# Patient Record
Sex: MALE | Race: WHITE | NOT HISPANIC OR LATINO | Employment: OTHER | URBAN - METROPOLITAN AREA
[De-identification: names, ages, dates, MRNs, and addresses within clinical notes are randomized per-mention and may not be internally consistent; named-entity substitution may affect disease eponyms.]

---

## 2017-01-26 ENCOUNTER — HOSPITAL ENCOUNTER (OUTPATIENT)
Dept: SLEEP MEDICINE | Facility: HOSPITAL | Age: 62
Discharge: HOME | End: 2017-01-26
Attending: INTERNAL MEDICINE | Admitting: INTERNAL MEDICINE

## 2017-02-24 ENCOUNTER — HOSPITAL ENCOUNTER (OUTPATIENT)
Dept: OTHER | Facility: HOSPITAL | Age: 62
Discharge: HOME | End: 2017-02-24
Attending: FAMILY MEDICINE | Admitting: FAMILY MEDICINE

## 2017-02-24 LAB
ALBUMIN SERPL-MCNC: 4 G/DL (ref 3.5–4.8)
ALBUMIN/GLOB SERPL: 1.5 {RATIO} (ref 1–1.7)
ALP SERPL-CCNC: 71 IU/L (ref 32–91)
ALT SERPL-CCNC: 18 IU/L (ref 17–63)
ANION GAP SERPL CALC-SCNC: 11.8 MMOL/L (ref 10–20)
AST SERPL-CCNC: 20 IU/L (ref 15–41)
BASOPHILS # BLD AUTO: 0.1 10*3/UL (ref 0–0.2)
BASOPHILS NFR BLD AUTO: 1 % (ref 0–2)
BILIRUB SERPL-MCNC: 0.7 MG/DL (ref 0.3–1.2)
BUN SERPL-MCNC: 9 MG/DL (ref 8–20)
BUN/CREAT SERPL: 9 (ref 6.2–20.3)
CALCIUM SERPL-MCNC: 9.4 MG/DL (ref 8.9–10.3)
CHLORIDE SERPL-SCNC: 104 MMOL/L (ref 101–111)
CHOLEST SERPL-MCNC: 140 MG/DL
CHOLEST/HDLC SERPL: 3 {RATIO}
CONV CO2: 29 MMOL/L (ref 22–32)
CONV LDL CHOLESTEROL DIRECT: 72 MG/DL (ref 0–100)
CONV TOTAL PROTEIN: 6.7 G/DL (ref 6.1–7.9)
CREAT UR-MCNC: 1 MG/DL (ref 0.7–1.2)
DIFFERENTIAL METHOD BLD: (no result)
EOSINOPHIL # BLD AUTO: 0.3 10*3/UL (ref 0–0.3)
EOSINOPHIL # BLD AUTO: 3 % (ref 0–3)
ERYTHROCYTE [DISTWIDTH] IN BLOOD BY AUTOMATED COUNT: 13.2 % (ref 11.5–14.5)
GLOBULIN UR ELPH-MCNC: 2.7 G/DL (ref 2.5–3.8)
GLUCOSE SERPL-MCNC: 128 MG/DL (ref 65–99)
HCT VFR BLD AUTO: 42.4 % (ref 40–54)
HDLC SERPL-MCNC: 46 MG/DL
HGB BLD-MCNC: 14.3 G/DL (ref 14–18)
LDLC/HDLC SERPL: 1.6 {RATIO}
LIPID INTERPRETATION: ABNORMAL
LYMPHOCYTES # BLD AUTO: 2 10*3/UL (ref 0.8–4.8)
LYMPHOCYTES NFR BLD AUTO: 21 % (ref 18–42)
MCH RBC QN AUTO: 29.3 PG (ref 26–32)
MCHC RBC AUTO-ENTMCNC: 33.7 G/DL (ref 32–36)
MCV RBC AUTO: 87 FL (ref 80–94)
MONOCYTES # BLD AUTO: 0.8 10*3/UL (ref 0.1–1.3)
MONOCYTES NFR BLD AUTO: 9 % (ref 2–11)
NEUTROPHILS # BLD AUTO: 6.1 10*3/UL (ref 2.3–8.6)
NEUTROPHILS NFR BLD AUTO: 66 % (ref 50–75)
NRBC BLD AUTO-RTO: 0 /100{WBCS}
NRBC/RBC NFR BLD MANUAL: 0 10*3/UL
PLATELET # BLD AUTO: 314 10*3/UL (ref 150–450)
PMV BLD AUTO: 8.1 FL (ref 7.4–10.4)
POTASSIUM SERPL-SCNC: 3.8 MMOL/L (ref 3.6–5.1)
RBC # BLD AUTO: 4.88 10*6/UL (ref 4.6–6)
SODIUM SERPL-SCNC: 141 MMOL/L (ref 136–144)
TRIGL SERPL-MCNC: 129 MG/DL
VLDLC SERPL CALC-MCNC: 21.2 MG/DL
WBC # BLD AUTO: 9.2 10*3/UL (ref 4.5–11.5)

## 2017-03-03 ENCOUNTER — HOSPITAL ENCOUNTER (OUTPATIENT)
Dept: NUCLEAR MEDICINE | Facility: HOSPITAL | Age: 62
Discharge: HOME | End: 2017-03-03
Attending: FAMILY MEDICINE | Admitting: FAMILY MEDICINE

## 2017-05-04 ENCOUNTER — HOSPITAL ENCOUNTER (OUTPATIENT)
Dept: SLEEP MEDICINE | Facility: HOSPITAL | Age: 62
Discharge: HOME | End: 2017-05-04
Attending: INTERNAL MEDICINE | Admitting: INTERNAL MEDICINE

## 2017-12-05 ENCOUNTER — HOSPITAL ENCOUNTER (OUTPATIENT)
Dept: RESPIRATORY THERAPY | Facility: HOSPITAL | Age: 62
Discharge: HOME | End: 2017-12-05
Attending: INTERNAL MEDICINE | Admitting: INTERNAL MEDICINE

## 2017-12-29 ENCOUNTER — HOSPITAL ENCOUNTER (OUTPATIENT)
Dept: FAMILY MEDICINE CLINIC | Facility: CLINIC | Age: 62
Setting detail: SPECIMEN
Discharge: HOME | End: 2017-12-29
Attending: PHYSICIAN ASSISTANT | Admitting: PHYSICIAN ASSISTANT

## 2017-12-29 LAB
ALBUMIN SERPL-MCNC: 4 G/DL (ref 3.5–4.8)
ALBUMIN/GLOB SERPL: 1.3 {RATIO} (ref 1–1.7)
ALP SERPL-CCNC: 66 IU/L (ref 32–91)
ALT SERPL-CCNC: 22 IU/L (ref 17–63)
ANION GAP SERPL CALC-SCNC: 11.8 MMOL/L (ref 10–20)
AST SERPL-CCNC: 24 IU/L (ref 15–41)
BASOPHILS # BLD AUTO: 0 10*3/UL (ref 0–0.2)
BASOPHILS NFR BLD AUTO: 1 % (ref 0–2)
BILIRUB SERPL-MCNC: 0.5 MG/DL (ref 0.3–1.2)
BUN SERPL-MCNC: 11 MG/DL (ref 8–20)
BUN/CREAT SERPL: 12.2 (ref 6.2–20.3)
CALCIUM SERPL-MCNC: 9.7 MG/DL (ref 8.9–10.3)
CHLORIDE SERPL-SCNC: 102 MMOL/L (ref 101–111)
CHOLEST SERPL-MCNC: 159 MG/DL
CHOLEST/HDLC SERPL: 3.2 {RATIO}
CONV CO2: 27 MMOL/L (ref 22–32)
CONV LDL CHOLESTEROL DIRECT: 91 MG/DL (ref 0–100)
CONV TOTAL PROTEIN: 7.1 G/DL (ref 6.1–7.9)
CREAT UR-MCNC: 0.9 MG/DL (ref 0.7–1.2)
DIFFERENTIAL METHOD BLD: (no result)
EOSINOPHIL # BLD AUTO: 0.2 10*3/UL (ref 0–0.3)
EOSINOPHIL # BLD AUTO: 2 % (ref 0–3)
ERYTHROCYTE [DISTWIDTH] IN BLOOD BY AUTOMATED COUNT: 13.6 % (ref 11.5–14.5)
GLOBULIN UR ELPH-MCNC: 3.1 G/DL (ref 2.5–3.8)
GLUCOSE SERPL-MCNC: 77 MG/DL (ref 65–99)
HCT VFR BLD AUTO: 43.2 % (ref 40–54)
HDLC SERPL-MCNC: 50 MG/DL
HGB BLD-MCNC: 14.2 G/DL (ref 14–18)
LDLC/HDLC SERPL: 1.8 {RATIO}
LIPID INTERPRETATION: NORMAL
LYMPHOCYTES # BLD AUTO: 2.1 10*3/UL (ref 0.8–4.8)
LYMPHOCYTES NFR BLD AUTO: 26 % (ref 18–42)
MCH RBC QN AUTO: 29.3 PG (ref 26–32)
MCHC RBC AUTO-ENTMCNC: 32.9 G/DL (ref 32–36)
MCV RBC AUTO: 89.1 FL (ref 80–94)
MONOCYTES # BLD AUTO: 0.9 10*3/UL (ref 0.1–1.3)
MONOCYTES NFR BLD AUTO: 12 % (ref 2–11)
NEUTROPHILS # BLD AUTO: 4.7 10*3/UL (ref 2.3–8.6)
NEUTROPHILS NFR BLD AUTO: 59 % (ref 50–75)
NRBC BLD AUTO-RTO: 0 /100{WBCS}
NRBC/RBC NFR BLD MANUAL: 0 10*3/UL
PLATELET # BLD AUTO: 281 10*3/UL (ref 150–450)
PMV BLD AUTO: 7.6 FL (ref 7.4–10.4)
POTASSIUM SERPL-SCNC: 3.8 MMOL/L (ref 3.6–5.1)
RBC # BLD AUTO: 4.85 10*6/UL (ref 4.6–6)
SODIUM SERPL-SCNC: 137 MMOL/L (ref 136–144)
TRIGL SERPL-MCNC: 121 MG/DL
VLDLC SERPL CALC-MCNC: 18.2 MG/DL
WBC # BLD AUTO: 7.9 10*3/UL (ref 4.5–11.5)

## 2018-05-01 ENCOUNTER — HOSPITAL ENCOUNTER (OUTPATIENT)
Dept: FAMILY MEDICINE CLINIC | Facility: CLINIC | Age: 63
Setting detail: SPECIMEN
Discharge: HOME | End: 2018-05-01
Attending: PHYSICIAN ASSISTANT | Admitting: PHYSICIAN ASSISTANT

## 2018-05-01 LAB
ALBUMIN SERPL-MCNC: 3.9 G/DL (ref 3.5–4.8)
ALBUMIN/GLOB SERPL: 1.3 {RATIO} (ref 1–1.7)
ALP SERPL-CCNC: 77 IU/L (ref 32–91)
ALT SERPL-CCNC: 19 IU/L (ref 17–63)
ANION GAP SERPL CALC-SCNC: 13.6 MMOL/L (ref 10–20)
AST SERPL-CCNC: 17 IU/L (ref 15–41)
BASOPHILS # BLD AUTO: 0.1 10*3/UL (ref 0–0.2)
BASOPHILS NFR BLD AUTO: 0 % (ref 0–2)
BILIRUB SERPL-MCNC: 0.5 MG/DL (ref 0.3–1.2)
BUN SERPL-MCNC: 9 MG/DL (ref 8–20)
BUN/CREAT SERPL: 11.3 (ref 6.2–20.3)
CALCIUM SERPL-MCNC: 9.3 MG/DL (ref 8.9–10.3)
CHLORIDE SERPL-SCNC: 102 MMOL/L (ref 101–111)
CONV CO2: 26 MMOL/L (ref 22–32)
CONV TOTAL PROTEIN: 7 G/DL (ref 6.1–7.9)
CREAT UR-MCNC: 0.8 MG/DL (ref 0.7–1.2)
DIFFERENTIAL METHOD BLD: (no result)
EOSINOPHIL # BLD AUTO: 0.2 10*3/UL (ref 0–0.3)
EOSINOPHIL # BLD AUTO: 2 % (ref 0–3)
ERYTHROCYTE [DISTWIDTH] IN BLOOD BY AUTOMATED COUNT: 13.3 % (ref 11.5–14.5)
GLOBULIN UR ELPH-MCNC: 3.1 G/DL (ref 2.5–3.8)
GLUCOSE SERPL-MCNC: 103 MG/DL (ref 65–99)
HCT VFR BLD AUTO: 42.1 % (ref 40–54)
HGB BLD-MCNC: 13.9 G/DL (ref 14–18)
LYMPHOCYTES # BLD AUTO: 2 10*3/UL (ref 0.8–4.8)
LYMPHOCYTES NFR BLD AUTO: 15 % (ref 18–42)
MCH RBC QN AUTO: 29 PG (ref 26–32)
MCHC RBC AUTO-ENTMCNC: 33 G/DL (ref 32–36)
MCV RBC AUTO: 87.8 FL (ref 80–94)
MONOCYTES # BLD AUTO: 1.4 10*3/UL (ref 0.1–1.3)
MONOCYTES NFR BLD AUTO: 11 % (ref 2–11)
NEUTROPHILS # BLD AUTO: 9.8 10*3/UL (ref 2.3–8.6)
NEUTROPHILS NFR BLD AUTO: 72 % (ref 50–75)
NRBC BLD AUTO-RTO: 0 /100{WBCS}
NRBC/RBC NFR BLD MANUAL: 0 10*3/UL
PLATELET # BLD AUTO: 296 10*3/UL (ref 150–450)
PMV BLD AUTO: 7.9 FL (ref 7.4–10.4)
POTASSIUM SERPL-SCNC: 3.6 MMOL/L (ref 3.6–5.1)
RBC # BLD AUTO: 4.79 10*6/UL (ref 4.6–6)
SODIUM SERPL-SCNC: 138 MMOL/L (ref 136–144)
WBC # BLD AUTO: 13.5 10*3/UL (ref 4.5–11.5)

## 2018-05-02 LAB
EBV AB TO VIRAL CAPSID AG, IGM: NORMAL
HAV IGM SERPL QL IA: NONREACTIVE

## 2018-05-04 ENCOUNTER — HOSPITAL ENCOUNTER (OUTPATIENT)
Dept: FAMILY MEDICINE CLINIC | Facility: CLINIC | Age: 63
Setting detail: SPECIMEN
Discharge: HOME | End: 2018-05-04
Attending: PHYSICIAN ASSISTANT | Admitting: PHYSICIAN ASSISTANT

## 2018-05-04 LAB
BASOPHILS # BLD AUTO: 0.1 10*3/UL (ref 0–0.2)
BASOPHILS NFR BLD AUTO: 1 % (ref 0–2)
BILIRUB UR QL STRIP: NEGATIVE MG/DL
CASTS URNS QL MICRO: NORMAL /[LPF]
COLOR UR: YELLOW
CONV BACTERIA IN URINE MICRO: NEGATIVE
CONV CLARITY OF URINE: CLEAR
CONV HYALINE CASTS IN URINE MICRO: 0 /[LPF] (ref 0–5)
CONV PROTEIN IN URINE BY AUTOMATED TEST STRIP: NEGATIVE MG/DL
CONV SMALL ROUND CELLS: NORMAL /[HPF]
CONV UROBILINOGEN IN URINE BY AUTOMATED TEST STRIP: 0.2 MG/DL
CULTURE INDICATED?: NORMAL
DIFFERENTIAL METHOD BLD: (no result)
EOSINOPHIL # BLD AUTO: 0.2 10*3/UL (ref 0–0.3)
EOSINOPHIL # BLD AUTO: 2 % (ref 0–3)
ERYTHROCYTE [DISTWIDTH] IN BLOOD BY AUTOMATED COUNT: 13.3 % (ref 11.5–14.5)
GLUCOSE UR QL: NEGATIVE MG/DL
HCT VFR BLD AUTO: 38.8 % (ref 40–54)
HGB BLD-MCNC: 12.6 G/DL (ref 14–18)
HGB UR QL STRIP: NEGATIVE
KETONES UR QL STRIP: NEGATIVE MG/DL
LEUKOCYTE ESTERASE UR QL STRIP: NEGATIVE
LYMPHOCYTES # BLD AUTO: 2.4 10*3/UL (ref 0.8–4.8)
LYMPHOCYTES NFR BLD AUTO: 22 % (ref 18–42)
MCH RBC QN AUTO: 28.7 PG (ref 26–32)
MCHC RBC AUTO-ENTMCNC: 32.6 G/DL (ref 32–36)
MCV RBC AUTO: 88.2 FL (ref 80–94)
MONOCYTES # BLD AUTO: 1.1 10*3/UL (ref 0.1–1.3)
MONOCYTES NFR BLD AUTO: 10 % (ref 2–11)
NEUTROPHILS # BLD AUTO: 7.4 10*3/UL (ref 2.3–8.6)
NEUTROPHILS NFR BLD AUTO: 65 % (ref 50–75)
NITRITE UR QL STRIP: NEGATIVE
NRBC BLD AUTO-RTO: 0 /100{WBCS}
NRBC/RBC NFR BLD MANUAL: 0 10*3/UL
PH UR STRIP.AUTO: 5.5 [PH] (ref 4.5–8)
PLATELET # BLD AUTO: 272 10*3/UL (ref 150–450)
PMV BLD AUTO: 7.6 FL (ref 7.4–10.4)
RBC # BLD AUTO: 4.4 10*6/UL (ref 4.6–6)
RBC #/AREA URNS HPF: 0 /[HPF] (ref 0–3)
SP GR UR: 1.02 (ref 1–1.03)
SPERM URNS QL MICRO: NORMAL /[HPF]
SQUAMOUS SPT QL MICRO: 0 /[HPF] (ref 0–5)
UNIDENT CRYS URNS QL MICRO: NORMAL /[HPF]
WBC # BLD AUTO: 11.3 10*3/UL (ref 4.5–11.5)
WBC #/AREA URNS HPF: 0 /[HPF] (ref 0–5)
YEAST SPEC QL WET PREP: NORMAL /[HPF]

## 2018-05-11 ENCOUNTER — HOSPITAL ENCOUNTER (OUTPATIENT)
Dept: FAMILY MEDICINE CLINIC | Facility: CLINIC | Age: 63
Setting detail: SPECIMEN
Discharge: HOME | End: 2018-05-11
Attending: PHYSICIAN ASSISTANT | Admitting: PHYSICIAN ASSISTANT

## 2018-05-11 LAB
BASOPHILS # BLD AUTO: 0.1 10*3/UL (ref 0–0.2)
BASOPHILS NFR BLD AUTO: 1 % (ref 0–2)
DIFFERENTIAL METHOD BLD: (no result)
EOSINOPHIL # BLD AUTO: 0.3 10*3/UL (ref 0–0.3)
EOSINOPHIL # BLD AUTO: 3 % (ref 0–3)
ERYTHROCYTE [DISTWIDTH] IN BLOOD BY AUTOMATED COUNT: 13.1 % (ref 11.5–14.5)
HCT VFR BLD AUTO: 41.2 % (ref 40–54)
HGB BLD-MCNC: 13.7 G/DL (ref 14–18)
LYMPHOCYTES # BLD AUTO: 2.4 10*3/UL (ref 0.8–4.8)
LYMPHOCYTES NFR BLD AUTO: 25 % (ref 18–42)
MCH RBC QN AUTO: 29.2 PG (ref 26–32)
MCHC RBC AUTO-ENTMCNC: 33.1 G/DL (ref 32–36)
MCV RBC AUTO: 88.1 FL (ref 80–94)
MONOCYTES # BLD AUTO: 0.9 10*3/UL (ref 0.1–1.3)
MONOCYTES NFR BLD AUTO: 9 % (ref 2–11)
NEUTROPHILS # BLD AUTO: 6.2 10*3/UL (ref 2.3–8.6)
NEUTROPHILS NFR BLD AUTO: 62 % (ref 50–75)
NRBC BLD AUTO-RTO: 0 /100{WBCS}
NRBC/RBC NFR BLD MANUAL: 0 10*3/UL
PLATELET # BLD AUTO: 346 10*3/UL (ref 150–450)
PMV BLD AUTO: 7.9 FL (ref 7.4–10.4)
RBC # BLD AUTO: 4.68 10*6/UL (ref 4.6–6)
WBC # BLD AUTO: 9.9 10*3/UL (ref 4.5–11.5)

## 2018-06-26 ENCOUNTER — HOSPITAL ENCOUNTER (OUTPATIENT)
Dept: RESPIRATORY THERAPY | Facility: HOSPITAL | Age: 63
Discharge: HOME | End: 2018-06-26
Attending: INTERNAL MEDICINE | Admitting: INTERNAL MEDICINE

## 2018-06-29 ENCOUNTER — HOSPITAL ENCOUNTER (OUTPATIENT)
Dept: FAMILY MEDICINE CLINIC | Facility: CLINIC | Age: 63
Setting detail: SPECIMEN
Discharge: HOME | End: 2018-06-29
Attending: PHYSICIAN ASSISTANT | Admitting: PHYSICIAN ASSISTANT

## 2018-06-29 LAB
CHOLEST SERPL-MCNC: 152 MG/DL
CHOLEST/HDLC SERPL: 3.5 {RATIO}
CONV LDL CHOLESTEROL DIRECT: 87 MG/DL (ref 0–100)
HDLC SERPL-MCNC: 43 MG/DL
LDLC/HDLC SERPL: 2 {RATIO}
LIPID INTERPRETATION: ABNORMAL
TRIGL SERPL-MCNC: 144 MG/DL
VLDLC SERPL CALC-MCNC: 22.4 MG/DL

## 2018-08-08 ENCOUNTER — HOSPITAL ENCOUNTER (OUTPATIENT)
Dept: RESPIRATORY THERAPY | Facility: HOSPITAL | Age: 63
Discharge: HOME | End: 2018-08-08
Attending: INTERNAL MEDICINE | Admitting: INTERNAL MEDICINE

## 2018-08-14 ENCOUNTER — HOSPITAL ENCOUNTER (OUTPATIENT)
Dept: RESPIRATORY THERAPY | Facility: HOSPITAL | Age: 63
Discharge: HOME | End: 2018-08-14
Attending: INTERNAL MEDICINE | Admitting: INTERNAL MEDICINE

## 2018-08-14 ENCOUNTER — HOSPITAL ENCOUNTER (OUTPATIENT)
Dept: ONCOLOGY | Facility: HOSPITAL | Age: 63
Discharge: HOME | End: 2018-08-14
Attending: PHYSICIAN ASSISTANT | Admitting: PHYSICIAN ASSISTANT

## 2018-08-23 ENCOUNTER — HOSPITAL ENCOUNTER (OUTPATIENT)
Dept: SLEEP MEDICINE | Facility: HOSPITAL | Age: 63
Discharge: HOME | End: 2018-08-23
Attending: INTERNAL MEDICINE | Admitting: INTERNAL MEDICINE

## 2018-09-26 ENCOUNTER — HOSPITAL ENCOUNTER (OUTPATIENT)
Dept: RESPIRATORY THERAPY | Facility: HOSPITAL | Age: 63
Discharge: HOME | End: 2018-09-26
Attending: INTERNAL MEDICINE | Admitting: INTERNAL MEDICINE

## 2018-10-12 ENCOUNTER — HOSPITAL ENCOUNTER (OUTPATIENT)
Dept: FAMILY MEDICINE CLINIC | Facility: CLINIC | Age: 63
Setting detail: SPECIMEN
Discharge: HOME | End: 2018-10-12
Attending: PHYSICIAN ASSISTANT | Admitting: PHYSICIAN ASSISTANT

## 2018-10-12 LAB
ALBUMIN SERPL-MCNC: 4.1 G/DL (ref 3.5–4.8)
ALBUMIN/GLOB SERPL: 1.5 {RATIO} (ref 1–1.7)
ALP SERPL-CCNC: 66 IU/L (ref 32–91)
ALT SERPL-CCNC: 23 IU/L (ref 17–63)
ANION GAP SERPL CALC-SCNC: 16.9 MMOL/L (ref 10–20)
AST SERPL-CCNC: 19 IU/L (ref 15–41)
BILIRUB SERPL-MCNC: 0.6 MG/DL (ref 0.3–1.2)
BUN SERPL-MCNC: 10 MG/DL (ref 8–20)
BUN/CREAT SERPL: 11.1 (ref 6.2–20.3)
CALCIUM SERPL-MCNC: 9.5 MG/DL (ref 8.9–10.3)
CHLORIDE SERPL-SCNC: 103 MMOL/L (ref 101–111)
CHOLEST SERPL-MCNC: 148 MG/DL
CHOLEST/HDLC SERPL: 2.5 {RATIO}
CONV CO2: 25 MMOL/L (ref 22–32)
CONV LDL CHOLESTEROL DIRECT: 72 MG/DL (ref 0–100)
CONV TOTAL PROTEIN: 6.9 G/DL (ref 6.1–7.9)
CREAT UR-MCNC: 0.9 MG/DL (ref 0.7–1.2)
GLOBULIN UR ELPH-MCNC: 2.8 G/DL (ref 2.5–3.8)
GLUCOSE SERPL-MCNC: 104 MG/DL (ref 65–99)
HDLC SERPL-MCNC: 59 MG/DL
LDLC/HDLC SERPL: 1.2 {RATIO}
LIPID INTERPRETATION: NORMAL
POTASSIUM SERPL-SCNC: 3.9 MMOL/L (ref 3.6–5.1)
SODIUM SERPL-SCNC: 141 MMOL/L (ref 136–144)
TRIGL SERPL-MCNC: 135 MG/DL
VLDLC SERPL CALC-MCNC: 17.1 MG/DL

## 2018-10-24 ENCOUNTER — HOSPITAL ENCOUNTER (OUTPATIENT)
Dept: RESPIRATORY THERAPY | Facility: HOSPITAL | Age: 63
Discharge: HOME | End: 2018-10-24
Attending: INTERNAL MEDICINE | Admitting: INTERNAL MEDICINE

## 2018-11-02 ENCOUNTER — HOSPITAL ENCOUNTER (OUTPATIENT)
Dept: CT IMAGING | Facility: HOSPITAL | Age: 63
Discharge: HOME | End: 2018-11-02
Attending: INTERNAL MEDICINE | Admitting: INTERNAL MEDICINE

## 2018-11-14 ENCOUNTER — HOSPITAL ENCOUNTER (OUTPATIENT)
Dept: RESPIRATORY THERAPY | Facility: HOSPITAL | Age: 63
Discharge: HOME | End: 2018-11-14
Attending: INTERNAL MEDICINE | Admitting: INTERNAL MEDICINE

## 2018-11-21 ENCOUNTER — OFFICE (OUTPATIENT)
Dept: URBAN - METROPOLITAN AREA CLINIC 64 | Facility: CLINIC | Age: 63
End: 2018-11-21

## 2018-11-21 VITALS
HEART RATE: 77 BPM | DIASTOLIC BLOOD PRESSURE: 67 MMHG | HEIGHT: 68 IN | SYSTOLIC BLOOD PRESSURE: 120 MMHG | WEIGHT: 226 LBS

## 2018-11-21 DIAGNOSIS — R14.0 ABDOMINAL DISTENSION (GASEOUS): ICD-10-CM

## 2018-11-21 DIAGNOSIS — R10.13 EPIGASTRIC PAIN: ICD-10-CM

## 2018-11-21 DIAGNOSIS — Z79.02 LONG TERM (CURRENT) USE OF ANTITHROMBOTICS/ANTIPLATELETS: ICD-10-CM

## 2018-11-21 DIAGNOSIS — R10.11 RIGHT UPPER QUADRANT PAIN: ICD-10-CM

## 2018-11-21 DIAGNOSIS — R19.7 DIARRHEA, UNSPECIFIED: ICD-10-CM

## 2018-11-21 DIAGNOSIS — K80.20 CALCULUS OF GALLBLADDER WITHOUT CHOLECYSTITIS WITHOUT OBSTRU: ICD-10-CM

## 2018-11-21 DIAGNOSIS — R93.2 ABNORMAL FINDINGS ON DIAGNOSTIC IMAGING OF LIVER AND BILIARY: ICD-10-CM

## 2018-11-21 PROCEDURE — 99244 OFF/OP CNSLTJ NEW/EST MOD 40: CPT | Performed by: NURSE PRACTITIONER

## 2018-11-21 RX ORDER — OMEPRAZOLE 40 MG/1
40 CAPSULE, DELAYED RELEASE ORAL
Qty: 30 | Refills: 0 | Status: COMPLETED
Start: 2018-11-21 | End: 2024-05-29

## 2018-11-28 ENCOUNTER — OFFICE (OUTPATIENT)
Dept: URBAN - METROPOLITAN AREA CLINIC 64 | Facility: CLINIC | Age: 63
End: 2018-11-28

## 2018-11-28 VITALS
SYSTOLIC BLOOD PRESSURE: 126 MMHG | HEART RATE: 96 BPM | HEIGHT: 68 IN | WEIGHT: 228 LBS | DIASTOLIC BLOOD PRESSURE: 60 MMHG

## 2018-11-28 DIAGNOSIS — Z79.02 LONG TERM (CURRENT) USE OF ANTITHROMBOTICS/ANTIPLATELETS: ICD-10-CM

## 2018-11-28 DIAGNOSIS — K80.20 CALCULUS OF GALLBLADDER WITHOUT CHOLECYSTITIS WITHOUT OBSTRU: ICD-10-CM

## 2018-11-28 DIAGNOSIS — R19.7 DIARRHEA, UNSPECIFIED: ICD-10-CM

## 2018-11-28 DIAGNOSIS — R10.11 RIGHT UPPER QUADRANT PAIN: ICD-10-CM

## 2018-11-28 DIAGNOSIS — R14.0 ABDOMINAL DISTENSION (GASEOUS): ICD-10-CM

## 2018-11-28 DIAGNOSIS — K75.81 NONALCOHOLIC STEATOHEPATITIS (NASH): ICD-10-CM

## 2018-11-28 DIAGNOSIS — K74.69 OTHER CIRRHOSIS OF LIVER: ICD-10-CM

## 2018-11-28 PROCEDURE — 99213 OFFICE O/P EST LOW 20 MIN: CPT | Performed by: INTERNAL MEDICINE

## 2018-12-03 ENCOUNTER — HOSPITAL ENCOUNTER (OUTPATIENT)
Dept: NUCLEAR MEDICINE | Facility: HOSPITAL | Age: 63
Discharge: HOME | End: 2018-12-03
Attending: INTERNAL MEDICINE | Admitting: INTERNAL MEDICINE

## 2018-12-26 ENCOUNTER — OFFICE (OUTPATIENT)
Dept: URBAN - METROPOLITAN AREA CLINIC 64 | Facility: CLINIC | Age: 63
End: 2018-12-26
Payer: COMMERCIAL

## 2018-12-26 VITALS
DIASTOLIC BLOOD PRESSURE: 78 MMHG | SYSTOLIC BLOOD PRESSURE: 131 MMHG | WEIGHT: 225 LBS | HEART RATE: 92 BPM | HEIGHT: 68 IN

## 2018-12-26 DIAGNOSIS — K80.20 CALCULUS OF GALLBLADDER WITHOUT CHOLECYSTITIS WITHOUT OBSTRU: ICD-10-CM

## 2018-12-26 DIAGNOSIS — Z79.02 LONG TERM (CURRENT) USE OF ANTITHROMBOTICS/ANTIPLATELETS: ICD-10-CM

## 2018-12-26 DIAGNOSIS — R10.13 EPIGASTRIC PAIN: ICD-10-CM

## 2018-12-26 DIAGNOSIS — Z86.010 PERSONAL HISTORY OF COLONIC POLYPS: ICD-10-CM

## 2018-12-26 DIAGNOSIS — K75.81 NONALCOHOLIC STEATOHEPATITIS (NASH): ICD-10-CM

## 2018-12-26 DIAGNOSIS — R93.2 ABNORMAL FINDINGS ON DIAGNOSTIC IMAGING OF LIVER AND BILIARY: ICD-10-CM

## 2018-12-26 DIAGNOSIS — R10.11 RIGHT UPPER QUADRANT PAIN: ICD-10-CM

## 2018-12-26 PROCEDURE — 99213 OFFICE O/P EST LOW 20 MIN: CPT | Performed by: NURSE PRACTITIONER

## 2019-02-12 ENCOUNTER — HOSPITAL ENCOUNTER (OUTPATIENT)
Dept: RESPIRATORY THERAPY | Facility: HOSPITAL | Age: 64
Discharge: HOME | End: 2019-02-12
Attending: NURSE PRACTITIONER | Admitting: NURSE PRACTITIONER

## 2019-02-22 ENCOUNTER — OFFICE (OUTPATIENT)
Dept: URBAN - METROPOLITAN AREA CLINIC 64 | Facility: CLINIC | Age: 64
End: 2019-02-22

## 2019-02-22 VITALS
WEIGHT: 234 LBS | HEART RATE: 84 BPM | HEIGHT: 68 IN | SYSTOLIC BLOOD PRESSURE: 111 MMHG | DIASTOLIC BLOOD PRESSURE: 67 MMHG

## 2019-02-22 DIAGNOSIS — K74.60 UNSPECIFIED CIRRHOSIS OF LIVER: ICD-10-CM

## 2019-02-22 DIAGNOSIS — Z86.010 PERSONAL HISTORY OF COLONIC POLYPS: ICD-10-CM

## 2019-02-22 DIAGNOSIS — K76.9 LIVER DISEASE, UNSPECIFIED: ICD-10-CM

## 2019-02-22 DIAGNOSIS — Z80.0 FAMILY HISTORY OF MALIGNANT NEOPLASM OF DIGESTIVE ORGANS: ICD-10-CM

## 2019-02-22 DIAGNOSIS — Z79.02 LONG TERM (CURRENT) USE OF ANTITHROMBOTICS/ANTIPLATELETS: ICD-10-CM

## 2019-02-22 DIAGNOSIS — K75.81 NONALCOHOLIC STEATOHEPATITIS (NASH): ICD-10-CM

## 2019-02-22 PROCEDURE — 99214 OFFICE O/P EST MOD 30 MIN: CPT | Performed by: INTERNAL MEDICINE

## 2019-02-22 RX ORDER — OMEPRAZOLE 40 MG/1
40 CAPSULE, DELAYED RELEASE ORAL
Qty: 30 | Refills: 0 | Status: COMPLETED
Start: 2018-11-21 | End: 2024-05-29

## 2019-05-17 ENCOUNTER — OFFICE (OUTPATIENT)
Dept: URBAN - METROPOLITAN AREA CLINIC 64 | Facility: CLINIC | Age: 64
End: 2019-05-17

## 2019-05-17 VITALS
DIASTOLIC BLOOD PRESSURE: 77 MMHG | HEIGHT: 68 IN | SYSTOLIC BLOOD PRESSURE: 134 MMHG | WEIGHT: 242 LBS | HEART RATE: 83 BPM

## 2019-05-17 DIAGNOSIS — K74.69 OTHER CIRRHOSIS OF LIVER: ICD-10-CM

## 2019-05-17 DIAGNOSIS — R10.11 RIGHT UPPER QUADRANT PAIN: ICD-10-CM

## 2019-05-17 DIAGNOSIS — R10.13 EPIGASTRIC PAIN: ICD-10-CM

## 2019-05-17 DIAGNOSIS — K76.9 LIVER DISEASE, UNSPECIFIED: ICD-10-CM

## 2019-05-17 PROCEDURE — 99213 OFFICE O/P EST LOW 20 MIN: CPT | Performed by: INTERNAL MEDICINE

## 2019-05-20 ENCOUNTER — HOSPITAL ENCOUNTER (OUTPATIENT)
Dept: FAMILY MEDICINE CLINIC | Facility: CLINIC | Age: 64
Setting detail: SPECIMEN
Discharge: HOME | End: 2019-05-20
Attending: PHYSICIAN ASSISTANT | Admitting: PHYSICIAN ASSISTANT

## 2019-05-20 LAB
ALBUMIN SERPL-MCNC: 4 G/DL (ref 3.5–4.8)
ALBUMIN/GLOB SERPL: 1.5 {RATIO} (ref 1–1.7)
ALP SERPL-CCNC: 68 IU/L (ref 32–91)
ALT SERPL-CCNC: 20 IU/L (ref 17–63)
ANION GAP SERPL CALC-SCNC: 14.2 MMOL/L (ref 10–20)
AST SERPL-CCNC: 21 IU/L (ref 15–41)
BILIRUB SERPL-MCNC: 0.5 MG/DL (ref 0.3–1.2)
BUN SERPL-MCNC: 8 MG/DL (ref 8–20)
BUN/CREAT SERPL: 8.9 (ref 6.2–20.3)
CALCIUM SERPL-MCNC: 9.1 MG/DL (ref 8.9–10.3)
CHLORIDE SERPL-SCNC: 102 MMOL/L (ref 101–111)
CHOLEST SERPL-MCNC: 130 MG/DL
CHOLEST/HDLC SERPL: 3.7 {RATIO}
CONV CO2: 26 MMOL/L (ref 22–32)
CONV LDL CHOLESTEROL DIRECT: 77 MG/DL (ref 0–100)
CONV TOTAL PROTEIN: 6.6 G/DL (ref 6.1–7.9)
CREAT UR-MCNC: 0.9 MG/DL (ref 0.7–1.2)
GLOBULIN UR ELPH-MCNC: 2.6 G/DL (ref 2.5–3.8)
GLUCOSE SERPL-MCNC: 90 MG/DL (ref 65–99)
HDLC SERPL-MCNC: 35 MG/DL
LDLC/HDLC SERPL: 2.2 {RATIO}
LIPID INTERPRETATION: ABNORMAL
POTASSIUM SERPL-SCNC: 4.2 MMOL/L (ref 3.6–5.1)
PSA SERPL-MCNC: 0.61 NG/ML (ref 0–4)
SODIUM SERPL-SCNC: 138 MMOL/L (ref 136–144)
TRIGL SERPL-MCNC: 123 MG/DL
VLDLC SERPL CALC-MCNC: 18 MG/DL

## 2019-06-17 ENCOUNTER — TELEPHONE (OUTPATIENT)
Dept: FAMILY MEDICINE CLINIC | Facility: CLINIC | Age: 64
End: 2019-06-17

## 2019-06-17 RX ORDER — HYDROCODONE BITARTRATE AND ACETAMINOPHEN 10; 325 MG/1; MG/1
1 TABLET ORAL EVERY 6 HOURS PRN
Qty: 120 TABLET | Refills: 0 | Status: SHIPPED | OUTPATIENT
Start: 2019-06-17 | End: 2019-07-18 | Stop reason: SDUPTHER

## 2019-06-17 RX ORDER — HYDROCODONE BITARTRATE AND ACETAMINOPHEN 10; 325 MG/1; MG/1
TABLET ORAL
Refills: 0 | COMMUNITY
Start: 2019-04-26 | End: 2019-06-17 | Stop reason: SDUPTHER

## 2019-07-10 ENCOUNTER — APPOINTMENT (OUTPATIENT)
Dept: PULMONOLOGY | Facility: HOSPITAL | Age: 64
End: 2019-07-10

## 2019-07-15 RX ORDER — ASPIRIN 81 MG/1
81 TABLET ORAL DAILY
COMMUNITY
Start: 2018-11-20 | End: 2021-03-01

## 2019-07-15 RX ORDER — CLOPIDOGREL BISULFATE 75 MG/1
75 TABLET ORAL DAILY
COMMUNITY
Start: 2019-05-20 | End: 2019-10-31 | Stop reason: SDUPTHER

## 2019-07-15 RX ORDER — HYDROCODONE BITARTRATE AND ACETAMINOPHEN 10; 325 MG/1; MG/1
1 TABLET ORAL EVERY 6 HOURS PRN
Qty: 120 TABLET | Refills: 0 | Status: CANCELLED | OUTPATIENT
Start: 2019-07-15

## 2019-07-15 RX ORDER — DULOXETIN HYDROCHLORIDE 60 MG/1
CAPSULE, DELAYED RELEASE ORAL
COMMUNITY
Start: 2019-05-20 | End: 2019-08-15 | Stop reason: SDUPTHER

## 2019-07-15 RX ORDER — DILTIAZEM HYDROCHLORIDE 60 MG/1
TABLET, FILM COATED ORAL
COMMUNITY
Start: 2018-08-28 | End: 2019-09-26 | Stop reason: SDUPTHER

## 2019-07-15 RX ORDER — FUROSEMIDE 40 MG/1
TABLET ORAL EVERY 24 HOURS
COMMUNITY
Start: 2018-11-20 | End: 2019-08-19

## 2019-07-15 RX ORDER — DICYCLOMINE HYDROCHLORIDE 10 MG/1
CAPSULE ORAL
Refills: 2 | COMMUNITY
Start: 2019-04-16 | End: 2019-07-25 | Stop reason: SDUPTHER

## 2019-07-15 RX ORDER — ATORVASTATIN CALCIUM 40 MG/1
TABLET, FILM COATED ORAL
COMMUNITY
Start: 2019-05-20 | End: 2019-10-01 | Stop reason: SDUPTHER

## 2019-07-15 RX ORDER — AMLODIPINE BESYLATE 10 MG/1
TABLET ORAL EVERY 24 HOURS
COMMUNITY
Start: 2017-08-08 | End: 2019-09-14 | Stop reason: SDUPTHER

## 2019-07-15 RX ORDER — IPRATROPIUM BROMIDE AND ALBUTEROL SULFATE 2.5; .5 MG/3ML; MG/3ML
SOLUTION RESPIRATORY (INHALATION)
COMMUNITY
Start: 2011-10-18 | End: 2019-10-27

## 2019-07-17 NOTE — TELEPHONE ENCOUNTER
Informed pt that Dr. Mace wanted to see him prior to filling med.  Offered 8am appt on 7/18/19. Pt very upset.  I apologized and explained that with this physician never seeing him she has to take precautions when writing these types of meds.  Pt still not happy and asked when Kathryn would be back. Explained to pt that we were just informed she would not be returning here and going full time at another office.  Pt very upset and stated he was calling that office because he wants to follow her. I tried apologizing to pt and again offered the appt slot for tomorrow but he declined.

## 2019-07-18 ENCOUNTER — OFFICE VISIT (OUTPATIENT)
Dept: FAMILY MEDICINE CLINIC | Facility: CLINIC | Age: 64
End: 2019-07-18

## 2019-07-18 VITALS
HEIGHT: 68 IN | BODY MASS INDEX: 36.22 KG/M2 | WEIGHT: 239 LBS | HEART RATE: 84 BPM | RESPIRATION RATE: 16 BRPM | DIASTOLIC BLOOD PRESSURE: 89 MMHG | SYSTOLIC BLOOD PRESSURE: 138 MMHG | TEMPERATURE: 97.9 F | OXYGEN SATURATION: 96 %

## 2019-07-18 DIAGNOSIS — G89.29 CHRONIC BILATERAL LOW BACK PAIN WITH BILATERAL SCIATICA: Primary | ICD-10-CM

## 2019-07-18 DIAGNOSIS — M54.42 CHRONIC BILATERAL LOW BACK PAIN WITH BILATERAL SCIATICA: Primary | ICD-10-CM

## 2019-07-18 DIAGNOSIS — M54.41 CHRONIC BILATERAL LOW BACK PAIN WITH BILATERAL SCIATICA: Primary | ICD-10-CM

## 2019-07-18 PROBLEM — K74.60 LIVER CIRRHOSIS: Status: ACTIVE | Noted: 2018-11-08

## 2019-07-18 PROBLEM — D64.9 ANEMIA: Status: ACTIVE | Noted: 2018-05-07

## 2019-07-18 PROBLEM — Z95.5 STATUS POST CORONARY ARTERY STENT PLACEMENT: Status: ACTIVE | Noted: 2018-11-20

## 2019-07-18 PROBLEM — K21.9 GASTROESOPHAGEAL REFLUX DISEASE: Status: ACTIVE | Noted: 2019-01-10

## 2019-07-18 PROBLEM — K80.20 GALLSTONES: Status: ACTIVE | Noted: 2018-11-26

## 2019-07-18 PROBLEM — I25.10 CHRONIC CORONARY ARTERY DISEASE: Status: ACTIVE | Noted: 2018-11-02

## 2019-07-18 PROBLEM — M62.838 MUSCLE SPASM: Status: ACTIVE | Noted: 2019-01-10

## 2019-07-18 PROBLEM — Z12.5 PROSTATE CANCER SCREENING: Status: ACTIVE | Noted: 2017-12-29

## 2019-07-18 PROBLEM — F41.8 MIXED ANXIETY DEPRESSIVE DISORDER: Status: ACTIVE | Noted: 2018-10-12

## 2019-07-18 PROBLEM — R50.9 FEVER OF UNKNOWN ORIGIN: Status: ACTIVE | Noted: 2019-04-02

## 2019-07-18 PROCEDURE — 99214 OFFICE O/P EST MOD 30 MIN: CPT | Performed by: FAMILY MEDICINE

## 2019-07-18 PROCEDURE — 80305 DRUG TEST PRSMV DIR OPT OBS: CPT | Performed by: FAMILY MEDICINE

## 2019-07-18 RX ORDER — PREGABALIN 100 MG/1
100 CAPSULE ORAL 2 TIMES DAILY
Qty: 60 CAPSULE | Refills: 2 | Status: SHIPPED | OUTPATIENT
Start: 2019-07-18 | End: 2019-11-20 | Stop reason: SDUPTHER

## 2019-07-18 RX ORDER — HYDROCODONE BITARTRATE AND ACETAMINOPHEN 10; 325 MG/1; MG/1
1 TABLET ORAL EVERY 6 HOURS PRN
Qty: 120 TABLET | Refills: 0 | Status: SHIPPED | OUTPATIENT
Start: 2019-07-18 | End: 2019-08-12 | Stop reason: SDUPTHER

## 2019-07-25 RX ORDER — DICYCLOMINE HYDROCHLORIDE 10 MG/1
CAPSULE ORAL
Qty: 60 CAPSULE | Refills: 0 | Status: SHIPPED | OUTPATIENT
Start: 2019-07-25 | End: 2019-08-26 | Stop reason: SDUPTHER

## 2019-07-26 ENCOUNTER — HOSPITAL ENCOUNTER (OUTPATIENT)
Dept: CT IMAGING | Facility: HOSPITAL | Age: 64
Discharge: HOME | End: 2019-07-26
Admitting: INTERNAL MEDICINE

## 2019-07-26 VITALS
DIASTOLIC BLOOD PRESSURE: 79 MMHG | TEMPERATURE: 97.8 F | RESPIRATION RATE: 16 BRPM | HEIGHT: 68 IN | BODY MASS INDEX: 36.12 KG/M2 | SYSTOLIC BLOOD PRESSURE: 124 MMHG | WEIGHT: 238.32 LBS | OXYGEN SATURATION: 95 % | HEART RATE: 73 BPM

## 2019-07-26 DIAGNOSIS — K75.81 NASH (NONALCOHOLIC STEATOHEPATITIS): ICD-10-CM

## 2019-07-26 LAB
APTT PPP: 28.6 SECONDS (ref 24–31)
BASOPHILS # BLD AUTO: 0.1 10*3/MM3 (ref 0–0.2)
BASOPHILS NFR BLD AUTO: 1.1 % (ref 0–1.5)
DEPRECATED RDW RBC AUTO: 40.7 FL (ref 37–54)
EOSINOPHIL # BLD AUTO: 0.2 10*3/MM3 (ref 0–0.4)
EOSINOPHIL NFR BLD AUTO: 3.1 % (ref 0.3–6.2)
ERYTHROCYTE [DISTWIDTH] IN BLOOD BY AUTOMATED COUNT: 13.2 % (ref 12.3–15.4)
HCT VFR BLD AUTO: 40 % (ref 37.5–51)
HGB BLD-MCNC: 13.4 G/DL (ref 13–17.7)
INR PPP: 0.92 (ref 0.9–1.1)
LYMPHOCYTES # BLD AUTO: 1.3 10*3/MM3 (ref 0.7–3.1)
LYMPHOCYTES NFR BLD AUTO: 18.8 % (ref 19.6–45.3)
MCH RBC QN AUTO: 29.4 PG (ref 26.6–33)
MCHC RBC AUTO-ENTMCNC: 33.4 G/DL (ref 31.5–35.7)
MCV RBC AUTO: 87.9 FL (ref 79–97)
MONOCYTES # BLD AUTO: 0.8 10*3/MM3 (ref 0.1–0.9)
MONOCYTES NFR BLD AUTO: 11.3 % (ref 5–12)
NEUTROPHILS # BLD AUTO: 4.4 10*3/MM3 (ref 1.7–7)
NEUTROPHILS NFR BLD AUTO: 65.7 % (ref 42.7–76)
NRBC BLD AUTO-RTO: 0.1 /100 WBC (ref 0–0.2)
PLATELET # BLD AUTO: 270 10*3/MM3 (ref 140–450)
PMV BLD AUTO: 7.7 FL (ref 6–12)
PROTHROMBIN TIME: 9.6 SECONDS (ref 9.6–11.7)
RBC # BLD AUTO: 4.55 10*6/MM3 (ref 4.14–5.8)
WBC NRBC COR # BLD: 6.7 10*3/MM3 (ref 3.4–10.8)

## 2019-07-26 PROCEDURE — 85025 COMPLETE CBC W/AUTO DIFF WBC: CPT | Performed by: RADIOLOGY

## 2019-07-26 PROCEDURE — 85730 THROMBOPLASTIN TIME PARTIAL: CPT | Performed by: RADIOLOGY

## 2019-07-26 PROCEDURE — 77012 CT SCAN FOR NEEDLE BIOPSY: CPT

## 2019-07-26 PROCEDURE — 25010000002 FENTANYL CITRATE (PF) 100 MCG/2ML SOLUTION: Performed by: RADIOLOGY

## 2019-07-26 PROCEDURE — 25010000002 MIDAZOLAM PER 1 MG: Performed by: RADIOLOGY

## 2019-07-26 PROCEDURE — 85610 PROTHROMBIN TIME: CPT | Performed by: RADIOLOGY

## 2019-07-26 RX ORDER — HYDROCODONE BITARTRATE AND ACETAMINOPHEN 5; 325 MG/1; MG/1
2 TABLET ORAL EVERY 6 HOURS PRN
Status: DISCONTINUED | OUTPATIENT
Start: 2019-07-26 | End: 2019-07-27 | Stop reason: HOSPADM

## 2019-07-26 RX ORDER — SODIUM CHLORIDE 9 MG/ML
75 INJECTION, SOLUTION INTRAVENOUS CONTINUOUS
Status: DISCONTINUED | OUTPATIENT
Start: 2019-07-27 | End: 2019-07-27 | Stop reason: HOSPADM

## 2019-07-26 RX ORDER — SODIUM CHLORIDE 0.9 % (FLUSH) 0.9 %
3-10 SYRINGE (ML) INJECTION AS NEEDED
Status: DISCONTINUED | OUTPATIENT
Start: 2019-07-27 | End: 2019-07-26 | Stop reason: HOSPADM

## 2019-07-26 RX ORDER — FENTANYL CITRATE 50 UG/ML
INJECTION, SOLUTION INTRAMUSCULAR; INTRAVENOUS
Status: COMPLETED | OUTPATIENT
Start: 2019-07-26 | End: 2019-07-26

## 2019-07-26 RX ORDER — MIDAZOLAM HYDROCHLORIDE 1 MG/ML
INJECTION INTRAMUSCULAR; INTRAVENOUS
Status: COMPLETED | OUTPATIENT
Start: 2019-07-26 | End: 2019-07-26

## 2019-07-26 RX ADMIN — MIDAZOLAM 1 MG: 1 INJECTION INTRAMUSCULAR; INTRAVENOUS at 08:58

## 2019-07-26 RX ADMIN — FENTANYL CITRATE 100 MCG: 50 INJECTION, SOLUTION INTRAMUSCULAR; INTRAVENOUS at 08:58

## 2019-07-26 RX ADMIN — FENTANYL CITRATE 100 MCG: 50 INJECTION, SOLUTION INTRAMUSCULAR; INTRAVENOUS at 08:54

## 2019-07-26 RX ADMIN — HYDROCODONE BITARTRATE AND ACETAMINOPHEN 2 TABLET: 5; 325 TABLET ORAL at 09:33

## 2019-07-26 RX ADMIN — MIDAZOLAM 1 MG: 1 INJECTION INTRAMUSCULAR; INTRAVENOUS at 08:54

## 2019-07-26 NOTE — NURSING NOTE
Dr. Johnson is marking area on RUQ/right lateral abdomen for liver biopsy procedure, using CT flouro guidance.

## 2019-07-26 NOTE — NURSING NOTE
Dr. Johnson prepped pt's. RUQ, in sterile fashion, using chlorhexidine scrub and then draped area in sterile fashion.

## 2019-07-26 NOTE — NURSING NOTE
Pt moved self from CT table back onto stretcher, supine position, right side down. Pt being taken to post-op area for recovery but will continue to be monitored by IR RN for conscious sedation time.

## 2019-07-26 NOTE — POST-PROCEDURE NOTE
IR POST OP NOTE    Procedure:CT guided liver core biopsy ( TEIXEIRA study Patient)      Pre Op DX:TEIXEIRA      Post Op DX:same      Anesthesia: Local      Findings:Multiple 16 g cores sent per study protocol      Complications:No immediate.       Provider Signature: Dr. Lv Johnson

## 2019-07-26 NOTE — DISCHARGE INSTRUCTIONS
A responsible adult should stay with you and you should rest quietly for the rest of the day. Do not drink alcohol, drive or cook for 24 hours following your procedure.  Progress your diet as tolerated.  Resume your usually medications including aspirin.  When you remove your dressing in 48 hours, a small amount of blood is to be expected. Do not be alarmed.  If you feel it is bleeding excessively apply pressure and proceed to the Emergency room.  Do not shower, bath, or get your dressing wet at all for 48 hours.  You may shower after the dressing is removed. No lifting more that 10 pounds for 48 hours.  If severe pain, increased shortness of air or racing heartbeat occur, seek immediate medical attention.  Follow up with Dr. Hoffman for results.

## 2019-07-26 NOTE — NURSING NOTE
Container with samples handed off to Ethel study rep, Erna. 0907  Dr. Johnson injected gel-foam and is taking CT flouro image. 0908 Dr. Johnson injecting additional gel-foam and removed needle. Final CT flouro image taken by Dr. Johnson.

## 2019-07-26 NOTE — NURSING NOTE
Dr. Johnson has needle in place and is obtaining liver biopsy core samples and placing them in provided formulin container for TEIXEIRA Study representative, Erna.

## 2019-07-26 NOTE — NURSING NOTE
Pt moved self from stretcher to CT table in supine position with head first into the scanner. Pt. Remains on heart monitor and was placed on 2L oxygen via N/C for procedure of liver biopsy, TEIXEIRA Study.

## 2019-07-29 ENCOUNTER — OFFICE VISIT (OUTPATIENT)
Dept: PULMONOLOGY | Facility: HOSPITAL | Age: 64
End: 2019-07-29

## 2019-07-29 VITALS
SYSTOLIC BLOOD PRESSURE: 126 MMHG | OXYGEN SATURATION: 94 % | HEART RATE: 80 BPM | TEMPERATURE: 97.5 F | DIASTOLIC BLOOD PRESSURE: 76 MMHG | BODY MASS INDEX: 36.65 KG/M2 | WEIGHT: 241.8 LBS | HEIGHT: 68 IN

## 2019-07-29 DIAGNOSIS — Z99.89 OSA ON CPAP: ICD-10-CM

## 2019-07-29 DIAGNOSIS — G47.33 OSA ON CPAP: ICD-10-CM

## 2019-07-29 DIAGNOSIS — R06.09 DYSPNEA ON EXERTION: ICD-10-CM

## 2019-07-29 DIAGNOSIS — J44.9 CHRONIC OBSTRUCTIVE PULMONARY DISEASE, UNSPECIFIED COPD TYPE (HCC): Primary | ICD-10-CM

## 2019-07-29 PROCEDURE — G0463 HOSPITAL OUTPT CLINIC VISIT: HCPCS

## 2019-07-29 RX ORDER — MODAFINIL 200 MG/1
TABLET ORAL
Refills: 1 | COMMUNITY
Start: 2019-06-17 | End: 2019-08-06 | Stop reason: SDUPTHER

## 2019-07-29 RX ORDER — LISINOPRIL 20 MG/1
20 TABLET ORAL DAILY
Refills: 1 | COMMUNITY
Start: 2019-07-25 | End: 2019-09-01 | Stop reason: SDUPTHER

## 2019-07-29 RX ORDER — OMEPRAZOLE 20 MG/1
CAPSULE, DELAYED RELEASE ORAL
Refills: 2 | COMMUNITY
Start: 2019-07-15 | End: 2019-10-27

## 2019-07-29 NOTE — PROGRESS NOTES
7/29/2019     Neptali SERRA [place]  1955      Chief Complaint   Patient presents with   • Sleep Apnea   • Abnormal CT chest         Subjective   64 year old male presented to clinic today for follow up to his COPD and BRITTNEY.  He was last seen in clinic on 2/12/19 by Gia BUSTILLO.  The patient is using Trelegy inhaler to manage his COPD and feels like it helps.  He does remain with some shortness of air if walking up a hill or with exertion.  He is better at rest.  He has a rescue inhaler that he uses if needed.  He denies any recent hospitalizations, use of antibiotics, or steroids.  PAP data sheet reviewed and showed 30 days with and 0 days without.  100% compliant with PAP.  Average usage time is 6 hours and 31 minutes.  Greater than 4 hours use is 90%.  Average AHI is 3.2.  No issues with his machine or mask.  He is using nasal pillows.      Review of System  General: Denies fevers or chills.  Denies any weakness or fatigue.  Denies any weight loss or weight gain.  HEENT: Denies sore throat, rhinorrhea, ear pain.  Denies any change in vision.   LUNGS: + shortness of breath No wheezing.  Denies any cough.  Denies any hemoptysis.  CARDIAC: Denies any chest pain, palpitations. Denies edema  ABD: Denies any abdominal pain.  Denies any N/V/D  PSYCH: Negative for suicidal ideas. Denies anxiety or depression  All other systems reviewed and negative unless stated in HPI       Objective    Vitals:    07/29/19 1329   BP: 126/76   Pulse: 80   Temp: 97.5 °F (36.4 °C)   SpO2: 94%       General: NAD, alert and oriented x 4  Cardiac: S1, S2, RRR, no murmur, no edema  Pulmonary: CTA bilaterally, no wheezing   Abdomen: soft, non-tender, non-distended  : Voids independently, no CVA tenderness   Musculoskeletal: Moves all extremities, equal strength bilaterally  Neuro: alert and oriented x 3, CN 2 - 12 grossly intact  Skin: warm, dry, and intact          Current Outpatient Medications:   •  amLODIPine (NORVASC) 10 MG tablet,  Daily., Disp: , Rfl:   •  aspirin (ASPIR-LOW) 81 MG EC tablet, Daily., Disp: , Rfl:   •  atorvastatin (LIPITOR) 40 MG tablet, , Disp: , Rfl:   •  budesonide-formoterol (SYMBICORT) 80-4.5 MCG/ACT inhaler, SYMBICORT 80-4.5 MCG/ACT AERO, Disp: , Rfl:   •  clopidogrel (PLAVIX) 75 MG tablet, , Disp: , Rfl:   •  dicyclomine (BENTYL) 10 MG capsule, TAKE 1 CAPSULE BY MOUTH THREE TIMES DAILY AS NEEDED FOR STOMACH PAIN/CRAMPS, Disp: 60 capsule, Rfl: 0  •  DULoxetine (CYMBALTA) 60 MG capsule, , Disp: , Rfl:   •  HYDROcodone-acetaminophen (NORCO)  MG per tablet, Take 1 tablet by mouth Every 6 (Six) Hours As Needed for Severe Pain ., Disp: 120 tablet, Rfl: 0  •  ipratropium-albuterol (COMBIVENT RESPIMAT)  MCG/ACT inhaler, COMBIVENT RESPIMAT  MCG/ACT AERS, Disp: , Rfl:   •  ipratropium-albuterol (DUO-NEB) 0.5-2.5 mg/3 ml nebulizer, IPRATROPIUM-ALBUTEROL 0.5-2.5 (3) MG/3ML SOLN, Disp: , Rfl:   •  lisinopril (PRINIVIL,ZESTRIL) 20 MG tablet, Take 20 mg by mouth Daily., Disp: , Rfl: 1  •  modafinil (PROVIGIL) 200 MG tablet, TK 1 T PO QAM, Disp: , Rfl: 1  •  omeprazole (priLOSEC) 20 MG capsule, TK 1 C PO QAM, Disp: , Rfl: 2  •  pregabalin (LYRICA) 100 MG capsule, Take 1 capsule by mouth 2 (Two) Times a Day., Disp: 60 capsule, Rfl: 2  •  TRELEGY ELLIPTA 100-62.5-25 MCG/INH aerosol powder , Inhale Daily., Disp: , Rfl: 5  •  furosemide (LASIX) 40 MG tablet, Daily., Disp: , Rfl:     Social History     Socioeconomic History   • Marital status:      Spouse name: Not on file   • Number of children: Not on file   • Years of education: Not on file   • Highest education level: Not on file   Tobacco Use   • Smoking status: Former Smoker     Types: Cigarettes     Last attempt to quit: 2009     Years since quitting: 10.5   • Smokeless tobacco: Never Used   Substance and Sexual Activity   • Alcohol use: No     Frequency: Never   • Drug use: No   • Sexual activity: Defer       Past Medical History:   Diagnosis Date   • Acute  diverticulitis     with perforation   • Allergic    • Anemia    • Anxiety    • Back pain    • CAD S/P percutaneous coronary angioplasty    • COPD (chronic obstructive pulmonary disease) (CMS/HCC)    • Depression    • Diverticulitis    • GERD (gastroesophageal reflux disease)    • Hyperlipidemia    • Hypertension    • Low back pain    • Myocardial infarction (CMS/HCC)    • TEIXEIRA (nonalcoholic steatohepatitis)    • Obesity    • BRITTNEY on CPAP    • Sleep apnea    • Sleep disorder, shift work    • Spinal stenosis of lumbar region at multiple levels                Diagnoses and all orders for this visit:    1. Chronic obstructive pulmonary disease, unspecified COPD type (CMS/HCC) (Primary)    2. BRITTNEY on CPAP  -     Miscellaneous DME    3. Dyspnea on exertion    -PAP data sheet reviewed and showed 30 days with and 0 days without.  100% compliant with PAP.  Average usage time is 6 hours and 31 minutes.  Greater than 4 hours use is 90%.  Average AHI is 3.2.  No issues with his machine or mask.  He is using nasal pillows.  Patient is compliant with use and benefiting from use  -cont Use of Trelegy inhaler  -rescue inhaler if needed       Vilma Velasco, APRN

## 2019-08-06 ENCOUNTER — OFFICE VISIT (OUTPATIENT)
Dept: FAMILY MEDICINE CLINIC | Facility: CLINIC | Age: 64
End: 2019-08-06

## 2019-08-06 VITALS
SYSTOLIC BLOOD PRESSURE: 112 MMHG | HEART RATE: 83 BPM | DIASTOLIC BLOOD PRESSURE: 76 MMHG | RESPIRATION RATE: 17 BRPM | WEIGHT: 235 LBS | TEMPERATURE: 97.6 F | OXYGEN SATURATION: 95 % | HEIGHT: 68 IN | BODY MASS INDEX: 35.61 KG/M2

## 2019-08-06 DIAGNOSIS — I10 ESSENTIAL HYPERTENSION: ICD-10-CM

## 2019-08-06 DIAGNOSIS — R40.4 ALTERED CONSCIOUSNESS: Primary | ICD-10-CM

## 2019-08-06 DIAGNOSIS — E66.01 CLASS 2 SEVERE OBESITY WITH SERIOUS COMORBIDITY AND BODY MASS INDEX (BMI) OF 35.0 TO 35.9 IN ADULT, UNSPECIFIED OBESITY TYPE (HCC): ICD-10-CM

## 2019-08-06 DIAGNOSIS — G47.26 SLEEP DISORDER, SHIFT WORK: ICD-10-CM

## 2019-08-06 DIAGNOSIS — I25.10 CHRONIC CORONARY ARTERY DISEASE: ICD-10-CM

## 2019-08-06 DIAGNOSIS — G47.33 OSA (OBSTRUCTIVE SLEEP APNEA): ICD-10-CM

## 2019-08-06 PROCEDURE — 99214 OFFICE O/P EST MOD 30 MIN: CPT | Performed by: FAMILY MEDICINE

## 2019-08-06 RX ORDER — MODAFINIL 200 MG/1
200 TABLET ORAL DAILY
Qty: 30 TABLET | Refills: 5 | Status: CANCELLED | OUTPATIENT
Start: 2019-08-06

## 2019-08-06 RX ORDER — MODAFINIL 200 MG/1
TABLET ORAL
Qty: 30 TABLET | Refills: 0 | Status: SHIPPED | OUTPATIENT
Start: 2019-08-06 | End: 2019-09-03 | Stop reason: SDUPTHER

## 2019-08-06 NOTE — TELEPHONE ENCOUNTER
Patient called to request refill for Modafanil. Pharmacy also faxed refill request, awaiting Dr. Qiu's approval.      Dr. Mace called [facility].  She will refill pt's medication since Dr. Qiu is on vacation.

## 2019-08-09 ENCOUNTER — TELEPHONE (OUTPATIENT)
Dept: FAMILY MEDICINE CLINIC | Facility: CLINIC | Age: 64
End: 2019-08-09

## 2019-08-12 NOTE — TELEPHONE ENCOUNTER
Pt LM stating he needs refill of Norco sent.     Also, wanted to speak about [place] papers. I attempted to call pt to get more info, but the # was not accepting calls at this time.

## 2019-08-13 ENCOUNTER — HOSPITAL ENCOUNTER (OUTPATIENT)
Dept: CT IMAGING | Facility: HOSPITAL | Age: 64
Discharge: HOME | End: 2019-08-13

## 2019-08-13 ENCOUNTER — HOSPITAL ENCOUNTER (OUTPATIENT)
Dept: NEUROLOGY | Facility: HOSPITAL | Age: 64
Discharge: HOME | End: 2019-08-13
Admitting: FAMILY MEDICINE

## 2019-08-13 ENCOUNTER — HOSPITAL ENCOUNTER (OUTPATIENT)
Dept: RESPIRATORY THERAPY | Facility: HOSPITAL | Age: 64
Discharge: HOME | End: 2019-08-13

## 2019-08-13 DIAGNOSIS — R40.4 ALTERED CONSCIOUSNESS: ICD-10-CM

## 2019-08-13 PROCEDURE — 70450 CT HEAD/BRAIN W/O DYE: CPT

## 2019-08-13 PROCEDURE — 93225 XTRNL ECG REC<48 HRS REC: CPT

## 2019-08-13 PROCEDURE — 95816 EEG AWAKE AND DROWSY: CPT

## 2019-08-13 NOTE — TELEPHONE ENCOUNTER
Inspect given to  to scan.  Pt has appt already sched next wk, will bring [place] papers.  Also, he states he just did a UDS last time he was here 3 wks ago... Does he need to do another one?  Needs [facility] refill - [facility]

## 2019-08-14 RX ORDER — HYDROCODONE BITARTRATE AND ACETAMINOPHEN 10; 325 MG/1; MG/1
1 TABLET ORAL EVERY 6 HOURS PRN
Qty: 120 TABLET | Refills: 0 | Status: SHIPPED | OUTPATIENT
Start: 2019-08-14 | End: 2019-09-09 | Stop reason: SDUPTHER

## 2019-08-16 LAB
BUPRENORPHINE SERPL-MCNC: NEGATIVE NG/ML
MDMA UR QL SCN: NEGATIVE
POC AMPHETAMINES: NEGATIVE
POC BARBITURATES: NEGATIVE
POC BENZODIAZEPHINES: NEGATIVE
POC COCAINE: NEGATIVE
POC METHADONE: NEGATIVE
POC METHAMPHETAMINE SCREEN URINE: NEGATIVE
POC OPIATES: NEGATIVE
POC OXYCODONE: NEGATIVE
POC THC: NEGATIVE
POC TRICYCLIC ANTIDEPRESSANTS: NEGATIVE

## 2019-08-16 RX ORDER — DULOXETIN HYDROCHLORIDE 60 MG/1
CAPSULE, DELAYED RELEASE ORAL
Qty: 90 CAPSULE | Refills: 0 | Status: SHIPPED | OUTPATIENT
Start: 2019-08-16 | End: 2019-10-27

## 2019-08-19 ENCOUNTER — OFFICE VISIT (OUTPATIENT)
Dept: FAMILY MEDICINE CLINIC | Facility: CLINIC | Age: 64
End: 2019-08-19

## 2019-08-19 VITALS
SYSTOLIC BLOOD PRESSURE: 164 MMHG | OXYGEN SATURATION: 93 % | RESPIRATION RATE: 14 BRPM | DIASTOLIC BLOOD PRESSURE: 77 MMHG | HEART RATE: 82 BPM | BODY MASS INDEX: 35.92 KG/M2 | WEIGHT: 237 LBS | HEIGHT: 68 IN | TEMPERATURE: 97.6 F

## 2019-08-19 DIAGNOSIS — R40.4 EPISODE OF ALTERED CONSCIOUSNESS: ICD-10-CM

## 2019-08-19 DIAGNOSIS — G47.33 OSA ON CPAP: ICD-10-CM

## 2019-08-19 DIAGNOSIS — Z99.89 OSA ON CPAP: ICD-10-CM

## 2019-08-19 DIAGNOSIS — R55 SYNCOPE, UNSPECIFIED SYNCOPE TYPE: Primary | ICD-10-CM

## 2019-08-19 PROCEDURE — 99214 OFFICE O/P EST MOD 30 MIN: CPT | Performed by: FAMILY MEDICINE

## 2019-08-21 DIAGNOSIS — G47.39 OTHER SLEEP APNEA: Primary | ICD-10-CM

## 2019-08-21 DIAGNOSIS — R40.4 ALTERED CONSCIOUSNESS: ICD-10-CM

## 2019-08-26 RX ORDER — DICYCLOMINE HYDROCHLORIDE 10 MG/1
10 CAPSULE ORAL
Qty: 60 CAPSULE | Refills: 5 | Status: SHIPPED | OUTPATIENT
Start: 2019-08-26 | End: 2020-01-08

## 2019-08-26 RX ORDER — DICYCLOMINE HYDROCHLORIDE 10 MG/1
CAPSULE ORAL
Qty: 60 CAPSULE | Refills: 0 | OUTPATIENT
Start: 2019-08-26

## 2019-08-30 ENCOUNTER — TELEPHONE (OUTPATIENT)
Dept: FAMILY MEDICINE CLINIC | Facility: CLINIC | Age: 64
End: 2019-08-30

## 2019-09-03 RX ORDER — LISINOPRIL 20 MG/1
TABLET ORAL
Qty: 90 TABLET | Refills: 0 | Status: SHIPPED | OUTPATIENT
Start: 2019-09-03 | End: 2019-10-24 | Stop reason: DRUGHIGH

## 2019-09-03 RX ORDER — PREDNISONE 10 MG/1
TABLET ORAL
Qty: 20 TABLET | Refills: 0 | OUTPATIENT
Start: 2019-09-03

## 2019-09-03 NOTE — TELEPHONE ENCOUNTER
Last visit:  8/19/19  Next visit:9/19/19  Last labs: 7/26/19    Rx requested: modafinil   Pharmacy: [facility] in

## 2019-09-05 RX ORDER — MODAFINIL 200 MG/1
TABLET ORAL
Qty: 30 TABLET | Refills: 0 | Status: SHIPPED | OUTPATIENT
Start: 2019-09-05 | End: 2019-10-03 | Stop reason: SDUPTHER

## 2019-09-09 ENCOUNTER — OFFICE VISIT (OUTPATIENT)
Dept: SLEEP MEDICINE | Facility: HOSPITAL | Age: 64
End: 2019-09-09

## 2019-09-09 VITALS
SYSTOLIC BLOOD PRESSURE: 127 MMHG | HEART RATE: 92 BPM | DIASTOLIC BLOOD PRESSURE: 78 MMHG | OXYGEN SATURATION: 96 % | WEIGHT: 234.6 LBS | BODY MASS INDEX: 35.55 KG/M2 | HEIGHT: 68 IN

## 2019-09-09 DIAGNOSIS — G47.33 OBSTRUCTIVE SLEEP APNEA, ADULT: Primary | ICD-10-CM

## 2019-09-09 PROCEDURE — G0463 HOSPITAL OUTPT CLINIC VISIT: HCPCS

## 2019-09-09 RX ORDER — HYDROCODONE BITARTRATE AND ACETAMINOPHEN 10; 325 MG/1; MG/1
1 TABLET ORAL EVERY 6 HOURS PRN
Qty: 120 TABLET | Refills: 0 | Status: SHIPPED | OUTPATIENT
Start: 2019-09-09 | End: 2019-10-03 | Stop reason: SDUPTHER
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
## 2019-09-17 RX ORDER — AMLODIPINE BESYLATE 10 MG/1
TABLET ORAL
Qty: 90 TABLET | Refills: 1 | Status: SHIPPED | OUTPATIENT
Start: 2019-09-17 | End: 2019-10-21 | Stop reason: SDUPTHER

## 2019-09-26 ENCOUNTER — OFFICE VISIT (OUTPATIENT)
Dept: FAMILY MEDICINE CLINIC | Facility: CLINIC | Age: 64
End: 2019-09-26

## 2019-09-26 VITALS
RESPIRATION RATE: 18 BRPM | OXYGEN SATURATION: 96 % | DIASTOLIC BLOOD PRESSURE: 89 MMHG | SYSTOLIC BLOOD PRESSURE: 147 MMHG | BODY MASS INDEX: 36.37 KG/M2 | HEIGHT: 68 IN | HEART RATE: 93 BPM | WEIGHT: 240 LBS | TEMPERATURE: 98.3 F

## 2019-09-26 DIAGNOSIS — F41.9 ANXIETY AND DEPRESSION: ICD-10-CM

## 2019-09-26 DIAGNOSIS — I10 BENIGN ESSENTIAL HYPERTENSION: ICD-10-CM

## 2019-09-26 DIAGNOSIS — F32.A ANXIETY AND DEPRESSION: ICD-10-CM

## 2019-09-26 DIAGNOSIS — M51.36 DDD (DEGENERATIVE DISC DISEASE), LUMBAR: ICD-10-CM

## 2019-09-26 DIAGNOSIS — R55 SYNCOPE, UNSPECIFIED SYNCOPE TYPE: Primary | ICD-10-CM

## 2019-09-26 DIAGNOSIS — G89.4 CHRONIC PAIN DISORDER: ICD-10-CM

## 2019-09-26 PROCEDURE — 99214 OFFICE O/P EST MOD 30 MIN: CPT | Performed by: FAMILY MEDICINE

## 2019-10-01 RX ORDER — ATORVASTATIN CALCIUM 40 MG/1
TABLET, FILM COATED ORAL
Qty: 90 TABLET | Refills: 0 | Status: SHIPPED | OUTPATIENT
Start: 2019-10-01 | End: 2019-10-27

## 2019-10-03 RX ORDER — MODAFINIL 200 MG/1
TABLET ORAL
Qty: 30 TABLET | Refills: 0 | Status: CANCELLED | OUTPATIENT
Start: 2019-10-03

## 2019-10-07 ENCOUNTER — TELEPHONE (OUTPATIENT)
Dept: FAMILY MEDICINE CLINIC | Facility: CLINIC | Age: 64
End: 2019-10-07

## 2019-10-07 RX ORDER — HYDROCODONE BITARTRATE AND ACETAMINOPHEN 10; 325 MG/1; MG/1
1 TABLET ORAL EVERY 6 HOURS PRN
Qty: 120 TABLET | Refills: 0 | OUTPATIENT
Start: 2019-10-07

## 2019-10-07 RX ORDER — MODAFINIL 200 MG/1
TABLET ORAL
Qty: 30 TABLET | Refills: 2 | Status: SHIPPED | OUTPATIENT
Start: 2019-10-07 | End: 2019-10-27

## 2019-10-07 RX ORDER — HYDROCODONE BITARTRATE AND ACETAMINOPHEN 10; 325 MG/1; MG/1
1 TABLET ORAL EVERY 6 HOURS PRN
Qty: 120 TABLET | Refills: 0 | Status: SHIPPED | OUTPATIENT
Start: 2019-10-07 | End: 2019-10-31 | Stop reason: SDUPTHER

## 2019-10-07 NOTE — TELEPHONE ENCOUNTER
Last visit:  9/26/19  Next visit: 10/24/19  Last labs: 7/26/19    Rx requested: Modafinil & Norvasc   Pharmacy: [facility] in [place]    Patient called for these refills

## 2019-10-18 ENCOUNTER — TELEPHONE (OUTPATIENT)
Dept: FAMILY MEDICINE CLINIC | Facility: CLINIC | Age: 64
End: 2019-10-18

## 2019-10-18 NOTE — TELEPHONE ENCOUNTER
Last visit:  9/26/19  Next visit: 10/24/19  Last labs: 7/26/19     Rx requested: Norvasc   Pharmacy: [facility] in [place]

## 2019-10-21 RX ORDER — AMLODIPINE BESYLATE 10 MG/1
10 TABLET ORAL DAILY
Qty: 90 TABLET | Refills: 1 | Status: SHIPPED | OUTPATIENT
Start: 2019-10-21 | End: 2020-03-02

## 2019-10-24 ENCOUNTER — OFFICE VISIT (OUTPATIENT)
Dept: FAMILY MEDICINE CLINIC | Facility: CLINIC | Age: 64
End: 2019-10-24

## 2019-10-24 VITALS
WEIGHT: 238 LBS | TEMPERATURE: 98.1 F | BODY MASS INDEX: 36.07 KG/M2 | OXYGEN SATURATION: 97 % | SYSTOLIC BLOOD PRESSURE: 146 MMHG | RESPIRATION RATE: 18 BRPM | HEIGHT: 68 IN | DIASTOLIC BLOOD PRESSURE: 85 MMHG | HEART RATE: 93 BPM

## 2019-10-24 DIAGNOSIS — Z99.89 OSA ON CPAP: ICD-10-CM

## 2019-10-24 DIAGNOSIS — Z79.899 MEDICATION MANAGEMENT: ICD-10-CM

## 2019-10-24 DIAGNOSIS — G47.26 SLEEP DISORDER, SHIFT WORK: ICD-10-CM

## 2019-10-24 DIAGNOSIS — I10 ESSENTIAL HYPERTENSION: ICD-10-CM

## 2019-10-24 DIAGNOSIS — R40.20 LOSS OF CONSCIOUSNESS (HCC): ICD-10-CM

## 2019-10-24 DIAGNOSIS — G89.29 CHRONIC BILATERAL LOW BACK PAIN WITH BILATERAL SCIATICA: ICD-10-CM

## 2019-10-24 DIAGNOSIS — E66.01 CLASS 3 SEVERE OBESITY DUE TO EXCESS CALORIES WITH SERIOUS COMORBIDITY IN ADULT, UNSPECIFIED BMI (HCC): ICD-10-CM

## 2019-10-24 DIAGNOSIS — T78.40XD ALLERGIC STATE, SUBSEQUENT ENCOUNTER: Primary | ICD-10-CM

## 2019-10-24 DIAGNOSIS — M54.41 CHRONIC BILATERAL LOW BACK PAIN WITH BILATERAL SCIATICA: ICD-10-CM

## 2019-10-24 DIAGNOSIS — G47.33 OSA ON CPAP: ICD-10-CM

## 2019-10-24 DIAGNOSIS — M51.36 DDD (DEGENERATIVE DISC DISEASE), LUMBAR: ICD-10-CM

## 2019-10-24 DIAGNOSIS — J00 ACUTE RHINITIS: ICD-10-CM

## 2019-10-24 DIAGNOSIS — M54.42 CHRONIC BILATERAL LOW BACK PAIN WITH BILATERAL SCIATICA: ICD-10-CM

## 2019-10-24 LAB
BUPRENORPHINE SERPL-MCNC: NEGATIVE NG/ML
MDMA-ECSTASY: NEGATIVE
POC AMPHETAMINES: NEGATIVE
POC BARBITURATES: NEGATIVE
POC BENZODIAZEPHINES: NEGATIVE
POC COCAINE: NEGATIVE
POC METHADONE: NEGATIVE
POC METHAMPHETAMINE SCREEN URINE: NEGATIVE
POC OPIATES: POSITIVE
POC OXYCODONE: NEGATIVE
POC THC: NEGATIVE
POC TRICYCLIC ANTIDEPRESSANTS: NEGATIVE

## 2019-10-24 PROCEDURE — 80305 DRUG TEST PRSMV DIR OPT OBS: CPT | Performed by: FAMILY MEDICINE

## 2019-10-24 PROCEDURE — 99214 OFFICE O/P EST MOD 30 MIN: CPT | Performed by: FAMILY MEDICINE

## 2019-10-24 RX ORDER — LORATADINE 10 MG/1
10 TABLET ORAL DAILY
Qty: 30 TABLET | Refills: 2 | Status: SHIPPED | OUTPATIENT
Start: 2019-10-24 | End: 2019-10-31 | Stop reason: SDUPTHER

## 2019-10-24 RX ORDER — FLUTICASONE PROPIONATE 50 MCG
2 SPRAY, SUSPENSION (ML) NASAL DAILY
Qty: 1 BOTTLE | Refills: 2 | Status: SHIPPED | OUTPATIENT
Start: 2019-10-24 | End: 2019-10-27

## 2019-10-24 RX ORDER — LISINOPRIL 30 MG/1
30 TABLET ORAL DAILY
Qty: 90 TABLET | Refills: 0 | Status: SHIPPED | OUTPATIENT
Start: 2019-10-24 | End: 2020-01-13

## 2019-10-24 RX ORDER — LORATADINE 10 MG/1
10 TABLET ORAL DAILY
Status: DISCONTINUED | OUTPATIENT
Start: 2019-10-24 | End: 2019-10-28 | Stop reason: HOSPADM

## 2019-10-24 NOTE — PATIENT INSTRUCTIONS
Form completed to continue disability  Will refill meds for chronic pain when due  Refill otgher medes as [id number]   neurology referral

## 2019-10-24 NOTE — PROGRESS NOTES
Teresa Osuna is a 64 y.o. male.     Chief Complaint   Patient presents with   • Follow-up     syncope/ disability papers   • URI   • Loss of Consciousness       History of Present Illness   Pt had initial episode of altered/loss of conscoiusness on August 3, 2019.  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  He has not been able to drive and has been off work on disability since  He has had 3 moreepisodes since passing out 9/30/19, 10/8/19. 10/16/19  His appointment with neurology is not until March.  We will contact Dr. Seipel about a soone appt or see he can be referred to another neurologist  Normal EEG noted 8/13/19He has seen Dr. Gonzalez, for fu BRITTNEY,has a CPAP., has fu with Dr. Qiu  Hx shift work disorderper Dr. Maria.  He has continued modafinal daily to stay awake during the day  Which has been prescribed here  He was seen by sleep specialist dr. Gonzalez recently and noted abnormalitis in sleep card.  He will fu with Dr. QIU  He has appt with psych Dr. Carl Aburto at [facility] in [place]  Hx chronic pain, DDD, takes hydrocodone-apap.  Narcotic agreement reviewed, UDS done and fopund appropriate.  Will refill meds when due  The following portions of the patient's history were reviewed and updated as appropriate: allergies, current medications, past family history, past medical history, past social history, past surgical history and problem list.    Past Medical History:   Diagnosis Date   • Acute diverticulitis     with perforation   • Allergic    • Anemia    • Anxiety    • Back pain    • CAD S/P percutaneous coronary angioplasty    • COPD (chronic obstructive pulmonary disease) (CMS/HCC)    • Depression    • Diverticulitis    • GERD (gastroesophageal reflux disease)    • Hyperlipidemia    • Hypertension    • Low back pain    • Myocardial infarction (CMS/[facility])    • TEIXEIRA (nonalcoholic steatohepatitis)    • Obesity    • BRITTNEY on CPAP    • Sleep apnea    • Sleep disorder, shift work     • Spinal stenosis of lumbar region at multiple levels        Past Surgical History:   Procedure Laterality Date   • CARDIAC CATHETERIZATION      Dr. Sher   • COLONOSCOPY  2016   • CORONARY STENT PLACEMENT     • LIVER BIOPSY     • TONSILLECTOMY AND ADENOIDECTOMY         Family History   Problem Relation Age of Onset   • Colon cancer Mother    • Colon cancer Father    • Heart disease Father    • Diabetes Brother    • Diabetes Maternal Grandmother    • Diabetes Maternal Grandfather        Review of Systems   Constitutional: Negative for fever.   HENT: Positive for congestion, postnasal drip and sore throat.    Respiratory: Negative for cough.    Musculoskeletal: Positive for back pain.   Neurological: Positive for syncope, light-headedness and memory problem. Negative for seizures.   Psychiatric/Behavioral: Positive for sleep disturbance.       Objective   Physical Exam   Constitutional: He is oriented to person, place, and time. He appears well-developed and well-nourished. No distress.   HENT:   Head: Normocephalic and atraumatic.   Right Ear: External ear normal.   Left Ear: External ear normal.   Nose: Nose normal.   Mouth/Throat: Oropharynx is clear and moist.   =op cobblestoning   Eyes: EOM are normal. Pupils are equal, round, and reactive to light.   Neck: Normal range of motion. Neck supple. No thyromegaly present.   Cardiovascular: Normal rate, regular rhythm and normal heart sounds. Exam reveals no gallop and no friction rub.   No murmur heard.  Pulmonary/Chest: Effort normal. He has no wheezes.   Abdominal: Soft. There is no tenderness.   Musculoskeletal: Normal range of motion. He exhibits no edema, tenderness or deformity.   Lymphadenopathy:     He has no cervical adenopathy.   Neurological: He is alert and oriented to person, place, and time. No cranial nerve deficit.   Skin: Skin is warm and dry. No rash noted. He is not diaphoretic.   Psychiatric: He has a normal mood and affect. His behavior is  normal. Judgment and thought content normal.   Nursing note and vitals reviewed.        Assessment/Plan   Neptali was seen today for follow-up, uri and loss of consciousness.    Diagnoses and all orders for this visit:    Allergic state, subsequent encounter  -     fluticasone (FLONASE) 50 MCG/ACT nasal spray; 2 sprays into the nostril(s) as directed by provider Daily.  -     loratadine (CLARITIN) tablet 10 mg    Acute rhinitis  -     fluticasone (FLONASE) 50 MCG/ACT nasal spray; 2 sprays into the nostril(s) as directed by provider Daily.  -     loratadine (CLARITIN) tablet 10 mg    Loss of consciousness (CMS/HCC)    BRITTNEY on CPAP    Sleep disorder, shift work    Class 3 severe obesity due to excess calories with serious comorbidity in adult, unspecified BMI (CMS/HCC)    Essential hypertension    DDD (degenerative disc disease), lumbar    Chronic bilateral low back pain with bilateral sciatica    Medication management  -     POC Urine Drug Screen, Triage    Other orders  -     lisinopril (PRINIVIL,ZESTRIL) 30 MG tablet; Take 1 tablet by mouth Daily.  -     loratadine (CLARITIN) 10 MG tablet; Take 1 tablet by mouth Daily.  -     Cancel: Urine Drug Screen - Urine, Clean Catch; Future        Refill meds  Refill pain meds when due  Form completed to continue disability  Pt cannot drive due to episodes of loss of consiciousness  Will get records for Dr. Gonzalez who saw pt for sleep disorder  Fu referral to neurology , may need referral to another provider         Vitals:    10/24/19 0952   BP: 146/85   Pulse: 93   Resp: 18   Temp: 98.1 °F (36.7 °C)   SpO2: 97%     Body mass index is 36.19 kg/m².    LDL Cholesterol    Date Value Ref Range Status   05/20/2019 77 0 - 100 mg/dL Final   11/06/2018 70 0 - 100 mg/dL Final   10/12/2018 72 0 - 100 mg/dL Final

## 2019-10-27 ENCOUNTER — APPOINTMENT (OUTPATIENT)
Dept: CT IMAGING | Facility: HOSPITAL | Age: 64
End: 2019-10-27

## 2019-10-27 ENCOUNTER — HOSPITAL ENCOUNTER (OUTPATIENT)
Facility: HOSPITAL | Age: 64
Setting detail: OBSERVATION
Discharge: HOME | End: 2019-10-28
Attending: INTERNAL MEDICINE | Admitting: INTERNAL MEDICINE

## 2019-10-27 ENCOUNTER — APPOINTMENT (OUTPATIENT)
Dept: GENERAL RADIOLOGY | Facility: HOSPITAL | Age: 64
End: 2019-10-27

## 2019-10-27 DIAGNOSIS — T78.40XD ALLERGIC STATE, SUBSEQUENT ENCOUNTER: ICD-10-CM

## 2019-10-27 DIAGNOSIS — J00 ACUTE RHINITIS: ICD-10-CM

## 2019-10-27 DIAGNOSIS — A41.9 SEPSIS, DUE TO UNSPECIFIED ORGANISM, UNSPECIFIED WHETHER ACUTE ORGAN DYSFUNCTION PRESENT (HCC): ICD-10-CM

## 2019-10-27 DIAGNOSIS — J06.9 UPPER RESPIRATORY TRACT INFECTION, UNSPECIFIED TYPE: ICD-10-CM

## 2019-10-27 DIAGNOSIS — J01.90 ACUTE NON-RECURRENT SINUSITIS, UNSPECIFIED LOCATION: Primary | ICD-10-CM

## 2019-10-27 LAB
ANION GAP SERPL CALCULATED.3IONS-SCNC: 12 MMOL/L (ref 5–15)
BASOPHILS # BLD AUTO: 0.1 10*3/MM3 (ref 0–0.2)
BASOPHILS NFR BLD AUTO: 0.8 % (ref 0–1.5)
BUN BLD-MCNC: 9 MG/DL (ref 8–23)
BUN/CREAT SERPL: 11.7 (ref 7–25)
CALCIUM SPEC-SCNC: 9.4 MG/DL (ref 8.6–10.5)
CHLORIDE SERPL-SCNC: 102 MMOL/L (ref 98–107)
CO2 SERPL-SCNC: 27 MMOL/L (ref 22–29)
CREAT BLD-MCNC: 0.77 MG/DL (ref 0.76–1.27)
D-LACTATE SERPL-SCNC: 2.3 MMOL/L (ref 0.5–2)
D-LACTATE SERPL-SCNC: 5.3 MMOL/L (ref 0.5–2)
DEPRECATED RDW RBC AUTO: 41.1 FL (ref 37–54)
EOSINOPHIL # BLD AUTO: 0.2 10*3/MM3 (ref 0–0.4)
EOSINOPHIL NFR BLD AUTO: 1.6 % (ref 0.3–6.2)
ERYTHROCYTE [DISTWIDTH] IN BLOOD BY AUTOMATED COUNT: 13.6 % (ref 12.3–15.4)
FLUAV SUBTYP SPEC NAA+PROBE: NOT DETECTED
FLUBV RNA ISLT QL NAA+PROBE: NOT DETECTED
GFR SERPL CREATININE-BSD FRML MDRD: 102 ML/MIN/1.73
GLUCOSE BLD-MCNC: 147 MG/DL (ref 65–99)
HCT VFR BLD AUTO: 42.1 % (ref 37.5–51)
HGB BLD-MCNC: 14.1 G/DL (ref 13–17.7)
HOLD SPECIMEN: NORMAL
INR PPP: 1.06 (ref 0.9–1.1)
LYMPHOCYTES # BLD AUTO: 1.3 10*3/MM3 (ref 0.7–3.1)
LYMPHOCYTES NFR BLD AUTO: 9 % (ref 19.6–45.3)
MCH RBC QN AUTO: 29.1 PG (ref 26.6–33)
MCHC RBC AUTO-ENTMCNC: 33.5 G/DL (ref 31.5–35.7)
MCV RBC AUTO: 86.9 FL (ref 79–97)
MONOCYTES # BLD AUTO: 0.9 10*3/MM3 (ref 0.1–0.9)
MONOCYTES NFR BLD AUTO: 6 % (ref 5–12)
NEUTROPHILS # BLD AUTO: 11.8 10*3/MM3 (ref 1.7–7)
NEUTROPHILS NFR BLD AUTO: 82.6 % (ref 42.7–76)
NRBC BLD AUTO-RTO: 0.1 /100 WBC (ref 0–0.2)
PLATELET # BLD AUTO: 407 10*3/MM3 (ref 140–450)
PMV BLD AUTO: 7.3 FL (ref 6–12)
POTASSIUM BLD-SCNC: 3.9 MMOL/L (ref 3.5–5.2)
PROTHROMBIN TIME: 11 SECONDS (ref 9.6–11.7)
RBC # BLD AUTO: 4.84 10*6/MM3 (ref 4.14–5.8)
SODIUM BLD-SCNC: 141 MMOL/L (ref 136–145)
TROPONIN T SERPL-MCNC: <0.01 NG/ML (ref 0–0.03)
WBC NRBC COR # BLD: 14.3 10*3/MM3 (ref 3.4–10.8)

## 2019-10-27 PROCEDURE — 25010000002 CEFTRIAXONE PER 250 MG: Performed by: NURSE PRACTITIONER

## 2019-10-27 PROCEDURE — 96366 THER/PROPH/DIAG IV INF ADDON: CPT

## 2019-10-27 PROCEDURE — 25010000002 METHYLPREDNISOLONE PER 125 MG: Performed by: NURSE PRACTITIONER

## 2019-10-27 PROCEDURE — 71045 X-RAY EXAM CHEST 1 VIEW: CPT

## 2019-10-27 PROCEDURE — 96361 HYDRATE IV INFUSION ADD-ON: CPT

## 2019-10-27 PROCEDURE — 84484 ASSAY OF TROPONIN QUANT: CPT | Performed by: NURSE PRACTITIONER

## 2019-10-27 PROCEDURE — G0378 HOSPITAL OBSERVATION PER HR: HCPCS

## 2019-10-27 PROCEDURE — 99220 PR INITIAL OBSERVATION CARE/DAY 70 MINUTES: CPT | Performed by: HOSPITALIST

## 2019-10-27 PROCEDURE — 94640 AIRWAY INHALATION TREATMENT: CPT

## 2019-10-27 PROCEDURE — 93005 ELECTROCARDIOGRAM TRACING: CPT | Performed by: NURSE PRACTITIONER

## 2019-10-27 PROCEDURE — 70450 CT HEAD/BRAIN W/O DYE: CPT

## 2019-10-27 PROCEDURE — 96365 THER/PROPH/DIAG IV INF INIT: CPT

## 2019-10-27 PROCEDURE — 94799 UNLISTED PULMONARY SVC/PX: CPT

## 2019-10-27 PROCEDURE — 25010000002 CEFEPIME PER 500 MG: Performed by: HOSPITALIST

## 2019-10-27 PROCEDURE — 87040 BLOOD CULTURE FOR BACTERIA: CPT | Performed by: NURSE PRACTITIONER

## 2019-10-27 PROCEDURE — 96367 TX/PROPH/DG ADDL SEQ IV INF: CPT

## 2019-10-27 PROCEDURE — 87502 INFLUENZA DNA AMP PROBE: CPT | Performed by: NURSE PRACTITIONER

## 2019-10-27 PROCEDURE — 36415 COLL VENOUS BLD VENIPUNCTURE: CPT

## 2019-10-27 PROCEDURE — 80048 BASIC METABOLIC PNL TOTAL CA: CPT | Performed by: NURSE PRACTITIONER

## 2019-10-27 PROCEDURE — 96375 TX/PRO/DX INJ NEW DRUG ADDON: CPT

## 2019-10-27 PROCEDURE — 99285 EMERGENCY DEPT VISIT HI MDM: CPT

## 2019-10-27 PROCEDURE — 85610 PROTHROMBIN TIME: CPT | Performed by: NURSE PRACTITIONER

## 2019-10-27 PROCEDURE — 85025 COMPLETE CBC W/AUTO DIFF WBC: CPT | Performed by: NURSE PRACTITIONER

## 2019-10-27 PROCEDURE — 83605 ASSAY OF LACTIC ACID: CPT

## 2019-10-27 RX ORDER — CETIRIZINE HYDROCHLORIDE 10 MG/1
10 TABLET ORAL DAILY
Status: DISCONTINUED | OUTPATIENT
Start: 2019-10-28 | End: 2019-10-28 | Stop reason: HOSPADM

## 2019-10-27 RX ORDER — ACETAMINOPHEN 500 MG
1000 TABLET ORAL ONCE
Status: COMPLETED | OUTPATIENT
Start: 2019-10-27 | End: 2019-10-27

## 2019-10-27 RX ORDER — SODIUM CHLORIDE 9 MG/ML
125 INJECTION, SOLUTION INTRAVENOUS CONTINUOUS
Status: DISCONTINUED | OUTPATIENT
Start: 2019-10-27 | End: 2019-10-27

## 2019-10-27 RX ORDER — PREGABALIN 100 MG/1
100 CAPSULE ORAL 2 TIMES DAILY
Status: DISCONTINUED | OUTPATIENT
Start: 2019-10-27 | End: 2019-10-28 | Stop reason: HOSPADM

## 2019-10-27 RX ORDER — ATORVASTATIN CALCIUM 40 MG/1
40 TABLET, FILM COATED ORAL DAILY
Status: DISCONTINUED | OUTPATIENT
Start: 2019-10-28 | End: 2019-10-28 | Stop reason: HOSPADM

## 2019-10-27 RX ORDER — SODIUM CHLORIDE 9 MG/ML
75 INJECTION, SOLUTION INTRAVENOUS CONTINUOUS
Status: DISCONTINUED | OUTPATIENT
Start: 2019-10-27 | End: 2019-10-28 | Stop reason: HOSPADM

## 2019-10-27 RX ORDER — CETIRIZINE HYDROCHLORIDE 10 MG/1
10 TABLET ORAL DAILY
Status: DISCONTINUED | OUTPATIENT
Start: 2019-10-28 | End: 2019-10-27

## 2019-10-27 RX ORDER — DULOXETIN HYDROCHLORIDE 30 MG/1
60 CAPSULE, DELAYED RELEASE ORAL DAILY
Status: DISCONTINUED | OUTPATIENT
Start: 2019-10-28 | End: 2019-10-28 | Stop reason: HOSPADM

## 2019-10-27 RX ORDER — ASPIRIN 81 MG/1
81 TABLET ORAL DAILY
Status: DISCONTINUED | OUTPATIENT
Start: 2019-10-28 | End: 2019-10-28 | Stop reason: HOSPADM

## 2019-10-27 RX ORDER — ACETAMINOPHEN 500 MG
TABLET ORAL
Status: COMPLETED
Start: 2019-10-27 | End: 2019-10-27

## 2019-10-27 RX ORDER — PANTOPRAZOLE SODIUM 40 MG/1
40 TABLET, DELAYED RELEASE ORAL EVERY MORNING
Status: DISCONTINUED | OUTPATIENT
Start: 2019-10-28 | End: 2019-10-28 | Stop reason: HOSPADM

## 2019-10-27 RX ORDER — AMLODIPINE BESYLATE 5 MG/1
10 TABLET ORAL DAILY
Status: DISCONTINUED | OUTPATIENT
Start: 2019-10-28 | End: 2019-10-28 | Stop reason: HOSPADM

## 2019-10-27 RX ORDER — ALBUTEROL SULFATE 2.5 MG/3ML
2.5 SOLUTION RESPIRATORY (INHALATION)
Status: COMPLETED | OUTPATIENT
Start: 2019-10-27 | End: 2019-10-27

## 2019-10-27 RX ORDER — ACETAMINOPHEN 650 MG/1
650 SUPPOSITORY RECTAL EVERY 4 HOURS PRN
Status: DISCONTINUED | OUTPATIENT
Start: 2019-10-27 | End: 2019-10-28 | Stop reason: HOSPADM

## 2019-10-27 RX ORDER — ACETAMINOPHEN 160 MG/5ML
650 SOLUTION ORAL EVERY 4 HOURS PRN
Status: DISCONTINUED | OUTPATIENT
Start: 2019-10-27 | End: 2019-10-28 | Stop reason: HOSPADM

## 2019-10-27 RX ORDER — SODIUM CHLORIDE 0.9 % (FLUSH) 0.9 %
10 SYRINGE (ML) INJECTION AS NEEDED
Status: DISCONTINUED | OUTPATIENT
Start: 2019-10-27 | End: 2019-10-28 | Stop reason: HOSPADM

## 2019-10-27 RX ORDER — IPRATROPIUM BROMIDE AND ALBUTEROL SULFATE 2.5; .5 MG/3ML; MG/3ML
3 SOLUTION RESPIRATORY (INHALATION) EVERY 4 HOURS PRN
Status: DISCONTINUED | OUTPATIENT
Start: 2019-10-27 | End: 2019-10-28 | Stop reason: HOSPADM

## 2019-10-27 RX ORDER — DULOXETIN HYDROCHLORIDE 60 MG/1
60 CAPSULE, DELAYED RELEASE ORAL DAILY
COMMUNITY
End: 2020-01-29

## 2019-10-27 RX ORDER — DICYCLOMINE HYDROCHLORIDE 10 MG/1
10 CAPSULE ORAL
Status: DISCONTINUED | OUTPATIENT
Start: 2019-10-27 | End: 2019-10-28 | Stop reason: HOSPADM

## 2019-10-27 RX ORDER — BISACODYL 5 MG/1
5 TABLET, DELAYED RELEASE ORAL DAILY PRN
Status: DISCONTINUED | OUTPATIENT
Start: 2019-10-27 | End: 2019-10-28 | Stop reason: HOSPADM

## 2019-10-27 RX ORDER — MODAFINIL 200 MG/1
200 TABLET ORAL DAILY
COMMUNITY
End: 2019-11-07

## 2019-10-27 RX ORDER — VANCOMYCIN 1.75 GRAM/500 ML IN 0.9 % SODIUM CHLORIDE INTRAVENOUS
20 ONCE
Status: COMPLETED | OUTPATIENT
Start: 2019-10-27 | End: 2019-10-28

## 2019-10-27 RX ORDER — CLOPIDOGREL BISULFATE 75 MG/1
75 TABLET ORAL DAILY
Status: DISCONTINUED | OUTPATIENT
Start: 2019-10-28 | End: 2019-10-28 | Stop reason: HOSPADM

## 2019-10-27 RX ORDER — ACETAMINOPHEN 325 MG/1
650 TABLET ORAL EVERY 4 HOURS PRN
Status: DISCONTINUED | OUTPATIENT
Start: 2019-10-27 | End: 2019-10-28 | Stop reason: HOSPADM

## 2019-10-27 RX ORDER — SODIUM CHLORIDE 0.9 % (FLUSH) 0.9 %
10 SYRINGE (ML) INJECTION EVERY 12 HOURS SCHEDULED
Status: DISCONTINUED | OUTPATIENT
Start: 2019-10-27 | End: 2019-10-28 | Stop reason: HOSPADM

## 2019-10-27 RX ORDER — IPRATROPIUM BROMIDE AND ALBUTEROL SULFATE 2.5; .5 MG/3ML; MG/3ML
3 SOLUTION RESPIRATORY (INHALATION) ONCE
Status: COMPLETED | OUTPATIENT
Start: 2019-10-27 | End: 2019-10-27

## 2019-10-27 RX ORDER — ATORVASTATIN CALCIUM 40 MG/1
40 TABLET, FILM COATED ORAL NIGHTLY
COMMUNITY
End: 2021-05-13 | Stop reason: SDUPTHER

## 2019-10-27 RX ORDER — METHYLPREDNISOLONE SODIUM SUCCINATE 125 MG/2ML
125 INJECTION, POWDER, LYOPHILIZED, FOR SOLUTION INTRAMUSCULAR; INTRAVENOUS ONCE
Status: COMPLETED | OUTPATIENT
Start: 2019-10-27 | End: 2019-10-27

## 2019-10-27 RX ORDER — OMEPRAZOLE 20 MG/1
20 CAPSULE, DELAYED RELEASE ORAL DAILY
COMMUNITY
End: 2019-11-07

## 2019-10-27 RX ORDER — ONDANSETRON 2 MG/ML
4 INJECTION INTRAMUSCULAR; INTRAVENOUS EVERY 4 HOURS PRN
Status: DISCONTINUED | OUTPATIENT
Start: 2019-10-27 | End: 2019-10-28 | Stop reason: HOSPADM

## 2019-10-27 RX ORDER — HYDROCODONE BITARTRATE AND ACETAMINOPHEN 10; 325 MG/1; MG/1
1 TABLET ORAL EVERY 6 HOURS PRN
Status: DISCONTINUED | OUTPATIENT
Start: 2019-10-27 | End: 2019-10-28 | Stop reason: HOSPADM

## 2019-10-27 RX ORDER — MODAFINIL 100 MG/1
200 TABLET ORAL DAILY
Status: DISCONTINUED | OUTPATIENT
Start: 2019-10-28 | End: 2019-10-28 | Stop reason: HOSPADM

## 2019-10-27 RX ORDER — BISACODYL 10 MG
10 SUPPOSITORY, RECTAL RECTAL DAILY PRN
Status: DISCONTINUED | OUTPATIENT
Start: 2019-10-27 | End: 2019-10-28 | Stop reason: HOSPADM

## 2019-10-27 RX ORDER — IPRATROPIUM BROMIDE AND ALBUTEROL SULFATE 2.5; .5 MG/3ML; MG/3ML
3 SOLUTION RESPIRATORY (INHALATION) EVERY 4 HOURS PRN
COMMUNITY
End: 2020-04-02 | Stop reason: SDUPTHER

## 2019-10-27 RX ADMIN — SODIUM CHLORIDE 500 ML: 900 INJECTION, SOLUTION INTRAVENOUS at 17:31

## 2019-10-27 RX ADMIN — ALBUTEROL SULFATE 2.5 MG: 2.5 SOLUTION RESPIRATORY (INHALATION) at 12:30

## 2019-10-27 RX ADMIN — ALBUTEROL SULFATE 2.5 MG: 2.5 SOLUTION RESPIRATORY (INHALATION) at 12:51

## 2019-10-27 RX ADMIN — CEFTRIAXONE SODIUM 1 G: 10 INJECTION, POWDER, FOR SOLUTION INTRAVENOUS at 14:53

## 2019-10-27 RX ADMIN — Medication 1000 MG: at 13:38

## 2019-10-27 RX ADMIN — HYDROCODONE BITARTRATE AND ACETAMINOPHEN 1 TABLET: 10; 325 TABLET ORAL at 17:31

## 2019-10-27 RX ADMIN — PREGABALIN 100 MG: 100 CAPSULE ORAL at 21:28

## 2019-10-27 RX ADMIN — SODIUM CHLORIDE 75 ML/HR: 900 INJECTION, SOLUTION INTRAVENOUS at 21:30

## 2019-10-27 RX ADMIN — CEFEPIME HYDROCHLORIDE 1 G: 1 INJECTION, POWDER, FOR SOLUTION INTRAMUSCULAR; INTRAVENOUS at 21:25

## 2019-10-27 RX ADMIN — ACETAMINOPHEN 1000 MG: 500 TABLET, FILM COATED ORAL at 13:38

## 2019-10-27 RX ADMIN — IPRATROPIUM BROMIDE AND ALBUTEROL SULFATE 3 ML: .5; 3 SOLUTION RESPIRATORY (INHALATION) at 14:45

## 2019-10-27 RX ADMIN — ALBUTEROL SULFATE 2.5 MG: 2.5 SOLUTION RESPIRATORY (INHALATION) at 12:15

## 2019-10-27 RX ADMIN — DICYCLOMINE HYDROCHLORIDE 10 MG: 10 CAPSULE ORAL at 21:28

## 2019-10-27 RX ADMIN — METHYLPREDNISOLONE SODIUM SUCCINATE 125 MG: 125 INJECTION, POWDER, FOR SOLUTION INTRAMUSCULAR; INTRAVENOUS at 14:24

## 2019-10-28 VITALS
HEART RATE: 87 BPM | SYSTOLIC BLOOD PRESSURE: 148 MMHG | HEIGHT: 68 IN | TEMPERATURE: 97.9 F | RESPIRATION RATE: 18 BRPM | OXYGEN SATURATION: 95 % | BODY MASS INDEX: 34.98 KG/M2 | WEIGHT: 230.82 LBS | DIASTOLIC BLOOD PRESSURE: 78 MMHG

## 2019-10-28 PROBLEM — K75.81 NASH (NONALCOHOLIC STEATOHEPATITIS): Chronic | Status: ACTIVE | Noted: 2019-10-28

## 2019-10-28 LAB
ALBUMIN SERPL-MCNC: 3.8 G/DL (ref 3.5–5.2)
ALBUMIN/GLOB SERPL: 1.3 G/DL
ALP SERPL-CCNC: 83 U/L (ref 39–117)
ALT SERPL W P-5'-P-CCNC: 6 U/L (ref 1–41)
ANION GAP SERPL CALCULATED.3IONS-SCNC: 10 MMOL/L (ref 5–15)
AST SERPL-CCNC: 11 U/L (ref 1–40)
BASOPHILS # BLD AUTO: 0 10*3/MM3 (ref 0–0.2)
BASOPHILS NFR BLD AUTO: 0.3 % (ref 0–1.5)
BILIRUB SERPL-MCNC: <0.2 MG/DL (ref 0.2–1.2)
BUN BLD-MCNC: 13 MG/DL (ref 8–23)
BUN/CREAT SERPL: 17.1 (ref 7–25)
CALCIUM SPEC-SCNC: 9.1 MG/DL (ref 8.6–10.5)
CHLORIDE SERPL-SCNC: 104 MMOL/L (ref 98–107)
CO2 SERPL-SCNC: 24 MMOL/L (ref 22–29)
CREAT BLD-MCNC: 0.76 MG/DL (ref 0.76–1.27)
D-LACTATE SERPL-SCNC: 1.9 MMOL/L (ref 0.5–2)
DEPRECATED RDW RBC AUTO: 42.4 FL (ref 37–54)
EOSINOPHIL # BLD AUTO: 0 10*3/MM3 (ref 0–0.4)
EOSINOPHIL NFR BLD AUTO: 0 % (ref 0.3–6.2)
ERYTHROCYTE [DISTWIDTH] IN BLOOD BY AUTOMATED COUNT: 13.9 % (ref 12.3–15.4)
GFR SERPL CREATININE-BSD FRML MDRD: 104 ML/MIN/1.73
GLOBULIN UR ELPH-MCNC: 2.9 GM/DL
GLUCOSE BLD-MCNC: 153 MG/DL (ref 65–99)
HCT VFR BLD AUTO: 37.2 % (ref 37.5–51)
HGB BLD-MCNC: 12.6 G/DL (ref 13–17.7)
LYMPHOCYTES # BLD AUTO: 0.9 10*3/MM3 (ref 0.7–3.1)
LYMPHOCYTES NFR BLD AUTO: 6.7 % (ref 19.6–45.3)
MAGNESIUM SERPL-MCNC: 2 MG/DL (ref 1.6–2.4)
MCH RBC QN AUTO: 29.4 PG (ref 26.6–33)
MCHC RBC AUTO-ENTMCNC: 33.9 G/DL (ref 31.5–35.7)
MCV RBC AUTO: 86.7 FL (ref 79–97)
MONOCYTES # BLD AUTO: 0.6 10*3/MM3 (ref 0.1–0.9)
MONOCYTES NFR BLD AUTO: 4.5 % (ref 5–12)
NEUTROPHILS # BLD AUTO: 12.6 10*3/MM3 (ref 1.7–7)
NEUTROPHILS NFR BLD AUTO: 88.5 % (ref 42.7–76)
NRBC BLD AUTO-RTO: 0.1 /100 WBC (ref 0–0.2)
PHOSPHATE SERPL-MCNC: 3.3 MG/DL (ref 2.5–4.5)
PLATELET # BLD AUTO: 395 10*3/MM3 (ref 140–450)
PMV BLD AUTO: 7.5 FL (ref 6–12)
POTASSIUM BLD-SCNC: 4.4 MMOL/L (ref 3.5–5.2)
PROCALCITONIN SERPL-MCNC: 0.07 NG/ML (ref 0.1–0.25)
PROT SERPL-MCNC: 6.7 G/DL (ref 6–8.5)
RBC # BLD AUTO: 4.29 10*6/MM3 (ref 4.14–5.8)
SODIUM BLD-SCNC: 138 MMOL/L (ref 136–145)
T4 FREE SERPL-MCNC: 1.05 NG/DL (ref 0.93–1.7)
WBC NRBC COR # BLD: 14.2 10*3/MM3 (ref 3.4–10.8)

## 2019-10-28 PROCEDURE — 99217 PR OBSERVATION CARE DISCHARGE MANAGEMENT: CPT | Performed by: INTERNAL MEDICINE

## 2019-10-28 PROCEDURE — 84145 PROCALCITONIN (PCT): CPT | Performed by: HOSPITALIST

## 2019-10-28 PROCEDURE — 84100 ASSAY OF PHOSPHORUS: CPT | Performed by: HOSPITALIST

## 2019-10-28 PROCEDURE — 25010000002 VANCOMYCIN 10 G RECONSTITUTED SOLUTION: Performed by: HOSPITALIST

## 2019-10-28 PROCEDURE — 84439 ASSAY OF FREE THYROXINE: CPT | Performed by: HOSPITALIST

## 2019-10-28 PROCEDURE — 83605 ASSAY OF LACTIC ACID: CPT | Performed by: HOSPITALIST

## 2019-10-28 PROCEDURE — 25010000002 CEFEPIME PER 500 MG: Performed by: HOSPITALIST

## 2019-10-28 PROCEDURE — 96361 HYDRATE IV INFUSION ADD-ON: CPT

## 2019-10-28 PROCEDURE — 83735 ASSAY OF MAGNESIUM: CPT | Performed by: HOSPITALIST

## 2019-10-28 PROCEDURE — G0378 HOSPITAL OBSERVATION PER HR: HCPCS

## 2019-10-28 PROCEDURE — 85025 COMPLETE CBC W/AUTO DIFF WBC: CPT | Performed by: HOSPITALIST

## 2019-10-28 PROCEDURE — 80053 COMPREHEN METABOLIC PANEL: CPT | Performed by: HOSPITALIST

## 2019-10-28 PROCEDURE — 96366 THER/PROPH/DIAG IV INF ADDON: CPT

## 2019-10-28 RX ORDER — PREDNISONE 20 MG/1
40 TABLET ORAL DAILY
Qty: 5 TABLET | Refills: 0 | Status: SHIPPED | OUTPATIENT
Start: 2019-10-28 | End: 2019-11-02

## 2019-10-28 RX ORDER — CEPHALEXIN 500 MG/1
500 CAPSULE ORAL 3 TIMES DAILY
Qty: 12 CAPSULE | Refills: 0 | Status: SHIPPED | OUTPATIENT
Start: 2019-10-28 | End: 2019-11-01

## 2019-10-28 RX ADMIN — DULOXETINE HYDROCHLORIDE 60 MG: 30 CAPSULE, DELAYED RELEASE ORAL at 08:30

## 2019-10-28 RX ADMIN — HYDROCODONE BITARTRATE AND ACETAMINOPHEN 1 TABLET: 10; 325 TABLET ORAL at 06:25

## 2019-10-28 RX ADMIN — CLOPIDOGREL BISULFATE 75 MG: 75 TABLET ORAL at 08:30

## 2019-10-28 RX ADMIN — PREGABALIN 100 MG: 100 CAPSULE ORAL at 08:29

## 2019-10-28 RX ADMIN — HYDROCODONE BITARTRATE AND ACETAMINOPHEN 1 TABLET: 10; 325 TABLET ORAL at 00:43

## 2019-10-28 RX ADMIN — MODAFINIL 200 MG: 100 TABLET ORAL at 08:30

## 2019-10-28 RX ADMIN — Medication 10 ML: at 08:33

## 2019-10-28 RX ADMIN — CETIRIZINE HYDROCHLORIDE 10 MG: 10 TABLET, FILM COATED ORAL at 08:30

## 2019-10-28 RX ADMIN — DICYCLOMINE HYDROCHLORIDE 10 MG: 10 CAPSULE ORAL at 08:31

## 2019-10-28 RX ADMIN — CEFEPIME HYDROCHLORIDE 1 G: 1 INJECTION, POWDER, FOR SOLUTION INTRAMUSCULAR; INTRAVENOUS at 05:33

## 2019-10-28 RX ADMIN — VANCOMYCIN HYDROCHLORIDE 1750 MG: 10 INJECTION, POWDER, LYOPHILIZED, FOR SOLUTION INTRAVENOUS at 00:45

## 2019-10-28 RX ADMIN — PANTOPRAZOLE SODIUM 40 MG: 40 TABLET, DELAYED RELEASE ORAL at 08:31

## 2019-10-28 RX ADMIN — ATORVASTATIN CALCIUM 40 MG: 40 TABLET, FILM COATED ORAL at 08:30

## 2019-10-28 RX ADMIN — AMLODIPINE BESYLATE 10 MG: 5 TABLET ORAL at 08:30

## 2019-10-28 RX ADMIN — LISINOPRIL 30 MG: 5 TABLET ORAL at 08:29

## 2019-10-28 RX ADMIN — ASPIRIN 81 MG: 81 TABLET, COATED ORAL at 08:29

## 2019-10-29 ENCOUNTER — READMISSION MANAGEMENT (OUTPATIENT)
Dept: CALL CENTER | Facility: HOSPITAL | Age: 64
End: 2019-10-29

## 2019-10-29 NOTE — OUTREACH NOTE
Prep Survey      Responses   Facility patient discharged from?  [facility]   Is patient eligible?  Yes   Discharge diagnosis  Acute exacerbation of chronic obstructive pulmonary disease    Does the patient have one of the following disease processes/diagnoses(primary or secondary)?  COPD/Pneumonia   Does the patient have Home health ordered?  No   Is there a DME ordered?  No   Prep survey completed?  Yes          Vilma Barnhart RN

## 2019-10-30 ENCOUNTER — READMISSION MANAGEMENT (OUTPATIENT)
Dept: CALL CENTER | Facility: HOSPITAL | Age: 64
End: 2019-10-30

## 2019-10-30 NOTE — OUTREACH NOTE
COPD/PN Week 1 Survey      Responses   Facility patient discharged from?  [facility]   Does the patient have one of the following disease processes/diagnoses(primary or secondary)?  COPD/Pneumonia   Is there a successful TCM telephone encounter documented?  No   Was the primary reason for admission:  COPD exacerbation   Week 1 attempt successful?  No   Unsuccessful attempts  Attempt 1 [NO ANSWER, LEFT VM]          Marley Morejon LPN

## 2019-10-31 ENCOUNTER — READMISSION MANAGEMENT (OUTPATIENT)
Dept: CALL CENTER | Facility: HOSPITAL | Age: 64
End: 2019-10-31

## 2019-10-31 RX ORDER — HYDROCODONE BITARTRATE AND ACETAMINOPHEN 10; 325 MG/1; MG/1
1 TABLET ORAL EVERY 6 HOURS PRN
Qty: 120 TABLET | Refills: 0 | Status: SHIPPED | OUTPATIENT
Start: 2019-10-31 | End: 2019-11-20 | Stop reason: SDUPTHER

## 2019-10-31 RX ORDER — DULOXETIN HYDROCHLORIDE 60 MG/1
CAPSULE, DELAYED RELEASE ORAL
Qty: 90 CAPSULE | Refills: 0 | Status: SHIPPED | OUTPATIENT
Start: 2019-10-31 | End: 2019-11-07

## 2019-10-31 RX ORDER — CLOPIDOGREL BISULFATE 75 MG/1
TABLET ORAL
Qty: 90 TABLET | Refills: 1 | Status: SHIPPED | OUTPATIENT
Start: 2019-10-31 | End: 2020-05-15

## 2019-10-31 RX ORDER — LORATADINE 10 MG/1
10 TABLET ORAL DAILY
Qty: 30 TABLET | Refills: 2 | Status: SHIPPED | OUTPATIENT
Start: 2019-10-31 | End: 2020-02-20

## 2019-10-31 NOTE — OUTREACH NOTE
COPD/PN Week 1 Survey      Responses   Facility patient discharged from?  [facility]   Does the patient have one of the following disease processes/diagnoses(primary or secondary)?  COPD/Pneumonia   Is there a successful TCM telephone encounter documented?  No   Was the primary reason for admission:  COPD exacerbation   Week 1 attempt successful?  No   Unsuccessful attempts  Attempt 2 [NO ANSWER, LEFT VM]          Marley Morejon LPN

## 2019-11-01 LAB
BACTERIA SPEC AEROBE CULT: ABNORMAL
BACTERIA SPEC AEROBE CULT: NORMAL
GRAM STN SPEC: ABNORMAL
ISOLATED FROM: ABNORMAL

## 2019-11-05 ENCOUNTER — READMISSION MANAGEMENT (OUTPATIENT)
Dept: CALL CENTER | Facility: HOSPITAL | Age: 64
End: 2019-11-05

## 2019-11-05 NOTE — OUTREACH NOTE
COPD/PN Week 2 Survey      Responses   Facility patient discharged from?  [facility]   Does the patient have one of the following disease processes/diagnoses(primary or secondary)?  COPD/Pneumonia   Was the primary reason for admission:  COPD exacerbation   Week 2 attempt successful?  Yes   Call start time  0910   Call end time  0914   Meds reviewed with patient/caregiver?  Yes   Is the patient having any side effects they believe may be caused by any medication additions or changes?  No   Does the patient have all medications ordered at discharge?  Yes   Is the patient taking all medications as directed (includes completed medication regime)?  Yes   Does the patient have a primary care provider?   Yes   Does the patient have an appointment with their PCP or pulmonologist within 7 days of discharge?  Greater than 7 days   Comments regarding PCP  PATIENT HAS APPT WITH PCP ON 11/7   What is preventing the patient from scheduling follow up appointments within 7 days of discharge?  Earlier appointment not available   Nursing Interventions  Verified appointment date/time/provider   Has the patient kept scheduled appointments due by today?  N/A   Has home health visited the patient within 72 hours of discharge?  N/A   Did the patient receive a copy of their discharge instructions?  Yes   Nursing interventions  Reviewed instructions with patient   What is the patient's perception of their health status since discharge?  Improving   Is the patient/caregiver able to teach back the hierarchy of who to call/visit for symptoms/problems? PCP, Specialist, Home health nurse, Urgent Care, ED, 911  Yes   Is the patient able to teach back COPD zones?  Yes   Nursing interventions  Education provided on various zones   Patient reports what zone on this call?  Green Zone   Green Zone  Reports doing well, Breathing without shortness of breath, Usual activity and exercise level, Usual amount of phlegm/mucus without difficulty coughing up,  Appetite is good, Sleeping well   Green Zone interventions:  Take daily medications, Use oxygen as prescribed, Avoid indoor/outdoor triggers   Week 2 call completed?  Yes          Marley Morejon LPN

## 2019-11-06 ENCOUNTER — OFFICE (OUTPATIENT)
Dept: URBAN - METROPOLITAN AREA CLINIC 64 | Facility: CLINIC | Age: 64
End: 2019-11-06

## 2019-11-06 VITALS
DIASTOLIC BLOOD PRESSURE: 61 MMHG | HEIGHT: 68 IN | HEART RATE: 99 BPM | WEIGHT: 241 LBS | SYSTOLIC BLOOD PRESSURE: 105 MMHG

## 2019-11-06 DIAGNOSIS — K74.69 OTHER CIRRHOSIS OF LIVER: ICD-10-CM

## 2019-11-06 DIAGNOSIS — R10.11 RIGHT UPPER QUADRANT PAIN: ICD-10-CM

## 2019-11-06 DIAGNOSIS — I10 ESSENTIAL (PRIMARY) HYPERTENSION: ICD-10-CM

## 2019-11-06 DIAGNOSIS — E78.2 MIXED HYPERLIPIDEMIA: ICD-10-CM

## 2019-11-06 DIAGNOSIS — K76.9 LIVER DISEASE, UNSPECIFIED: ICD-10-CM

## 2019-11-06 DIAGNOSIS — Z86.010 PERSONAL HISTORY OF COLONIC POLYPS: ICD-10-CM

## 2019-11-06 PROCEDURE — 99213 OFFICE O/P EST LOW 20 MIN: CPT | Performed by: INTERNAL MEDICINE

## 2019-11-06 RX ORDER — ATORVASTATIN CALCIUM 40 MG/1
TABLET, FILM COATED ORAL
Qty: 90 | Refills: 1 | Status: ACTIVE

## 2019-11-06 RX ORDER — OMEPRAZOLE 40 MG/1
40 CAPSULE, DELAYED RELEASE ORAL
Qty: 30 | Refills: 0 | Status: COMPLETED
Start: 2018-11-21 | End: 2024-05-29

## 2019-11-06 RX ORDER — URSODIOL 500 MG/1
TABLET, FILM COATED ORAL
Qty: 180 | Refills: 10 | Status: COMPLETED
Start: 2019-11-06 | End: 2024-05-29

## 2019-11-07 ENCOUNTER — OFFICE VISIT (OUTPATIENT)
Dept: FAMILY MEDICINE CLINIC | Facility: CLINIC | Age: 64
End: 2019-11-07

## 2019-11-07 VITALS
DIASTOLIC BLOOD PRESSURE: 72 MMHG | WEIGHT: 239 LBS | SYSTOLIC BLOOD PRESSURE: 146 MMHG | HEART RATE: 108 BPM | OXYGEN SATURATION: 94 % | BODY MASS INDEX: 36.35 KG/M2 | TEMPERATURE: 98.2 F

## 2019-11-07 DIAGNOSIS — R41.0 CONFUSION: ICD-10-CM

## 2019-11-07 DIAGNOSIS — R00.0 TACHYCARDIA: ICD-10-CM

## 2019-11-07 DIAGNOSIS — D72.829 LEUKOCYTOSIS, UNSPECIFIED TYPE: ICD-10-CM

## 2019-11-07 DIAGNOSIS — R55 BLACK-OUT (NOT AMNESIA): Primary | ICD-10-CM

## 2019-11-07 PROBLEM — R50.9 FEVER OF UNKNOWN ORIGIN: Status: RESOLVED | Noted: 2019-04-02 | Resolved: 2019-11-07

## 2019-11-07 LAB
BILIRUB BLD-MCNC: NEGATIVE MG/DL
CLARITY, POC: CLEAR
COLOR UR: YELLOW
GLUCOSE UR STRIP-MCNC: NEGATIVE MG/DL
KETONES UR QL: NEGATIVE
LEUKOCYTE EST, POC: NEGATIVE
NITRITE UR-MCNC: NEGATIVE MG/ML
PH UR: 7 [PH] (ref 5–8)
PROT UR STRIP-MCNC: NEGATIVE MG/DL
RBC # UR STRIP: NEGATIVE /UL
SP GR UR: 1.01 (ref 1–1.03)
UROBILINOGEN UR QL: NORMAL

## 2019-11-07 PROCEDURE — 81003 URINALYSIS AUTO W/O SCOPE: CPT | Performed by: PHYSICIAN ASSISTANT

## 2019-11-07 PROCEDURE — 99214 OFFICE O/P EST MOD 30 MIN: CPT | Performed by: PHYSICIAN ASSISTANT

## 2019-11-07 RX ORDER — LISINOPRIL 20 MG/1
20 TABLET ORAL DAILY
Refills: 0 | COMMUNITY
Start: 2019-11-04 | End: 2019-11-07

## 2019-11-07 RX ORDER — OMEPRAZOLE 40 MG/1
40 CAPSULE, DELAYED RELEASE ORAL DAILY
COMMUNITY
Start: 2019-11-06 | End: 2021-07-09

## 2019-11-07 RX ORDER — URSODIOL 500 MG/1
500 TABLET, FILM COATED ORAL DAILY
COMMUNITY
Start: 2019-11-06 | End: 2019-12-30
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
## 2019-11-08 ENCOUNTER — HOSPITAL ENCOUNTER (OUTPATIENT)
Dept: CARDIOLOGY | Facility: HOSPITAL | Age: 64
Discharge: HOME | End: 2019-11-08
Admitting: PHYSICIAN ASSISTANT

## 2019-11-08 ENCOUNTER — TELEPHONE (OUTPATIENT)
Dept: FAMILY MEDICINE CLINIC | Facility: CLINIC | Age: 64
End: 2019-11-08

## 2019-11-08 DIAGNOSIS — R55 BLACK-OUT (NOT AMNESIA): ICD-10-CM

## 2019-11-08 DIAGNOSIS — R41.0 CONFUSION: ICD-10-CM

## 2019-11-08 LAB
BH CV XLRA MEAS LEFT CCA RATIO VEL: 114 CM/SEC
BH CV XLRA MEAS LEFT DIST CCA EDV: 24.9 CM/SEC
BH CV XLRA MEAS LEFT DIST CCA PSV: 114 CM/SEC
BH CV XLRA MEAS LEFT DIST ICA EDV: -26.2 CM/SEC
BH CV XLRA MEAS LEFT DIST ICA PSV: -71.7 CM/SEC
BH CV XLRA MEAS LEFT ICA RATIO VEL: -86.4 CM/SEC
BH CV XLRA MEAS LEFT ICA/CCA RATIO: -0.76
BH CV XLRA MEAS LEFT PROX CCA EDV: 21.1 CM/SEC
BH CV XLRA MEAS LEFT PROX CCA PSV: 111 CM/SEC
BH CV XLRA MEAS LEFT PROX ECA PSV: -80.8 CM/SEC
BH CV XLRA MEAS LEFT PROX ICA EDV: -28.3 CM/SEC
BH CV XLRA MEAS LEFT PROX ICA PSV: -86.4 CM/SEC
BH CV XLRA MEAS LEFT PROX SCLA PSV: 250 CM/SEC
BH CV XLRA MEAS LEFT VERTEBRAL A PSV: 47.2 CM/SEC
BH CV XLRA MEAS RIGHT CCA RATIO VEL: 103 CM/SEC
BH CV XLRA MEAS RIGHT DIST CCA EDV: 15.5 CM/SEC
BH CV XLRA MEAS RIGHT DIST CCA PSV: 85.1 CM/SEC
BH CV XLRA MEAS RIGHT DIST ICA EDV: -27.3 CM/SEC
BH CV XLRA MEAS RIGHT DIST ICA PSV: -88.8 CM/SEC
BH CV XLRA MEAS RIGHT ICA RATIO VEL: -88.8 CM/SEC
BH CV XLRA MEAS RIGHT ICA/CCA RATIO: -0.86
BH CV XLRA MEAS RIGHT PROX CCA EDV: 14.9 CM/SEC
BH CV XLRA MEAS RIGHT PROX CCA PSV: 103 CM/SEC
BH CV XLRA MEAS RIGHT PROX ECA PSV: -77 CM/SEC
BH CV XLRA MEAS RIGHT PROX ICA EDV: -19.9 CM/SEC
BH CV XLRA MEAS RIGHT PROX ICA PSV: -78.3 CM/SEC
BH CV XLRA MEAS RIGHT PROX SCLA PSV: 139 CM/SEC
BH CV XLRA MEAS RIGHT VERTEBRAL A PSV: -44.1 CM/SEC

## 2019-11-08 PROCEDURE — 93880 EXTRACRANIAL BILAT STUDY: CPT

## 2019-11-08 NOTE — TELEPHONE ENCOUNTER
Per Kathryn:  Patient needs to have all medical records pertaining to his dizziness and blacks that he was seen for on 11/7/19 by Kathryn sent to [facility], fax # [phone].  Include all testing done for the same thing by other providers, all labs, imaging that you can see in the chart.  Please fax this Monday morning ASAP as his appt is on Tuesday and they will not see him if his records aren't there.

## 2019-11-09 ENCOUNTER — APPOINTMENT (OUTPATIENT)
Dept: LAB | Facility: HOSPITAL | Age: 64
End: 2019-11-09

## 2019-11-09 LAB
ALBUMIN SERPL-MCNC: 4 G/DL (ref 3.5–5.2)
ALBUMIN/GLOB SERPL: 1.3 G/DL
ALP SERPL-CCNC: 122 U/L (ref 39–117)
ALT SERPL W P-5'-P-CCNC: 18 U/L (ref 1–41)
ANION GAP SERPL CALCULATED.3IONS-SCNC: 13.2 MMOL/L (ref 5–15)
AST SERPL-CCNC: 9 U/L (ref 1–40)
BASOPHILS # BLD AUTO: 0.05 10*3/MM3 (ref 0–0.2)
BASOPHILS NFR BLD AUTO: 0.4 % (ref 0–1.5)
BILIRUB SERPL-MCNC: 0.3 MG/DL (ref 0.2–1.2)
BUN BLD-MCNC: 12 MG/DL (ref 8–23)
BUN/CREAT SERPL: 13.6 (ref 7–25)
CALCIUM SPEC-SCNC: 9.2 MG/DL (ref 8.6–10.5)
CHLORIDE SERPL-SCNC: 98 MMOL/L (ref 98–107)
CO2 SERPL-SCNC: 26.8 MMOL/L (ref 22–29)
CREAT BLD-MCNC: 0.88 MG/DL (ref 0.76–1.27)
DEPRECATED RDW RBC AUTO: 40.2 FL (ref 37–54)
EOSINOPHIL # BLD AUTO: 0.35 10*3/MM3 (ref 0–0.4)
EOSINOPHIL NFR BLD AUTO: 2.8 % (ref 0.3–6.2)
ERYTHROCYTE [DISTWIDTH] IN BLOOD BY AUTOMATED COUNT: 12.6 % (ref 12.3–15.4)
GFR SERPL CREATININE-BSD FRML MDRD: 87 ML/MIN/1.73
GLOBULIN UR ELPH-MCNC: 3 GM/DL
GLUCOSE BLD-MCNC: 110 MG/DL (ref 65–99)
HBA1C MFR BLD: 5.9 % (ref 3.5–5.6)
HCT VFR BLD AUTO: 38.6 % (ref 37.5–51)
HGB BLD-MCNC: 12.7 G/DL (ref 13–17.7)
IMM GRANULOCYTES # BLD AUTO: 0.06 10*3/MM3 (ref 0–0.05)
IMM GRANULOCYTES NFR BLD AUTO: 0.5 % (ref 0–0.5)
LYMPHOCYTES # BLD AUTO: 2.34 10*3/MM3 (ref 0.7–3.1)
LYMPHOCYTES NFR BLD AUTO: 19 % (ref 19.6–45.3)
MCH RBC QN AUTO: 28.9 PG (ref 26.6–33)
MCHC RBC AUTO-ENTMCNC: 32.9 G/DL (ref 31.5–35.7)
MCV RBC AUTO: 87.7 FL (ref 79–97)
MONOCYTES # BLD AUTO: 1.17 10*3/MM3 (ref 0.1–0.9)
MONOCYTES NFR BLD AUTO: 9.5 % (ref 5–12)
NEUTROPHILS # BLD AUTO: 8.37 10*3/MM3 (ref 1.7–7)
NEUTROPHILS NFR BLD AUTO: 67.8 % (ref 42.7–76)
NRBC BLD AUTO-RTO: 0 /100 WBC (ref 0–0.2)
PLATELET # BLD AUTO: 371 10*3/MM3 (ref 140–450)
PMV BLD AUTO: 10.3 FL (ref 6–12)
POTASSIUM BLD-SCNC: 4.6 MMOL/L (ref 3.5–5.2)
PROCALCITONIN SERPL-MCNC: 0.22 NG/ML (ref 0.1–0.25)
PROT SERPL-MCNC: 7 G/DL (ref 6–8.5)
PTH-INTACT SERPL-MCNC: 44.9 PG/ML (ref 15–65)
RBC # BLD AUTO: 4.4 10*6/MM3 (ref 4.14–5.8)
SODIUM BLD-SCNC: 138 MMOL/L (ref 136–145)
TSH SERPL DL<=0.05 MIU/L-ACNC: 1.98 UIU/ML (ref 0.27–4.2)
VIT B12 BLD-MCNC: 399 PG/ML (ref 211–946)
WBC NRBC COR # BLD: 12.34 10*3/MM3 (ref 3.4–10.8)

## 2019-11-09 PROCEDURE — 86757 RICKETTSIA ANTIBODY: CPT | Performed by: PHYSICIAN ASSISTANT

## 2019-11-09 PROCEDURE — 36415 COLL VENOUS BLD VENIPUNCTURE: CPT | Performed by: PHYSICIAN ASSISTANT

## 2019-11-09 PROCEDURE — 82607 VITAMIN B-12: CPT | Performed by: PHYSICIAN ASSISTANT

## 2019-11-09 PROCEDURE — 85025 COMPLETE CBC W/AUTO DIFF WBC: CPT | Performed by: PHYSICIAN ASSISTANT

## 2019-11-09 PROCEDURE — 86666 EHRLICHIA ANTIBODY: CPT | Performed by: PHYSICIAN ASSISTANT

## 2019-11-09 PROCEDURE — 84443 ASSAY THYROID STIM HORMONE: CPT | Performed by: PHYSICIAN ASSISTANT

## 2019-11-09 PROCEDURE — 86622 BRUCELLA ANTIBODY: CPT | Performed by: PHYSICIAN ASSISTANT

## 2019-11-09 PROCEDURE — 86618 LYME DISEASE ANTIBODY: CPT | Performed by: PHYSICIAN ASSISTANT

## 2019-11-09 PROCEDURE — 80053 COMPREHEN METABOLIC PANEL: CPT | Performed by: PHYSICIAN ASSISTANT

## 2019-11-09 PROCEDURE — 83036 HEMOGLOBIN GLYCOSYLATED A1C: CPT | Performed by: PHYSICIAN ASSISTANT

## 2019-11-09 PROCEDURE — 84145 PROCALCITONIN (PCT): CPT | Performed by: PHYSICIAN ASSISTANT

## 2019-11-09 PROCEDURE — 83970 ASSAY OF PARATHORMONE: CPT | Performed by: PHYSICIAN ASSISTANT

## 2019-11-09 PROCEDURE — 84425 ASSAY OF VITAMIN B-1: CPT | Performed by: PHYSICIAN ASSISTANT

## 2019-11-11 ENCOUNTER — OFFICE VISIT (OUTPATIENT)
Dept: CARDIOLOGY | Facility: CLINIC | Age: 64
End: 2019-11-11

## 2019-11-11 ENCOUNTER — TELEPHONE (OUTPATIENT)
Dept: FAMILY MEDICINE CLINIC | Facility: CLINIC | Age: 64
End: 2019-11-11

## 2019-11-11 VITALS
BODY MASS INDEX: 35.92 KG/M2 | WEIGHT: 237 LBS | OXYGEN SATURATION: 97 % | HEIGHT: 68 IN | HEART RATE: 85 BPM | SYSTOLIC BLOOD PRESSURE: 113 MMHG | DIASTOLIC BLOOD PRESSURE: 76 MMHG

## 2019-11-11 DIAGNOSIS — R55 SYNCOPE AND COLLAPSE: Primary | ICD-10-CM

## 2019-11-11 DIAGNOSIS — I10 ESSENTIAL HYPERTENSION: ICD-10-CM

## 2019-11-11 DIAGNOSIS — Z95.820 STATUS POST ANGIOPLASTY WITH STENT: ICD-10-CM

## 2019-11-11 DIAGNOSIS — E78.5 DYSLIPIDEMIA: ICD-10-CM

## 2019-11-11 LAB — B BURGDOR IGG SER QL: NEGATIVE

## 2019-11-11 PROCEDURE — 99215 OFFICE O/P EST HI 40 MIN: CPT | Performed by: INTERNAL MEDICINE

## 2019-11-11 NOTE — PROGRESS NOTES
Encounter Date:11/11/2019  Last seen 5/13/2019      Patient ID: Neptali Osuna is a 64 y.o. male.    Chief Complaint:  Status post stent  Recent syncopal episodes-new problem  Hypertension  Dyslipidemia  COPD      History of Present Illness  Patient recently has been having syncopal episodes preceded by dizziness.  The symptoms would last for a few seconds.  His wife noticed him to fall one time the syncope is associated with falling down.  Has been having perhaps once a week recently  Patient recently had carotid duplex scan which was negative.  No other associated aggravating or elevating factors.    Since I have last seen, the patient has been without any chest discomfort ,shortness of breath, palpitations, dizziness..  Denies having any headache ,abdominal pain ,nausea, vomiting , diarrhea constipation, loss of weight or loss of appetite.  Denies having any excessive bruising ,hematuria or blood in the stool.    Review of all systems negative except as indicated  Assessment and Plan     ]]]]]]]]]]]]]]]]]]]   impression  =============  - Recent recurrent syncopal episodes concerning for bradyarrhythmias.     -Status post stent to proximal LAD 11/05/2018     Cardiac catheterization 11/05/2018 revealed  Left ventricular size and contractility is normal with ejection fraction of 65%.  Left main coronary artery is normal.  Left anterior descending artery has proximal calcific plaquing with 95-99% proximal LAD disease.  Circumflex coronary artery is normal.  Right coronary artery is a large and dominant vessel that has mid segment 50% disease.      -Chest pain suggestive of unstable angina pectoris. prior to stent placement   CT scan of the chest showed significant coronary artery calcifications(prior to stent placement).  EKG is normal  Troponin levels are negative.      -COPD dyslipidemia hypertension      -Former smoker      -Strong family history of coronary artery disease      -No known  allergies  =========  Plan  ===========   patient is not having any angina pectoris or congestive heart failure  Patient is recently having recurrent episodes of syncope.  The symptoms are preceded by dizziness.  Concerning for bradycardia arrhythmias.  Patient has an appointment to see neurologist.  Recent Holter monitor (48-hour Holter monitor) did not reveal any arrhythmias although patient did not have any symptoms while he was wearing Holter monitor.  Medications were reviewed and updated.  Continue Plavix and other medications.  Options were discussed with patient.  Patient would benefit from loop recorder placement.  Risks and benefits pros and cons of the procedure were discussed with patient.  Further plan will depend on patient's progress  ]]]]]]]]]]]]]]]              Diagnosis Plan   1. Syncope and collapse  Loop Recorder Implant - Treatment Room   2. Dyslipidemia     3. Essential hypertension     4. Status post angioplasty with stent     LAB RESULTS (LAST 7 DAYS)    CBC  Results from last 7 days   Lab Units 11/09/19  0730   WBC 10*3/mm3 12.34*   RBC 10*6/mm3 4.40   HEMOGLOBIN g/dL 12.7*   HEMATOCRIT % 38.6   MCV fL 87.7   PLATELETS 10*3/mm3 371       BMP  Results from last 7 days   Lab Units 11/09/19  0730   SODIUM mmol/L 138   POTASSIUM mmol/L 4.6   CHLORIDE mmol/L 98   CO2 mmol/L 26.8   BUN mg/dL 12   CREATININE mg/dL 0.88   GLUCOSE mg/dL 110*       CMP   Results from last 7 days   Lab Units 11/09/19  0730   SODIUM mmol/L 138   POTASSIUM mmol/L 4.6   CHLORIDE mmol/L 98   CO2 mmol/L 26.8   BUN mg/dL 12   CREATININE mg/dL 0.88   GLUCOSE mg/dL 110*   ALBUMIN g/dL 4.00   BILIRUBIN mg/dL 0.3   ALK PHOS U/L 122*   AST (SGOT) U/L 9   ALT (SGPT) U/L 18         BNP        TROPONIN        CoAg        Creatinine Clearance  Estimated Creatinine Clearance: 102.3 mL/min (by C-G formula based on SCr of 0.88 mg/dL).    ABG        Radiology  No radiology results for the last day                The following portions of  the patient's history were reviewed and updated as appropriate: allergies, current medications, past family history, past medical history, past social history, past surgical history and problem list.    Review of Systems   Constitution: Negative for malaise/fatigue.   Cardiovascular: Negative for chest pain, leg swelling, palpitations and syncope.   Respiratory: Negative for shortness of breath.    Skin: Negative for rash.   Gastrointestinal: Negative for nausea and vomiting.   Neurological: Negative for dizziness, light-headedness and numbness.         Current Outpatient Medications:   •  amLODIPine (NORVASC) 10 MG tablet, Take 1 tablet by mouth Daily., Disp: 90 tablet, Rfl: 1  •  aspirin (ASPIR-LOW) 81 MG EC tablet, Take 81 mg by mouth Daily., Disp: , Rfl:   •  atorvastatin (LIPITOR) 40 MG tablet, Take 40 mg by mouth Daily., Disp: , Rfl:   •  clopidogrel (PLAVIX) 75 MG tablet, TAKE 1 TABLET BY MOUTH DAILY, Disp: 90 tablet, Rfl: 1  •  dicyclomine (BENTYL) 10 MG capsule, Take 1 capsule by mouth 4 (Four) Times a Day Before Meals & at Bedtime., Disp: 60 capsule, Rfl: 5  •  DULoxetine (CYMBALTA) 60 MG capsule, Take 60 mg by mouth Daily., Disp: , Rfl:   •  HYDROcodone-acetaminophen (NORCO)  MG per tablet, Take 1 tablet by mouth Every 6 (Six) Hours As Needed for Severe Pain ., Disp: 120 tablet, Rfl: 0  •  ipratropium-albuterol (DUO-NEB) 0.5-2.5 mg/3 ml nebulizer, Take 3 mL by nebulization Every 4 (Four) Hours As Needed for Wheezing., Disp: , Rfl:   •  lisinopril (PRINIVIL,ZESTRIL) 30 MG tablet, Take 1 tablet by mouth Daily., Disp: 90 tablet, Rfl: 0  •  loratadine (CLARITIN) 10 MG tablet, Take 1 tablet by mouth Daily., Disp: 30 tablet, Rfl: 2  •  omeprazole (priLOSEC) 40 MG capsule, Take 40 mg by mouth Daily., Disp: , Rfl:   •  pregabalin (LYRICA) 100 MG capsule, Take 1 capsule by mouth 2 (Two) Times a Day., Disp: 60 capsule, Rfl: 2  •  TRELEGY ELLIPTA 100-62.5-25 MCG/INH aerosol powder , INHALE 1 PUFF ONCE DAILY,  Disp: 1 each, Rfl: 5  •  ursodiol (ACTIGALL) 500 MG tablet, Take 500 mg by mouth Daily., Disp: , Rfl:   •  Vortioxetine HBr (TRINTELLIX) 10 MG tablet, Take 10 mg by mouth Daily., Disp: 30 tablet, Rfl:     No Known Allergies    Family History   Problem Relation Age of Onset   • Colon cancer Mother    • Colon cancer Father    • Heart disease Father    • Diabetes Brother    • Diabetes Maternal Grandmother    • Diabetes Maternal Grandfather        Past Surgical History:   Procedure Laterality Date   • CARDIAC CATHETERIZATION  11/05/2018    Dr. Sher   • COLONOSCOPY  2016   • CORONARY STENT PLACEMENT     • LIVER BIOPSY     • TONSILLECTOMY AND ADENOIDECTOMY         Past Medical History:   Diagnosis Date   • Acute diverticulitis     with perforation   • Allergic    • Anemia    • Anxiety    • Back pain    • CAD S/P percutaneous coronary angioplasty    • COPD (chronic obstructive pulmonary disease) (CMS/HCC)    • Depression    • Diverticulitis    • GERD (gastroesophageal reflux disease)    • Hyperlipidemia    • Hypertension    • Low back pain    • Myocardial infarction (CMS/HCC)    • TEIXEIRA (nonalcoholic steatohepatitis)    • Obesity    • BRITTNEY on CPAP    • Sleep apnea    • Sleep disorder, shift work    • Spinal stenosis of lumbar region at multiple levels        Family History   Problem Relation Age of Onset   • Colon cancer Mother    • Colon cancer Father    • Heart disease Father    • Diabetes Brother    • Diabetes Maternal Grandmother    • Diabetes Maternal Grandfather        Social History     Socioeconomic History   • Marital status:      Spouse name: Not on file   • Number of children: Not on file   • Years of education: Not on file   • Highest education level: Not on file   Tobacco Use   • Smoking status: Former Smoker     Types: Cigarettes     Last attempt to quit: 2009     Years since quitting: 10.8   • Smokeless tobacco: Never Used   Substance and Sexual Activity   • Alcohol use: No     Frequency: Never   •  "Drug use: No   • Sexual activity: Defer         Procedures      Objective:       Physical Exam    /76 (BP Location: Left arm, Patient Position: Sitting, Cuff Size: Large Adult)   Pulse 85   Ht 172.7 cm (68\")   Wt 108 kg (237 lb)   SpO2 97%   BMI 36.04 kg/m²   The patient is alert, oriented and in no distress.    Vital signs as noted above.    Head and neck revealed no carotid bruits or jugular venous distension.  No thyromegaly or lymphadenopathy is present.    Lungs clear.  No wheezing.  Breath sounds are normal bilaterally.    Heart normal first and second heart sounds.  No murmur..  No pericardial rub is present.  No gallop is present.    Abdomen soft and nontender.  No organomegaly is present.    Extremities revealed good peripheral pulses without any pedal edema.    Skin warm and dry.    Musculoskeletal system is grossly normal.    CNS grossly normal.        "

## 2019-11-11 NOTE — TELEPHONE ENCOUNTER
I got all of the patient's records surrounding his blackouts organized and have given them to my medical assistant to fax today.  I also made a second copy that I put up front for the patient to get today and take to the appointment with him or take today before his appointment tomorrow.  I called the patient and informed him, he states he will come pick them up today.

## 2019-11-11 NOTE — H&P (VIEW-ONLY)
Encounter Date:11/11/2019  Last seen 5/13/2019      Patient ID: Neptali Osuna is a 64 y.o. male.    Chief Complaint:  Status post stent  Recent syncopal episodes–new problem  Hypertension  Dyslipidemia  COPD      History of Present Illness  Patient recently has been having syncopal episodes preceded by dizziness.  The symptoms would last for a few seconds.  His wife noticed him to fall one time the syncope is associated with falling down.  Has been having perhaps once a week recently  Patient recently had carotid duplex scan which was negative.  No other associated aggravating or elevating factors.    Since I have last seen, the patient has been without any chest discomfort ,shortness of breath, palpitations, dizziness..  Denies having any headache ,abdominal pain ,nausea, vomiting , diarrhea constipation, loss of weight or loss of appetite.  Denies having any excessive bruising ,hematuria or blood in the stool.    Review of all systems negative except as indicated  Assessment and Plan     ]]]]]]]]]]]]]]]]]]]   impression  =============  – Recent recurrent syncopal episodes concerning for bradyarrhythmias.     -Status post stent to proximal LAD 11/05/2018     Cardiac catheterization 11/05/2018 revealed  Left ventricular size and contractility is normal with ejection fraction of 65%.  Left main coronary artery is normal.  Left anterior descending artery has proximal calcific plaquing with 95-99% proximal LAD disease.  Circumflex coronary artery is normal.  Right coronary artery is a large and dominant vessel that has mid segment 50% disease.      -Chest pain suggestive of unstable angina pectoris. prior to stent placement   CT scan of the chest showed significant coronary artery calcifications(prior to stent placement).  EKG is normal  Troponin levels are negative.      -COPD dyslipidemia hypertension      -Former smoker      -Strong family history of coronary artery disease      -No known  allergies  =========  Plan  ===========   patient is not having any angina pectoris or congestive heart failure  Patient is recently having recurrent episodes of syncope.  The symptoms are preceded by dizziness.  Concerning for bradycardia arrhythmias.  Patient has an appointment to see neurologist.  Recent Holter monitor (48-hour Holter monitor) did not reveal any arrhythmias although patient did not have any symptoms while he was wearing Holter monitor.  Medications were reviewed and updated.  Continue Plavix and other medications.  Options were discussed with patient.  Patient would benefit from loop recorder placement.  Risks and benefits pros and cons of the procedure were discussed with patient.  Further plan will depend on patient's progress  ]]]]]]]]]]]]]]]              Diagnosis Plan   1. Syncope and collapse  Loop Recorder Implant - Treatment Room   2. Dyslipidemia     3. Essential hypertension     4. Status post angioplasty with stent     LAB RESULTS (LAST 7 DAYS)    CBC  Results from last 7 days   Lab Units 11/09/19  0730   WBC 10*3/mm3 12.34*   RBC 10*6/mm3 4.40   HEMOGLOBIN g/dL 12.7*   HEMATOCRIT % 38.6   MCV fL 87.7   PLATELETS 10*3/mm3 371       BMP  Results from last 7 days   Lab Units 11/09/19  0730   SODIUM mmol/L 138   POTASSIUM mmol/L 4.6   CHLORIDE mmol/L 98   CO2 mmol/L 26.8   BUN mg/dL 12   CREATININE mg/dL 0.88   GLUCOSE mg/dL 110*       CMP   Results from last 7 days   Lab Units 11/09/19  0730   SODIUM mmol/L 138   POTASSIUM mmol/L 4.6   CHLORIDE mmol/L 98   CO2 mmol/L 26.8   BUN mg/dL 12   CREATININE mg/dL 0.88   GLUCOSE mg/dL 110*   ALBUMIN g/dL 4.00   BILIRUBIN mg/dL 0.3   ALK PHOS U/L 122*   AST (SGOT) U/L 9   ALT (SGPT) U/L 18         BNP        TROPONIN        CoAg        Creatinine Clearance  Estimated Creatinine Clearance: 102.3 mL/min (by C-G formula based on SCr of 0.88 mg/dL).    ABG        Radiology  No radiology results for the last day                The following portions of  the patient's history were reviewed and updated as appropriate: allergies, current medications, past family history, past medical history, past social history, past surgical history and problem list.    Review of Systems   Constitution: Negative for malaise/fatigue.   Cardiovascular: Negative for chest pain, leg swelling, palpitations and syncope.   Respiratory: Negative for shortness of breath.    Skin: Negative for rash.   Gastrointestinal: Negative for nausea and vomiting.   Neurological: Negative for dizziness, light-headedness and numbness.         Current Outpatient Medications:   •  amLODIPine (NORVASC) 10 MG tablet, Take 1 tablet by mouth Daily., Disp: 90 tablet, Rfl: 1  •  aspirin (ASPIR-LOW) 81 MG EC tablet, Take 81 mg by mouth Daily., Disp: , Rfl:   •  atorvastatin (LIPITOR) 40 MG tablet, Take 40 mg by mouth Daily., Disp: , Rfl:   •  clopidogrel (PLAVIX) 75 MG tablet, TAKE 1 TABLET BY MOUTH DAILY, Disp: 90 tablet, Rfl: 1  •  dicyclomine (BENTYL) 10 MG capsule, Take 1 capsule by mouth 4 (Four) Times a Day Before Meals & at Bedtime., Disp: 60 capsule, Rfl: 5  •  DULoxetine (CYMBALTA) 60 MG capsule, Take 60 mg by mouth Daily., Disp: , Rfl:   •  HYDROcodone-acetaminophen (NORCO)  MG per tablet, Take 1 tablet by mouth Every 6 (Six) Hours As Needed for Severe Pain ., Disp: 120 tablet, Rfl: 0  •  ipratropium-albuterol (DUO-NEB) 0.5-2.5 mg/3 ml nebulizer, Take 3 mL by nebulization Every 4 (Four) Hours As Needed for Wheezing., Disp: , Rfl:   •  lisinopril (PRINIVIL,ZESTRIL) 30 MG tablet, Take 1 tablet by mouth Daily., Disp: 90 tablet, Rfl: 0  •  loratadine (CLARITIN) 10 MG tablet, Take 1 tablet by mouth Daily., Disp: 30 tablet, Rfl: 2  •  omeprazole (priLOSEC) 40 MG capsule, Take 40 mg by mouth Daily., Disp: , Rfl:   •  pregabalin (LYRICA) 100 MG capsule, Take 1 capsule by mouth 2 (Two) Times a Day., Disp: 60 capsule, Rfl: 2  •  TRELEGY ELLIPTA 100-62.5-25 MCG/INH aerosol powder , INHALE 1 PUFF ONCE DAILY,  Disp: 1 each, Rfl: 5  •  ursodiol (ACTIGALL) 500 MG tablet, Take 500 mg by mouth Daily., Disp: , Rfl:   •  Vortioxetine HBr (TRINTELLIX) 10 MG tablet, Take 10 mg by mouth Daily., Disp: 30 tablet, Rfl:     No Known Allergies    Family History   Problem Relation Age of Onset   • Colon cancer Mother    • Colon cancer Father    • Heart disease Father    • Diabetes Brother    • Diabetes Maternal Grandmother    • Diabetes Maternal Grandfather        Past Surgical History:   Procedure Laterality Date   • CARDIAC CATHETERIZATION  11/05/2018    Dr. Sher   • COLONOSCOPY  2016   • CORONARY STENT PLACEMENT     • LIVER BIOPSY     • TONSILLECTOMY AND ADENOIDECTOMY         Past Medical History:   Diagnosis Date   • Acute diverticulitis     with perforation   • Allergic    • Anemia    • Anxiety    • Back pain    • CAD S/P percutaneous coronary angioplasty    • COPD (chronic obstructive pulmonary disease) (CMS/HCC)    • Depression    • Diverticulitis    • GERD (gastroesophageal reflux disease)    • Hyperlipidemia    • Hypertension    • Low back pain    • Myocardial infarction (CMS/HCC)    • TEIXEIRA (nonalcoholic steatohepatitis)    • Obesity    • BRITTNEY on CPAP    • Sleep apnea    • Sleep disorder, shift work    • Spinal stenosis of lumbar region at multiple levels        Family History   Problem Relation Age of Onset   • Colon cancer Mother    • Colon cancer Father    • Heart disease Father    • Diabetes Brother    • Diabetes Maternal Grandmother    • Diabetes Maternal Grandfather        Social History     Socioeconomic History   • Marital status:      Spouse name: Not on file   • Number of children: Not on file   • Years of education: Not on file   • Highest education level: Not on file   Tobacco Use   • Smoking status: Former Smoker     Types: Cigarettes     Last attempt to quit: 2009     Years since quitting: 10.8   • Smokeless tobacco: Never Used   Substance and Sexual Activity   • Alcohol use: No     Frequency: Never   •  "Drug use: No   • Sexual activity: Defer         Procedures      Objective:       Physical Exam    /76 (BP Location: Left arm, Patient Position: Sitting, Cuff Size: Large Adult)   Pulse 85   Ht 172.7 cm (68\")   Wt 108 kg (237 lb)   SpO2 97%   BMI 36.04 kg/m²   The patient is alert, oriented and in no distress.    Vital signs as noted above.    Head and neck revealed no carotid bruits or jugular venous distension.  No thyromegaly or lymphadenopathy is present.    Lungs clear.  No wheezing.  Breath sounds are normal bilaterally.    Heart normal first and second heart sounds.  No murmur..  No pericardial rub is present.  No gallop is present.    Abdomen soft and nontender.  No organomegaly is present.    Extremities revealed good peripheral pulses without any pedal edema.    Skin warm and dry.    Musculoskeletal system is grossly normal.    CNS grossly normal.        "

## 2019-11-12 ENCOUNTER — READMISSION MANAGEMENT (OUTPATIENT)
Dept: CALL CENTER | Facility: HOSPITAL | Age: 64
End: 2019-11-12

## 2019-11-12 NOTE — OUTREACH NOTE
COPD/PN Week 3 Survey      Responses   Facility patient discharged from?  [facility]   Does the patient have one of the following disease processes/diagnoses(primary or secondary)?  COPD/Pneumonia   Was the primary reason for admission:  COPD exacerbation   Week 3 attempt successful?  Yes   Call start time  1242   Call end time  1245   Meds reviewed with patient/caregiver?  Yes   Is the patient having any side effects they believe may be caused by any medication additions or changes?  No   Does the patient have all medications ordered at discharge?  Yes   Is the patient taking all medications as directed (includes completed medication regime)?  Yes   Does the patient have a primary care provider?   Yes   Has the patient kept scheduled appointments due by today?  Yes   Has home health visited the patient within 72 hours of discharge?  N/A   Did the patient receive a copy of their discharge instructions?  Yes   Nursing interventions  Reviewed instructions with patient   What is the patient's perception of their health status since discharge?  Improving   Is the patient/caregiver able to teach back the hierarchy of who to call/visit for symptoms/problems? PCP, Specialist, Home health nurse, Urgent Care, ED, 911  Yes   Is the patient able to teach back COPD zones?  Yes   Nursing interventions  Education provided on various zones   Patient reports what zone on this call?  Green Zone   Green Zone  Reports doing well, Breathing without shortness of breath, Usual activity and exercise level, Usual amount of phlegm/mucus without difficulty coughing up, Sleeping well, Appetite is good   Green Zone interventions:  Take daily medications, Avoid indoor/outdoor triggers, Use oxygen as prescribed   Week 3 call completed?  Yes   Revoked  No further contact(revokes)-requires comment   Graduated/Revoked comments  PATIENT DOING WELL WITH NO NEEDS OR QUESTIONS AT THIS TIME.          Marley Morejon LPN

## 2019-11-13 LAB
A PHAGOCYTOPH IGM TITR SER IF: NEGATIVE {TITER}
CONV HGE IGG TITER: NEGATIVE
E CHAFFEENSIS IGG TITR SER IF: NEGATIVE {TITER}
E. CHAFFEENSIS (HME) IGM TITER: NEGATIVE

## 2019-11-14 LAB
BRUCELLA IGG SER QL IA: NEGATIVE
BRUCELLA IGM SER QL IA: NEGATIVE
RICK SF IGG TITR SER IF: NORMAL {TITER}
RICK SF IGM TITR SER IF: NORMAL {TITER}
TYPHUS FEVER GROUP IGG: NORMAL
TYPHUS FEVER GROUP IGM: NORMAL

## 2019-11-15 ENCOUNTER — CLINICAL SUPPORT NO REQUIREMENTS (OUTPATIENT)
Dept: CARDIOLOGY | Facility: CLINIC | Age: 64
End: 2019-11-15

## 2019-11-15 ENCOUNTER — HOSPITAL ENCOUNTER (OUTPATIENT)
Dept: CARDIOLOGY | Facility: HOSPITAL | Age: 64
Discharge: HOME | End: 2019-11-15
Admitting: INTERNAL MEDICINE

## 2019-11-15 VITALS
DIASTOLIC BLOOD PRESSURE: 71 MMHG | HEART RATE: 84 BPM | WEIGHT: 235.67 LBS | RESPIRATION RATE: 16 BRPM | TEMPERATURE: 97.6 F | OXYGEN SATURATION: 96 % | HEIGHT: 66 IN | BODY MASS INDEX: 37.88 KG/M2 | SYSTOLIC BLOOD PRESSURE: 130 MMHG

## 2019-11-15 DIAGNOSIS — Z95.818 STATUS POST PLACEMENT OF IMPLANTABLE LOOP RECORDER: Primary | ICD-10-CM

## 2019-11-15 DIAGNOSIS — R55 SYNCOPE AND COLLAPSE: ICD-10-CM

## 2019-11-15 LAB — VIT B1 BLD-SCNC: 143.5 NMOL/L (ref 66.5–200)

## 2019-11-15 PROCEDURE — C1764 EVENT RECORDER, CARDIAC: HCPCS

## 2019-11-15 PROCEDURE — 33285 INSJ SUBQ CAR RHYTHM MNTR: CPT | Performed by: INTERNAL MEDICINE

## 2019-11-15 PROCEDURE — 33285 INSJ SUBQ CAR RHYTHM MNTR: CPT

## 2019-11-15 RX ORDER — LIDOCAINE HYDROCHLORIDE AND EPINEPHRINE BITARTRATE 20; .01 MG/ML; MG/ML
INJECTION, SOLUTION SUBCUTANEOUS
Status: COMPLETED | OUTPATIENT
Start: 2019-11-15 | End: 2019-11-15

## 2019-11-15 RX ADMIN — LIDOCAINE HYDROCHLORIDE,EPINEPHRINE BITARTRATE 10 ML: 20; .01 INJECTION, SOLUTION INFILTRATION; PERINEURAL at 08:11

## 2019-11-15 NOTE — DISCHARGE INSTRUCTIONS
keep dressing clean and dry, after tegaderm comes off, let steri strips come off on own.  If any s/s of wound infection call Dr. Sher or come back to ER.  Fever, redness at site, drainage, etc.

## 2019-11-18 ENCOUNTER — CLINICAL SUPPORT NO REQUIREMENTS (OUTPATIENT)
Dept: CARDIOLOGY | Facility: CLINIC | Age: 64
End: 2019-11-18

## 2019-11-18 DIAGNOSIS — Z95.818 STATUS POST PLACEMENT OF IMPLANTABLE LOOP RECORDER: ICD-10-CM

## 2019-11-18 DIAGNOSIS — R55 SYNCOPE AND COLLAPSE: Primary | ICD-10-CM

## 2019-11-20 RX ORDER — HYDROCODONE BITARTRATE AND ACETAMINOPHEN 10; 325 MG/1; MG/1
1 TABLET ORAL EVERY 6 HOURS PRN
Qty: 120 TABLET | Refills: 0 | Status: SHIPPED | OUTPATIENT
Start: 2019-11-20 | End: 2019-11-25 | Stop reason: SDUPTHER

## 2019-11-20 RX ORDER — PREGABALIN 100 MG/1
100 CAPSULE ORAL 2 TIMES DAILY
Qty: 60 CAPSULE | Refills: 2 | Status: SHIPPED | OUTPATIENT
Start: 2019-11-20 | End: 2020-03-23

## 2019-11-20 RX ORDER — PREGABALIN 100 MG/1
CAPSULE ORAL
Qty: 60 CAPSULE | Refills: 0 | Status: SHIPPED | OUTPATIENT
Start: 2019-11-20 | End: 2019-12-06

## 2019-11-22 ENCOUNTER — CLINICAL SUPPORT NO REQUIREMENTS (OUTPATIENT)
Dept: CARDIOLOGY | Facility: CLINIC | Age: 64
End: 2019-11-22

## 2019-11-22 DIAGNOSIS — Z95.818 STATUS POST PLACEMENT OF IMPLANTABLE LOOP RECORDER: Primary | ICD-10-CM

## 2019-11-22 DIAGNOSIS — R55 SYNCOPE AND COLLAPSE: ICD-10-CM

## 2019-11-23 RX ORDER — HYDROCODONE BITARTRATE AND ACETAMINOPHEN 10; 325 MG/1; MG/1
1 TABLET ORAL EVERY 6 HOURS PRN
Qty: 120 TABLET | Refills: 0 | Status: CANCELLED | OUTPATIENT
Start: 2019-11-23

## 2019-11-25 RX ORDER — HYDROCODONE BITARTRATE AND ACETAMINOPHEN 10; 325 MG/1; MG/1
1 TABLET ORAL EVERY 6 HOURS PRN
Qty: 120 TABLET | Refills: 0 | Status: SHIPPED | OUTPATIENT
Start: 2019-11-25 | End: 2019-12-17 | Stop reason: SDUPTHER

## 2019-12-02 ENCOUNTER — CLINICAL SUPPORT NO REQUIREMENTS (OUTPATIENT)
Dept: CARDIOLOGY | Facility: CLINIC | Age: 64
End: 2019-12-02

## 2019-12-02 ENCOUNTER — OFFICE VISIT (OUTPATIENT)
Dept: CARDIOLOGY | Facility: CLINIC | Age: 64
End: 2019-12-02

## 2019-12-02 VITALS
SYSTOLIC BLOOD PRESSURE: 121 MMHG | DIASTOLIC BLOOD PRESSURE: 75 MMHG | HEIGHT: 66 IN | OXYGEN SATURATION: 94 % | WEIGHT: 235 LBS | HEART RATE: 98 BPM | BODY MASS INDEX: 37.77 KG/M2

## 2019-12-02 DIAGNOSIS — E78.2 MIXED HYPERLIPIDEMIA: ICD-10-CM

## 2019-12-02 DIAGNOSIS — I25.10 CHRONIC CORONARY ARTERY DISEASE: ICD-10-CM

## 2019-12-02 DIAGNOSIS — Z95.818 STATUS POST PLACEMENT OF IMPLANTABLE LOOP RECORDER: ICD-10-CM

## 2019-12-02 DIAGNOSIS — R55 SYNCOPE AND COLLAPSE: Primary | ICD-10-CM

## 2019-12-02 DIAGNOSIS — Z95.820 STATUS POST ANGIOPLASTY WITH STENT: ICD-10-CM

## 2019-12-02 DIAGNOSIS — R55 SYNCOPE AND COLLAPSE: ICD-10-CM

## 2019-12-02 DIAGNOSIS — I10 ESSENTIAL HYPERTENSION: Primary | ICD-10-CM

## 2019-12-02 PROCEDURE — 99214 OFFICE O/P EST MOD 30 MIN: CPT | Performed by: INTERNAL MEDICINE

## 2019-12-02 RX ORDER — OMEPRAZOLE 20 MG/1
CAPSULE, DELAYED RELEASE ORAL
COMMUNITY
Start: 2019-11-30 | End: 2019-12-06

## 2019-12-02 RX ORDER — PREDNISONE 20 MG/1
40 TABLET ORAL DAILY
COMMUNITY
Start: 2019-10-28 | End: 2019-12-06

## 2019-12-02 NOTE — PROGRESS NOTES
Encounter Date:12/02/2019  Last seen 11/11/2019      Patient ID: Neptali Osuna is a 64 y.o. male.    Chief Complaint:    History of recurrent episodes of syncope.  Status post stent  Status post recent loop recorder placement    History of Present Illness  Patient had an episode of syncope last Friday.  Patient was wearing a 72-hour EEG monitoring at that time.    Since I have last seen, the patient has been without any chest discomfort ,shortness of breath, palpitations, dizziness.  Denies having any headache ,abdominal pain ,nausea, vomiting , diarrhea constipation, loss of weight or loss of appetite.  Denies having any excessive bruising ,hematuria or blood in the stool.    Review of all systems negative except as indicated      Assessment and Plan       ]]]]]]]]]]]]]]]]]]]   impression  =============  - Recent recurrent syncopal episodes concerning for bradyarrhythmias.    -Status post [employer] loop recorder 11/15/2019 ([employer])      -Status post stent to proximal LAD 11/05/2018     Cardiac catheterization 11/05/2018 revealed  Left ventricular size and contractility is normal with ejection fraction of 65%.  Left main coronary artery is normal.  Left anterior descending artery has proximal calcific plaquing with 95-99% proximal LAD disease.  Circumflex coronary artery is normal.  Right coronary artery is a large and dominant vessel that has mid segment 50% disease.      -Chest pain suggestive of unstable angina pectoris. prior to stent placement   CT scan of the chest showed significant coronary artery calcifications(prior to stent placement).  EKG is normal  Troponin levels are negative.      -COPD dyslipidemia hypertension      -Former smoker      -Strong family history of coronary artery disease      -No known allergies  =========  Plan  ===========   patient is not having any angina pectoris or congestive heart failure  Patient is recently having recurrent episodes of syncope.  The symptoms are preceded by  dizziness.  Concerning for bradycardia arrhythmias.  Patient had a 72 hr EEG during which patient had a syncopal episode.  Await results from neurologist.  Loop recorder was interrogated and no definite evidence for bradycardia arrhythmia was noted.  Recent Holter monitor (48-hour Holter monitor) did not reveal any arrhythmias although patient did not have any symptoms while he was wearing Holter monitor.  Medications were reviewed and updated.  Continue Plavix and other medications.  Consideration would be given for discontinuation of Plavix since it has been more than a year since he had stent placement.  Follow-up in the office in 2 weeks.  Patient recently had orthostatic blood pressure changes and they were negative.  During recent episode patient's wife tested his blood sugar and blood pressure and they were normal.  Heart rate was a little faster but not slow.  Further plan will depend on patient's progress  ]]]]]]]]]]]]]]]                  Diagnosis Plan   1. Essential hypertension     2. Chronic coronary artery disease     3. Mixed hyperlipidemia     4. Syncope and collapse     5. Status post angioplasty with stent     LAB RESULTS (LAST 7 DAYS)    CBC        BMP        CMP         BNP        TROPONIN        CoAg        Creatinine Clearance  Estimated Creatinine Clearance: 98.6 mL/min (by C-G formula based on SCr of 0.88 mg/dL).    ABG        Radiology  No radiology results for the last day                The following portions of the patient's history were reviewed and updated as appropriate: allergies, current medications, past family history, past medical history, past social history, past surgical history and problem list.    Review of Systems   Constitution: Negative for malaise/fatigue.   Cardiovascular: Negative for chest pain, leg swelling, palpitations and syncope.   Respiratory: Positive for shortness of breath (COPD).    Skin: Negative for rash.   Gastrointestinal: Negative for nausea and  vomiting.   Neurological: Negative for dizziness, light-headedness and numbness.         Current Outpatient Medications:   •  amLODIPine (NORVASC) 10 MG tablet, Take 1 tablet by mouth Daily., Disp: 90 tablet, Rfl: 1  •  aspirin (ASPIR-LOW) 81 MG EC tablet, Take 81 mg by mouth Daily., Disp: , Rfl:   •  atorvastatin (LIPITOR) 40 MG tablet, Take 40 mg by mouth Daily., Disp: , Rfl:   •  clopidogrel (PLAVIX) 75 MG tablet, TAKE 1 TABLET BY MOUTH DAILY, Disp: 90 tablet, Rfl: 1  •  dicyclomine (BENTYL) 10 MG capsule, Take 1 capsule by mouth 4 (Four) Times a Day Before Meals & at Bedtime., Disp: 60 capsule, Rfl: 5  •  DULoxetine (CYMBALTA) 60 MG capsule, Take 60 mg by mouth Daily., Disp: , Rfl:   •  HYDROcodone-acetaminophen (NORCO)  MG per tablet, Take 1 tablet by mouth Every 6 (Six) Hours As Needed for Severe Pain ., Disp: 120 tablet, Rfl: 0  •  ipratropium-albuterol (DUO-NEB) 0.5-2.5 mg/3 ml nebulizer, Take 3 mL by nebulization Every 4 (Four) Hours As Needed for Wheezing., Disp: , Rfl:   •  lisinopril (PRINIVIL,ZESTRIL) 30 MG tablet, Take 1 tablet by mouth Daily., Disp: 90 tablet, Rfl: 0  •  loratadine (CLARITIN) 10 MG tablet, Take 1 tablet by mouth Daily., Disp: 30 tablet, Rfl: 2  •  omeprazole (priLOSEC) 20 MG capsule, , Disp: , Rfl:   •  omeprazole (priLOSEC) 40 MG capsule, Take 40 mg by mouth Daily., Disp: , Rfl:   •  predniSONE (DELTASONE) 20 MG tablet, Take 40 mg by mouth Daily., Disp: , Rfl:   •  pregabalin (LYRICA) 100 MG capsule, Take 1 capsule by mouth 2 (Two) Times a Day., Disp: 60 capsule, Rfl: 2  •  TRELEGY ELLIPTA 100-62.5-25 MCG/INH aerosol powder , INHALE 1 PUFF ONCE DAILY, Disp: 1 each, Rfl: 5  •  ursodiol (ACTIGALL) 500 MG tablet, Take 500 mg by mouth Daily., Disp: , Rfl:   •  Vortioxetine HBr (TRINTELLIX) 10 MG tablet, Take 10 mg by mouth Daily., Disp: 30 tablet, Rfl:   •  pregabalin (LYRICA) 100 MG capsule, TAKE 1 CAPSULE BY MOUTH TWICE DAILY, Disp: 60 capsule, Rfl: 0    No Known  "Allergies    Family History   Problem Relation Age of Onset   • Colon cancer Mother    • Colon cancer Father    • Heart disease Father    • Diabetes Brother    • Diabetes Maternal Grandmother    • Diabetes Maternal Grandfather        Past Surgical History:   Procedure Laterality Date   • CARDIAC CATHETERIZATION  11/05/2018    Dr. Sher   • COLONOSCOPY  2016   • CORONARY STENT PLACEMENT     • LIVER BIOPSY     • TONSILLECTOMY AND ADENOIDECTOMY         Past Medical History:   Diagnosis Date   • Acute diverticulitis     with perforation   • Allergic    • Anemia    • Anxiety    • Back pain    • CAD S/P percutaneous coronary angioplasty    • COPD (chronic obstructive pulmonary disease) (CMS/HCC)    • Depression    • Diverticulitis    • GERD (gastroesophageal reflux disease)    • Hyperlipidemia    • Hypertension    • Low back pain    • Myocardial infarction (CMS/HCC)    • TEIXEIRA (nonalcoholic steatohepatitis)    • Obesity    • BRITTNEY on CPAP    • Sleep apnea    • Sleep disorder, shift work    • Spinal stenosis of lumbar region at multiple levels        Family History   Problem Relation Age of Onset   • Colon cancer Mother    • Colon cancer Father    • Heart disease Father    • Diabetes Brother    • Diabetes Maternal Grandmother    • Diabetes Maternal Grandfather        Social History     Socioeconomic History   • Marital status:      Spouse name: Not on file   • Number of children: Not on file   • Years of education: Not on file   • Highest education level: Not on file   Tobacco Use   • Smoking status: Former Smoker     Types: Cigarettes     Last attempt to quit: 2009     Years since quitting: 10.9   • Smokeless tobacco: Never Used   Substance and Sexual Activity   • Alcohol use: No     Frequency: Never   • Drug use: No   • Sexual activity: Defer         Procedures      Objective:       Physical Exam    /75 (BP Location: Left arm, Patient Position: Sitting, Cuff Size: Adult)   Pulse 98   Ht 167.6 cm (66\")   Wt " 107 kg (235 lb)   SpO2 94%   BMI 37.93 kg/m²   The patient is alert, oriented and in no distress.    Vital signs as noted above.    Head and neck revealed no carotid bruits or jugular venous distension.  No thyromegaly or lymphadenopathy is present.    Lungs clear.  No wheezing.  Breath sounds are normal bilaterally.    Heart normal first and second heart sounds.  No murmur..  No pericardial rub is present.  No gallop is present.    Abdomen soft and nontender.  No organomegaly is present.    Extremities revealed good peripheral pulses without any pedal edema.    Skin warm and dry.  Loop recorder site looks normal.    Musculoskeletal system is grossly normal.    CNS grossly normal.

## 2019-12-03 ENCOUNTER — CLINICAL SUPPORT NO REQUIREMENTS (OUTPATIENT)
Dept: CARDIOLOGY | Facility: CLINIC | Age: 64
End: 2019-12-03

## 2019-12-03 DIAGNOSIS — R55 SYNCOPE AND COLLAPSE: Primary | ICD-10-CM

## 2019-12-03 DIAGNOSIS — Z95.818 STATUS POST PLACEMENT OF IMPLANTABLE LOOP RECORDER: ICD-10-CM

## 2019-12-04 RX ORDER — PREDNISONE 10 MG/1
TABLET ORAL
Qty: 20 TABLET | Refills: 0 | OUTPATIENT
Start: 2019-12-04

## 2019-12-04 RX ORDER — LISINOPRIL 20 MG/1
TABLET ORAL
Qty: 90 TABLET | Refills: 0 | OUTPATIENT
Start: 2019-12-04

## 2019-12-06 ENCOUNTER — CLINICAL SUPPORT NO REQUIREMENTS (OUTPATIENT)
Dept: CARDIOLOGY | Facility: CLINIC | Age: 64
End: 2019-12-06

## 2019-12-06 ENCOUNTER — OFFICE VISIT (OUTPATIENT)
Dept: FAMILY MEDICINE CLINIC | Facility: CLINIC | Age: 64
End: 2019-12-06

## 2019-12-06 VITALS
DIASTOLIC BLOOD PRESSURE: 67 MMHG | HEART RATE: 91 BPM | BODY MASS INDEX: 38.41 KG/M2 | WEIGHT: 239 LBS | HEIGHT: 66 IN | OXYGEN SATURATION: 96 % | SYSTOLIC BLOOD PRESSURE: 103 MMHG

## 2019-12-06 DIAGNOSIS — R55 SYNCOPE AND COLLAPSE: Primary | ICD-10-CM

## 2019-12-06 DIAGNOSIS — Z95.818 STATUS POST PLACEMENT OF IMPLANTABLE LOOP RECORDER: ICD-10-CM

## 2019-12-06 DIAGNOSIS — J44.1 COPD EXACERBATION (HCC): Primary | ICD-10-CM

## 2019-12-06 PROCEDURE — 99213 OFFICE O/P EST LOW 20 MIN: CPT | Performed by: NURSE PRACTITIONER

## 2019-12-06 RX ORDER — AMOXICILLIN AND CLAVULANATE POTASSIUM 875; 125 MG/1; MG/1
1 TABLET, FILM COATED ORAL 2 TIMES DAILY
Qty: 20 TABLET | Refills: 0 | Status: SHIPPED | OUTPATIENT
Start: 2019-12-06 | End: 2019-12-16

## 2019-12-06 RX ORDER — PREDNISONE 10 MG/1
TABLET ORAL
Qty: 30 TABLET | Refills: 0 | Status: SHIPPED | OUTPATIENT
Start: 2019-12-06 | End: 2019-12-19

## 2019-12-06 NOTE — PROGRESS NOTES
Teresa Osuna is a 64 y.o. male.     Pt is here today with c/o COPD exacerbation , shortness of breath, and cough for 2 weeks.  He states that in the morning he has sinus drainage and sore throat.  He has a lot of wheezing in the morning.  By noon he starts to feel better.  He reports that he has been coughing frequently through the day.  Talking tends to irritate the cough more.  He has had a fever off and on (100 degrees) and some chills.    He reports some body aches  Pt quit smoking 10 years ago.  Earlier this week he was having nausea and vomiting and diarrhea, but that has resolved.  He states that he is starting to feel better.  He thought he was feeling better last week, but then he started feeling poorly again.   Reports that his throat hurts in the morning.  He states that his CPAP has not been able to keep his sinuses open like it normally does.  He has had increased congestion.  He has not taken any medication OTC.  He has been doing his breathing treatments which help.        Shortness of Breath   This is a recurrent problem. The current episode started 1 to 4 weeks ago. The problem occurs daily. The problem has been waxing and waning. Associated symptoms include chest pain, coryza, a fever, headaches, leg swelling, orthopnea, PND, rhinorrhea, a sore throat, sputum production, syncope and wheezing. Pertinent negatives include no abdominal pain, claudication, ear pain, hemoptysis, leg pain, neck pain, rash, swollen glands or vomiting. The symptoms are aggravated by exercise, fumes, any activity, weather changes and URIs.        The following portions of the patient's history were reviewed and updated as appropriate: allergies, current medications, past family history, past medical history, past social history, past surgical history and problem list.    Review of Systems   Constitutional: Positive for chills, diaphoresis, fatigue and fever.   HENT: Positive for congestion, postnasal drip,  "rhinorrhea, sinus pressure and sore throat. Negative for ear pain and swollen glands.    Respiratory: Positive for cough, sputum production, chest tightness, shortness of breath and wheezing. Negative for hemoptysis.    Cardiovascular: Positive for chest pain, orthopnea, leg swelling, syncope and PND. Negative for claudication.   Gastrointestinal: Negative for abdominal pain, diarrhea, nausea and vomiting.        GI issues have resolved   Musculoskeletal: Negative for neck pain.   Skin: Negative for rash.   Neurological: Negative for dizziness and headache.       Objective   /67 (BP Location: Left arm, Patient Position: Sitting, Cuff Size: Large Adult)   Pulse 91   Ht 167.6 cm (66\")   Wt 108 kg (239 lb)   SpO2 96%   BMI 38.58 kg/m²   Physical Exam   Constitutional: He is oriented to person, place, and time. He appears well-developed and well-nourished. No distress.   HENT:   Head: Normocephalic and atraumatic.   Right Ear: External ear normal.   Left Ear: External ear normal.   Cardiovascular: Normal rate and regular rhythm.   Pulmonary/Chest: Effort normal. He has wheezes in the right upper field, the right middle field, the right lower field, the left upper field, the left middle field and the left lower field.   Frequent cough   Abdominal: Soft. Bowel sounds are normal. There is no tenderness.   Musculoskeletal: Normal range of motion.   Neurological: He is alert and oriented to person, place, and time.   Skin: Skin is warm and dry.   Psychiatric: He has a normal mood and affect. His behavior is normal. Judgment and thought content normal.         Assessment/Plan     Diagnoses and all orders for this visit:    1. COPD exacerbation (CMS/[facility]) (Primary)  Comments:  treat with abx and steroid taper  chest tight  if no imp on monday get CXR  cont breathing tx  push fluid  strict ER precautions   Orders:  -     amoxicillin-clavulanate (AUGMENTIN) 875-125 MG per tablet; Take 1 tablet by mouth 2 (Two) Times a " Day for 10 days.  Dispense: 20 tablet; Refill: 0  -     predniSONE (DELTASONE) 10 MG tablet; Take 4 tablets by mouth Daily for 3 days, THEN 3 tablets Daily for 3 days, THEN 2 tablets Daily for 3 days, THEN 1 tablet Daily for 3 days.  Dispense: 30 tablet; Refill: 0

## 2019-12-06 NOTE — PATIENT INSTRUCTIONS
Complete steroid taper  Complete antibiotic  Drink plenty of fluids  Continue breathing treatments  Call if not feeling better by Monday and will get Chest xray  Call for any issues or concerns

## 2019-12-16 ENCOUNTER — CLINICAL SUPPORT NO REQUIREMENTS (OUTPATIENT)
Dept: CARDIOLOGY | Facility: CLINIC | Age: 64
End: 2019-12-16

## 2019-12-16 DIAGNOSIS — R55 SYNCOPE AND COLLAPSE: ICD-10-CM

## 2019-12-16 DIAGNOSIS — Z95.818 STATUS POST PLACEMENT OF IMPLANTABLE LOOP RECORDER: Primary | ICD-10-CM

## 2019-12-16 PROCEDURE — 93298 REM INTERROG DEV EVAL SCRMS: CPT | Performed by: INTERNAL MEDICINE

## 2019-12-16 PROCEDURE — 93299 PR REM INTERROG ICPMS/SCRMS <30 D TECH REVIEW: CPT | Performed by: INTERNAL MEDICINE

## 2019-12-17 ENCOUNTER — OFFICE VISIT (OUTPATIENT)
Dept: CARDIOLOGY | Facility: CLINIC | Age: 64
End: 2019-12-17

## 2019-12-17 ENCOUNTER — CLINICAL SUPPORT NO REQUIREMENTS (OUTPATIENT)
Dept: CARDIOLOGY | Facility: CLINIC | Age: 64
End: 2019-12-17

## 2019-12-17 VITALS
HEART RATE: 87 BPM | SYSTOLIC BLOOD PRESSURE: 119 MMHG | HEIGHT: 66 IN | WEIGHT: 234.75 LBS | OXYGEN SATURATION: 96 % | BODY MASS INDEX: 37.73 KG/M2 | DIASTOLIC BLOOD PRESSURE: 79 MMHG

## 2019-12-17 DIAGNOSIS — Z95.5 STATUS POST CORONARY ARTERY STENT PLACEMENT: ICD-10-CM

## 2019-12-17 DIAGNOSIS — R55 SYNCOPE AND COLLAPSE: Primary | ICD-10-CM

## 2019-12-17 DIAGNOSIS — G89.29 CHRONIC BILATERAL LOW BACK PAIN WITHOUT SCIATICA: Primary | ICD-10-CM

## 2019-12-17 DIAGNOSIS — M54.50 CHRONIC BILATERAL LOW BACK PAIN WITHOUT SCIATICA: Primary | ICD-10-CM

## 2019-12-17 DIAGNOSIS — I10 ESSENTIAL HYPERTENSION: ICD-10-CM

## 2019-12-17 DIAGNOSIS — E78.2 MIXED HYPERLIPIDEMIA: ICD-10-CM

## 2019-12-17 DIAGNOSIS — Z95.820 STATUS POST ANGIOPLASTY WITH STENT: ICD-10-CM

## 2019-12-17 DIAGNOSIS — I25.10 CHRONIC CORONARY ARTERY DISEASE: ICD-10-CM

## 2019-12-17 DIAGNOSIS — Z95.818 STATUS POST PLACEMENT OF IMPLANTABLE LOOP RECORDER: ICD-10-CM

## 2019-12-17 PROCEDURE — 99214 OFFICE O/P EST MOD 30 MIN: CPT | Performed by: INTERNAL MEDICINE

## 2019-12-17 NOTE — PROGRESS NOTES
Encounter Date:12/17/2019  Last seen 12/2/2019      Patient ID: Neptali Osuna is a 64 y.o. male.    Chief Complaint:  History of syncope  Status post stent  Hypertension  Dyslipidemia  COPD    History of Present Illness  Patient had an episode of syncope last Wednesday and none since that time.    Since I have last seen, the patient has been without any chest discomfort ,shortness of breath, palpitations or dizziness.  Denies having any headache ,abdominal pain ,nausea, vomiting , diarrhea constipation, loss of weight or loss of appetite.  Denies having any excessive bruising ,hematuria or blood in the stool.    Review of all systems negative except as indicated  Assessment and Plan       ]]]]]]]]]]]]]]]]]]]   impression  =============  - Recent recurrent syncopal episodes concerning for bradyarrhythmias.     -Status post [employer] loop recorder 11/15/2019 ([employer])      -Status post stent to proximal LAD 11/05/2018     Cardiac catheterization 11/05/2018 revealed  Left ventricular size and contractility is normal with ejection fraction of 65%.  Left main coronary artery is normal.  Left anterior descending artery has proximal calcific plaquing with 95-99% proximal LAD disease.  Circumflex coronary artery is normal.  Right coronary artery is a large and dominant vessel that has mid segment 50% disease.      -Chest pain suggestive of unstable angina pectoris. prior to stent placement   CT scan of the chest showed significant coronary artery calcifications(prior to stent placement).  EKG is normal  Troponin levels are negative.      -COPD dyslipidemia hypertension      -Former smoker      -Strong family history of coronary artery disease      -No known allergies  =========  Plan  ===========   patient is not having any angina pectoris or congestive heart failure  Patient is recently having recurrent episodes of syncope.  Patient had an episode last week.  The symptoms are preceded by dizziness.  Concerning for bradycardia  arrhythmias.  Patient had a 72 hr EEG during which patient had a syncopal episode.    EEG was normal.  Loop recorder was interrogated and no definite evidence for bradycardia arrhythmia was noted.  Recent Holter monitor (48-hour Holter monitor) did not reveal any arrhythmias although patient did not have any symptoms while he was wearing Holter monitor.  Medications were reviewed and updated.  Continue Plavix and other medications.  Consideration would be given for discontinuation of Plavix since it has been more than a year since he had stent placement.  I had a lengthy discussion with patient and patient's wife.  Patient to have tilt table test.  Risks and benefits pros and cons were discussed.  Patient recently had orthostatic blood pressure changes and they were negative.  During recent episode patient's wife tested his blood sugar and blood pressure and they were normal.  Heart rate was a little faster but not slow.  Follow-up in the office in 6 weeks.  Further plan will depend on patient's progress  ]]]]]]]]]]]]]]]               Diagnosis Plan   1. Syncope and collapse  Tilt Table   2. Essential hypertension     3. Chronic coronary artery disease     4. Mixed hyperlipidemia     5. Status post angioplasty with stent     6. Status post coronary artery stent placement     LAB RESULTS (LAST 7 DAYS)    CBC        BMP        CMP         BNP        TROPONIN        CoAg        Creatinine Clearance  CrCl cannot be calculated (Patient's most recent lab result is older than the maximum 30 days allowed.).    ABG        Radiology  No radiology results for the last day                The following portions of the patient's history were reviewed and updated as appropriate: allergies, current medications, past family history, past medical history, past social history, past surgical history and problem list.    Review of Systems   Constitution: Negative for malaise/fatigue.   Cardiovascular: Positive for syncope. Negative for  leg swelling and palpitations. Chest pain: dull pain around loop recorder site.   Respiratory: Negative for shortness of breath.    Skin: Negative for rash.   Gastrointestinal: Negative for nausea and vomiting.   Neurological: Positive for dizziness. Negative for numbness.         Current Outpatient Medications:   •  amLODIPine (NORVASC) 10 MG tablet, Take 1 tablet by mouth Daily., Disp: 90 tablet, Rfl: 1  •  aspirin (ASPIR-LOW) 81 MG EC tablet, Take 81 mg by mouth Daily., Disp: , Rfl:   •  atorvastatin (LIPITOR) 40 MG tablet, Take 40 mg by mouth Daily., Disp: , Rfl:   •  clopidogrel (PLAVIX) 75 MG tablet, TAKE 1 TABLET BY MOUTH DAILY, Disp: 90 tablet, Rfl: 1  •  dicyclomine (BENTYL) 10 MG capsule, Take 1 capsule by mouth 4 (Four) Times a Day Before Meals & at Bedtime., Disp: 60 capsule, Rfl: 5  •  DULoxetine (CYMBALTA) 60 MG capsule, Take 60 mg by mouth Daily., Disp: , Rfl:   •  HYDROcodone-acetaminophen (NORCO)  MG per tablet, Take 1 tablet by mouth Every 6 (Six) Hours As Needed for Severe Pain ., Disp: 120 tablet, Rfl: 0  •  ipratropium-albuterol (DUO-NEB) 0.5-2.5 mg/3 ml nebulizer, Take 3 mL by nebulization Every 4 (Four) Hours As Needed for Wheezing., Disp: , Rfl:   •  lisinopril (PRINIVIL,ZESTRIL) 30 MG tablet, Take 1 tablet by mouth Daily., Disp: 90 tablet, Rfl: 0  •  loratadine (CLARITIN) 10 MG tablet, Take 1 tablet by mouth Daily., Disp: 30 tablet, Rfl: 2  •  omeprazole (priLOSEC) 40 MG capsule, Take 40 mg by mouth Daily., Disp: , Rfl:   •  predniSONE (DELTASONE) 10 MG tablet, Take 4 tablets by mouth Daily for 3 days, THEN 3 tablets Daily for 3 days, THEN 2 tablets Daily for 3 days, THEN 1 tablet Daily for 3 days., Disp: 30 tablet, Rfl: 0  •  pregabalin (LYRICA) 100 MG capsule, Take 1 capsule by mouth 2 (Two) Times a Day., Disp: 60 capsule, Rfl: 2  •  TRELEGY ELLIPTA 100-62.5-25 MCG/INH aerosol powder , INHALE 1 PUFF ONCE DAILY, Disp: 1 each, Rfl: 5  •  ursodiol (ACTIGALL) 500 MG tablet, Take 500 mg  by mouth Daily., Disp: , Rfl:   •  Vortioxetine HBr (TRINTELLIX) 10 MG tablet, Take 10 mg by mouth Daily., Disp: 30 tablet, Rfl:     No Known Allergies    Family History   Problem Relation Age of Onset   • Colon cancer Mother    • Colon cancer Father    • Heart disease Father    • Diabetes Brother    • Diabetes Maternal Grandmother    • Diabetes Maternal Grandfather        Past Surgical History:   Procedure Laterality Date   • CARDIAC CATHETERIZATION  11/05/2018    Dr. Sher   • COLONOSCOPY  2016   • CORONARY STENT PLACEMENT     • LIVER BIOPSY     • TONSILLECTOMY AND ADENOIDECTOMY         Past Medical History:   Diagnosis Date   • Acute diverticulitis     with perforation   • Allergic    • Anemia    • Anxiety    • Back pain    • CAD S/P percutaneous coronary angioplasty    • COPD (chronic obstructive pulmonary disease) (CMS/HCC)    • Depression    • Diverticulitis    • GERD (gastroesophageal reflux disease)    • Hyperlipidemia    • Hypertension    • Low back pain    • Myocardial infarction (CMS/HCC)    • TEIXEIRA (nonalcoholic steatohepatitis)    • Obesity    • BRITTNEY on CPAP    • Sleep apnea    • Sleep disorder, shift work    • Spinal stenosis of lumbar region at multiple levels        Family History   Problem Relation Age of Onset   • Colon cancer Mother    • Colon cancer Father    • Heart disease Father    • Diabetes Brother    • Diabetes Maternal Grandmother    • Diabetes Maternal Grandfather        Social History     Socioeconomic History   • Marital status:      Spouse name: Not on file   • Number of children: Not on file   • Years of education: Not on file   • Highest education level: Not on file   Tobacco Use   • Smoking status: Former Smoker     Years: 40.00     Types: Cigarettes     Last attempt to quit: 2009     Years since quitting: 10.9   • Smokeless tobacco: Never Used   Substance and Sexual Activity   • Alcohol use: No     Frequency: Never   • Drug use: No   • Sexual activity: Defer  "        Procedures      Objective:       Physical Exam    /79   Pulse 87   Ht 167.6 cm (66\")   Wt 106 kg (234 lb 12 oz)   SpO2 96%   BMI 37.89 kg/m²   The patient is alert, oriented and in no distress.    Vital signs as noted above.    Head and neck revealed no carotid bruits or jugular venous distension.  No thyromegaly or lymphadenopathy is present.    Lungs clear.  No wheezing.  Breath sounds are normal bilaterally.    Heart normal first and second heart sounds.  No murmur..  No pericardial rub is present.  No gallop is present.    Abdomen soft and nontender.  No organomegaly is present.    Extremities revealed good peripheral pulses without any pedal edema.    Skin warm and dry.  Loop recorder site looks normal.    Musculoskeletal system is grossly normal.    CNS grossly normal.        "

## 2019-12-19 ENCOUNTER — CLINICAL SUPPORT NO REQUIREMENTS (OUTPATIENT)
Dept: CARDIOLOGY | Facility: CLINIC | Age: 64
End: 2019-12-19

## 2019-12-19 ENCOUNTER — TELEPHONE (OUTPATIENT)
Dept: PULMONOLOGY | Facility: HOSPITAL | Age: 64
End: 2019-12-19

## 2019-12-19 DIAGNOSIS — Z95.818 STATUS POST PLACEMENT OF IMPLANTABLE LOOP RECORDER: ICD-10-CM

## 2019-12-19 DIAGNOSIS — R55 SYNCOPE AND COLLAPSE: Primary | ICD-10-CM

## 2019-12-20 ENCOUNTER — HOSPITAL ENCOUNTER (OUTPATIENT)
Dept: CARDIOLOGY | Facility: HOSPITAL | Age: 64
Discharge: HOME | End: 2019-12-20
Admitting: INTERNAL MEDICINE

## 2019-12-20 VITALS
SYSTOLIC BLOOD PRESSURE: 105 MMHG | WEIGHT: 240.96 LBS | TEMPERATURE: 98.8 F | BODY MASS INDEX: 38.73 KG/M2 | HEIGHT: 66 IN | RESPIRATION RATE: 13 BRPM | HEART RATE: 98 BPM | OXYGEN SATURATION: 96 % | DIASTOLIC BLOOD PRESSURE: 69 MMHG

## 2019-12-20 DIAGNOSIS — R55 SYNCOPE AND COLLAPSE: ICD-10-CM

## 2019-12-20 DIAGNOSIS — G89.29 CHRONIC BILATERAL LOW BACK PAIN WITHOUT SCIATICA: ICD-10-CM

## 2019-12-20 DIAGNOSIS — G47.33 OSA (OBSTRUCTIVE SLEEP APNEA): Primary | ICD-10-CM

## 2019-12-20 DIAGNOSIS — M54.50 CHRONIC BILATERAL LOW BACK PAIN WITHOUT SCIATICA: ICD-10-CM

## 2019-12-20 LAB — BH CV TILT AGENT USED: NORMAL

## 2019-12-20 PROCEDURE — 93660 TILT TABLE EVALUATION: CPT | Performed by: INTERNAL MEDICINE

## 2019-12-20 PROCEDURE — 93660 TILT TABLE EVALUATION: CPT

## 2019-12-20 RX ORDER — HYDROCODONE BITARTRATE AND ACETAMINOPHEN 10; 325 MG/1; MG/1
1 TABLET ORAL EVERY 6 HOURS PRN
Qty: 120 TABLET | Refills: 0 | Status: SHIPPED | OUTPATIENT
Start: 2019-12-20 | End: 2020-01-14 | Stop reason: SDUPTHER

## 2019-12-23 ENCOUNTER — CLINICAL SUPPORT NO REQUIREMENTS (OUTPATIENT)
Dept: CARDIOLOGY | Facility: CLINIC | Age: 64
End: 2019-12-23

## 2019-12-23 DIAGNOSIS — R55 SYNCOPE AND COLLAPSE: ICD-10-CM

## 2019-12-23 DIAGNOSIS — Z95.818 STATUS POST PLACEMENT OF IMPLANTABLE LOOP RECORDER: Primary | ICD-10-CM

## 2019-12-23 RX ORDER — HYDROCODONE BITARTRATE AND ACETAMINOPHEN 10; 325 MG/1; MG/1
1 TABLET ORAL EVERY 6 HOURS PRN
Qty: 120 TABLET | Refills: 0 | OUTPATIENT
Start: 2019-12-23

## 2019-12-30 ENCOUNTER — APPOINTMENT (OUTPATIENT)
Dept: LAB | Facility: HOSPITAL | Age: 64
End: 2019-12-30

## 2019-12-30 ENCOUNTER — CONSULT (OUTPATIENT)
Dept: ONCOLOGY | Facility: CLINIC | Age: 64
End: 2019-12-30

## 2019-12-30 VITALS
WEIGHT: 245 LBS | DIASTOLIC BLOOD PRESSURE: 86 MMHG | HEART RATE: 96 BPM | RESPIRATION RATE: 18 BRPM | TEMPERATURE: 97.8 F | BODY MASS INDEX: 37.13 KG/M2 | HEIGHT: 68 IN | SYSTOLIC BLOOD PRESSURE: 132 MMHG

## 2019-12-30 DIAGNOSIS — D72.829 LEUKOCYTOSIS, UNSPECIFIED TYPE: Primary | ICD-10-CM

## 2019-12-30 LAB
BASOPHILS # BLD AUTO: 0.04 10*3/MM3 (ref 0–0.2)
BASOPHILS NFR BLD AUTO: 0.8 % (ref 0–1.5)
DEPRECATED RDW RBC AUTO: 46.4 FL (ref 37–54)
EOSINOPHIL # BLD AUTO: 0.43 10*3/MM3 (ref 0–0.4)
EOSINOPHIL NFR BLD AUTO: 8.7 % (ref 0.3–6.2)
ERYTHROCYTE [DISTWIDTH] IN BLOOD BY AUTOMATED COUNT: 14.6 % (ref 12.3–15.4)
HCT VFR BLD AUTO: 39.1 % (ref 37.5–51)
HGB BLD-MCNC: 12.4 G/DL (ref 13–17.7)
LYMPHOCYTES # BLD AUTO: 1.52 10*3/MM3 (ref 0.7–3.1)
LYMPHOCYTES NFR BLD AUTO: 30.7 % (ref 19.6–45.3)
MCH RBC QN AUTO: 28.4 PG (ref 26.6–33)
MCHC RBC AUTO-ENTMCNC: 31.7 G/DL (ref 31.5–35.7)
MCV RBC AUTO: 89.5 FL (ref 79–97)
MONOCYTES # BLD AUTO: 1.15 10*3/MM3 (ref 0.1–0.9)
MONOCYTES NFR BLD AUTO: 23.2 % (ref 5–12)
NEUTROPHILS # BLD AUTO: 1.81 10*3/MM3 (ref 1.7–7)
NEUTROPHILS NFR BLD AUTO: 36.6 % (ref 42.7–76)
PLATELET # BLD AUTO: 345 10*3/MM3 (ref 140–450)
PMV BLD AUTO: 9 FL (ref 6–12)
RBC # BLD AUTO: 4.37 10*6/MM3 (ref 4.14–5.8)
WBC NRBC COR # BLD: 4.95 10*3/MM3 (ref 3.4–10.8)

## 2019-12-30 PROCEDURE — 85025 COMPLETE CBC W/AUTO DIFF WBC: CPT | Performed by: INTERNAL MEDICINE

## 2019-12-30 PROCEDURE — 36415 COLL VENOUS BLD VENIPUNCTURE: CPT | Performed by: INTERNAL MEDICINE

## 2019-12-30 PROCEDURE — 99204 OFFICE O/P NEW MOD 45 MIN: CPT | Performed by: INTERNAL MEDICINE

## 2019-12-30 NOTE — PROGRESS NOTES
Hematology/Oncology Outpatient Consultation    Patient name: Neptali Yañez  : 1955  MRN: [id number]  Primary Care Physician: Kathryn Valencia PA  Referring Physician: Kathryn Valencia PA  Reason For Consult:   Passing out spells  No chief complaint on file.      History of Present Illness:  Mr. yañez reports that an episode of fall loss of consciousness happened, and that he drove for about 45 minutes without knowing and ended up in his neighbor's backyard which happened in 2019.  Ever since then he had had a few more spells of passing out.    The passing out spells happen for a few seconds.  Patient has obstructive sleep apnea and uses CPAP machine.  No seizure episodes.  He underwent video examination for 3 days had one episode during the evaluation.  Patient was sent to the cancer care center for further evaluation given his abnormal CBC that happened with leukocytosis and monocytosis.  Past Medical History:   Diagnosis Date   • Acute diverticulitis     with perforation   • Allergic    • Anemia    • Anxiety    • Back pain    • CAD S/P percutaneous coronary angioplasty    • COPD (chronic obstructive pulmonary disease) (CMS/HCC)    • Depression    • Diverticulitis    • GERD (gastroesophageal reflux disease)    • Hyperlipidemia    • Hypertension    • Low back pain    • Myocardial infarction (CMS/HCC)    • TEIXEIRA (nonalcoholic steatohepatitis)    • Obesity    • BRITTNEY on CPAP    • Sleep apnea    • Sleep disorder, shift work    • Spinal stenosis of lumbar region at multiple levels        Past Surgical History:   Procedure Laterality Date   • CARDIAC CATHETERIZATION  2018    Dr. Sher   • COLONOSCOPY     • CORONARY STENT PLACEMENT     • LIVER BIOPSY     • TONSILLECTOMY AND ADENOIDECTOMY           Current Outpatient Medications:   •  amLODIPine (NORVASC) 10 MG tablet, Take 1 tablet by mouth Daily., Disp: 90 tablet, Rfl: 1  •  aspirin (ASPIR-LOW) 81 MG EC tablet, Take 81 mg by mouth Daily., Disp: ,  Rfl:   •  atorvastatin (LIPITOR) 40 MG tablet, Take 40 mg by mouth Daily., Disp: , Rfl:   •  clopidogrel (PLAVIX) 75 MG tablet, TAKE 1 TABLET BY MOUTH DAILY, Disp: 90 tablet, Rfl: 1  •  dicyclomine (BENTYL) 10 MG capsule, Take 1 capsule by mouth 4 (Four) Times a Day Before Meals & at Bedtime., Disp: 60 capsule, Rfl: 5  •  DULoxetine (CYMBALTA) 60 MG capsule, Take 60 mg by mouth Daily., Disp: , Rfl:   •  HYDROcodone-acetaminophen (NORCO)  MG per tablet, Take 1 tablet by mouth Every 6 (Six) Hours As Needed for Severe Pain ., Disp: 120 tablet, Rfl: 0  •  ipratropium-albuterol (DUO-NEB) 0.5-2.5 mg/3 ml nebulizer, Take 3 mL by nebulization Every 4 (Four) Hours As Needed for Wheezing., Disp: , Rfl:   •  lisinopril (PRINIVIL,ZESTRIL) 30 MG tablet, Take 1 tablet by mouth Daily., Disp: 90 tablet, Rfl: 0  •  loratadine (CLARITIN) 10 MG tablet, Take 1 tablet by mouth Daily., Disp: 30 tablet, Rfl: 2  •  omeprazole (priLOSEC) 40 MG capsule, Take 40 mg by mouth Daily., Disp: , Rfl:   •  pregabalin (LYRICA) 100 MG capsule, Take 1 capsule by mouth 2 (Two) Times a Day., Disp: 60 capsule, Rfl: 2  •  TRELEGY ELLIPTA 100-62.5-25 MCG/INH aerosol powder , INHALE 1 PUFF ONCE DAILY, Disp: 1 each, Rfl: 5  •  Vortioxetine HBr (TRINTELLIX) 10 MG tablet, Take 10 mg by mouth Daily., Disp: 30 tablet, Rfl:     No Known Allergies    Immunization History   Administered Date(s) Administered   • Flu Vaccine Intradermal Quad 18-64YR 10/12/2017   • Pneumococcal Polysaccharide (PPSV23) 06/20/2012       Family History   Problem Relation Age of Onset   • Colon cancer Mother    • Colon cancer Father    • Heart disease Father    • Diabetes Brother    • Diabetes Maternal Grandmother    • Diabetes Maternal Grandfather        Cancer-related family history includes Colon cancer in his father and mother.    Social History     Tobacco Use   • Smoking status: Former Smoker     Years: 40.00     Types: Cigarettes     Last attempt to quit: 2009     Years since  "quittin.0   • Smokeless tobacco: Never Used   Substance Use Topics   • Alcohol use: No     Frequency: Never   • Drug use: No     Subjective:  Patient is my office follow-up accompanied by his family.  Again he denies seizure episodes.  He reports to having this passing out spells which last for about few seconds.  He is not driving now.  ROS:    Review of Systems   Constitutional: Negative for fever.   HENT: Negative for nosebleeds and trouble swallowing.    Eyes: Negative for visual disturbance.   Respiratory: Negative for cough, shortness of breath and wheezing.    Cardiovascular: Negative for chest pain.   Gastrointestinal: Negative for abdominal pain and blood in stool.   Endocrine: Negative for cold intolerance.   Genitourinary: Negative for dysuria and hematuria.   Musculoskeletal: Negative for joint swelling.   Skin: Negative for rash.   Allergic/Immunologic: Negative for environmental allergies.   Neurological: Negative for seizures.   Hematological: Does not bruise/bleed easily.   Psychiatric/Behavioral: The patient is not nervous/anxious.        Objective:    Vitals:    19 1531   BP: 132/86   Pulse: 96   Resp: 18   Temp: 97.8 °F (36.6 °C)   Weight: 111 kg (245 lb)   Height: 172.7 cm (68\")   PainSc:   6   PainLoc: Back  Comment: lower right side of back       Physical Exam:    Physical Exam   Constitutional: He is oriented to person, place, and time. No distress.   Moderately built obese   HENT:   Head: Normocephalic and atraumatic.   Eyes: Conjunctivae and EOM are normal. Right eye exhibits no discharge. Left eye exhibits no discharge. No scleral icterus.   Neck: Normal range of motion. Neck supple. No thyromegaly present.   Cardiovascular: Normal rate, regular rhythm and normal heart sounds. Exam reveals no gallop and no friction rub.   Pulmonary/Chest: Effort normal. No stridor. No respiratory distress. He has no wheezes.   Distant breath sounds   Abdominal: Soft. Bowel sounds are normal. He " exhibits no mass. There is no tenderness. There is no rebound and no guarding.   Musculoskeletal: Normal range of motion. He exhibits no tenderness.   1+ lower extremity   Lymphadenopathy:     He has no cervical adenopathy.   Neurological: He is alert and oriented to person, place, and time. He exhibits normal muscle tone.   Skin: Skin is warm. No rash noted. He is not diaphoretic. No erythema.   Psychiatric: He has a normal mood and affect. His behavior is normal.   Nursing note and vitals reviewed.      RECENT LABS  WBC   Date Value Ref Range Status   12/30/2019 4.95 3.40 - 10.80 10*3/mm3 Final     RBC   Date Value Ref Range Status   12/30/2019 4.37 4.14 - 5.80 10*6/mm3 Final     Hemoglobin   Date Value Ref Range Status   12/30/2019 12.4 (L) 13.0 - 17.7 g/dL Final     Hematocrit   Date Value Ref Range Status   12/30/2019 39.1 37.5 - 51.0 % Final     MCV   Date Value Ref Range Status   12/30/2019 89.5 79.0 - 97.0 fL Final     MCH   Date Value Ref Range Status   12/30/2019 28.4 26.6 - 33.0 pg Final     MCHC   Date Value Ref Range Status   12/30/2019 31.7 31.5 - 35.7 g/dL Final     RDW   Date Value Ref Range Status   12/30/2019 14.6 12.3 - 15.4 % Final     RDW-SD   Date Value Ref Range Status   12/30/2019 46.4 37.0 - 54.0 fl Final     MPV   Date Value Ref Range Status   12/30/2019 9.0 6.0 - 12.0 fL Final     Platelets   Date Value Ref Range Status   12/30/2019 345 140 - 450 10*3/mm3 Final     Neutrophil %   Date Value Ref Range Status   12/30/2019 36.6 (L) 42.7 - 76.0 % Final     Lymphocyte %   Date Value Ref Range Status   12/30/2019 30.7 19.6 - 45.3 % Final     Monocyte %   Date Value Ref Range Status   12/30/2019 23.2 (H) 5.0 - 12.0 % Final     Eosinophil %   Date Value Ref Range Status   12/30/2019 8.7 (H) 0.3 - 6.2 % Final     Basophil %   Date Value Ref Range Status   12/30/2019 0.8 0.0 - 1.5 % Final     Immature Grans %   Date Value Ref Range Status   11/09/2019 0.5 0.0 - 0.5 % Final     Neutrophils, Absolute    Date Value Ref Range Status   12/30/2019 1.81 1.70 - 7.00 10*3/mm3 Final     Lymphocytes, Absolute   Date Value Ref Range Status   12/30/2019 1.52 0.70 - 3.10 10*3/mm3 Final     Monocytes, Absolute   Date Value Ref Range Status   12/30/2019 1.15 (H) 0.10 - 0.90 10*3/mm3 Final     Eosinophils, Absolute   Date Value Ref Range Status   12/30/2019 0.43 (H) 0.00 - 0.40 10*3/mm3 Final     Basophils, Absolute   Date Value Ref Range Status   12/30/2019 0.04 0.00 - 0.20 10*3/mm3 Final     Immature Grans, Absolute   Date Value Ref Range Status   11/09/2019 0.06 (H) 0.00 - 0.05 10*3/mm3 Final     nRBC   Date Value Ref Range Status   11/09/2019 0.0 0.0 - 0.2 /100 WBC Final       Lab Results   Component Value Date    GLUCOSE 110 (H) 11/09/2019    BUN 12 11/09/2019    CREATININE 0.88 11/09/2019    EGFRIFNONA 87 11/09/2019    BCR 13.6 11/09/2019    K 4.6 11/09/2019    CO2 26.8 11/09/2019    CALCIUM 9.2 11/09/2019    ALBUMIN 4.00 11/09/2019    LABIL2 1.5 05/20/2019    AST 9 11/09/2019    ALT 18 11/09/2019         Assessment/Plan     Leukocytosis, unspecified type  - CBC & Differential  - CBC & Differential  - CBC Auto Differential  Assessment:  1. Leukocytosis  2. Passing out spells  3. Low blood pressure  4. Obstructive sleep apnea  5. Eosinophilia  6. Monocytosis  7. ECOG 1    Plan:  Patient has appointment to see cardiology.  Reviewed the CBC results with the patient today he had is no failure today that has resolved but he has monocytosis today.  I will send his peripheral blood for flow cytometry.  I encouraged the patient to continue to use a CPAP machine.  Patient should not be driving given his passing out spells and he understands that.  Blood pressure today is adequate.  I will see him back in my office in 2 weeks and the flow cytometry report will be available  I have reviewed labs results, imaging, vitals, and medications with the patient today.        Patient verbalized understanding and is in agreement of the above  plan.

## 2020-01-02 LAB
ABNORMAL WBC NFR SPEC FC: NORMAL %
ANNOTATION COMMENT IMP: NORMAL
ASSESSMENT OF LEUKOCYTES: NORMAL
CLINICAL INFO: NORMAL
CONV COMMENT: NORMAL
GATING STRATEGY: NORMAL
Lab: NORMAL
PATH INTERP SPEC-IMP: NORMAL
PHENOTYPE CHART: NORMAL
SPECIMEN SOURCE: NORMAL
VIABLE CELLS NFR SPEC: NORMAL %

## 2020-01-03 ENCOUNTER — OFFICE VISIT (OUTPATIENT)
Dept: ONCOLOGY | Facility: CLINIC | Age: 65
End: 2020-01-03

## 2020-01-03 ENCOUNTER — APPOINTMENT (OUTPATIENT)
Dept: LAB | Facility: HOSPITAL | Age: 65
End: 2020-01-03

## 2020-01-03 VITALS
DIASTOLIC BLOOD PRESSURE: 75 MMHG | HEIGHT: 68 IN | SYSTOLIC BLOOD PRESSURE: 119 MMHG | WEIGHT: 244.4 LBS | BODY MASS INDEX: 37.04 KG/M2 | TEMPERATURE: 98.1 F | HEART RATE: 91 BPM | RESPIRATION RATE: 18 BRPM

## 2020-01-03 DIAGNOSIS — C91.10 CLL (CHRONIC LYMPHOCYTIC LEUKEMIA) (HCC): Primary | ICD-10-CM

## 2020-01-03 PROCEDURE — G0463 HOSPITAL OUTPT CLINIC VISIT: HCPCS | Performed by: INTERNAL MEDICINE

## 2020-01-03 PROCEDURE — 99214 OFFICE O/P EST MOD 30 MIN: CPT | Performed by: INTERNAL MEDICINE

## 2020-01-03 NOTE — PROGRESS NOTES
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 osuna reports that an episode of fall loss of consciousness happened, and that he drove for about 45 minutes without knowing and ended up in his neighbor's backyard which happened in October 2019.  Ever since then he had had a few more spells of passing out.    The passing out spells happen for a few seconds.  Patient has obstructive sleep apnea and uses CPAP machine.  No seizure episodes.  He underwent video examination for 3 days had one episode during the evaluation.  · Patient was sent to the cancer care center for further evaluation given his abnormal CBC that happened with leukocytosis and monocytosis.  · Peripheral blood flow cytometry -A small monoclonal B-cell population with a CLL phenotype is present. If the patient has a prior diagnosis of CLL, then the findings may represent persistent involvement. If the patient is not known to have CLL or SLL, further investigation is recommended if clinically indicated. In the absence of CLL or SLL, small (<5000/uL) clonal B-cell populations in the blood are classified as monoclonal B-cell lymphocytosis. Clinical correlation is required.  Past Medical History:   Diagnosis Date   • Acute diverticulitis     with perforation   • Allergic    • Anemia    • Anxiety    • Back pain    • CAD S/P percutaneous coronary angioplasty    • COPD (chronic obstructive pulmonary disease) (CMS/HCC)    • Depression    • Diverticulitis    • GERD (gastroesophageal reflux disease)    • Hyperlipidemia    • Hypertension    • Low back pain    • Myocardial infarction (CMS/HCC)    • TEIXEIRA (nonalcoholic steatohepatitis)    • Obesity    • BRITTNEY on CPAP    • Sleep apnea    • Sleep disorder, shift work    • Spinal stenosis of lumbar  region at multiple levels        Past Surgical History:   Procedure Laterality Date   • CARDIAC CATHETERIZATION  11/05/2018    Dr. Sher   • COLONOSCOPY  2016   • CORONARY STENT PLACEMENT     • LIVER BIOPSY     • TONSILLECTOMY AND ADENOIDECTOMY           Current Outpatient Medications:   •  amLODIPine (NORVASC) 10 MG tablet, Take 1 tablet by mouth Daily., Disp: 90 tablet, Rfl: 1  •  aspirin (ASPIR-LOW) 81 MG EC tablet, Take 81 mg by mouth Daily., Disp: , Rfl:   •  atorvastatin (LIPITOR) 40 MG tablet, Take 40 mg by mouth Daily., Disp: , Rfl:   •  clopidogrel (PLAVIX) 75 MG tablet, TAKE 1 TABLET BY MOUTH DAILY, Disp: 90 tablet, Rfl: 1  •  dicyclomine (BENTYL) 10 MG capsule, Take 1 capsule by mouth 4 (Four) Times a Day Before Meals & at Bedtime., Disp: 60 capsule, Rfl: 5  •  DULoxetine (CYMBALTA) 60 MG capsule, Take 60 mg by mouth Daily., Disp: , Rfl:   •  HYDROcodone-acetaminophen (NORCO)  MG per tablet, Take 1 tablet by mouth Every 6 (Six) Hours As Needed for Severe Pain ., Disp: 120 tablet, Rfl: 0  •  ipratropium-albuterol (DUO-NEB) 0.5-2.5 mg/3 ml nebulizer, Take 3 mL by nebulization Every 4 (Four) Hours As Needed for Wheezing., Disp: , Rfl:   •  lisinopril (PRINIVIL,ZESTRIL) 30 MG tablet, Take 1 tablet by mouth Daily., Disp: 90 tablet, Rfl: 0  •  loratadine (CLARITIN) 10 MG tablet, Take 1 tablet by mouth Daily., Disp: 30 tablet, Rfl: 2  •  omeprazole (priLOSEC) 40 MG capsule, Take 40 mg by mouth Daily., Disp: , Rfl:   •  pregabalin (LYRICA) 100 MG capsule, Take 1 capsule by mouth 2 (Two) Times a Day., Disp: 60 capsule, Rfl: 2  •  TRELEGY ELLIPTA 100-62.5-25 MCG/INH aerosol powder , INHALE 1 PUFF ONCE DAILY, Disp: 1 each, Rfl: 5  •  Vortioxetine HBr (TRINTELLIX) 10 MG tablet, Take 10 mg by mouth Daily., Disp: 30 tablet, Rfl:     No Known Allergies    Family History   Problem Relation Age of Onset   • Colon cancer Mother    • Colon cancer Father    • Heart disease Father    • Diabetes Brother    • Diabetes  "Maternal Grandmother    • Diabetes Maternal Grandfather        Cancer-related family history includes Colon cancer in his father and mother.    Social History     Tobacco Use   • Smoking status: Former Smoker     Years: 40.00     Types: Cigarettes     Last attempt to quit: 2009     Years since quittin.0   • Smokeless tobacco: Never Used   Substance Use Topics   • Alcohol use: No     Frequency: Never   • Drug use: No       I have reviewed the history of present illness, past medical history, family history, social history, lab results, all notes and other records since the patient was last seen at the cancer care center.    SUBJECTIVE:      Patient is my office for follow-up.  No further passing out episodes since last visit here.  Denies fevers night sweats or weight loss    ROS:      Review of Systems   Constitutional: Negative for fever.   HENT: Negative for nosebleeds and trouble swallowing.    Eyes: Negative for visual disturbance.   Respiratory: Negative for cough, shortness of breath and wheezing.    Cardiovascular: Negative for chest pain.   Gastrointestinal: Negative for abdominal pain and blood in stool.   Endocrine: Negative for cold intolerance.   Genitourinary: Negative for dysuria and hematuria.   Musculoskeletal: Negative for joint swelling.   Skin: Negative for rash.   Allergic/Immunologic: Negative for environmental allergies.   Neurological: Negative for seizures.   Hematological: Does not bruise/bleed easily.   Psychiatric/Behavioral: The patient is not nervous/anxious.          Objective:       Vitals:    20 1214   BP: 119/75   Pulse: 91   Resp: 18   Temp: 98.1 °F (36.7 °C)   Weight: 111 kg (244 lb 6.4 oz)   Height: 172.7 cm (68\")   PainSc:   6   PainLoc: Back         PHYSICAL EXAM:      Physical Exam   Constitutional: He is oriented to person, place, and time. No distress.   Moderately built obese   HENT:   Head: Normocephalic and atraumatic.   Eyes: Conjunctivae and EOM are normal. " Right eye exhibits no discharge. Left eye exhibits no discharge. No scleral icterus.   Neck: Normal range of motion. Neck supple. No thyromegaly present.   Cardiovascular: Normal rate, regular rhythm and normal heart sounds. Exam reveals no gallop and no friction rub.   Pulmonary/Chest: Effort normal. No stridor. No respiratory distress. He has no wheezes.   Abdominal: Soft. Bowel sounds are normal. He exhibits no mass. There is no tenderness. There is no rebound and no guarding.   Musculoskeletal: Normal range of motion. He exhibits no tenderness.   Trace bilateral lower extremity edema   Lymphadenopathy:     He has no cervical adenopathy.   Neurological: He is alert and oriented to person, place, and time. He exhibits normal muscle tone.   Skin: Skin is warm. No rash noted. He is not diaphoretic. No erythema.   Psychiatric: He has a normal mood and affect. His behavior is normal.   Nursing note and vitals reviewed.       RECENT LABS:     WBC   Date Value Ref Range Status   12/30/2019 4.95 3.40 - 10.80 10*3/mm3 Final     RBC   Date Value Ref Range Status   12/30/2019 4.37 4.14 - 5.80 10*6/mm3 Final     Hemoglobin   Date Value Ref Range Status   12/30/2019 12.4 (L) 13.0 - 17.7 g/dL Final     Hematocrit   Date Value Ref Range Status   12/30/2019 39.1 37.5 - 51.0 % Final     MCV   Date Value Ref Range Status   12/30/2019 89.5 79.0 - 97.0 fL Final     MCH   Date Value Ref Range Status   12/30/2019 28.4 26.6 - 33.0 pg Final     MCHC   Date Value Ref Range Status   12/30/2019 31.7 31.5 - 35.7 g/dL Final     RDW   Date Value Ref Range Status   12/30/2019 14.6 12.3 - 15.4 % Final     RDW-SD   Date Value Ref Range Status   12/30/2019 46.4 37.0 - 54.0 fl Final     MPV   Date Value Ref Range Status   12/30/2019 9.0 6.0 - 12.0 fL Final     Platelets   Date Value Ref Range Status   12/30/2019 345 140 - 450 10*3/mm3 Final     Neutrophil %   Date Value Ref Range Status   12/30/2019 36.6 (L) 42.7 - 76.0 % Final     Lymphocyte %    Date Value Ref Range Status   12/30/2019 30.7 19.6 - 45.3 % Final     Monocyte %   Date Value Ref Range Status   12/30/2019 23.2 (H) 5.0 - 12.0 % Final     Eosinophil %   Date Value Ref Range Status   12/30/2019 8.7 (H) 0.3 - 6.2 % Final     Basophil %   Date Value Ref Range Status   12/30/2019 0.8 0.0 - 1.5 % Final     Immature Grans %   Date Value Ref Range Status   11/09/2019 0.5 0.0 - 0.5 % Final     Neutrophils, Absolute   Date Value Ref Range Status   12/30/2019 1.81 1.70 - 7.00 10*3/mm3 Final     Lymphocytes, Absolute   Date Value Ref Range Status   12/30/2019 1.52 0.70 - 3.10 10*3/mm3 Final     Monocytes, Absolute   Date Value Ref Range Status   12/30/2019 1.15 (H) 0.10 - 0.90 10*3/mm3 Final     Eosinophils, Absolute   Date Value Ref Range Status   12/30/2019 0.43 (H) 0.00 - 0.40 10*3/mm3 Final     Basophils, Absolute   Date Value Ref Range Status   12/30/2019 0.04 0.00 - 0.20 10*3/mm3 Final     Immature Grans, Absolute   Date Value Ref Range Status   11/09/2019 0.06 (H) 0.00 - 0.05 10*3/mm3 Final     nRBC   Date Value Ref Range Status   11/09/2019 0.0 0.0 - 0.2 /100 WBC Final       Lab Results   Component Value Date    GLUCOSE 110 (H) 11/09/2019    BUN 12 11/09/2019    CREATININE 0.88 11/09/2019    EGFRIFNONA 87 11/09/2019    BCR 13.6 11/09/2019    K 4.6 11/09/2019    CO2 26.8 11/09/2019    CALCIUM 9.2 11/09/2019    ALBUMIN 4.00 11/09/2019    LABIL2 1.5 05/20/2019    AST 9 11/09/2019    ALT 18 11/09/2019         Assessment/Plan      ASSESSMENT:     1. Monoclonal B-cell lymphocytosis  2. Passing out spells  3. Low blood pressure  4. Obstructive sleep apnea  5. ECOG 1    PLAN:      1. Reviewed the flow cytometry report with the patient and his family.  Flow cytometry is consistent with monoclonal B-cell lymphocytosis.  I will obtain CT scans to evaluate for lymphadenopathy.  2. No further passing out spells.  Patient to continue to use CPAP machine.  Blood pressure is controlled well now   3. I will see him  back in my office after the CT scans are done  4. Today I will check CMP beta-2 microglobulin.    I have reviewed labs results, imaging, vitals, and medications with the patient today.   Patient verbalized understanding and is in agreement of the above plan.          This report was compiled using Dragon voice recognition software. I have made every effort to proof read this document; however, typographical errors may persist.

## 2020-01-08 RX ORDER — DICYCLOMINE HYDROCHLORIDE 10 MG/1
CAPSULE ORAL
Qty: 120 CAPSULE | Refills: 5 | Status: SHIPPED | OUTPATIENT
Start: 2020-01-08 | End: 2021-08-18

## 2020-01-13 ENCOUNTER — CLINICAL SUPPORT NO REQUIREMENTS (OUTPATIENT)
Dept: CARDIOLOGY | Facility: CLINIC | Age: 65
End: 2020-01-13

## 2020-01-13 RX ORDER — LISINOPRIL 30 MG/1
30 TABLET ORAL DAILY
Qty: 90 TABLET | Refills: 1 | Status: SHIPPED | OUTPATIENT
Start: 2020-01-13 | End: 2020-08-24

## 2020-01-14 DIAGNOSIS — G89.29 CHRONIC BILATERAL LOW BACK PAIN WITHOUT SCIATICA: ICD-10-CM

## 2020-01-14 DIAGNOSIS — M54.50 CHRONIC BILATERAL LOW BACK PAIN WITHOUT SCIATICA: ICD-10-CM

## 2020-01-16 DIAGNOSIS — C91.10 CLL (CHRONIC LYMPHOCYTIC LEUKEMIA) (HCC): Primary | ICD-10-CM

## 2020-01-16 RX ORDER — HYDROCODONE BITARTRATE AND ACETAMINOPHEN 10; 325 MG/1; MG/1
1 TABLET ORAL EVERY 6 HOURS PRN
Qty: 120 TABLET | Refills: 0 | Status: SHIPPED | OUTPATIENT
Start: 2020-01-16 | End: 2020-02-13

## 2020-01-17 ENCOUNTER — OFFICE VISIT (OUTPATIENT)
Dept: LAB | Facility: HOSPITAL | Age: 65
End: 2020-01-17

## 2020-01-17 ENCOUNTER — OFFICE VISIT (OUTPATIENT)
Dept: ONCOLOGY | Facility: CLINIC | Age: 65
End: 2020-01-17

## 2020-01-17 ENCOUNTER — CLINICAL SUPPORT NO REQUIREMENTS (OUTPATIENT)
Dept: CARDIOLOGY | Facility: CLINIC | Age: 65
End: 2020-01-17

## 2020-01-17 VITALS
SYSTOLIC BLOOD PRESSURE: 136 MMHG | HEART RATE: 105 BPM | HEIGHT: 68 IN | BODY MASS INDEX: 35.58 KG/M2 | TEMPERATURE: 98.5 F | DIASTOLIC BLOOD PRESSURE: 72 MMHG | RESPIRATION RATE: 16 BRPM | WEIGHT: 234.8 LBS

## 2020-01-17 DIAGNOSIS — Z95.818 STATUS POST PLACEMENT OF IMPLANTABLE LOOP RECORDER: Primary | ICD-10-CM

## 2020-01-17 DIAGNOSIS — C91.10 CLL (CHRONIC LYMPHOCYTIC LEUKEMIA) (HCC): ICD-10-CM

## 2020-01-17 DIAGNOSIS — C91.10 CLL (CHRONIC LYMPHOCYTIC LEUKEMIA) (HCC): Primary | ICD-10-CM

## 2020-01-17 DIAGNOSIS — R55 SYNCOPE AND COLLAPSE: ICD-10-CM

## 2020-01-17 LAB
ALBUMIN SERPL-MCNC: 4.2 G/DL (ref 3.5–5.2)
ALBUMIN/GLOB SERPL: 1.6 G/DL
ALP SERPL-CCNC: 94 U/L (ref 39–117)
ALT SERPL W P-5'-P-CCNC: 15 U/L (ref 1–41)
ANION GAP SERPL CALCULATED.3IONS-SCNC: 13 MMOL/L (ref 5–15)
AST SERPL-CCNC: 17 U/L (ref 1–40)
B2 MICROGLOB SERPL-MCNC: 2.3 MG/L (ref 0.8–2.2)
BASOPHILS # BLD AUTO: 0.03 10*3/MM3 (ref 0–0.2)
BASOPHILS NFR BLD AUTO: 0.3 % (ref 0–1.5)
BILIRUB SERPL-MCNC: 0.5 MG/DL (ref 0.2–1.2)
BUN BLD-MCNC: 10 MG/DL (ref 8–23)
BUN/CREAT SERPL: 12.8 (ref 7–25)
CALCIUM SPEC-SCNC: 9.9 MG/DL (ref 8.6–10.5)
CHLORIDE SERPL-SCNC: 99 MMOL/L (ref 98–107)
CO2 SERPL-SCNC: 28 MMOL/L (ref 22–29)
CREAT BLD-MCNC: 0.78 MG/DL (ref 0.76–1.27)
DEPRECATED RDW RBC AUTO: 47.1 FL (ref 37–54)
EOSINOPHIL # BLD AUTO: 0.31 10*3/MM3 (ref 0–0.4)
EOSINOPHIL NFR BLD AUTO: 2.9 % (ref 0.3–6.2)
ERYTHROCYTE [DISTWIDTH] IN BLOOD BY AUTOMATED COUNT: 14.9 % (ref 12.3–15.4)
GFR SERPL CREATININE-BSD FRML MDRD: 101 ML/MIN/1.73
GLOBULIN UR ELPH-MCNC: 2.7 GM/DL
GLUCOSE BLD-MCNC: 150 MG/DL (ref 65–99)
HCT VFR BLD AUTO: 42 % (ref 37.5–51)
HGB BLD-MCNC: 13.5 G/DL (ref 13–17.7)
LYMPHOCYTES # BLD AUTO: 1.56 10*3/MM3 (ref 0.7–3.1)
LYMPHOCYTES NFR BLD AUTO: 14.4 % (ref 19.6–45.3)
MCH RBC QN AUTO: 28.4 PG (ref 26.6–33)
MCHC RBC AUTO-ENTMCNC: 32.1 G/DL (ref 31.5–35.7)
MCV RBC AUTO: 88.2 FL (ref 79–97)
MONOCYTES # BLD AUTO: 0.88 10*3/MM3 (ref 0.1–0.9)
MONOCYTES NFR BLD AUTO: 8.1 % (ref 5–12)
NEUTROPHILS # BLD AUTO: 8.06 10*3/MM3 (ref 1.7–7)
NEUTROPHILS NFR BLD AUTO: 74.3 % (ref 42.7–76)
PLATELET # BLD AUTO: 410 10*3/MM3 (ref 140–450)
PMV BLD AUTO: 8.9 FL (ref 6–12)
POTASSIUM BLD-SCNC: 4.3 MMOL/L (ref 3.5–5.2)
PROT SERPL-MCNC: 6.9 G/DL (ref 6–8.5)
RBC # BLD AUTO: 4.76 10*6/MM3 (ref 4.14–5.8)
SODIUM BLD-SCNC: 140 MMOL/L (ref 136–145)
WBC NRBC COR # BLD: 10.84 10*3/MM3 (ref 3.4–10.8)

## 2020-01-17 PROCEDURE — 82232 ASSAY OF BETA-2 PROTEIN: CPT | Performed by: INTERNAL MEDICINE

## 2020-01-17 PROCEDURE — 80053 COMPREHEN METABOLIC PANEL: CPT | Performed by: INTERNAL MEDICINE

## 2020-01-17 PROCEDURE — 99214 OFFICE O/P EST MOD 30 MIN: CPT | Performed by: INTERNAL MEDICINE

## 2020-01-17 PROCEDURE — 36415 COLL VENOUS BLD VENIPUNCTURE: CPT | Performed by: INTERNAL MEDICINE

## 2020-01-17 PROCEDURE — 85025 COMPLETE CBC W/AUTO DIFF WBC: CPT

## 2020-01-17 NOTE — PROGRESS NOTES
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 osuna reports that an episode of fall loss of consciousness happened, and that he drove for about 45 minutes without knowing and ended up in his neighbor's backyard which happened in October 2019.  Ever since then he had had a few more spells of passing out.    The passing out spells happen for a few seconds.  Patient has obstructive sleep apnea and uses CPAP machine.  No seizure episodes.  He underwent video examination for 3 days had one episode during the evaluation.  · Patient was sent to the cancer [facility] center for further evaluation given his abnormal CBC that happened with leukocytosis and monocytosis.  · 12/30/2019 peripheral blood flow cytometry -A small monoclonal B-cell population with a CLL phenotype is present. If the patient has a prior diagnosis of CLL, then the findings may represent persistent involvement. If the patient is not known to have CLL or SLL, further investigation is recommended if clinically indicated. In the absence of CLL or SLL, small (<5000/uL) clonal B-cell populations in the blood are classified as monoclonal B-cell lymphocytosis. Clinical correlation is required.  · 1/3/2020 CT scan of the soft tissue of the neck chest abdomen and pelvis were obtained-CT soft tissue neck shows some soft tissue prominence in the nasopharynx which could relate to slightly prominent adenoid tissue.  Vascular calcification is noted in the carotid bifurcations.  DJD C-spine.  Chest abdomen and pelvis show some areas of atelectasis or scarring involving the lungs.  Bilateral renal cysts.  Prostate appears enlarged.  Colonic diverticulosis.  DJD spine.  Cholelithiasis.  No obvious pathologically enlarged lymphadenopathy.  Past Medical  History:   Diagnosis Date   • Acute diverticulitis     with perforation   • Allergic    • Anemia    • Anxiety    • Back pain    • CAD S/P percutaneous coronary angioplasty    • COPD (chronic obstructive pulmonary disease) (CMS/HCC)    • Depression    • Diverticulitis    • GERD (gastroesophageal reflux disease)    • Hyperlipidemia    • Hypertension    • Low back pain    • Myocardial infarction (CMS/HCC)    • TEIXEIRA (nonalcoholic steatohepatitis)    • Obesity    • BRITTNEY on CPAP    • Sleep apnea    • Sleep disorder, shift work    • Spinal stenosis of lumbar region at multiple levels        Past Surgical History:   Procedure Laterality Date   • CARDIAC CATHETERIZATION  11/05/2018    Dr. Sher   • COLONOSCOPY  2016   • CORONARY STENT PLACEMENT     • LIVER BIOPSY     • TONSILLECTOMY AND ADENOIDECTOMY           Current Outpatient Medications:   •  amLODIPine (NORVASC) 10 MG tablet, Take 1 tablet by mouth Daily., Disp: 90 tablet, Rfl: 1  •  aspirin (ASPIR-LOW) 81 MG EC tablet, Take 81 mg by mouth Daily., Disp: , Rfl:   •  atorvastatin (LIPITOR) 40 MG tablet, Take 40 mg by mouth Daily., Disp: , Rfl:   •  clopidogrel (PLAVIX) 75 MG tablet, TAKE 1 TABLET BY MOUTH DAILY, Disp: 90 tablet, Rfl: 1  •  dicyclomine (BENTYL) 10 MG capsule, TAKE 1 CAPSULE BY MOUTH FOUR TIMES DAILY BEFORE MEALS AND AT BEDTIME, Disp: 120 capsule, Rfl: 5  •  DULoxetine (CYMBALTA) 60 MG capsule, Take 60 mg by mouth Daily., Disp: , Rfl:   •  HYDROcodone-acetaminophen (NORCO)  MG per tablet, Take 1 tablet by mouth Every 6 (Six) Hours As Needed for Severe Pain ., Disp: 120 tablet, Rfl: 0  •  ipratropium-albuterol (DUO-NEB) 0.5-2.5 mg/3 ml nebulizer, Take 3 mL by nebulization Every 4 (Four) Hours As Needed for Wheezing., Disp: , Rfl:   •  lisinopril (PRINIVIL,ZESTRIL) 30 MG tablet, TAKE 1 TABLET BY MOUTH DAILY, Disp: 90 tablet, Rfl: 1  •  loratadine (CLARITIN) 10 MG tablet, Take 1 tablet by mouth Daily., Disp: 30 tablet, Rfl: 2  •  omeprazole (priLOSEC) 40  MG capsule, Take 40 mg by mouth Daily., Disp: , Rfl:   •  pregabalin (LYRICA) 100 MG capsule, Take 1 capsule by mouth 2 (Two) Times a Day., Disp: 60 capsule, Rfl: 2  •  TRELEGY ELLIPTA 100-62.5-25 MCG/INH aerosol powder , INHALE 1 PUFF ONCE DAILY, Disp: 1 each, Rfl: 5  •  Vortioxetine HBr (TRINTELLIX) 10 MG tablet, Take 10 mg by mouth Daily., Disp: 30 tablet, Rfl:     No Known Allergies    Family History   Problem Relation Age of Onset   • Colon cancer Mother    • Colon cancer Father    • Heart disease Father    • Diabetes Brother    • Diabetes Maternal Grandmother    • Diabetes Maternal Grandfather        Cancer-related family history includes Colon cancer in his father and mother.    Social History     Tobacco Use   • Smoking status: Former Smoker     Years: 40.00     Types: Cigarettes     Last attempt to quit:      Years since quittin.0   • Smokeless tobacco: Never Used   Substance Use Topics   • Alcohol use: No     Frequency: Never   • Drug use: No       I have reviewed the history of present illness, past medical history, family history, social history, lab results, all notes and other records since the patient was last seen at the cancer care center.    SUBJECTIVE:      Patient is my office for follow-up accompanied by his family.  No further passing out episodes since last visit here.  But he reports a near passing out episode.  Denies fevers night sweats or weight loss    ROS:      Review of Systems   Constitutional: Negative for fever.   HENT: Negative for nosebleeds and trouble swallowing.    Eyes: Negative for visual disturbance.   Respiratory: Negative for cough, shortness of breath and wheezing.    Cardiovascular: Negative for chest pain.   Gastrointestinal: Negative for abdominal pain and blood in stool.   Endocrine: Negative for cold intolerance.   Genitourinary: Negative for dysuria and hematuria.   Musculoskeletal: Negative for joint swelling.   Skin: Negative for rash.  "  Allergic/Immunologic: Negative for environmental allergies.   Neurological: Negative for seizures.   Hematological: Does not bruise/bleed easily.   Psychiatric/Behavioral: The patient is not nervous/anxious.          Objective:       Vitals:    01/17/20 0941   BP: 136/72   Pulse: 105   Resp: 16   Temp: 98.5 °F (36.9 °C)   Weight: 107 kg (234 lb 12.8 oz)   Height: 172.7 cm (68\")         PHYSICAL EXAM:      Physical Exam   Constitutional: He is oriented to person, place, and time. No distress.   Moderately built obese   HENT:   Head: Normocephalic and atraumatic.   Eyes: Conjunctivae and EOM are normal. Right eye exhibits no discharge. Left eye exhibits no discharge. No scleral icterus.   Neck: Normal range of motion. Neck supple. No thyromegaly present.   Cardiovascular: Normal rate, regular rhythm and normal heart sounds. Exam reveals no gallop and no friction rub.   Pulmonary/Chest: Effort normal. No stridor. No respiratory distress. He has no wheezes.   Abdominal: Soft. Bowel sounds are normal. He exhibits no mass. There is no tenderness. There is no rebound and no guarding.   Musculoskeletal: Normal range of motion. He exhibits no tenderness.   Trace bilateral lower extremity edema   Lymphadenopathy:     He has no cervical adenopathy.   Neurological: He is alert and oriented to person, place, and time. He exhibits normal muscle tone.   Skin: Skin is warm. No rash noted. He is not diaphoretic. No erythema.   Psychiatric: He has a normal mood and affect. His behavior is normal.   Nursing note and vitals reviewed.       RECENT LABS:     WBC   Date Value Ref Range Status   01/17/2020 10.84 (H) 3.40 - 10.80 10*3/mm3 Final     RBC   Date Value Ref Range Status   01/17/2020 4.76 4.14 - 5.80 10*6/mm3 Final     Hemoglobin   Date Value Ref Range Status   01/17/2020 13.5 13.0 - 17.7 g/dL Final     Hematocrit   Date Value Ref Range Status   01/17/2020 42.0 37.5 - 51.0 % Final     MCV   Date Value Ref Range Status "   01/17/2020 88.2 79.0 - 97.0 fL Final     MCH   Date Value Ref Range Status   01/17/2020 28.4 26.6 - 33.0 pg Final     MCHC   Date Value Ref Range Status   01/17/2020 32.1 31.5 - 35.7 g/dL Final     RDW   Date Value Ref Range Status   01/17/2020 14.9 12.3 - 15.4 % Final     RDW-SD   Date Value Ref Range Status   01/17/2020 47.1 37.0 - 54.0 fl Final     MPV   Date Value Ref Range Status   01/17/2020 8.9 6.0 - 12.0 fL Final     Platelets   Date Value Ref Range Status   01/17/2020 410 140 - 450 10*3/mm3 Final     Neutrophil %   Date Value Ref Range Status   01/17/2020 74.3 42.7 - 76.0 % Final     Lymphocyte %   Date Value Ref Range Status   01/17/2020 14.4 (L) 19.6 - 45.3 % Final     Monocyte %   Date Value Ref Range Status   01/17/2020 8.1 5.0 - 12.0 % Final     Eosinophil %   Date Value Ref Range Status   01/17/2020 2.9 0.3 - 6.2 % Final     Basophil %   Date Value Ref Range Status   01/17/2020 0.3 0.0 - 1.5 % Final     Immature Grans %   Date Value Ref Range Status   11/09/2019 0.5 0.0 - 0.5 % Final     Neutrophils, Absolute   Date Value Ref Range Status   01/17/2020 8.06 (H) 1.70 - 7.00 10*3/mm3 Final     Lymphocytes, Absolute   Date Value Ref Range Status   01/17/2020 1.56 0.70 - 3.10 10*3/mm3 Final     Monocytes, Absolute   Date Value Ref Range Status   01/17/2020 0.88 0.10 - 0.90 10*3/mm3 Final     Eosinophils, Absolute   Date Value Ref Range Status   01/17/2020 0.31 0.00 - 0.40 10*3/mm3 Final     Basophils, Absolute   Date Value Ref Range Status   01/17/2020 0.03 0.00 - 0.20 10*3/mm3 Final     Immature Grans, Absolute   Date Value Ref Range Status   11/09/2019 0.06 (H) 0.00 - 0.05 10*3/mm3 Final     nRBC   Date Value Ref Range Status   11/09/2019 0.0 0.0 - 0.2 /100 WBC Final       Lab Results   Component Value Date    GLUCOSE 110 (H) 11/09/2019    BUN 12 11/09/2019    CREATININE 0.88 11/09/2019    EGFRIFNONA 87 11/09/2019    BCR 13.6 11/09/2019    K 4.6 11/09/2019    CO2 26.8 11/09/2019    CALCIUM 9.2  11/09/2019    ALBUMIN 4.00 11/09/2019    LABIL2 1.5 05/20/2019    AST 9 11/09/2019    ALT 18 11/09/2019         Assessment/Plan      ASSESSMENT:     1. Monoclonal B-cell lymphocytosis  2. Passing out spells  3. Low blood pressure  4. Obstructive sleep apnea  5. ECOG 1    PLAN:      1. Reviewed the flow cytometry report with the patient and his family.  Flow cytometry is consistent with monoclonal B-cell lymphocytosis.  CT scans are negative for lymphadenopathy.  No evidence of lymphoma/CLL and soft tissue/lymph nodes.    2. No further passing out spells.  Patient to continue to use CPAP machine.  Blood pressure is controlled well now   3. I will see him back in my office in 3 months  4. Today I will check CMP beta-2 microglobulin.    I have reviewed labs results, imaging, vitals, and medications with the patient today.   Patient verbalized understanding and is in agreement of the above plan.          This report was compiled using Dragon voice recognition software. I have made every effort to proof read this document; however, typographical errors may persist.

## 2020-01-27 ENCOUNTER — CLINICAL SUPPORT NO REQUIREMENTS (OUTPATIENT)
Dept: CARDIOLOGY | Facility: CLINIC | Age: 65
End: 2020-01-27

## 2020-01-27 DIAGNOSIS — Z95.818 STATUS POST PLACEMENT OF IMPLANTABLE LOOP RECORDER: ICD-10-CM

## 2020-01-27 DIAGNOSIS — R55 SYNCOPE AND COLLAPSE: Primary | ICD-10-CM

## 2020-01-28 ENCOUNTER — CLINICAL SUPPORT NO REQUIREMENTS (OUTPATIENT)
Dept: CARDIOLOGY | Facility: CLINIC | Age: 65
End: 2020-01-28

## 2020-01-28 ENCOUNTER — OFFICE VISIT (OUTPATIENT)
Dept: CARDIOLOGY | Facility: CLINIC | Age: 65
End: 2020-01-28

## 2020-01-28 VITALS
WEIGHT: 240 LBS | HEIGHT: 68 IN | SYSTOLIC BLOOD PRESSURE: 120 MMHG | BODY MASS INDEX: 36.37 KG/M2 | DIASTOLIC BLOOD PRESSURE: 78 MMHG | HEART RATE: 95 BPM | OXYGEN SATURATION: 95 %

## 2020-01-28 DIAGNOSIS — R55 SYNCOPE AND COLLAPSE: Primary | ICD-10-CM

## 2020-01-28 DIAGNOSIS — E78.5 DYSLIPIDEMIA: ICD-10-CM

## 2020-01-28 DIAGNOSIS — Z95.820 STATUS POST ANGIOPLASTY WITH STENT: ICD-10-CM

## 2020-01-28 DIAGNOSIS — R55 SYNCOPE AND COLLAPSE: ICD-10-CM

## 2020-01-28 DIAGNOSIS — Z95.818 STATUS POST PLACEMENT OF IMPLANTABLE LOOP RECORDER: ICD-10-CM

## 2020-01-28 DIAGNOSIS — I25.10 CHRONIC CORONARY ARTERY DISEASE: ICD-10-CM

## 2020-01-28 DIAGNOSIS — Z95.5 STATUS POST CORONARY ARTERY STENT PLACEMENT: ICD-10-CM

## 2020-01-28 DIAGNOSIS — I10 ESSENTIAL HYPERTENSION: Primary | ICD-10-CM

## 2020-01-28 DIAGNOSIS — E78.2 MIXED HYPERLIPIDEMIA: ICD-10-CM

## 2020-01-28 PROCEDURE — 99214 OFFICE O/P EST MOD 30 MIN: CPT | Performed by: INTERNAL MEDICINE

## 2020-01-28 NOTE — PROGRESS NOTES
Encounter Date:01/28/2020  Last seen 12/17/2019      Patient ID: Neptali Osuna is a 64 y.o. male.    Chief Complaint:  Follow-up tilt table test  History of syncope  Status post stent  Hypertension  Dyslipidemia      History of Present Illness  Patient had tilt table test.  Patient did not have any syncopal episodes since last visit.  Occasional dizziness.    Since I have last seen, the patient has been without any chest discomfort ,shortness of breath, palpitations, or syncope.  Denies having any headache ,abdominal pain ,nausea, vomiting , diarrhea constipation, loss of weight or loss of appetite.  Denies having any excessive bruising ,hematuria or blood in the stool.      Review of all systems negative except as indicated  Assessment and Plan       ]]]]]]]]]]]]]]]]]]]   impression  =============  - Recent recurrent syncopal episodes concerning for bradyarrhythmias.     -Status post [employer] loop recorder 11/15/2019 (Lin[employer])  Negative tilt table test 12/20/2019      -Status post stent to proximal LAD 11/05/2018     Cardiac catheterization 11/05/2018 revealed  Left ventricular size and contractility is normal with ejection fraction of 65%.  Left main coronary artery is normal.  Left anterior descending artery has proximal calcific plaquing with 95-99% proximal LAD disease.  Circumflex coronary artery is normal.  Right coronary artery is a large and dominant vessel that has mid segment 50% disease.      -Chest pain suggestive of unstable angina pectoris. prior to stent placement   CT scan of the chest showed significant coronary artery calcifications(prior to stent placement).  EKG is normal  Troponin levels are negative.      -COPD dyslipidemia hypertension      -Former smoker      -Strong family history of coronary artery disease      -No known allergies  =========  Plan  ===========   patient is not having any angina pectoris or congestive heart failure  Patient is recently having recurrent episodes of syncope.     Patient did not have any episodes since last visit  Recent tilt table test was negative.  The symptoms are usually preceded by dizziness.  Concerning for bradycardia arrhythmias.  Patient had a 72 hr EEG during which patient had a syncopal episode.    EEG was normal.  Loop recorder was interrogated and no definite evidence for bradycardia arrhythmia was noted.  Recent Holter monitor (48-hour Holter monitor) did not reveal any arrhythmias although patient did not have any symptoms while he was wearing Holter monitor.  Medications were reviewed and updated.  Continue Plavix and other medications.  Consideration would be given for discontinuation of Plavix since it has been more than a year since he had stent placement.  I had a lengthy discussion with patient and patient's wife.  Patient recently had orthostatic blood pressure changes and they were negative.  During recent episode patient's wife tested his blood sugar and blood pressure and they were normal.  Heart rate was a little faster but not slow.  Follow-up in the office in 6 months  Further plan will depend on patient's progress  ]]]]]]]]]]]]]]]            Diagnosis Plan   1. Essential hypertension     2. Chronic coronary artery disease     3. Mixed hyperlipidemia     4. Syncope and collapse     5. Status post coronary artery stent placement     6. Status post angioplasty with stent     7. Dyslipidemia     LAB RESULTS (LAST 7 DAYS)    CBC        BMP        CMP         BNP        TROPONIN        CoAg        Creatinine Clearance  Estimated Creatinine Clearance: 116 mL/min (by C-G formula based on SCr of 0.78 mg/dL).    ABG        Radiology  No radiology results for the last day                The following portions of the patient's history were reviewed and updated as appropriate: allergies, current medications, past family history, past medical history, past social history, past surgical history and problem list.    Review of Systems   Constitution: Negative  for chills, fever and malaise/fatigue.   Cardiovascular: Positive for leg swelling. Negative for chest pain, dyspnea on exertion, palpitations and syncope.   Respiratory: Negative for shortness of breath.    Skin: Negative for rash.   Neurological: Positive for dizziness and light-headedness. Negative for numbness.         Current Outpatient Medications:   •  amLODIPine (NORVASC) 10 MG tablet, Take 1 tablet by mouth Daily., Disp: 90 tablet, Rfl: 1  •  aspirin (ASPIR-LOW) 81 MG EC tablet, Take 81 mg by mouth Daily., Disp: , Rfl:   •  atorvastatin (LIPITOR) 40 MG tablet, Take 40 mg by mouth Daily., Disp: , Rfl:   •  clopidogrel (PLAVIX) 75 MG tablet, TAKE 1 TABLET BY MOUTH DAILY, Disp: 90 tablet, Rfl: 1  •  dicyclomine (BENTYL) 10 MG capsule, TAKE 1 CAPSULE BY MOUTH FOUR TIMES DAILY BEFORE MEALS AND AT BEDTIME, Disp: 120 capsule, Rfl: 5  •  DULoxetine (CYMBALTA) 60 MG capsule, Take 60 mg by mouth Daily., Disp: , Rfl:   •  HYDROcodone-acetaminophen (NORCO)  MG per tablet, Take 1 tablet by mouth Every 6 (Six) Hours As Needed for Severe Pain ., Disp: 120 tablet, Rfl: 0  •  ipratropium-albuterol (DUO-NEB) 0.5-2.5 mg/3 ml nebulizer, Take 3 mL by nebulization Every 4 (Four) Hours As Needed for Wheezing., Disp: , Rfl:   •  lisinopril (PRINIVIL,ZESTRIL) 30 MG tablet, TAKE 1 TABLET BY MOUTH DAILY, Disp: 90 tablet, Rfl: 1  •  loratadine (CLARITIN) 10 MG tablet, Take 1 tablet by mouth Daily., Disp: 30 tablet, Rfl: 2  •  omeprazole (priLOSEC) 40 MG capsule, Take 40 mg by mouth Daily., Disp: , Rfl:   •  pregabalin (LYRICA) 100 MG capsule, Take 1 capsule by mouth 2 (Two) Times a Day., Disp: 60 capsule, Rfl: 2  •  TRELEGY ELLIPTA 100-62.5-25 MCG/INH aerosol powder , INHALE 1 PUFF ONCE DAILY, Disp: 1 each, Rfl: 5  •  Vortioxetine HBr (TRINTELLIX) 10 MG tablet, Take 10 mg by mouth Daily., Disp: 30 tablet, Rfl:     No Known Allergies    Family History   Problem Relation Age of Onset   • Colon cancer Mother    • Colon cancer  "Father    • Heart disease Father    • Diabetes Brother    • Diabetes Maternal Grandmother    • Diabetes Maternal Grandfather        Past Surgical History:   Procedure Laterality Date   • CARDIAC CATHETERIZATION  2018    Dr. Sher   • COLONOSCOPY  2016   • CORONARY STENT PLACEMENT     • LIVER BIOPSY     • TONSILLECTOMY AND ADENOIDECTOMY         Past Medical History:   Diagnosis Date   • Acute diverticulitis     with perforation   • Allergic    • Anemia    • Anxiety    • Back pain    • CAD S/P percutaneous coronary angioplasty    • COPD (chronic obstructive pulmonary disease) (CMS/HCC)    • Depression    • Diverticulitis    • GERD (gastroesophageal reflux disease)    • Hyperlipidemia    • Hypertension    • Low back pain    • Myocardial infarction (CMS/HCC)    • TEIXEIRA (nonalcoholic steatohepatitis)    • Obesity    • BRITTNEY on CPAP    • Sleep apnea    • Sleep disorder, shift work    • Spinal stenosis of lumbar region at multiple levels        Family History   Problem Relation Age of Onset   • Colon cancer Mother    • Colon cancer Father    • Heart disease Father    • Diabetes Brother    • Diabetes Maternal Grandmother    • Diabetes Maternal Grandfather        Social History     Socioeconomic History   • Marital status:      Spouse name: Not on file   • Number of children: Not on file   • Years of education: Not on file   • Highest education level: Not on file   Tobacco Use   • Smoking status: Former Smoker     Years: 40.00     Types: Cigarettes     Last attempt to quit: 2009     Years since quittin.0   • Smokeless tobacco: Never Used   Substance and Sexual Activity   • Alcohol use: No     Frequency: Never   • Drug use: No   • Sexual activity: Defer         Procedures      Objective:       Physical Exam    /78 (BP Location: Left arm, Patient Position: Sitting, Cuff Size: Adult)   Pulse 95   Ht 172.7 cm (68\")   Wt 109 kg (240 lb)   SpO2 95%   BMI 36.49 kg/m²   The patient is alert, oriented and in " no distress.    Vital signs as noted above.    Head and neck revealed no carotid bruits or jugular venous distension.  No thyromegaly or lymphadenopathy is present.    Lungs clear.  No wheezing.  Breath sounds are normal bilaterally.    Heart normal first and second heart sounds.  No murmur..  No pericardial rub is present.  No gallop is present.    Abdomen soft and nontender.  No organomegaly is present.    Extremities revealed good peripheral pulses without any pedal edema.    Skin warm and dry.    Musculoskeletal system is grossly normal.    CNS grossly normal.

## 2020-01-29 ENCOUNTER — OFFICE VISIT (OUTPATIENT)
Dept: PULMONOLOGY | Facility: HOSPITAL | Age: 65
End: 2020-01-29

## 2020-01-29 ENCOUNTER — TELEPHONE (OUTPATIENT)
Dept: CARDIOLOGY | Facility: CLINIC | Age: 65
End: 2020-01-29

## 2020-01-29 VITALS
WEIGHT: 242.4 LBS | HEIGHT: 68 IN | HEART RATE: 84 BPM | OXYGEN SATURATION: 95 % | DIASTOLIC BLOOD PRESSURE: 69 MMHG | SYSTOLIC BLOOD PRESSURE: 103 MMHG | BODY MASS INDEX: 36.74 KG/M2

## 2020-01-29 DIAGNOSIS — J44.9 CHRONIC OBSTRUCTIVE PULMONARY DISEASE, UNSPECIFIED COPD TYPE (HCC): Primary | ICD-10-CM

## 2020-01-29 PROCEDURE — G0463 HOSPITAL OUTPT CLINIC VISIT: HCPCS

## 2020-01-29 RX ORDER — DULOXETIN HYDROCHLORIDE 60 MG/1
CAPSULE, DELAYED RELEASE ORAL
Qty: 90 CAPSULE | Refills: 1 | Status: SHIPPED | OUTPATIENT
Start: 2020-01-29 | End: 2020-09-02

## 2020-01-29 NOTE — TELEPHONE ENCOUNTER
Patient was in office yesterday to see Dr Sher, patient has been having symptoms. Had nehemiah episode noted on 1/24/20 at 9:54am, received on 1/27/20. Reviewed episode post apt, called patient, he had nausea and vomiting Friday morning and passed out while vomiting. Spoke to Dr Sher, he wants patient seen next week.    Please call patient and move apt up per Dr Sher.

## 2020-01-29 NOTE — TELEPHONE ENCOUNTER
Last visit:  10/24/19  Next visit: none  Last labs: 1/17/20    Rx requested: DULoxetine   Pharmacy: [facility] in [place]

## 2020-01-29 NOTE — PROGRESS NOTES
"Pulmonary/ Critical Care/ sleep medicine OUTPATIENT Consult/ Follow up Note        Patient Name:  Neptali Osuna    :  1955    Medical Record:  [id number]    Primary Care Physician     Kathryn Valencia PA    Reason for consultation    Neptali Osuna is a 64 y.o. male who is seen in the clinic today for follow-up on his COPD and obstructive sleep apnea.  Last office visit was 7 months ago.    Gets SOA with activity, better with resting. Occasional Cough. Wheezing \"all the time\". Had an episode of AECOPD and was admitted overnight to hospital for that. Using Trelegy inhaler daily. He wwas treated with steroids and abx per PCP too and required a long taper.  Using albuterol nebulizer and inhaler . Not using it too often. Not smoking. Quit smoking . Has some second hand smoke exposure from his wife's smoking.    Typical bedtime is 11pm, wake up time 7-9am, using PAP with nasal pillows mask.  Denies any specific problems with the mask and the machine. Occasional nap. Wakes up a few times at night. [place] [address]    Review of Systems    Constitutional:  Denies fever or chills   Eyes:  Denies change in visual acuity   HENT:  + nasal congestion no sore throat   Respiratory:  + wheezing+ shortness of breath   Cardiovascular:  + chest pain no edema   GI:  Denies abdominal pain, nausea, vomiting, bloody stools or diarrhea   :  Denies dysuria   Musculoskeletal:  Denies back pain or joint pain   Integument:  Denies rash   Neurologic:  Denies headache, focal weakness or sensory changes   Endocrine:  Denies polyuria or polydipsia   Lymphatic:  Denies swollen glands   Psychiatric:  + depression + anxiety     Medical History    Past Medical History:   Diagnosis Date   • Acute diverticulitis     with perforation   • Allergic    • Anemia    • Anxiety    • Back pain    • CAD S/P percutaneous coronary angioplasty    • COPD (chronic obstructive pulmonary disease) (CMS/[facility])    • Depression    • Diverticulitis    • GERD " (gastroesophageal reflux disease)    • Hyperlipidemia    • Hypertension    • Low back pain    • Myocardial infarction (CMS/HCC)    • TEIXEIRA (nonalcoholic steatohepatitis)    • Obesity    • BRITTNEY on CPAP    • Sleep apnea    • Sleep disorder, shift work    • Spinal stenosis of lumbar region at multiple levels         Surgical History    Past Surgical History:   Procedure Laterality Date   • CARDIAC CATHETERIZATION  2018    Dr. Sher   • COLONOSCOPY     • CORONARY STENT PLACEMENT     • LIVER BIOPSY     • TONSILLECTOMY AND ADENOIDECTOMY          Family History    Family History   Problem Relation Age of Onset   • Colon cancer Mother    • Colon cancer Father    • Heart disease Father    • Diabetes Brother    • Diabetes Maternal Grandmother    • Diabetes Maternal Grandfather        Social History    Social History     Tobacco Use   • Smoking status: Former Smoker     Years: 40.00     Types: Cigarettes     Last attempt to quit: 2009     Years since quittin.0   • Smokeless tobacco: Never Used   Substance Use Topics   • Alcohol use: No     Frequency: Never        Allergies    No Known Allergies      Medications    Current Outpatient Medications on File Prior to Visit   Medication Sig Dispense Refill   • amLODIPine (NORVASC) 10 MG tablet Take 1 tablet by mouth Daily. 90 tablet 1   • aspirin (ASPIR-LOW) 81 MG EC tablet Take 81 mg by mouth Daily.     • atorvastatin (LIPITOR) 40 MG tablet Take 40 mg by mouth Daily.     • clopidogrel (PLAVIX) 75 MG tablet TAKE 1 TABLET BY MOUTH DAILY 90 tablet 1   • dicyclomine (BENTYL) 10 MG capsule TAKE 1 CAPSULE BY MOUTH FOUR TIMES DAILY BEFORE MEALS AND AT BEDTIME 120 capsule 5   • HYDROcodone-acetaminophen (NORCO)  MG per tablet Take 1 tablet by mouth Every 6 (Six) Hours As Needed for Severe Pain . 120 tablet 0   • ipratropium-albuterol (DUO-NEB) 0.5-2.5 mg/3 ml nebulizer Take 3 mL by nebulization Every 4 (Four) Hours As Needed for Wheezing.     • lisinopril  "(PRINIVIL,ZESTRIL) 30 MG tablet TAKE 1 TABLET BY MOUTH DAILY 90 tablet 1   • loratadine (CLARITIN) 10 MG tablet Take 1 tablet by mouth Daily. 30 tablet 2   • omeprazole (priLOSEC) 40 MG capsule Take 40 mg by mouth Daily.     • pregabalin (LYRICA) 100 MG capsule Take 1 capsule by mouth 2 (Two) Times a Day. 60 capsule 2   • TRELEGY ELLIPTA 100-62.5-25 MCG/INH aerosol powder  INHALE 1 PUFF ONCE DAILY 1 each 5   • Vortioxetine HBr (TRINTELLIX) 10 MG tablet Take 10 mg by mouth Daily. 30 tablet    • [DISCONTINUED] DULoxetine (CYMBALTA) 60 MG capsule Take 60 mg by mouth Daily.       No current facility-administered medications on file prior to visit.        Physical Exam    Vitals:    01/29/20 1337   BP: 103/69   BP Location: Left arm   Patient Position: Sitting   Cuff Size: Adult   Pulse: 84   SpO2: 95%   Weight: 110 kg (242 lb 6.4 oz)   Height: 172.7 cm (68\")         Constitutional:  Well developed, well nourished, no acute distress, non-toxic appearance   Eyes:  PERRL, conjunctiva normal   HENT:  Atraumatic, external ears normal, nose normal, oropharynx moist, no pharyngeal exudates. mallampatti 3  Neck- normal range of motion, no tenderness, supple   Respiratory:  No respiratory distress, normal breath sounds, no rales, no wheezing   Cardiovascular:  Normal rate, normal rhythm, no murmurs, no gallops, no rubs   GI:  Soft, nondistended, normal bowel sounds, nontender, no organomegaly, no mass, no rebound, no guarding   :  No costovertebral angle tenderness   Musculoskeletal: + edema, no tenderness, no deformities. Back- no tenderness  Integument:  Well hydrated, no rash   Lymphatic:  No lymphadenopathy noted   Neurologic:  Alert & oriented x 3, CN 2-12 normal, normal motor function, normal sensory function, no focal deficits noted   Psychiatric:  Speech and behavior appropriate       Assessment & PLAN:    There are no diagnoses linked to this encounter.  COPD  LDCT chest 8/18: Several scattered left lower lobe " ground-glass nodules with a tree-in-bud configuration suggesting an infectious or inflammatory process, largest measures up to 7 mm, recommend six-month low-dose CT follow-up. Advanced three-vessel coronary atherosclerotic disease. Cholelithiasis.  CT chest 11/18: resolution of GG infiltrates   PFTs 8/18: Nonspecific ventilatory defect with hyperinflation.  Diffusion capacity normal when corrected for alveolar volume.  FEV1 75%, %, %, DLCO 91%, DL/% predicted  -Remains on Trelegy and albuterol  -Repeat PFTs    Obstructive sleep apnea  CPAP compliance data sheet reviewed.  Total days with the machine use are 183 days without the machine use are 0.  Average use is 6 hours and 51 minutes.  Average AHI 2.3.  Days use more than 4 hours nightly: 91%  -Patient with good compliance and good symptomatic improvement.    Abnormal CT scan of the chest  -CT chest 8/18 and 11/18 as above  -Underwent recent CT chest per Oncologist for w/u of leucocytosis. Will get reports    Obesity  -Weight control recommended    This document has been electronically signed by  Joe Qiu MD  12:54 PM

## 2020-02-06 ENCOUNTER — OFFICE VISIT (OUTPATIENT)
Dept: CARDIOLOGY | Facility: CLINIC | Age: 65
End: 2020-02-06

## 2020-02-06 VITALS
OXYGEN SATURATION: 97 % | DIASTOLIC BLOOD PRESSURE: 88 MMHG | WEIGHT: 238 LBS | HEART RATE: 87 BPM | BODY MASS INDEX: 36.07 KG/M2 | HEIGHT: 68 IN | SYSTOLIC BLOOD PRESSURE: 137 MMHG

## 2020-02-06 DIAGNOSIS — I10 ESSENTIAL HYPERTENSION: Primary | ICD-10-CM

## 2020-02-06 DIAGNOSIS — R55 SYNCOPE AND COLLAPSE: ICD-10-CM

## 2020-02-06 DIAGNOSIS — E78.2 MIXED HYPERLIPIDEMIA: ICD-10-CM

## 2020-02-06 DIAGNOSIS — Z95.820 STATUS POST ANGIOPLASTY WITH STENT: ICD-10-CM

## 2020-02-06 DIAGNOSIS — Z95.5 STATUS POST CORONARY ARTERY STENT PLACEMENT: ICD-10-CM

## 2020-02-06 PROCEDURE — 99214 OFFICE O/P EST MOD 30 MIN: CPT | Performed by: INTERNAL MEDICINE

## 2020-02-06 NOTE — PROGRESS NOTES
Encounter Date:02/06/2020  Last seen 1/28/2020      Patient ID: Neptali Osuna is a 64 y.o. male.    Chief Complaint:  Acute visit  Abnormal loop recorder tracing.  History of syncope.  Status post stent      History of Present Illness  Patient has history of recurrent episodes of syncope.  No significant documentation was available in the past.  Patient had loop recorder placement.  Recently patient had an episode of nausea and syncope on 1/29/2020.  Patient apparently also had vomiting.  Loop recorder showed bradycardia with Mobitz type I and type II AV block.    Since I have last seen, the patient has been without any chest discomfort ,shortness of breath, palpitations.  Denies having any headache ,abdominal pain ,nausea, vomiting , diarrhea constipation, loss of weight or loss of appetite.  Denies having any excessive bruising ,hematuria or blood in the stool.    Review of all systems negative except as indicated  Assessment and Plan       ]]]]]]]]]]]]]]]]]]]   impression  =============  - Recent recurrent syncopal episodes concerning for bradyarrhythmias.  Recent loop recorder monitor showed second-degree AV block type I and type II.     -Status post [employer] loop recorder 11/15/2019 (Linq)  Negative tilt table test 12/20/2019      -Status post stent to proximal LAD 11/05/2018     Cardiac catheterization 11/05/2018 revealed  Left ventricular size and contractility is normal with ejection fraction of 65%.  Left main coronary artery is normal.  Left anterior descending artery has proximal calcific plaquing with 95-99% proximal LAD disease.  Circumflex coronary artery is normal.  Right coronary artery is a large and dominant vessel that has mid segment 50% disease.      -Chest pain suggestive of unstable angina pectoris. prior to stent placement   CT scan of the chest showed significant coronary artery calcifications(prior to stent placement).  EKG is normal  Troponin levels are negative.      -COPD dyslipidemia  hypertension      -Former smoker      -Strong family history of coronary artery disease      -No known allergies  =========  Plan  ===========   patient is not having any angina pectoris or congestive heart failure  Patient is recently having recurrent episodes of syncope.    Patient did not have any episodes since last visit  Recent tilt table test was negative.  The symptoms are usually preceded by dizziness.  Concerning for bradycardia arrhythmias.  Patient had a 72 hr EEG during which patient had a syncopal episode.    EEG was normal.  Loop recorder monitoring showed second-degree Mobitz type I and type II (shorter duration).  Recent Holter monitor (48-hour Holter monitor) did not reveal any arrhythmias although patient did not have any symptoms while he was wearing Holter monitor.  Medications were reviewed and updated.  Continue Plavix and other medications.  Consideration would be given for discontinuation of Plavix since it has been more than a year since he had stent placement.  I had a lengthy discussion with patient and patient's wife.  Discussion was carried out regarding permanent dual-chamber pacemaker implantation.  However they would like to wait and observe at this time.  Follow-up in the office at her regularly scheduled appointment unless patient has further symptoms or loop recorder shows bradycardia arrhythmias.  Further plan will depend on patient's progress  ]]]]]]]]]]]]]]]            Diagnosis Plan   1. Essential hypertension     2. Mixed hyperlipidemia     3. Syncope and collapse     4. Status post coronary artery stent placement     5. Status post angioplasty with stent     LAB RESULTS (LAST 7 DAYS)    CBC        BMP        CMP         BNP        TROPONIN        CoAg        Creatinine Clearance  Estimated Creatinine Clearance: 115.4 mL/min (by C-G formula based on SCr of 0.78 mg/dL).    ABG        Radiology  No radiology results for the last day                The following portions of the  patient's history were reviewed and updated as appropriate: allergies, current medications, past family history, past medical history, past social history, past surgical history and problem list.    Review of Systems   Constitution: Negative for chills, fever and malaise/fatigue.   Cardiovascular: Positive for dyspnea on exertion, leg swelling and syncope. Negative for chest pain and palpitations.   Respiratory: Positive for shortness of breath.    Skin: Negative for rash.   Gastrointestinal: Positive for nausea and vomiting.   Neurological: Positive for dizziness and light-headedness. Negative for numbness.         Current Outpatient Medications:   •  amLODIPine (NORVASC) 10 MG tablet, Take 1 tablet by mouth Daily., Disp: 90 tablet, Rfl: 1  •  aspirin (ASPIR-LOW) 81 MG EC tablet, Take 81 mg by mouth Daily., Disp: , Rfl:   •  atorvastatin (LIPITOR) 40 MG tablet, Take 40 mg by mouth Daily., Disp: , Rfl:   •  clopidogrel (PLAVIX) 75 MG tablet, TAKE 1 TABLET BY MOUTH DAILY, Disp: 90 tablet, Rfl: 1  •  dicyclomine (BENTYL) 10 MG capsule, TAKE 1 CAPSULE BY MOUTH FOUR TIMES DAILY BEFORE MEALS AND AT BEDTIME, Disp: 120 capsule, Rfl: 5  •  DULoxetine (CYMBALTA) 60 MG capsule, TAKE 1 CAPSULE BY MOUTH EVERY DAY, Disp: 90 capsule, Rfl: 1  •  HYDROcodone-acetaminophen (NORCO)  MG per tablet, Take 1 tablet by mouth Every 6 (Six) Hours As Needed for Severe Pain ., Disp: 120 tablet, Rfl: 0  •  ipratropium-albuterol (DUO-NEB) 0.5-2.5 mg/3 ml nebulizer, Take 3 mL by nebulization Every 4 (Four) Hours As Needed for Wheezing., Disp: , Rfl:   •  lisinopril (PRINIVIL,ZESTRIL) 30 MG tablet, TAKE 1 TABLET BY MOUTH DAILY, Disp: 90 tablet, Rfl: 1  •  loratadine (CLARITIN) 10 MG tablet, Take 1 tablet by mouth Daily., Disp: 30 tablet, Rfl: 2  •  omeprazole (priLOSEC) 40 MG capsule, Take 40 mg by mouth Daily., Disp: , Rfl:   •  pregabalin (LYRICA) 100 MG capsule, Take 1 capsule by mouth 2 (Two) Times a Day., Disp: 60 capsule, Rfl: 2  •   SHERYL ELLIPTA 100-62.5-25 MCG/INH aerosol powder , INHALE 1 PUFF ONCE DAILY, Disp: 1 each, Rfl: 5  •  Vortioxetine HBr (TRINTELLIX) 10 MG tablet, Take 10 mg by mouth Daily., Disp: 30 tablet, Rfl:     No Known Allergies    Family History   Problem Relation Age of Onset   • Colon cancer Mother    • Colon cancer Father    • Heart disease Father    • Diabetes Brother    • Diabetes Maternal Grandmother    • Diabetes Maternal Grandfather        Past Surgical History:   Procedure Laterality Date   • CARDIAC CATHETERIZATION  2018    Dr. Sher   • COLONOSCOPY     • CORONARY STENT PLACEMENT     • LIVER BIOPSY     • TONSILLECTOMY AND ADENOIDECTOMY         Past Medical History:   Diagnosis Date   • Acute diverticulitis     with perforation   • Allergic    • Anemia    • Anxiety    • Back pain    • CAD S/P percutaneous coronary angioplasty    • COPD (chronic obstructive pulmonary disease) (CMS/HCC)    • Depression    • Diverticulitis    • GERD (gastroesophageal reflux disease)    • Hyperlipidemia    • Hypertension    • Low back pain    • Myocardial infarction (CMS/HCC)    • TEIXEIRA (nonalcoholic steatohepatitis)    • Obesity    • BRITTNEY on CPAP    • Sleep apnea    • Sleep disorder, shift work    • Spinal stenosis of lumbar region at multiple levels        Family History   Problem Relation Age of Onset   • Colon cancer Mother    • Colon cancer Father    • Heart disease Father    • Diabetes Brother    • Diabetes Maternal Grandmother    • Diabetes Maternal Grandfather        Social History     Socioeconomic History   • Marital status:      Spouse name: Not on file   • Number of children: Not on file   • Years of education: Not on file   • Highest education level: Not on file   Tobacco Use   • Smoking status: Former Smoker     Years: 40.00     Types: Cigarettes     Last attempt to quit:      Years since quittin.1   • Smokeless tobacco: Never Used   Substance and Sexual Activity   • Alcohol use: No     Frequency:  "Never   • Drug use: No   • Sexual activity: Defer         Procedures      Objective:       Physical Exam    /88 (BP Location: Left arm, Patient Position: Sitting, Cuff Size: Adult)   Pulse 87   Ht 172.7 cm (68\")   Wt 108 kg (238 lb)   SpO2 97%   BMI 36.19 kg/m²   The patient is alert, oriented and in no distress.    Vital signs as noted above.    Head and neck revealed no carotid bruits or jugular venous distension.  No thyromegaly or lymphadenopathy is present.    Lungs clear.  No wheezing.  Breath sounds are normal bilaterally.    Heart normal first and second heart sounds.  No murmur..  No pericardial rub is present.  No gallop is present.    Abdomen soft and nontender.  No organomegaly is present.    Extremities revealed good peripheral pulses without any pedal edema.    Skin warm and dry.    Musculoskeletal system is grossly normal.    CNS grossly normal.        "

## 2020-02-13 ENCOUNTER — OFFICE VISIT (OUTPATIENT)
Dept: FAMILY MEDICINE CLINIC | Facility: CLINIC | Age: 65
End: 2020-02-13

## 2020-02-13 VITALS
HEART RATE: 91 BPM | BODY MASS INDEX: 35.73 KG/M2 | WEIGHT: 235 LBS | TEMPERATURE: 97.6 F | OXYGEN SATURATION: 95 % | DIASTOLIC BLOOD PRESSURE: 83 MMHG | SYSTOLIC BLOOD PRESSURE: 164 MMHG

## 2020-02-13 DIAGNOSIS — F45.1 SOMATIC SYMPTOM DISORDER, PERSISTENT, MODERATE: ICD-10-CM

## 2020-02-13 DIAGNOSIS — M54.16 LUMBAR RADICULOPATHY: ICD-10-CM

## 2020-02-13 DIAGNOSIS — M54.50 CHRONIC BILATERAL LOW BACK PAIN WITHOUT SCIATICA: ICD-10-CM

## 2020-02-13 DIAGNOSIS — G89.29 CHRONIC BILATERAL LOW BACK PAIN WITHOUT SCIATICA: ICD-10-CM

## 2020-02-13 DIAGNOSIS — C91.10 CHRONIC LYMPHOCYTIC LEUKEMIA (HCC): ICD-10-CM

## 2020-02-13 DIAGNOSIS — D72.820 MONOCLONAL B-CELL LYMPHOCYTOSIS: Primary | ICD-10-CM

## 2020-02-13 DIAGNOSIS — F41.8 MIXED ANXIETY DEPRESSIVE DISORDER: ICD-10-CM

## 2020-02-13 DIAGNOSIS — F43.10 POST TRAUMATIC STRESS DISORDER (PTSD): ICD-10-CM

## 2020-02-13 PROCEDURE — 99214 OFFICE O/P EST MOD 30 MIN: CPT | Performed by: PHYSICIAN ASSISTANT

## 2020-02-13 RX ORDER — HYDROCODONE BITARTRATE AND ACETAMINOPHEN 10; 325 MG/1; MG/1
1 TABLET ORAL EVERY 6 HOURS PRN
Qty: 120 TABLET | Refills: 0 | Status: CANCELLED | OUTPATIENT
Start: 2020-02-13

## 2020-02-13 RX ORDER — HYDROCODONE BITARTRATE AND ACETAMINOPHEN 10; 325 MG/1; MG/1
1 TABLET ORAL 4 TIMES DAILY
Qty: 120 TABLET | Refills: 0 | Status: SHIPPED | OUTPATIENT
Start: 2020-02-13 | End: 2020-03-11 | Stop reason: SDUPTHER

## 2020-02-13 RX ORDER — MODAFINIL 200 MG/1
200 TABLET ORAL DAILY PRN
COMMUNITY
Start: 2020-01-24 | End: 2020-05-07 | Stop reason: SDUPTHER

## 2020-02-13 NOTE — PROGRESS NOTES
Teresa Osuna is a 64 y.o. male     History of Present Illness  Patient is a 64-year-old white male here for four-month follow-up for pain management.  He states he has also had some recent diagnoses that are new and wants to inform me of them.    1.  Chronic low back pain: Patient is currently taking Norco 10 mg every 6 hours for chronic low back pain.    2.  Syncope and collapse: Patient has had symptoms of syncope and collapse for the past several months, he has been seen by neurology, worked up, and cleared by them, discussed with .  He has also been seen by cardiology, and implanted loop recorder was monitored and unremarkable, this is with Dr. Sher.  Because nothing specific was found, it was recommended that he have a neuropsychiatric evaluation for further evaluation of this condition.  Neuropsychiatric evaluation done by [facility] indicates patient has PTSD and somatic symptom disorder.  Patient states he has been seeing psychiatry at the [facility].  He is applying for long-term disability for these reasons and for his chronic low back pain.    Patient also states he has been recently diagnosed with chronic lymphocytic leukemia and monoclonal B-cell lymphocytosis.    The following portions of the patient's history were reviewed and updated as appropriate: allergies, current medications, past family history, past medical history, past social history, past surgical history and problem list.    Review of Systems   Constitutional: Negative for fever and unexpected weight loss.   HENT: Negative for sore throat.    Respiratory: Negative for shortness of breath.    Cardiovascular: Negative for chest pain.   Gastrointestinal: Negative for abdominal pain, diarrhea, nausea and vomiting.   Genitourinary: Negative for dysuria and urinary incontinence.   Musculoskeletal: Positive for back pain.   Neurological: Positive for numbness. Negative for weakness.   Psychiatric/Behavioral:  Negative for suicidal ideas and depressed mood.       Objective  Physical Exam   Constitutional: He is oriented to person, place, and time. He appears well-developed and well-nourished.   HENT:   Head: Normocephalic and atraumatic.   Eyes: Pupils are equal, round, and reactive to light.   Cardiovascular: Normal rate, regular rhythm and normal heart sounds.   Pulmonary/Chest: Effort normal and breath sounds normal.   Musculoskeletal:        Lumbar back: He exhibits tenderness, bony tenderness and pain.   Neurological: He is alert and oriented to person, place, and time.   Psychiatric: His behavior is normal. His mood appears anxious. He exhibits a depressed mood.       Vitals:    02/13/20 1424   BP: 164/83   BP Location: Right arm   Patient Position: Sitting   Cuff Size: Adult   Pulse: 91   Temp: 97.6 °F (36.4 °C)   TempSrc: Oral   SpO2: 95%   Weight: 107 kg (235 lb)     Body mass index is 35.73 kg/m².    PHQ-9 Total Score:        Assessment/Plan  Diagnoses and all orders for this visit:    1. Monoclonal B-cell lymphocytosis (Primary)  Comments:  Stable, patient to continue following up with oncology as directed by them.    2. Chronic lymphocytic leukemia (CMS/HCC)  Comments:  Stable, patient to continue following up with oncology as directed by them.    3. Somatic symptom disorder, persistent, moderate  Comments:  Long-term disability paperwork filled out at patient request.    4. Post traumatic stress disorder (PTSD)  Comments:  Stable, patient to continue following up with psychiatry as directed by them.    5. Lumbar radiculopathy  Comments:  Stable, patient to continue current medications.  Pain scale collected, inspect run and reviewed.  I declined to increase patient's medication dosage, if additional pain medication as needed he will have to follow-up with pain management.    6. Chronic bilateral low back pain without sciatica  Comments:  Stable, patient to continue current medications.  Orders:  -      HYDROcodone-acetaminophen (NORCO)  MG per tablet; Take 1 tablet by mouth 4 (Four) Times a Day.  Dispense: 120 tablet; Refill: 0    7. Mixed anxiety depressive disorder  Comments:  Stable, patient to continue following up with psychiatry as directed by them.    Other orders  -     Cancel: HYDROcodone-acetaminophen (NORCO)  MG per tablet; Take 1 tablet by mouth Every 6 (Six) Hours As Needed for Severe Pain .  Dispense: 120 tablet; Refill: 0              Answers for HPI/ROS submitted by the patient on 2/9/2020   Back pain  What is the primary reason for your visit?: Back Pain  Chronicity: chronic  Onset: more than 1 year ago  Frequency: daily  Progression since onset: gradually worsening  Pain location: lumbar spine  Pain quality: aching  Radiates to: left knee, left thigh, right thigh  Pain - numeric: 6/10  Pain is: worse during the day  Aggravated by: position, standing, stress  Stiffness is present: in the morning  bowel incontinence: No  headaches: No  leg pain: Yes  paresis: No  paresthesias: Yes  pelvic pain: No  perianal numbness: No  tingling: Yes  Risk factors: lack of exercise, obesity, sedentary lifestyle

## 2020-02-17 ENCOUNTER — CLINICAL SUPPORT NO REQUIREMENTS (OUTPATIENT)
Dept: CARDIOLOGY | Facility: CLINIC | Age: 65
End: 2020-02-17

## 2020-02-17 DIAGNOSIS — Z95.818 STATUS POST PLACEMENT OF IMPLANTABLE LOOP RECORDER: ICD-10-CM

## 2020-02-17 DIAGNOSIS — R55 SYNCOPE AND COLLAPSE: Primary | ICD-10-CM

## 2020-02-18 ENCOUNTER — CLINICAL SUPPORT NO REQUIREMENTS (OUTPATIENT)
Dept: CARDIOLOGY | Facility: CLINIC | Age: 65
End: 2020-02-18

## 2020-02-18 DIAGNOSIS — R55 SYNCOPE AND COLLAPSE: Primary | ICD-10-CM

## 2020-02-18 DIAGNOSIS — Z95.818 STATUS POST PLACEMENT OF IMPLANTABLE LOOP RECORDER: ICD-10-CM

## 2020-02-18 PROCEDURE — 93298 REM INTERROG DEV EVAL SCRMS: CPT | Performed by: INTERNAL MEDICINE

## 2020-02-20 RX ORDER — LORATADINE 10 MG/1
10 TABLET ORAL DAILY
Qty: 90 TABLET | Refills: 1 | Status: SHIPPED | OUTPATIENT
Start: 2020-02-20 | End: 2020-09-21

## 2020-02-25 ENCOUNTER — CLINICAL SUPPORT NO REQUIREMENTS (OUTPATIENT)
Dept: CARDIOLOGY | Facility: CLINIC | Age: 65
End: 2020-02-25

## 2020-02-25 DIAGNOSIS — R55 SYNCOPE AND COLLAPSE: Primary | ICD-10-CM

## 2020-02-25 DIAGNOSIS — Z95.818 STATUS POST PLACEMENT OF IMPLANTABLE LOOP RECORDER: ICD-10-CM

## 2020-02-25 PROCEDURE — 93298 REM INTERROG DEV EVAL SCRMS: CPT | Performed by: INTERNAL MEDICINE

## 2020-03-02 RX ORDER — AMLODIPINE BESYLATE 10 MG/1
TABLET ORAL
Qty: 90 TABLET | Refills: 1 | Status: SHIPPED | OUTPATIENT
Start: 2020-03-02 | End: 2020-08-31

## 2020-03-09 ENCOUNTER — CLINICAL SUPPORT NO REQUIREMENTS (OUTPATIENT)
Dept: CARDIOLOGY | Facility: CLINIC | Age: 65
End: 2020-03-09

## 2020-03-09 DIAGNOSIS — Z95.818 STATUS POST PLACEMENT OF IMPLANTABLE LOOP RECORDER: Primary | ICD-10-CM

## 2020-03-09 DIAGNOSIS — R55 SYNCOPE AND COLLAPSE: ICD-10-CM

## 2020-03-11 DIAGNOSIS — M54.50 CHRONIC BILATERAL LOW BACK PAIN WITHOUT SCIATICA: ICD-10-CM

## 2020-03-11 DIAGNOSIS — G89.29 CHRONIC BILATERAL LOW BACK PAIN WITHOUT SCIATICA: ICD-10-CM

## 2020-03-12 DIAGNOSIS — M54.50 CHRONIC BILATERAL LOW BACK PAIN WITHOUT SCIATICA: ICD-10-CM

## 2020-03-12 DIAGNOSIS — G89.29 CHRONIC BILATERAL LOW BACK PAIN WITHOUT SCIATICA: ICD-10-CM

## 2020-03-13 ENCOUNTER — CLINICAL SUPPORT (OUTPATIENT)
Dept: FAMILY MEDICINE CLINIC | Facility: CLINIC | Age: 65
End: 2020-03-13

## 2020-03-13 ENCOUNTER — TELEPHONE (OUTPATIENT)
Dept: CARDIOLOGY | Facility: CLINIC | Age: 65
End: 2020-03-13

## 2020-03-13 DIAGNOSIS — Z79.891 LONG-TERM CURRENT USE OF OPIATE ANALGESIC: ICD-10-CM

## 2020-03-13 PROCEDURE — UDS: Performed by: PHYSICIAN ASSISTANT

## 2020-03-13 RX ORDER — HYDROCODONE BITARTRATE AND ACETAMINOPHEN 10; 325 MG/1; MG/1
1 TABLET ORAL 4 TIMES DAILY
Qty: 120 TABLET | Refills: 0 | OUTPATIENT
Start: 2020-03-13

## 2020-03-13 RX ORDER — HYDROCODONE BITARTRATE AND ACETAMINOPHEN 10; 325 MG/1; MG/1
1 TABLET ORAL 4 TIMES DAILY
Qty: 120 TABLET | Refills: 0 | Status: SHIPPED | OUTPATIENT
Start: 2020-03-13 | End: 2020-04-08 | Stop reason: SDUPTHER

## 2020-03-13 NOTE — TELEPHONE ENCOUNTER
Kathryn Valencia NP called, need clarification, can patient drive from cardiovascular stand point?

## 2020-03-22 DIAGNOSIS — G89.29 CHRONIC BILATERAL LOW BACK PAIN WITHOUT SCIATICA: Primary | ICD-10-CM

## 2020-03-22 DIAGNOSIS — M54.50 CHRONIC BILATERAL LOW BACK PAIN WITHOUT SCIATICA: Primary | ICD-10-CM

## 2020-03-23 ENCOUNTER — CLINICAL SUPPORT NO REQUIREMENTS (OUTPATIENT)
Dept: CARDIOLOGY | Facility: CLINIC | Age: 65
End: 2020-03-23

## 2020-03-23 DIAGNOSIS — Z95.818 STATUS POST PLACEMENT OF IMPLANTABLE LOOP RECORDER: ICD-10-CM

## 2020-03-23 DIAGNOSIS — R55 SYNCOPE AND COLLAPSE: Primary | ICD-10-CM

## 2020-03-23 RX ORDER — PREGABALIN 100 MG/1
100 CAPSULE ORAL 2 TIMES DAILY
Qty: 60 CAPSULE | Refills: 2 | OUTPATIENT
Start: 2020-03-23

## 2020-03-23 RX ORDER — PREGABALIN 100 MG/1
CAPSULE ORAL
Qty: 180 CAPSULE | Refills: 1 | Status: SHIPPED | OUTPATIENT
Start: 2020-03-23 | End: 2020-09-22

## 2020-03-26 ENCOUNTER — CLINICAL SUPPORT NO REQUIREMENTS (OUTPATIENT)
Dept: CARDIOLOGY | Facility: CLINIC | Age: 65
End: 2020-03-26

## 2020-03-26 DIAGNOSIS — Z95.818 STATUS POST PLACEMENT OF IMPLANTABLE LOOP RECORDER: ICD-10-CM

## 2020-03-26 DIAGNOSIS — R55 SYNCOPE AND COLLAPSE: Primary | ICD-10-CM

## 2020-03-26 PROCEDURE — 93298 REM INTERROG DEV EVAL SCRMS: CPT | Performed by: INTERNAL MEDICINE

## 2020-04-02 ENCOUNTER — CLINICAL SUPPORT NO REQUIREMENTS (OUTPATIENT)
Dept: CARDIOLOGY | Facility: CLINIC | Age: 65
End: 2020-04-02

## 2020-04-02 DIAGNOSIS — R55 SYNCOPE AND COLLAPSE: Primary | ICD-10-CM

## 2020-04-02 DIAGNOSIS — Z95.818 STATUS POST PLACEMENT OF IMPLANTABLE LOOP RECORDER: ICD-10-CM

## 2020-04-02 RX ORDER — IPRATROPIUM BROMIDE AND ALBUTEROL SULFATE 2.5; .5 MG/3ML; MG/3ML
3 SOLUTION RESPIRATORY (INHALATION) EVERY 4 HOURS PRN
Qty: 360 ML | Refills: 5 | Status: SHIPPED | OUTPATIENT
Start: 2020-04-02 | End: 2020-07-30 | Stop reason: SDUPTHER

## 2020-04-06 ENCOUNTER — CLINICAL SUPPORT NO REQUIREMENTS (OUTPATIENT)
Dept: CARDIOLOGY | Facility: CLINIC | Age: 65
End: 2020-04-06

## 2020-04-06 DIAGNOSIS — Z95.818 STATUS POST PLACEMENT OF IMPLANTABLE LOOP RECORDER: Primary | ICD-10-CM

## 2020-04-06 DIAGNOSIS — R55 SYNCOPE AND COLLAPSE: ICD-10-CM

## 2020-04-08 DIAGNOSIS — G89.29 CHRONIC BILATERAL LOW BACK PAIN WITHOUT SCIATICA: ICD-10-CM

## 2020-04-08 DIAGNOSIS — M54.50 CHRONIC BILATERAL LOW BACK PAIN WITHOUT SCIATICA: ICD-10-CM

## 2020-04-09 PROBLEM — G47.419 NARCOLEPSY: Status: ACTIVE | Noted: 2020-04-09

## 2020-04-09 RX ORDER — HYDROCODONE BITARTRATE AND ACETAMINOPHEN 10; 325 MG/1; MG/1
1 TABLET ORAL 4 TIMES DAILY
Qty: 120 TABLET | Refills: 0 | Status: SHIPPED | OUTPATIENT
Start: 2020-04-09 | End: 2020-05-06 | Stop reason: SDUPTHER

## 2020-04-22 ENCOUNTER — APPOINTMENT (OUTPATIENT)
Dept: LAB | Facility: HOSPITAL | Age: 65
End: 2020-04-22

## 2020-04-22 ENCOUNTER — TELEMEDICINE (OUTPATIENT)
Dept: ONCOLOGY | Facility: CLINIC | Age: 65
End: 2020-04-22

## 2020-04-22 DIAGNOSIS — D72.820 MONOCLONAL B-CELL LYMPHOCYTOSIS: Primary | ICD-10-CM

## 2020-04-22 PROCEDURE — 99213 OFFICE O/P EST LOW 20 MIN: CPT | Performed by: INTERNAL MEDICINE

## 2020-04-22 NOTE — PROGRESS NOTES
Hematology/Oncology Outpatient Follow Up    Neptali Osuna  1955    Primary Care Physician: Kathryn Valencia PA   Referring Physician: Kathryn Valencia PA  Chief Complaint:   Monoclonal B-cell lymphocytosis diagnosed in December 2019  History of Present Illness:     · Mr. osuna reports that an episode of fall loss of consciousness happened, and that he drove for about 45 minutes without knowing and ended up in his neighbor's backyard which happened in October 2019.  Ever since then he had had a few more spells of passing out.    The passing out spells happen for a few seconds.  Patient has obstructive sleep apnea and uses CPAP machine.  No seizure episodes.  He underwent video examination for 3 days had one episode during the evaluation.  · Patient was sent to the cancer [facility] center for further evaluation given his abnormal CBC that happened with leukocytosis and monocytosis.  · 12/30/2019 peripheral blood flow cytometry -A small monoclonal B-cell population with a CLL phenotype is present. If the patient has a prior diagnosis of CLL, then the findings may represent persistent involvement. If the patient is not known to have CLL or SLL, further investigation is recommended if clinically indicated. In the absence of CLL or SLL, small (<5000/uL) clonal B-cell populations in the blood are classified as monoclonal B-cell lymphocytosis. Clinical correlation is required.  · 1/3/2020 CT scan of the soft tissue of the neck chest abdomen and pelvis were obtained-CT soft tissue neck shows some soft tissue prominence in the nasopharynx which could relate to slightly prominent adenoid tissue.  Vascular calcification is noted in the carotid bifurcations.  DJD C-spine.  Chest abdomen and pelvis show some areas of atelectasis or scarring involving the lungs.  Bilateral renal cysts.  Prostate appears enlarged.  Colonic diverticulosis.  DJD spine.  Cholelithiasis.  No obvious pathologically enlarged lymphadenopathy.    Past  Medical History:   Diagnosis Date   • Acute diverticulitis     with perforation   • Allergic    • Anemia    • Anxiety    • Back pain    • CAD S/P percutaneous coronary angioplasty    • COPD (chronic obstructive pulmonary disease) (CMS/HCC)    • Depression    • Diverticulitis    • GERD (gastroesophageal reflux disease)    • Hyperlipidemia    • Hypertension    • Low back pain    • Myocardial infarction (CMS/HCC)    • TEIXEIRA (nonalcoholic steatohepatitis)    • Obesity    • BRITTNEY on CPAP    • Sleep apnea    • Sleep disorder, shift work    • Spinal stenosis of lumbar region at multiple levels        Past Surgical History:   Procedure Laterality Date   • CARDIAC CATHETERIZATION  11/05/2018    Dr. Sher   • COLONOSCOPY  2016   • CORONARY STENT PLACEMENT     • LIVER BIOPSY     • TONSILLECTOMY AND ADENOIDECTOMY           Current Outpatient Medications:   •  amLODIPine (NORVASC) 10 MG tablet, TAKE 1 TABLET DAILY, Disp: 90 tablet, Rfl: 1  •  aspirin (ASPIR-LOW) 81 MG EC tablet, Take 81 mg by mouth Daily., Disp: , Rfl:   •  atorvastatin (LIPITOR) 40 MG tablet, Take 40 mg by mouth Daily., Disp: , Rfl:   •  clopidogrel (PLAVIX) 75 MG tablet, TAKE 1 TABLET BY MOUTH DAILY, Disp: 90 tablet, Rfl: 1  •  dicyclomine (BENTYL) 10 MG capsule, TAKE 1 CAPSULE BY MOUTH FOUR TIMES DAILY BEFORE MEALS AND AT BEDTIME, Disp: 120 capsule, Rfl: 5  •  DULoxetine (CYMBALTA) 60 MG capsule, TAKE 1 CAPSULE BY MOUTH EVERY DAY, Disp: 90 capsule, Rfl: 1  •  HYDROcodone-acetaminophen (NORCO)  MG per tablet, Take 1 tablet by mouth 4 (Four) Times a Day., Disp: 120 tablet, Rfl: 0  •  ipratropium-albuterol (DUO-NEB) 0.5-2.5 mg/3 ml nebulizer, Take 3 mL by nebulization Every 4 (Four) Hours As Needed for Wheezing., Disp: 360 mL, Rfl: 5  •  lisinopril (PRINIVIL,ZESTRIL) 30 MG tablet, TAKE 1 TABLET BY MOUTH DAILY, Disp: 90 tablet, Rfl: 1  •  loratadine (CLARITIN) 10 MG tablet, TAKE 1 TABLET BY MOUTH DAILY, Disp: 90 tablet, Rfl: 1  •  modafinil (PROVIGIL) 200 MG  tablet, Take 200 mg by mouth Daily As Needed., Disp: , Rfl:   •  omeprazole (priLOSEC) 40 MG capsule, Take 40 mg by mouth Daily., Disp: , Rfl:   •  pregabalin (LYRICA) 100 MG capsule, TAKE 1 CAPSULE BY MOUTH TWICE DAILY, Disp: 180 capsule, Rfl: 1  •  TRELEGY ELLIPTA 100-62.5-25 MCG/INH aerosol powder , INHALE 1 PUFF ONCE DAILY, Disp: 1 each, Rfl: 5  •  Vortioxetine HBr (TRINTELLIX) 10 MG tablet, Take 10 mg by mouth Daily., Disp: 30 tablet, Rfl:     No Known Allergies    Family History   Problem Relation Age of Onset   • Colon cancer Mother    • Colon cancer Father    • Heart disease Father    • Diabetes Brother    • Diabetes Maternal Grandmother    • Diabetes Maternal Grandfather        Cancer-related family history includes Colon cancer in his father and mother.    Social History     Tobacco Use   • Smoking status: Former Smoker     Years: 40.00     Types: Cigarettes     Last attempt to quit:      Years since quittin.3   • Smokeless tobacco: Never Used   Substance Use Topics   • Alcohol use: No     Frequency: Never   • Drug use: No       I have reviewed the history of present illness, past medical history, family history, social history, lab results, all notes and other records since the patient was last seen on   2020  SUBJECTIVE:      Patient on the phone for tele-visit.  Denies fevers night sweats or weight loss.    ROS:      Review of Systems   Constitutional: Negative for fever.   HENT: Negative for nosebleeds and trouble swallowing.    Eyes: Negative for visual disturbance.   Respiratory: Negative for cough, shortness of breath and wheezing.    Cardiovascular: Negative for chest pain.   Gastrointestinal: Negative for abdominal pain and blood in stool.   Endocrine: Negative for cold intolerance.   Genitourinary: Negative for dysuria and hematuria.   Musculoskeletal: Negative for joint swelling.   Skin: Negative for rash.   Allergic/Immunologic: Negative for environmental allergies.    Neurological: Negative for seizures.   Hematological: Does not bruise/bleed easily.   Psychiatric/Behavioral: The patient is not nervous/anxious.          Objective:       There were no vitals filed for this visit.      PHYSICAL EXAM:      Physical Exam   Well-built well-nourished  Awake alert oriented x3  No psychological problems  Hearing good  RECENT LABS:     WBC   Date Value Ref Range Status   01/17/2020 10.84 (H) 3.40 - 10.80 10*3/mm3 Final     RBC   Date Value Ref Range Status   01/17/2020 4.76 4.14 - 5.80 10*6/mm3 Final     Hemoglobin   Date Value Ref Range Status   01/17/2020 13.5 13.0 - 17.7 g/dL Final     Hematocrit   Date Value Ref Range Status   01/17/2020 42.0 37.5 - 51.0 % Final     MCV   Date Value Ref Range Status   01/17/2020 88.2 79.0 - 97.0 fL Final     MCH   Date Value Ref Range Status   01/17/2020 28.4 26.6 - 33.0 pg Final     MCHC   Date Value Ref Range Status   01/17/2020 32.1 31.5 - 35.7 g/dL Final     RDW   Date Value Ref Range Status   01/17/2020 14.9 12.3 - 15.4 % Final     RDW-SD   Date Value Ref Range Status   01/17/2020 47.1 37.0 - 54.0 fl Final     MPV   Date Value Ref Range Status   01/17/2020 8.9 6.0 - 12.0 fL Final     Platelets   Date Value Ref Range Status   01/17/2020 410 140 - 450 10*3/mm3 Final     Neutrophil %   Date Value Ref Range Status   01/17/2020 74.3 42.7 - 76.0 % Final     Lymphocyte %   Date Value Ref Range Status   01/17/2020 14.4 (L) 19.6 - 45.3 % Final     Monocyte %   Date Value Ref Range Status   01/17/2020 8.1 5.0 - 12.0 % Final     Eosinophil %   Date Value Ref Range Status   01/17/2020 2.9 0.3 - 6.2 % Final     Basophil %   Date Value Ref Range Status   01/17/2020 0.3 0.0 - 1.5 % Final     Immature Grans %   Date Value Ref Range Status   11/09/2019 0.5 0.0 - 0.5 % Final     Neutrophils, Absolute   Date Value Ref Range Status   01/17/2020 8.06 (H) 1.70 - 7.00 10*3/mm3 Final     Lymphocytes, Absolute   Date Value Ref Range Status   01/17/2020 1.56 0.70 - 3.10  10*3/mm3 Final     Monocytes, Absolute   Date Value Ref Range Status   01/17/2020 0.88 0.10 - 0.90 10*3/mm3 Final     Eosinophils, Absolute   Date Value Ref Range Status   01/17/2020 0.31 0.00 - 0.40 10*3/mm3 Final     Basophils, Absolute   Date Value Ref Range Status   01/17/2020 0.03 0.00 - 0.20 10*3/mm3 Final     Immature Grans, Absolute   Date Value Ref Range Status   11/09/2019 0.06 (H) 0.00 - 0.05 10*3/mm3 Final     nRBC   Date Value Ref Range Status   11/09/2019 0.0 0.0 - 0.2 /100 WBC Final       Lab Results   Component Value Date    GLUCOSE 150 (H) 01/17/2020    BUN 10 01/17/2020    CREATININE 0.78 01/17/2020    EGFRIFNONA 101 01/17/2020    BCR 12.8 01/17/2020    K 4.3 01/17/2020    CO2 28.0 01/17/2020    CALCIUM 9.9 01/17/2020    ALBUMIN 4.20 01/17/2020    LABIL2 1.5 05/20/2019    AST 17 01/17/2020    ALT 15 01/17/2020         Assessment/Plan      ASSESSMENT:     1. Monoclonal B-cell lymphocytosis  2. Passing out spells  3. Low blood pressure  4. Obstructive sleep apnea  5. ECOG 1    PLAN:      1. Flow cytometry is consistent with monoclonal B-cell lymphocytosis.    No evidence of lymphadenopathy.    2. Patient is to report to us if he develops any B symptoms.    3. No further passing out spells.  Patient to continue to use CPAP machine.  Blood pressure is controlled well now   4. I will see him back in my office in 4months and check CBC, CMP and beta-2 microglobulin.    I have reviewed labs results, imaging, vitals, and medications with the patient today.   Patient verbalized understanding and is in agreement of the above plan.  Spent 10 minutes on the video call with the patient discussing plan and coordination of care  Electronically signed by Ester Florez MD, 04/22/20, 3:10 PM.  Addendum  Electronically signed by Ester Florez MD, 04/29/20, 2:06 PM.             This report was compiled using Dragon voice recognition software. I have made every effort to proof read this document; however,  typographical errors may persist.

## 2020-04-23 ENCOUNTER — CLINICAL SUPPORT NO REQUIREMENTS (OUTPATIENT)
Dept: CARDIOLOGY | Facility: CLINIC | Age: 65
End: 2020-04-23

## 2020-04-23 DIAGNOSIS — R55 SYNCOPE AND COLLAPSE: Primary | ICD-10-CM

## 2020-04-23 DIAGNOSIS — Z95.818 STATUS POST PLACEMENT OF IMPLANTABLE LOOP RECORDER: ICD-10-CM

## 2020-04-27 ENCOUNTER — CLINICAL SUPPORT NO REQUIREMENTS (OUTPATIENT)
Dept: CARDIOLOGY | Facility: CLINIC | Age: 65
End: 2020-04-27

## 2020-04-27 DIAGNOSIS — Z95.818 STATUS POST PLACEMENT OF IMPLANTABLE LOOP RECORDER: ICD-10-CM

## 2020-04-27 DIAGNOSIS — R55 SYNCOPE AND COLLAPSE: Primary | ICD-10-CM

## 2020-04-28 PROCEDURE — 93298 REM INTERROG DEV EVAL SCRMS: CPT | Performed by: INTERNAL MEDICINE

## 2020-04-30 ENCOUNTER — TELEPHONE (OUTPATIENT)
Dept: CARDIOLOGY | Facility: CLINIC | Age: 65
End: 2020-04-30

## 2020-04-30 ENCOUNTER — TELEMEDICINE (OUTPATIENT)
Dept: CARDIOLOGY | Facility: CLINIC | Age: 65
End: 2020-04-30

## 2020-04-30 VITALS — BODY MASS INDEX: 35.61 KG/M2 | WEIGHT: 235 LBS | HEIGHT: 68 IN

## 2020-04-30 DIAGNOSIS — Z95.820 STATUS POST ANGIOPLASTY WITH STENT: ICD-10-CM

## 2020-04-30 DIAGNOSIS — R55 SYNCOPE AND COLLAPSE: Primary | ICD-10-CM

## 2020-04-30 DIAGNOSIS — I10 ESSENTIAL HYPERTENSION: ICD-10-CM

## 2020-04-30 DIAGNOSIS — Z95.818 STATUS POST PLACEMENT OF IMPLANTABLE LOOP RECORDER: ICD-10-CM

## 2020-04-30 DIAGNOSIS — E78.5 DYSLIPIDEMIA: ICD-10-CM

## 2020-04-30 DIAGNOSIS — Z95.5 STATUS POST CORONARY ARTERY STENT PLACEMENT: ICD-10-CM

## 2020-04-30 PROCEDURE — 99213 OFFICE O/P EST LOW 20 MIN: CPT | Performed by: INTERNAL MEDICINE

## 2020-04-30 NOTE — PROGRESS NOTES
You have chosen to receive care through a video visit. Do you consent to use a video visit for your medical care today? Yes      Encounter Date:04/30/2020  Last seen 2/6/2020      Patient ID: Neptali Osuna is a 64 y.o. male.    Chief Complaint:  Status post stent  History of syncope  Status post loop recorder  Hypertension  Dyslipidemia      History of Present Illness    Since I have last seen, the patient has been without any chest discomfort ,shortness of breath, palpitations, dizziness or syncope.  Denies having any headache ,abdominal pain ,nausea, vomiting , diarrhea constipation, loss of weight or loss of appetite.  Denies having any excessive bruising ,hematuria or blood in the stool.    Review of all systems negative except as indicated  Assessment and Plan       ]]]]]]]]]]]]]]]]]]]   impression  =============  - Recent recurrent syncopal episodes concerning for bradyarrhythmias.  Recent loop recorder monitor showed second-degree AV block type I and type II.     -Status post [employer] loop recorder 11/15/2019 (Lin[employer])  Negative tilt table test 12/20/2019      -Status post stent to proximal LAD 11/05/2018     Cardiac catheterization 11/05/2018 revealed  Left ventricular size and contractility is normal with ejection fraction of 65%.  Left main coronary artery is normal.  Left anterior descending artery has proximal calcific plaquing with 95-99% proximal LAD disease.  Circumflex coronary artery is normal.  Right coronary artery is a large and dominant vessel that has mid segment 50% disease.      -Chest pain suggestive of unstable angina pectoris. prior to stent placement   CT scan of the chest showed significant coronary artery calcifications(prior to stent placement).  EKG is normal  Troponin levels are negative.      -COPD dyslipidemia hypertension      -Former smoker      -Strong family history of coronary artery disease      -No known allergies  =========  Plan  ===========   patient is not having any angina  pectoris or congestive heart failure  Patient did not have any symptoms of dizziness or syncope since last visit.  Recent tilt table test was negative.  The symptoms are usually preceded by dizziness.  Concerning for bradycardia arrhythmias.  Patient had a 72 hr EEG during which patient had a syncopal episode.    EEG was normal.  Loop recorder monitoring showed second-degree Mobitz type I and type II (shorter duration).  Recent Holter monitor (48-hour Holter monitor) did not reveal any arrhythmias although patient did not have any symptoms while he was wearing Holter monitor.  Medications were reviewed and updated.  Continue Plavix and other medications.  Consideration would be given for discontinuation of Plavix since it has been more than a year since he had stent placement.  I had a lengthy discussion with patient and patient's wife.  Discussion was carried out in the past regarding permanent dual-chamber pacemaker implantation.  However they would like to wait and observe at this time.  No need for pacemaker at this time.  Follow-up in the office in 6 months.  Further plan will depend on patient's progress  ]]]]]]]]]]]]]]]            Diagnosis Plan   1. Syncope and collapse     2. Status post angioplasty with stent     3. Status post placement of implantable loop recorder     4. Essential hypertension     5. Dyslipidemia     6. Status post coronary artery stent placement     LAB RESULTS (LAST 7 DAYS)    CBC        BMP        CMP         BNP        TROPONIN        CoAg        Creatinine Clearance  CrCl cannot be calculated (Patient's most recent lab result is older than the maximum 30 days allowed.).    ABG        Radiology  No radiology results for the last day                The following portions of the patient's history were reviewed and updated as appropriate: allergies, current medications, past family history, past medical history, past social history, past surgical history and problem list.    Review of  Systems   Cardiovascular: Negative for chest pain, leg swelling and palpitations.   Respiratory: Negative for shortness of breath.    Neurological: Negative for dizziness and numbness.         Current Outpatient Medications:   •  amLODIPine (NORVASC) 10 MG tablet, TAKE 1 TABLET DAILY, Disp: 90 tablet, Rfl: 1  •  aspirin (ASPIR-LOW) 81 MG EC tablet, Take 81 mg by mouth Daily., Disp: , Rfl:   •  atorvastatin (LIPITOR) 40 MG tablet, Take 40 mg by mouth Daily., Disp: , Rfl:   •  clopidogrel (PLAVIX) 75 MG tablet, TAKE 1 TABLET BY MOUTH DAILY, Disp: 90 tablet, Rfl: 1  •  dicyclomine (BENTYL) 10 MG capsule, TAKE 1 CAPSULE BY MOUTH FOUR TIMES DAILY BEFORE MEALS AND AT BEDTIME, Disp: 120 capsule, Rfl: 5  •  DULoxetine (CYMBALTA) 60 MG capsule, TAKE 1 CAPSULE BY MOUTH EVERY DAY, Disp: 90 capsule, Rfl: 1  •  HYDROcodone-acetaminophen (NORCO)  MG per tablet, Take 1 tablet by mouth 4 (Four) Times a Day., Disp: 120 tablet, Rfl: 0  •  ipratropium-albuterol (DUO-NEB) 0.5-2.5 mg/3 ml nebulizer, Take 3 mL by nebulization Every 4 (Four) Hours As Needed for Wheezing., Disp: 360 mL, Rfl: 5  •  lisinopril (PRINIVIL,ZESTRIL) 30 MG tablet, TAKE 1 TABLET BY MOUTH DAILY, Disp: 90 tablet, Rfl: 1  •  loratadine (CLARITIN) 10 MG tablet, TAKE 1 TABLET BY MOUTH DAILY, Disp: 90 tablet, Rfl: 1  •  modafinil (PROVIGIL) 200 MG tablet, Take 200 mg by mouth Daily As Needed., Disp: , Rfl:   •  omeprazole (priLOSEC) 40 MG capsule, Take 40 mg by mouth Daily., Disp: , Rfl:   •  pregabalin (LYRICA) 100 MG capsule, TAKE 1 CAPSULE BY MOUTH TWICE DAILY, Disp: 180 capsule, Rfl: 1  •  TRELEGY ELLIPTA 100-62.5-25 MCG/INH aerosol powder , INHALE 1 PUFF ONCE DAILY, Disp: 1 each, Rfl: 5  •  Vortioxetine HBr (TRINTELLIX) 10 MG tablet, Take 10 mg by mouth Daily., Disp: 30 tablet, Rfl:     No Known Allergies    Family History   Problem Relation Age of Onset   • Colon cancer Mother    • Heart disease Mother    • Colon cancer Father    • Heart disease Father    •  Heart attack Father    • Diabetes Brother    • Diabetes Maternal Grandmother    • Diabetes Maternal Grandfather        Past Surgical History:   Procedure Laterality Date   • CARDIAC CATHETERIZATION  11/05/2018    Dr. Sher   • COLONOSCOPY  2016   • CORONARY STENT PLACEMENT     • LIVER BIOPSY     • TONSILLECTOMY AND ADENOIDECTOMY         Past Medical History:   Diagnosis Date   • Acute diverticulitis     with perforation   • Allergic    • Anemia    • Anxiety    • Back pain    • CAD S/P percutaneous coronary angioplasty    • COPD (chronic obstructive pulmonary disease) (CMS/HCC)    • Depression    • Diverticulitis    • GERD (gastroesophageal reflux disease)    • Hyperlipidemia    • Hypertension    • Low back pain    • Myocardial infarction (CMS/HCC)    • TEIXEIRA (nonalcoholic steatohepatitis)    • Obesity    • BRITTNEY on CPAP    • Sleep apnea    • Sleep disorder, shift work    • Spinal stenosis of lumbar region at multiple levels        Family History   Problem Relation Age of Onset   • Colon cancer Mother    • Heart disease Mother    • Colon cancer Father    • Heart disease Father    • Heart attack Father    • Diabetes Brother    • Diabetes Maternal Grandmother    • Diabetes Maternal Grandfather        Social History     Socioeconomic History   • Marital status:      Spouse name: Not on file   • Number of children: Not on file   • Years of education: Not on file   • Highest education level: Not on file   Tobacco Use   • Smoking status: Former Smoker     Packs/day: 1.50     Years: 40.00     Pack years: 60.00     Types: Cigarettes     Start date: 9/1/1969     Last attempt to quit: 2009     Years since quitting: 10.6   • Smokeless tobacco: Never Used   Substance and Sexual Activity   • Alcohol use: Not Currently     Alcohol/week: 1.0 standard drinks     Types: 1 Glasses of wine per week     Frequency: Never     Comment: No alcohol the past 10 years.   • Drug use: No   • Sexual activity: Not Currently     Partners:  "Female     Birth control/protection: None         Procedures      Objective:       Physical Exam    Ht 172.7 cm (68\")   Wt 107 kg (235 lb)   BMI 35.73 kg/m²   The patient is alert, oriented and in no distress.    Vital signs as noted above.    Speech is normal.    No visible jugular venous distention or thyromegaly.    No visible or audible shortness of breath.    Skin-dry-no sweating    CNS grossly normal.        "

## 2020-05-06 DIAGNOSIS — M54.50 CHRONIC BILATERAL LOW BACK PAIN WITHOUT SCIATICA: ICD-10-CM

## 2020-05-06 DIAGNOSIS — G89.29 CHRONIC BILATERAL LOW BACK PAIN WITHOUT SCIATICA: ICD-10-CM

## 2020-05-07 ENCOUNTER — CLINICAL SUPPORT NO REQUIREMENTS (OUTPATIENT)
Dept: CARDIOLOGY | Facility: CLINIC | Age: 65
End: 2020-05-07

## 2020-05-07 DIAGNOSIS — G47.419 PRIMARY NARCOLEPSY WITHOUT CATAPLEXY: Primary | ICD-10-CM

## 2020-05-07 DIAGNOSIS — R55 SYNCOPE AND COLLAPSE: Primary | ICD-10-CM

## 2020-05-07 RX ORDER — HYDROCODONE BITARTRATE AND ACETAMINOPHEN 10; 325 MG/1; MG/1
1 TABLET ORAL 4 TIMES DAILY
Qty: 120 TABLET | Refills: 0 | OUTPATIENT
Start: 2020-05-07

## 2020-05-07 RX ORDER — HYDROCODONE BITARTRATE AND ACETAMINOPHEN 10; 325 MG/1; MG/1
1 TABLET ORAL 4 TIMES DAILY
Qty: 120 TABLET | Refills: 0 | Status: SHIPPED | OUTPATIENT
Start: 2020-05-07 | End: 2020-06-04 | Stop reason: SDUPTHER

## 2020-05-07 RX ORDER — MODAFINIL 200 MG/1
200 TABLET ORAL DAILY
Qty: 30 TABLET | Refills: 2 | Status: SHIPPED | OUTPATIENT
Start: 2020-05-07 | End: 2020-07-27

## 2020-05-15 RX ORDER — CLOPIDOGREL BISULFATE 75 MG/1
TABLET ORAL
Qty: 90 TABLET | Refills: 1 | Status: SHIPPED | OUTPATIENT
Start: 2020-05-15 | End: 2020-11-10

## 2020-05-18 ENCOUNTER — CLINICAL SUPPORT NO REQUIREMENTS (OUTPATIENT)
Dept: CARDIOLOGY | Facility: CLINIC | Age: 65
End: 2020-05-18

## 2020-05-18 DIAGNOSIS — Z95.818 STATUS POST PLACEMENT OF IMPLANTABLE LOOP RECORDER: ICD-10-CM

## 2020-05-18 DIAGNOSIS — R55 SYNCOPE AND COLLAPSE: Primary | ICD-10-CM

## 2020-05-28 ENCOUNTER — CLINICAL SUPPORT NO REQUIREMENTS (OUTPATIENT)
Dept: CARDIOLOGY | Facility: CLINIC | Age: 65
End: 2020-05-28

## 2020-05-28 DIAGNOSIS — R55 SYNCOPE AND COLLAPSE: Primary | ICD-10-CM

## 2020-05-28 DIAGNOSIS — Z95.818 STATUS POST PLACEMENT OF IMPLANTABLE LOOP RECORDER: ICD-10-CM

## 2020-05-28 PROCEDURE — 93298 REM INTERROG DEV EVAL SCRMS: CPT | Performed by: INTERNAL MEDICINE

## 2020-06-04 DIAGNOSIS — G89.29 CHRONIC BILATERAL LOW BACK PAIN WITHOUT SCIATICA: ICD-10-CM

## 2020-06-04 DIAGNOSIS — M54.50 CHRONIC BILATERAL LOW BACK PAIN WITHOUT SCIATICA: ICD-10-CM

## 2020-06-04 RX ORDER — HYDROCODONE BITARTRATE AND ACETAMINOPHEN 10; 325 MG/1; MG/1
1 TABLET ORAL 4 TIMES DAILY
Qty: 120 TABLET | Refills: 0 | Status: SHIPPED | OUTPATIENT
Start: 2020-06-04 | End: 2020-07-01 | Stop reason: SDUPTHER

## 2020-06-12 ENCOUNTER — APPOINTMENT (OUTPATIENT)
Dept: RESPIRATORY THERAPY | Facility: HOSPITAL | Age: 65
End: 2020-06-12

## 2020-06-15 ENCOUNTER — CLINICAL SUPPORT NO REQUIREMENTS (OUTPATIENT)
Dept: CARDIOLOGY | Facility: CLINIC | Age: 65
End: 2020-06-15

## 2020-06-15 DIAGNOSIS — Z95.818 STATUS POST PLACEMENT OF IMPLANTABLE LOOP RECORDER: Primary | ICD-10-CM

## 2020-06-15 DIAGNOSIS — R55 SYNCOPE AND COLLAPSE: ICD-10-CM

## 2020-06-18 ENCOUNTER — OFFICE VISIT (OUTPATIENT)
Dept: FAMILY MEDICINE CLINIC | Facility: CLINIC | Age: 65
End: 2020-06-18

## 2020-06-18 VITALS
DIASTOLIC BLOOD PRESSURE: 80 MMHG | HEART RATE: 90 BPM | SYSTOLIC BLOOD PRESSURE: 138 MMHG | TEMPERATURE: 99.6 F | BODY MASS INDEX: 35.73 KG/M2 | OXYGEN SATURATION: 95 % | WEIGHT: 235 LBS

## 2020-06-18 DIAGNOSIS — L03.115 CELLULITIS OF RIGHT LEG: Primary | ICD-10-CM

## 2020-06-18 DIAGNOSIS — L30.9 DERMATITIS: ICD-10-CM

## 2020-06-18 PROCEDURE — 99213 OFFICE O/P EST LOW 20 MIN: CPT | Performed by: PHYSICIAN ASSISTANT

## 2020-06-18 RX ORDER — CEPHALEXIN 500 MG/1
500 CAPSULE ORAL 2 TIMES DAILY
Qty: 20 CAPSULE | Refills: 0 | Status: SHIPPED | OUTPATIENT
Start: 2020-06-18 | End: 2020-06-28

## 2020-06-18 NOTE — PROGRESS NOTES
Teresa Osuna is a 64 y.o. male     History of Present Illness  Patient is a 64-year-old white male here for  Swelling of the right lower extremity, anterior shin area for the past several months.  He states it is been getting worse.  It is mildly itchy and painful.  He denies trying medications on it other than lotion over-the-counter.    Patient is also requesting that his long-term disability forms be filled out.    The following portions of the patient's history were reviewed and updated as appropriate: allergies, current medications, past family history, past medical history, past social history, past surgical history and problem list.    Review of Systems   Constitutional: Negative for fever.   HENT: Negative for sore throat.    Respiratory: Negative for shortness of breath and wheezing.    Cardiovascular: Positive for leg swelling. Negative for chest pain.   Gastrointestinal: Negative for diarrhea, nausea and vomiting.   Skin: Positive for color change, rash and skin lesions.   Psychiatric/Behavioral: Negative for suicidal ideas and depressed mood.       Objective  Physical Exam   Skin:        Psychiatric: His mood appears anxious.       Vitals:    06/18/20 1017   BP: 138/80   BP Location: Left arm   Patient Position: Sitting   Cuff Size: Adult   Pulse: 90   Temp: 99.6 °F (37.6 °C)   TempSrc: Temporal   SpO2: 95%   Weight: 107 kg (235 lb)     Body mass index is 35.73 kg/m².    PHQ-9 Total Score:        Assessment/Plan  Diagnoses and all orders for this visit:    1. Cellulitis of right leg (Primary)  Comments:  Treating with Keflex due to suspicion for cellulitis at this point, steroid cream to the skin irritation area.    2. Dermatitis  Comments:  New, treating patient with steroid cream to the area, appears as initially as a dermatitis which has progressed into a cellulitis.    Other orders  -     cephalexin (Keflex) 500 MG capsule; Take 1 capsule by mouth 2 (Two) Times a Day for 10 days.   Dispense: 20 capsule; Refill: 0  -     triamcinolone (KENALOG) 0.1 % ointment; Apply  topically to the appropriate area as directed 2 (Two) Times a Day.  Dispense: 30 g; Refill: 1

## 2020-06-19 ENCOUNTER — TRANSCRIBE ORDERS (OUTPATIENT)
Dept: ADMINISTRATIVE | Facility: HOSPITAL | Age: 65
End: 2020-06-19

## 2020-06-19 DIAGNOSIS — Z02.71 DISABILITY EXAMINATION: Primary | ICD-10-CM

## 2020-06-24 ENCOUNTER — TELEPHONE (OUTPATIENT)
Dept: FAMILY MEDICINE CLINIC | Facility: CLINIC | Age: 65
End: 2020-06-24

## 2020-06-24 NOTE — TELEPHONE ENCOUNTER
Patient has SOB, fever and diarrhea patient has COPD and is requesting a COPD test but is not certain.

## 2020-06-25 NOTE — TELEPHONE ENCOUNTER
I called the patient back and left a voicemail that if his symptoms are severe he needs to be tested for coronavirus, to go to the urgent care center or the emergency room.

## 2020-06-29 ENCOUNTER — CLINICAL SUPPORT NO REQUIREMENTS (OUTPATIENT)
Dept: CARDIOLOGY | Facility: CLINIC | Age: 65
End: 2020-06-29

## 2020-06-29 DIAGNOSIS — Z95.818 STATUS POST PLACEMENT OF IMPLANTABLE LOOP RECORDER: Primary | ICD-10-CM

## 2020-06-29 DIAGNOSIS — R55 SYNCOPE AND COLLAPSE: ICD-10-CM

## 2020-06-29 PROCEDURE — 93298 REM INTERROG DEV EVAL SCRMS: CPT | Performed by: INTERNAL MEDICINE

## 2020-07-01 DIAGNOSIS — G89.29 CHRONIC BILATERAL LOW BACK PAIN WITHOUT SCIATICA: ICD-10-CM

## 2020-07-01 DIAGNOSIS — M54.50 CHRONIC BILATERAL LOW BACK PAIN WITHOUT SCIATICA: ICD-10-CM

## 2020-07-02 RX ORDER — HYDROCODONE BITARTRATE AND ACETAMINOPHEN 10; 325 MG/1; MG/1
1 TABLET ORAL 4 TIMES DAILY
Qty: 120 TABLET | Refills: 0 | Status: SHIPPED | OUTPATIENT
Start: 2020-07-02 | End: 2020-07-29 | Stop reason: SDUPTHER

## 2020-07-06 ENCOUNTER — TRANSCRIBE ORDERS (OUTPATIENT)
Dept: RESPIRATORY THERAPY | Facility: HOSPITAL | Age: 65
End: 2020-07-06

## 2020-07-06 ENCOUNTER — CLINICAL SUPPORT NO REQUIREMENTS (OUTPATIENT)
Dept: CARDIOLOGY | Facility: CLINIC | Age: 65
End: 2020-07-06

## 2020-07-06 DIAGNOSIS — R55 SYNCOPE AND COLLAPSE: ICD-10-CM

## 2020-07-06 DIAGNOSIS — Z95.818 STATUS POST PLACEMENT OF IMPLANTABLE LOOP RECORDER: Primary | ICD-10-CM

## 2020-07-06 DIAGNOSIS — Z02.71 ENCOUNTER FOR DISABILITY DETERMINATION: Primary | ICD-10-CM

## 2020-07-07 ENCOUNTER — LAB (OUTPATIENT)
Dept: LAB | Facility: HOSPITAL | Age: 65
End: 2020-07-07

## 2020-07-08 ENCOUNTER — TELEPHONE (OUTPATIENT)
Dept: ONCOLOGY | Facility: CLINIC | Age: 65
End: 2020-07-08

## 2020-07-15 ENCOUNTER — APPOINTMENT (OUTPATIENT)
Dept: RESPIRATORY THERAPY | Facility: HOSPITAL | Age: 65
End: 2020-07-15

## 2020-07-21 ENCOUNTER — CLINICAL SUPPORT NO REQUIREMENTS (OUTPATIENT)
Dept: CARDIOLOGY | Facility: CLINIC | Age: 65
End: 2020-07-21

## 2020-07-21 DIAGNOSIS — Z95.818 STATUS POST PLACEMENT OF IMPLANTABLE LOOP RECORDER: Primary | ICD-10-CM

## 2020-07-21 DIAGNOSIS — R55 SYNCOPE AND COLLAPSE: ICD-10-CM

## 2020-07-26 DIAGNOSIS — G47.419 PRIMARY NARCOLEPSY WITHOUT CATAPLEXY: ICD-10-CM

## 2020-07-27 DIAGNOSIS — G47.419 PRIMARY NARCOLEPSY WITHOUT CATAPLEXY: ICD-10-CM

## 2020-07-27 RX ORDER — MODAFINIL 200 MG/1
200 TABLET ORAL DAILY
Qty: 30 TABLET | Refills: 0 | Status: SHIPPED | OUTPATIENT
Start: 2020-07-27 | End: 2020-08-24

## 2020-07-28 ENCOUNTER — LAB (OUTPATIENT)
Dept: LAB | Facility: HOSPITAL | Age: 65
End: 2020-07-28

## 2020-07-28 DIAGNOSIS — Z01.818 PRE-OP TESTING: Primary | ICD-10-CM

## 2020-07-28 PROCEDURE — C9803 HOPD COVID-19 SPEC COLLECT: HCPCS

## 2020-07-28 PROCEDURE — U0004 COV-19 TEST NON-CDC HGH THRU: HCPCS

## 2020-07-28 PROCEDURE — U0002 COVID-19 LAB TEST NON-CDC: HCPCS

## 2020-07-28 RX ORDER — MODAFINIL 200 MG/1
200 TABLET ORAL DAILY
Qty: 30 TABLET | Refills: 2 | OUTPATIENT
Start: 2020-07-28

## 2020-07-29 DIAGNOSIS — M54.50 CHRONIC BILATERAL LOW BACK PAIN WITHOUT SCIATICA: ICD-10-CM

## 2020-07-29 DIAGNOSIS — G89.29 CHRONIC BILATERAL LOW BACK PAIN WITHOUT SCIATICA: ICD-10-CM

## 2020-07-29 LAB
REF LAB TEST METHOD: NORMAL
SARS-COV-2 RNA RESP QL NAA+PROBE: NOT DETECTED

## 2020-07-30 ENCOUNTER — CLINICAL SUPPORT NO REQUIREMENTS (OUTPATIENT)
Dept: CARDIOLOGY | Facility: CLINIC | Age: 65
End: 2020-07-30

## 2020-07-30 ENCOUNTER — HOSPITAL ENCOUNTER (OUTPATIENT)
Dept: RESPIRATORY THERAPY | Facility: HOSPITAL | Age: 65
Discharge: HOME | End: 2020-07-30

## 2020-07-30 DIAGNOSIS — M54.50 CHRONIC BILATERAL LOW BACK PAIN WITHOUT SCIATICA: ICD-10-CM

## 2020-07-30 DIAGNOSIS — Z95.818 STATUS POST PLACEMENT OF IMPLANTABLE LOOP RECORDER: ICD-10-CM

## 2020-07-30 DIAGNOSIS — G89.29 CHRONIC BILATERAL LOW BACK PAIN WITHOUT SCIATICA: ICD-10-CM

## 2020-07-30 DIAGNOSIS — R55 SYNCOPE AND COLLAPSE: Primary | ICD-10-CM

## 2020-07-30 PROCEDURE — 93298 REM INTERROG DEV EVAL SCRMS: CPT | Performed by: INTERNAL MEDICINE

## 2020-07-30 PROCEDURE — 94640 AIRWAY INHALATION TREATMENT: CPT

## 2020-07-30 PROCEDURE — 94060 EVALUATION OF WHEEZING: CPT

## 2020-07-30 RX ORDER — IPRATROPIUM BROMIDE AND ALBUTEROL SULFATE 2.5; .5 MG/3ML; MG/3ML
3 SOLUTION RESPIRATORY (INHALATION) EVERY 4 HOURS PRN
Qty: 360 ML | Refills: 5 | Status: SHIPPED | OUTPATIENT
Start: 2020-07-30

## 2020-07-30 RX ORDER — ALBUTEROL SULFATE 90 UG/1
2 AEROSOL, METERED RESPIRATORY (INHALATION)
Status: DISCONTINUED | OUTPATIENT
Start: 2020-07-30 | End: 2020-07-31 | Stop reason: HOSPADM

## 2020-07-30 RX ORDER — HYDROCODONE BITARTRATE AND ACETAMINOPHEN 10; 325 MG/1; MG/1
1 TABLET ORAL 4 TIMES DAILY
Qty: 120 TABLET | Refills: 0 | Status: SHIPPED | OUTPATIENT
Start: 2020-07-30 | End: 2020-08-26 | Stop reason: SDUPTHER

## 2020-07-30 RX ADMIN — ALBUTEROL SULFATE 2 PUFF: 108 AEROSOL, METERED RESPIRATORY (INHALATION) at 12:55

## 2020-07-31 RX ORDER — HYDROCODONE BITARTRATE AND ACETAMINOPHEN 10; 325 MG/1; MG/1
1 TABLET ORAL 4 TIMES DAILY
Qty: 120 TABLET | Refills: 0 | OUTPATIENT
Start: 2020-07-31

## 2020-08-03 ENCOUNTER — CLINICAL SUPPORT NO REQUIREMENTS (OUTPATIENT)
Dept: CARDIOLOGY | Facility: CLINIC | Age: 65
End: 2020-08-03

## 2020-08-03 DIAGNOSIS — R55 SYNCOPE AND COLLAPSE: Primary | ICD-10-CM

## 2020-08-03 DIAGNOSIS — Z95.818 STATUS POST PLACEMENT OF IMPLANTABLE LOOP RECORDER: ICD-10-CM

## 2020-08-06 ENCOUNTER — CLINICAL SUPPORT NO REQUIREMENTS (OUTPATIENT)
Dept: CARDIOLOGY | Facility: CLINIC | Age: 65
End: 2020-08-06

## 2020-08-06 DIAGNOSIS — R55 SYNCOPE AND COLLAPSE: Primary | ICD-10-CM

## 2020-08-06 DIAGNOSIS — Z95.818 STATUS POST PLACEMENT OF IMPLANTABLE LOOP RECORDER: ICD-10-CM

## 2020-08-10 ENCOUNTER — CLINICAL SUPPORT NO REQUIREMENTS (OUTPATIENT)
Dept: CARDIOLOGY | Facility: CLINIC | Age: 65
End: 2020-08-10

## 2020-08-10 DIAGNOSIS — R55 SYNCOPE AND COLLAPSE: ICD-10-CM

## 2020-08-10 DIAGNOSIS — Z95.818 STATUS POST PLACEMENT OF IMPLANTABLE LOOP RECORDER: Primary | ICD-10-CM

## 2020-08-11 ENCOUNTER — OFFICE VISIT (OUTPATIENT)
Dept: PULMONOLOGY | Facility: HOSPITAL | Age: 65
End: 2020-08-11

## 2020-08-11 VITALS
HEART RATE: 85 BPM | BODY MASS INDEX: 37.89 KG/M2 | RESPIRATION RATE: 18 BRPM | WEIGHT: 250 LBS | HEIGHT: 68 IN | DIASTOLIC BLOOD PRESSURE: 71 MMHG | SYSTOLIC BLOOD PRESSURE: 127 MMHG | OXYGEN SATURATION: 95 % | TEMPERATURE: 98.2 F

## 2020-08-11 DIAGNOSIS — G47.33 OSA (OBSTRUCTIVE SLEEP APNEA): ICD-10-CM

## 2020-08-11 DIAGNOSIS — J44.9 CHRONIC OBSTRUCTIVE PULMONARY DISEASE, UNSPECIFIED COPD TYPE (HCC): Primary | ICD-10-CM

## 2020-08-11 PROCEDURE — G0463 HOSPITAL OUTPT CLINIC VISIT: HCPCS

## 2020-08-11 NOTE — PROGRESS NOTES
Pulmonary/ Critical Care/ sleep medicine OUTPATIENT Consult/ Follow up Note        Patient Name:  Neptali Osuna    :  1955    Medical Record:  [id number]    Primary Care Physician     Kathryn Valencia PA    Reason for consultation    Neptali Osuna is a 65 y.o. male who is seen in the clinic today for follow-up on his COPD and obstructive sleep apnea.  Last office visit was 7 months ago.    Gets SOA with activity, better with resting and inhaler use. Occasional Cough. Wheezing off and on. No recent exacerbations or hospitalizations. Using Trelegy inhaler daily. Using albuterol nebulizer and inhaler off an on.  Not smoking. Quit smoking . Has some second hand smoke exposure from his wife's smoking.  Patient diagnosed with CLL 3/20. Currently being monitored without any therapy    Typical bedtime is 11pm, wake up time 4 am, using PAP with nasal pillows mask.  Denies any specific problems with the mask and the machine. Occasional nap. Wakes up a few times at night. [place] 12. Sleep is disrupted due to back pain issues.     Review of Systems    Constitutional:  Denies fever or chills   Eyes:  Denies change in visual acuity   HENT:  + nasal congestion no sore throat   Respiratory:  + wheezing+ shortness of breath   Cardiovascular:  no chest pain no edema   GI:  Denies abdominal pain, nausea, vomiting, bloody stools or diarrhea   :  Denies dysuria   Musculoskeletal:  Denies back pain or joint pain   Integument:  Denies rash   Neurologic:  Denies headache, focal weakness or sensory changes, + Dizziness  Endocrine:  Denies polyuria or polydipsia   Lymphatic:  Denies swollen glands   Psychiatric:  + depression + anxiety     Medical History    Past Medical History:   Diagnosis Date   • Acute diverticulitis     with perforation   • Allergic    • Anemia    • Anxiety    • Back pain    • CAD S/P percutaneous coronary angioplasty    • COPD (chronic obstructive pulmonary disease) (CMS/[facility])    • Depression    •  Diverticulitis    • GERD (gastroesophageal reflux disease)    • Hyperlipidemia    • Hypertension    • Low back pain    • Myocardial infarction (CMS/HCC)    • TEIXEIRA (nonalcoholic steatohepatitis)    • Obesity    • BRITTNEY on CPAP    • Sleep apnea    • Sleep disorder, shift work    • Spinal stenosis of lumbar region at multiple levels         Surgical History    Past Surgical History:   Procedure Laterality Date   • CARDIAC CATHETERIZATION  11/05/2018    Dr. Sher   • COLONOSCOPY  2016   • CORONARY STENT PLACEMENT     • LIVER BIOPSY     • TONSILLECTOMY AND ADENOIDECTOMY          Family History    Family History   Problem Relation Age of Onset   • Colon cancer Mother    • Heart disease Mother    • Colon cancer Father    • Heart disease Father    • Heart attack Father    • Diabetes Brother    • Diabetes Maternal Grandmother    • Diabetes Maternal Grandfather        Social History    Social History     Tobacco Use   • Smoking status: Former Smoker     Packs/day: 1.50     Years: 40.00     Pack years: 60.00     Types: Cigarettes     Start date: 9/1/1969     Last attempt to quit: 2009     Years since quitting: 10.9   • Smokeless tobacco: Never Used   Substance Use Topics   • Alcohol use: Not Currently     Alcohol/week: 1.0 standard drinks     Types: 1 Glasses of wine per week     Frequency: Never     Comment: No alcohol the past 10 years.        Allergies    No Known Allergies      Medications    Current Outpatient Medications on File Prior to Visit   Medication Sig Dispense Refill   • amLODIPine (NORVASC) 10 MG tablet TAKE 1 TABLET DAILY 90 tablet 1   • aspirin (ASPIR-LOW) 81 MG EC tablet Take 81 mg by mouth Daily.     • atorvastatin (LIPITOR) 40 MG tablet Take 40 mg by mouth Daily.     • clopidogrel (PLAVIX) 75 MG tablet TAKE 1 TABLET BY MOUTH DAILY 90 tablet 1   • dicyclomine (BENTYL) 10 MG capsule TAKE 1 CAPSULE BY MOUTH FOUR TIMES DAILY BEFORE MEALS AND AT BEDTIME 120 capsule 5   • DULoxetine (CYMBALTA) 60 MG capsule TAKE 1  "CAPSULE BY MOUTH EVERY DAY 90 capsule 1   • HYDROcodone-acetaminophen (NORCO)  MG per tablet Take 1 tablet by mouth 4 (Four) Times a Day. 120 tablet 0   • ipratropium-albuterol (DUO-NEB) 0.5-2.5 mg/3 ml nebulizer Take 3 mL by nebulization Every 4 (Four) Hours As Needed for Wheezing. 360 mL 5   • lisinopril (PRINIVIL,ZESTRIL) 30 MG tablet TAKE 1 TABLET BY MOUTH DAILY 90 tablet 1   • loratadine (CLARITIN) 10 MG tablet TAKE 1 TABLET BY MOUTH DAILY 90 tablet 1   • modafinil (PROVIGIL) 200 MG tablet TAKE 1 TABLET BY MOUTH DAILY 30 tablet 0   • omeprazole (priLOSEC) 40 MG capsule Take 40 mg by mouth Daily.     • pregabalin (LYRICA) 100 MG capsule TAKE 1 CAPSULE BY MOUTH TWICE DAILY 180 capsule 1   • TRELEGY ELLIPTA 100-62.5-25 MCG/INH aerosol powder  INHALE 1 PUFF BY MOUTH EVERY DAY 60 each 5   • triamcinolone (KENALOG) 0.1 % ointment Apply  topically to the appropriate area as directed 2 (Two) Times a Day. 30 g 1   • Vortioxetine HBr (TRINTELLIX) 10 MG tablet Take 10 mg by mouth Daily. 30 tablet      No current facility-administered medications on file prior to visit.        Physical Exam    Vitals:    08/11/20 1338   BP: 127/71   BP Location: Left arm   Patient Position: Sitting   Cuff Size: Adult   Pulse: 85   Resp: 18   Temp: 98.2 °F (36.8 °C)   TempSrc: Oral   SpO2: 95%   Weight: 113 kg (250 lb)   Height: 172.7 cm (68\")         Constitutional:  Well developed, well nourished, no acute distress, non-toxic appearance   Eyes:  PERRL, conjunctiva normal   HENT:  Atraumatic, external ears normal, nose normal, oropharynx moist, no pharyngeal exudates. [place] 3  Neck- normal range of motion, no tenderness, supple   Respiratory:  No respiratory distress, normal breath sounds, no rales, no wheezing   Cardiovascular:  Normal rate, normal rhythm, no murmurs, no gallops, no rubs   GI:  Soft, nondistended, normal bowel sounds, nontender, no organomegaly, no mass, no rebound, no guarding   :  No costovertebral angle " tenderness   Musculoskeletal: + edema, no tenderness, no deformities. Back- no tenderness  Integument:  Well hydrated, no rash   Lymphatic:  No lymphadenopathy noted   Neurologic:  Alert & oriented x 3, CN 2-12 normal, normal motor function, normal sensory function, no focal deficits noted   Psychiatric:  Speech and behavior appropriate       Assessment & PLAN:    There are no diagnoses linked to this encounter.  COPD  -LDCT chest 8/18: Several scattered left lower lobe ground-glass nodules with a tree-in-bud configuration suggesting an infectious or inflammatory process, largest measures up to 7 mm, recommend six-month low-dose CT follow-up. Advanced three-vessel coronary atherosclerotic disease. Cholelithiasis.  -CT chest 11/18: resolution of GG infiltrates   -PFTs 8/18: Nonspecific ventilatory defect with hyperinflation.  Diffusion capacity normal when corrected for alveolar volume.  FEV1 75%, %, %, DLCO 91%, DL/% predicted  -CXR 10/19: Limited study demonstrating mild linear subsegmental atelectasis and/or scarring in the lung bases.  -CT abdomen and pelvis 1/20: Areas of atelectasis and scarring involving the lungs, perinephric standing, cholelithiasis without cholecystitis, enlarged prostate, colonic diverticulosis and coronary artery calcification noted.  -PFTs 7/20: Spirometry is suggestive of restrictive defect.  No obvious obstructive physiology seen.  Negative bronchodilator response.  FEV1 75% predicted  -Remains on Trelegy and albuterol. Continue current therapy. PFts stable    Obstructive sleep apnea  CPAP compliance data sheet reviewed.  Total days with the machine use are 179 days without the machine use are 1.  Average use is 6 hours and 1 minutes.  Average AHI 2.8.  Days use more than 4 hours nightly: 91%. CPAP pressure 10 cm  -Patient with good compliance and good symptomatic improvement.    Abnormal CT scan of the chest  -CT chest 8/18 and 11/18 as above    CLL  -being F/U by  Dr Ochoa    Obesity  -Weight control recommended    F/U 6 months    This document has been electronically signed by  Joe Qiu MD  12:54 PM

## 2020-08-19 ENCOUNTER — LAB (OUTPATIENT)
Dept: LAB | Facility: HOSPITAL | Age: 65
End: 2020-08-19

## 2020-08-19 ENCOUNTER — OFFICE VISIT (OUTPATIENT)
Dept: ONCOLOGY | Facility: CLINIC | Age: 65
End: 2020-08-19

## 2020-08-19 VITALS
SYSTOLIC BLOOD PRESSURE: 137 MMHG | WEIGHT: 243.2 LBS | BODY MASS INDEX: 36.86 KG/M2 | HEART RATE: 84 BPM | TEMPERATURE: 98.2 F | HEIGHT: 68 IN | RESPIRATION RATE: 18 BRPM | DIASTOLIC BLOOD PRESSURE: 79 MMHG

## 2020-08-19 DIAGNOSIS — C91.10 CLL (CHRONIC LYMPHOCYTIC LEUKEMIA) (HCC): ICD-10-CM

## 2020-08-19 DIAGNOSIS — D72.820 MONOCLONAL B-CELL LYMPHOCYTOSIS: ICD-10-CM

## 2020-08-19 DIAGNOSIS — C91.10 CLL (CHRONIC LYMPHOCYTIC LEUKEMIA) (HCC): Primary | ICD-10-CM

## 2020-08-19 LAB
ALBUMIN SERPL-MCNC: 4.3 G/DL (ref 3.5–5.2)
ALBUMIN/GLOB SERPL: 2 G/DL
ALP SERPL-CCNC: 91 U/L (ref 39–117)
ALT SERPL W P-5'-P-CCNC: 17 U/L (ref 1–41)
ANION GAP SERPL CALCULATED.3IONS-SCNC: 12 MMOL/L (ref 5–15)
AST SERPL-CCNC: 13 U/L (ref 1–40)
B2 MICROGLOB SERPL-MCNC: 1.9 MG/L (ref 0.8–2.2)
BASOPHILS # BLD AUTO: 0.04 10*3/MM3 (ref 0–0.2)
BASOPHILS NFR BLD AUTO: 0.4 % (ref 0–1.5)
BILIRUB SERPL-MCNC: 0.3 MG/DL (ref 0–1.2)
BUN SERPL-MCNC: 11 MG/DL (ref 8–23)
BUN SERPL-MCNC: ABNORMAL MG/DL
BUN/CREAT SERPL: ABNORMAL
CALCIUM SPEC-SCNC: 9 MG/DL (ref 8.6–10.5)
CHLORIDE SERPL-SCNC: 101 MMOL/L (ref 98–107)
CO2 SERPL-SCNC: 27 MMOL/L (ref 22–29)
CREAT SERPL-MCNC: 0.91 MG/DL (ref 0.76–1.27)
DEPRECATED RDW RBC AUTO: 44.8 FL (ref 37–54)
EOSINOPHIL # BLD AUTO: 0.42 10*3/MM3 (ref 0–0.4)
EOSINOPHIL NFR BLD AUTO: 3.7 % (ref 0.3–6.2)
ERYTHROCYTE [DISTWIDTH] IN BLOOD BY AUTOMATED COUNT: 13.7 % (ref 12.3–15.4)
GFR SERPL CREATININE-BSD FRML MDRD: 84 ML/MIN/1.73
GLOBULIN UR ELPH-MCNC: 2.2 GM/DL
GLUCOSE SERPL-MCNC: 128 MG/DL (ref 65–99)
HCT VFR BLD AUTO: 42.7 % (ref 37.5–51)
HGB BLD-MCNC: 13.9 G/DL (ref 13–17.7)
LYMPHOCYTES # BLD AUTO: 2.1 10*3/MM3 (ref 0.7–3.1)
LYMPHOCYTES NFR BLD AUTO: 18.6 % (ref 19.6–45.3)
MCH RBC QN AUTO: 29.7 PG (ref 26.6–33)
MCHC RBC AUTO-ENTMCNC: 32.6 G/DL (ref 31.5–35.7)
MCV RBC AUTO: 91.2 FL (ref 79–97)
MONOCYTES # BLD AUTO: 0.93 10*3/MM3 (ref 0.1–0.9)
MONOCYTES NFR BLD AUTO: 8.2 % (ref 5–12)
NEUTROPHILS NFR BLD AUTO: 69.1 % (ref 42.7–76)
NEUTROPHILS NFR BLD AUTO: 7.83 10*3/MM3 (ref 1.7–7)
PLATELET # BLD AUTO: 327 10*3/MM3 (ref 140–450)
PMV BLD AUTO: 9.3 FL (ref 6–12)
POTASSIUM SERPL-SCNC: 4.2 MMOL/L (ref 3.5–5.2)
PROT SERPL-MCNC: 6.5 G/DL (ref 6–8.5)
RBC # BLD AUTO: 4.68 10*6/MM3 (ref 4.14–5.8)
SODIUM SERPL-SCNC: 140 MMOL/L (ref 136–145)
WBC # BLD AUTO: 11.32 10*3/MM3 (ref 3.4–10.8)

## 2020-08-19 PROCEDURE — 80053 COMPREHEN METABOLIC PANEL: CPT

## 2020-08-19 PROCEDURE — 85025 COMPLETE CBC W/AUTO DIFF WBC: CPT

## 2020-08-19 PROCEDURE — 82232 ASSAY OF BETA-2 PROTEIN: CPT

## 2020-08-19 PROCEDURE — 99214 OFFICE O/P EST MOD 30 MIN: CPT | Performed by: INTERNAL MEDICINE

## 2020-08-19 PROCEDURE — 36415 COLL VENOUS BLD VENIPUNCTURE: CPT

## 2020-08-19 NOTE — PROGRESS NOTES
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 Osuna reports that an episode of fall loss of consciousness happened, and that he drove for about 45 minutes without knowing and ended up in his neighbor's backyard which happened in October 2019.  Ever since then he had had a few more spells of passing out.    The passing out spells happen for a few seconds.  Patient has obstructive sleep apnea and uses CPAP machine.  No seizure episodes.  He underwent video examination for 3 days had one episode during the evaluation.  · Patient was sent to the cancer [facility] center for further evaluation given his abnormal CBC that happened with leukocytosis and monocytosis.  · 12/30/2019 peripheral blood flow cytometry -A small monoclonal B-cell population with a CLL phenotype is present. If the patient has a prior diagnosis of CLL, then the findings may represent persistent involvement. If the patient is not known to have CLL or SLL, further investigation is recommended if clinically indicated. In the absence of CLL or SLL, small (<5000/uL) clonal B-cell populations in the blood are classified as monoclonal B-cell lymphocytosis. Clinical correlation is required.  · 1/3/2020 CT scan of the soft tissue of the neck chest abdomen and pelvis were obtained-CT soft tissue neck shows some soft tissue prominence in the nasopharynx which could relate to slightly prominent adenoid tissue.  Vascular calcification is noted in the carotid bifurcations.  DJD C-spine.  Chest abdomen and pelvis show some areas of atelectasis or scarring involving the lungs.  Bilateral renal cysts.  Prostate appears enlarged.  Colonic diverticulosis.  DJD spine.  Cholelithiasis.  No obvious pathologically enlarged lymphadenopathy.    Past Medical  History:   Diagnosis Date   • Acute diverticulitis     with perforation   • Allergic    • Anemia    • Anxiety    • Back pain    • CAD S/P percutaneous coronary angioplasty    • COPD (chronic obstructive pulmonary disease) (CMS/HCC)    • Depression    • Diverticulitis    • GERD (gastroesophageal reflux disease)    • Hyperlipidemia    • Hypertension    • Low back pain    • Myocardial infarction (CMS/HCC)    • TEIXEIRA (nonalcoholic steatohepatitis)    • Obesity    • BRITTNEY on CPAP    • Sleep apnea    • Sleep disorder, shift work    • Spinal stenosis of lumbar region at multiple levels        Past Surgical History:   Procedure Laterality Date   • CARDIAC CATHETERIZATION  11/05/2018    Dr. Sher   • COLONOSCOPY  2016   • CORONARY STENT PLACEMENT     • LIVER BIOPSY     • TONSILLECTOMY AND ADENOIDECTOMY           Current Outpatient Medications:   •  amLODIPine (NORVASC) 10 MG tablet, TAKE 1 TABLET DAILY, Disp: 90 tablet, Rfl: 1  •  aspirin (ASPIR-LOW) 81 MG EC tablet, Take 81 mg by mouth Daily., Disp: , Rfl:   •  atorvastatin (LIPITOR) 40 MG tablet, Take 40 mg by mouth Daily., Disp: , Rfl:   •  clopidogrel (PLAVIX) 75 MG tablet, TAKE 1 TABLET BY MOUTH DAILY, Disp: 90 tablet, Rfl: 1  •  dicyclomine (BENTYL) 10 MG capsule, TAKE 1 CAPSULE BY MOUTH FOUR TIMES DAILY BEFORE MEALS AND AT BEDTIME, Disp: 120 capsule, Rfl: 5  •  DULoxetine (CYMBALTA) 60 MG capsule, TAKE 1 CAPSULE BY MOUTH EVERY DAY, Disp: 90 capsule, Rfl: 1  •  HYDROcodone-acetaminophen (NORCO)  MG per tablet, Take 1 tablet by mouth 4 (Four) Times a Day., Disp: 120 tablet, Rfl: 0  •  ipratropium-albuterol (DUO-NEB) 0.5-2.5 mg/3 ml nebulizer, Take 3 mL by nebulization Every 4 (Four) Hours As Needed for Wheezing., Disp: 360 mL, Rfl: 5  •  lisinopril (PRINIVIL,ZESTRIL) 30 MG tablet, TAKE 1 TABLET BY MOUTH DAILY, Disp: 90 tablet, Rfl: 1  •  loratadine (CLARITIN) 10 MG tablet, TAKE 1 TABLET BY MOUTH DAILY, Disp: 90 tablet, Rfl: 1  •  modafinil (PROVIGIL) 200 MG tablet,  TAKE 1 TABLET BY MOUTH DAILY, Disp: 30 tablet, Rfl: 0  •  omeprazole (priLOSEC) 40 MG capsule, Take 40 mg by mouth Daily., Disp: , Rfl:   •  pregabalin (LYRICA) 100 MG capsule, TAKE 1 CAPSULE BY MOUTH TWICE DAILY, Disp: 180 capsule, Rfl: 1  •  TRELEGY ELLIPTA 100-62.5-25 MCG/INH aerosol powder , INHALE 1 PUFF BY MOUTH EVERY DAY, Disp: 60 each, Rfl: 5  •  triamcinolone (KENALOG) 0.1 % ointment, Apply  topically to the appropriate area as directed 2 (Two) Times a Day., Disp: 30 g, Rfl: 1  •  Vortioxetine HBr (TRINTELLIX) 10 MG tablet, Take 10 mg by mouth Daily., Disp: 30 tablet, Rfl:     No Known Allergies    Family History   Problem Relation Age of Onset   • Colon cancer Mother    • Heart disease Mother    • Colon cancer Father    • Heart disease Father    • Heart attack Father    • Diabetes Brother    • Diabetes Maternal Grandmother    • Diabetes Maternal Grandfather        Cancer-related family history includes Colon cancer in his father and mother.    Social History     Tobacco Use   • Smoking status: Former Smoker     Packs/day: 1.50     Years: 40.00     Pack years: 60.00     Types: Cigarettes     Start date: 9/1/1969     Last attempt to quit: 2009     Years since quitting: 10.9   • Smokeless tobacco: Never Used   Substance Use Topics   • Alcohol use: Not Currently     Alcohol/week: 1.0 standard drinks     Types: 1 Glasses of wine per week     Frequency: Never     Comment: No alcohol the past 10 years.   • Drug use: No       I have reviewed the history of present illness, past medical history, family history, social history, lab results, all notes and other records since the patient was last seen at the cancer [facility] center.    SUBJECTIVE:      Patient is my office for follow-up.  Denies fevers night sweats.  Trying to lose weight not successful.  In fact he is gaining.    ROS:      Review of Systems   Constitutional: Negative for fever.   HENT: Negative for nosebleeds and trouble swallowing.    Eyes: Negative for  "visual disturbance.   Respiratory: Negative for cough, shortness of breath and wheezing.    Cardiovascular: Negative for chest pain.   Gastrointestinal: Negative for abdominal pain and blood in stool.   Endocrine: Negative for cold intolerance.   Genitourinary: Negative for dysuria and hematuria.   Musculoskeletal: Negative for joint swelling.   Skin: Negative for rash.   Allergic/Immunologic: Negative for environmental allergies.   Neurological: Negative for seizures.   Hematological: Does not bruise/bleed easily.   Psychiatric/Behavioral: The patient is not nervous/anxious.       MD performed ROS and are negative except as mentioned in Subjective.      Objective:       Vitals:    08/19/20 0922   BP: 137/79   Pulse: 84   Resp: 18   Temp: 98.2 °F (36.8 °C)   Weight: 110 kg (243 lb 3.2 oz)   Height: 172.7 cm (68\")   PainSc:   4   PainLoc: Back     /79   Pulse 84   Temp 98.2 °F (36.8 °C)   Resp 18   Ht 172.7 cm (68\")   Wt 110 kg (243 lb 3.2 oz)   BMI 36.98 kg/m²       PHYSICAL EXAM:      Physical Exam   Constitutional: He is oriented to person, place, and time. No distress.   Moderately built very obese   HENT:   Head: Normocephalic and atraumatic.   Eyes: Conjunctivae and EOM are normal. Right eye exhibits no discharge. Left eye exhibits no discharge. No scleral icterus.   Neck: Normal range of motion. Neck supple. No thyromegaly present.   Cardiovascular: Normal rate, regular rhythm and normal heart sounds. Exam reveals no gallop and no friction rub.   Pulmonary/Chest: Effort normal. No stridor. No respiratory distress. He has no wheezes.   Distant breath sounds   Abdominal: Soft. Bowel sounds are normal. He exhibits no mass. There is no tenderness. There is no rebound and no guarding.   Obese distended no palpable organomegaly   Musculoskeletal: Normal range of motion. He exhibits no tenderness.   Lymphadenopathy:     He has no cervical adenopathy.   Neurological: He is alert and oriented to person, " place, and time. He exhibits normal muscle tone.   Skin: Skin is warm. No rash noted. He is not diaphoretic. No erythema.   Psychiatric: He has a normal mood and affect. His behavior is normal.   Nursing note and vitals reviewed.       RECENT LABS:     WBC   Date Value Ref Range Status   08/19/2020 11.32 (H) 3.40 - 10.80 10*3/mm3 Final     RBC   Date Value Ref Range Status   08/19/2020 4.68 4.14 - 5.80 10*6/mm3 Final     Hemoglobin   Date Value Ref Range Status   08/19/2020 13.9 13.0 - 17.7 g/dL Final     Hematocrit   Date Value Ref Range Status   08/19/2020 42.7 37.5 - 51.0 % Final     MCV   Date Value Ref Range Status   08/19/2020 91.2 79.0 - 97.0 fL Final     MCH   Date Value Ref Range Status   08/19/2020 29.7 26.6 - 33.0 pg Final     MCHC   Date Value Ref Range Status   08/19/2020 32.6 31.5 - 35.7 g/dL Final     RDW   Date Value Ref Range Status   08/19/2020 13.7 12.3 - 15.4 % Final     RDW-SD   Date Value Ref Range Status   08/19/2020 44.8 37.0 - 54.0 fl Final     MPV   Date Value Ref Range Status   08/19/2020 9.3 6.0 - 12.0 fL Final     Platelets   Date Value Ref Range Status   08/19/2020 327 140 - 450 10*3/mm3 Final     Neutrophil %   Date Value Ref Range Status   08/19/2020 69.1 42.7 - 76.0 % Final     Lymphocyte %   Date Value Ref Range Status   08/19/2020 18.6 (L) 19.6 - 45.3 % Final     Monocyte %   Date Value Ref Range Status   08/19/2020 8.2 5.0 - 12.0 % Final     Eosinophil %   Date Value Ref Range Status   08/19/2020 3.7 0.3 - 6.2 % Final     Basophil %   Date Value Ref Range Status   08/19/2020 0.4 0.0 - 1.5 % Final     Immature Grans %   Date Value Ref Range Status   11/09/2019 0.5 0.0 - 0.5 % Final     Neutrophils, Absolute   Date Value Ref Range Status   08/19/2020 7.83 (H) 1.70 - 7.00 10*3/mm3 Final     Lymphocytes, Absolute   Date Value Ref Range Status   08/19/2020 2.10 0.70 - 3.10 10*3/mm3 Final     Monocytes, Absolute   Date Value Ref Range Status   08/19/2020 0.93 (H) 0.10 - 0.90 10*3/mm3  Final     Eosinophils, Absolute   Date Value Ref Range Status   08/19/2020 0.42 (H) 0.00 - 0.40 10*3/mm3 Final     Basophils, Absolute   Date Value Ref Range Status   08/19/2020 0.04 0.00 - 0.20 10*3/mm3 Final     Immature Grans, Absolute   Date Value Ref Range Status   11/09/2019 0.06 (H) 0.00 - 0.05 10*3/mm3 Final     nRBC   Date Value Ref Range Status   11/09/2019 0.0 0.0 - 0.2 /100 WBC Final       Lab Results   Component Value Date    GLUCOSE 150 (H) 01/17/2020    BUN 10 01/17/2020    CREATININE 0.78 01/17/2020    EGFRIFNONA 101 01/17/2020    BCR 12.8 01/17/2020    K 4.3 01/17/2020    CO2 28.0 01/17/2020    CALCIUM 9.9 01/17/2020    ALBUMIN 4.20 01/17/2020    LABIL2 1.5 05/20/2019    AST 17 01/17/2020    ALT 15 01/17/2020         Assessment/Plan      ASSESSMENT:     1. Monoclonal B-cell lymphocytosis  2. Obesity  3. Posttraumatic stress disorder   4. obstructive sleep apnea  5. ECOG 1    PLAN:      1. Flow cytometry is consistent with monoclonal B-cell lymphocytosis.    No evidence of lymphadenopathy.  Repeat labs in 1 year.  He has lymphocytosis/leukocytosis treatment is not indicated.  2. Patient is to report to us if he develops any B symptoms.    3. Patient to continue to use CPAP machine.  Blood pressure is controlled well now   4. I encouraged him to lose weight.  5. He received disability now for his PTSD.  He reports the symptoms are controlled.  6. I will see him back in my office in 1 year and check CBC, CMP and beta-2 microglobulin.    I have reviewed labs results, imaging, vitals, and medications with the patient today.   Patient verbalized understanding and is in agreement of the above plan.  Electronically signed by Ester Florez MD, 08/19/20, 9:52 AM.               This report was compiled using Dragon voice recognition software. I have made every effort to proof read this document; however, typographical errors may persist.

## 2020-08-22 DIAGNOSIS — G47.419 PRIMARY NARCOLEPSY WITHOUT CATAPLEXY: ICD-10-CM

## 2020-08-24 DIAGNOSIS — G47.419 PRIMARY NARCOLEPSY WITHOUT CATAPLEXY: ICD-10-CM

## 2020-08-24 RX ORDER — MODAFINIL 200 MG/1
200 TABLET ORAL DAILY
Qty: 30 TABLET | Refills: 0 | Status: SHIPPED | OUTPATIENT
Start: 2020-08-24 | End: 2020-08-24 | Stop reason: SDUPTHER

## 2020-08-24 RX ORDER — LISINOPRIL 30 MG/1
30 TABLET ORAL DAILY
Qty: 90 TABLET | Refills: 1 | Status: SHIPPED | OUTPATIENT
Start: 2020-08-24 | End: 2021-03-12 | Stop reason: SDUPTHER

## 2020-08-24 RX ORDER — MODAFINIL 200 MG/1
200 TABLET ORAL DAILY
Qty: 30 TABLET | Refills: 0 | Status: SHIPPED | OUTPATIENT
Start: 2020-08-24 | End: 2020-09-21 | Stop reason: SDUPTHER

## 2020-08-26 DIAGNOSIS — M54.50 CHRONIC BILATERAL LOW BACK PAIN WITHOUT SCIATICA: ICD-10-CM

## 2020-08-26 DIAGNOSIS — G89.29 CHRONIC BILATERAL LOW BACK PAIN WITHOUT SCIATICA: ICD-10-CM

## 2020-08-26 RX ORDER — HYDROCODONE BITARTRATE AND ACETAMINOPHEN 10; 325 MG/1; MG/1
1 TABLET ORAL 4 TIMES DAILY
Qty: 120 TABLET | Refills: 0 | Status: SHIPPED | OUTPATIENT
Start: 2020-08-26 | End: 2020-09-23 | Stop reason: SDUPTHER

## 2020-08-31 RX ORDER — AMLODIPINE BESYLATE 10 MG/1
TABLET ORAL
Qty: 90 TABLET | Refills: 1 | Status: ON HOLD | OUTPATIENT
Start: 2020-08-31 | End: 2021-02-23

## 2020-09-01 RX ORDER — DULOXETIN HYDROCHLORIDE 60 MG/1
CAPSULE, DELAYED RELEASE ORAL
Qty: 90 CAPSULE | Refills: 1 | OUTPATIENT
Start: 2020-09-01

## 2020-09-01 NOTE — TELEPHONE ENCOUNTER
Kathryn Chenga Patient     Last visit:  10/24/19  Next visit:10/19/20   Last labs: 8/19/20    Rx requested: Duloxetine   Pharmacy: [facility] in [place]

## 2020-09-04 RX ORDER — DULOXETIN HYDROCHLORIDE 60 MG/1
CAPSULE, DELAYED RELEASE ORAL
Qty: 90 CAPSULE | Refills: 0 | Status: SHIPPED | OUTPATIENT
Start: 2020-09-04 | End: 2020-11-10

## 2020-09-08 NOTE — PROGRESS NOTES
"Teresa Osuna is a 65 y.o. male.     Pt is here today with c/o cough and wheezing.  He has COPD and believes he is currently having an exacerbation.  Symptoms started about 3 days ago.  States that he is continuously wheezing.  It is keeping him up at night.  He wears a CPAP.  He reports that he has been isolated for months so he does not believe that it could be Covid-19.  He has been using his trelegy inhaler and his nebulizer.  Denies fever but has had chills.  Coughing frequently.  It was productive yesterday- yellow sputum.  Otherwise, cough is dry.  Denies congestion, sinus pressure, sore throat.  Denies loss of taste or smell.  Has some chest discomfort and tightness.           The following portions of the patient's history were reviewed and updated as appropriate: allergies, current medications, past family history, past medical history, past social history, past surgical history and problem list.    Review of Systems   Constitutional: Negative for chills, fatigue and fever.   HENT: Positive for rhinorrhea and sore throat. Negative for ear pain and swollen glands.    Respiratory: Positive for shortness of breath and wheezing.    Cardiovascular: Positive for chest pain. Negative for leg swelling.   Gastrointestinal: Negative for abdominal pain and vomiting.   Musculoskeletal: Negative for neck pain.   Skin: Positive for rash (RLE).   Neurological: Negative for dizziness and headache.       Objective   /80 (BP Location: Left arm, Patient Position: Sitting, Cuff Size: Large Adult)   Pulse 99   Temp 97.7 °F (36.5 °C) (Temporal)   Ht 172.7 cm (68\")   Wt 110 kg (242 lb)   SpO2 94%   BMI 36.80 kg/m²   Physical Exam   Constitutional: He is oriented to person, place, and time. He appears well-developed and well-nourished. No distress.   HENT:   Head: Normocephalic and atraumatic.   Eyes: Pupils are equal, round, and reactive to light. EOM are normal.   Cardiovascular: Normal rate, regular " rhythm and normal heart sounds.   No murmur heard.  Pulmonary/Chest: Effort normal. No respiratory distress. He has wheezes (all lobes, LLL worse).   Abdominal: Soft. Bowel sounds are normal.   Musculoskeletal: Normal range of motion.   Neurological: He is alert and oriented to person, place, and time.   Psychiatric: He has a normal mood and affect. His behavior is normal. Judgment and thought content normal.         Assessment/Plan     Diagnoses and all orders for this visit:    1. COPD with exacerbation (CMS/[facility]) (Primary)  Comments:  will treat with steroid taper and zpack  O2 stable  frequent cough and wheezing  return if no improvement  Orders:  -     azithromycin (Zithromax Z-Carter) 250 MG tablet; Take 2 tablets the first day, then 1 tablet daily for 4 days.  Dispense: 6 tablet; Refill: 0  -     predniSONE (DELTASONE) 10 MG tablet; Take 4 tablets by mouth Daily for 3 days, THEN 3 tablets Daily for 3 days, THEN 2 tablets Daily for 3 days, THEN 1 tablet Daily for 3 days.  Dispense: 30 tablet; Refill: 0

## 2020-09-09 ENCOUNTER — OFFICE VISIT (OUTPATIENT)
Dept: FAMILY MEDICINE CLINIC | Facility: CLINIC | Age: 65
End: 2020-09-09

## 2020-09-09 VITALS
WEIGHT: 242 LBS | SYSTOLIC BLOOD PRESSURE: 153 MMHG | DIASTOLIC BLOOD PRESSURE: 80 MMHG | TEMPERATURE: 97.7 F | BODY MASS INDEX: 36.68 KG/M2 | HEART RATE: 99 BPM | HEIGHT: 68 IN | OXYGEN SATURATION: 94 %

## 2020-09-09 DIAGNOSIS — J44.1 COPD WITH EXACERBATION (HCC): Primary | ICD-10-CM

## 2020-09-09 PROCEDURE — 99213 OFFICE O/P EST LOW 20 MIN: CPT | Performed by: NURSE PRACTITIONER

## 2020-09-09 RX ORDER — AZITHROMYCIN 250 MG/1
TABLET, FILM COATED ORAL
Qty: 6 TABLET | Refills: 0 | Status: SHIPPED | OUTPATIENT
Start: 2020-09-09 | End: 2020-10-16

## 2020-09-09 RX ORDER — PREDNISONE 10 MG/1
TABLET ORAL
Qty: 30 TABLET | Refills: 0 | Status: SHIPPED | OUTPATIENT
Start: 2020-09-09 | End: 2020-09-21

## 2020-09-09 NOTE — PATIENT INSTRUCTIONS
Complete antibiotic and steroid taper  Cont nebulizing treatments  Return if no improvement  Go to ER for increased shortness of air or chest pain

## 2020-09-20 ENCOUNTER — HOSPITAL ENCOUNTER (EMERGENCY)
Facility: HOSPITAL | Age: 65
Discharge: HOME | End: 2020-09-20
Attending: EMERGENCY MEDICINE | Admitting: EMERGENCY MEDICINE

## 2020-09-20 ENCOUNTER — APPOINTMENT (OUTPATIENT)
Dept: GENERAL RADIOLOGY | Facility: HOSPITAL | Age: 65
End: 2020-09-20

## 2020-09-20 VITALS
OXYGEN SATURATION: 98 % | DIASTOLIC BLOOD PRESSURE: 74 MMHG | HEART RATE: 89 BPM | SYSTOLIC BLOOD PRESSURE: 148 MMHG | HEIGHT: 68 IN | RESPIRATION RATE: 14 BRPM | WEIGHT: 248.02 LBS | TEMPERATURE: 98 F | BODY MASS INDEX: 37.59 KG/M2

## 2020-09-20 DIAGNOSIS — M54.50 LOW BACK PAIN, UNSPECIFIED BACK PAIN LATERALITY, UNSPECIFIED CHRONICITY, UNSPECIFIED WHETHER SCIATICA PRESENT: Primary | ICD-10-CM

## 2020-09-20 PROCEDURE — 72110 X-RAY EXAM L-2 SPINE 4/>VWS: CPT

## 2020-09-20 PROCEDURE — 99283 EMERGENCY DEPT VISIT LOW MDM: CPT

## 2020-09-20 RX ORDER — PREDNISONE 20 MG/1
20 TABLET ORAL DAILY
Qty: 5 TABLET | Refills: 0 | Status: SHIPPED | OUTPATIENT
Start: 2020-09-20 | End: 2020-10-16

## 2020-09-20 RX ORDER — TRAMADOL HYDROCHLORIDE 50 MG/1
50 TABLET ORAL EVERY 6 HOURS PRN
Qty: 12 TABLET | Refills: 0 | Status: SHIPPED | OUTPATIENT
Start: 2020-09-20 | End: 2020-10-16

## 2020-09-20 NOTE — ED PROVIDER NOTES
Subjective   Patient is a 65-year-old male complaint low back pain for the past 1 week.  He states he has pain rating to his left leg.  He states he is had similar pain in the past but not to this extent.  Nuys recent injury.  He has no numbness tingling or other complaint.  Patient denies bowel or bladder abnormalities.          Review of Systems  For recent trauma numbness tingling weakness or other associated complaints  Past Medical History:   Diagnosis Date   • Acute diverticulitis     with perforation   • Allergic    • Anemia    • Anxiety    • Back pain    • CAD S/P percutaneous coronary angioplasty    • COPD (chronic obstructive pulmonary disease) (CMS/HCC)    • Depression    • Diverticulitis    • GERD (gastroesophageal reflux disease)    • Hyperlipidemia    • Hypertension    • Low back pain    • Myocardial infarction (CMS/HCC)    • TEIXEIRA (nonalcoholic steatohepatitis)    • Obesity    • BRITTNEY on CPAP    • Sleep apnea    • Sleep disorder, shift work    • Spinal stenosis of lumbar region at multiple levels        No Known Allergies    Past Surgical History:   Procedure Laterality Date   • CARDIAC CATHETERIZATION  11/05/2018    Dr. Sher   • COLONOSCOPY  2016   • CORONARY STENT PLACEMENT     • LIVER BIOPSY     • TONSILLECTOMY AND ADENOIDECTOMY         Family History   Problem Relation Age of Onset   • Colon cancer Mother    • Heart disease Mother    • Colon cancer Father    • Heart disease Father    • Heart attack Father    • Diabetes Brother    • Diabetes Maternal Grandmother    • Diabetes Maternal Grandfather        Social History     Socioeconomic History   • Marital status:      Spouse name: Not on file   • Number of children: Not on file   • Years of education: Not on file   • Highest education level: Not on file   Tobacco Use   • Smoking status: Former Smoker     Packs/day: 1.50     Years: 40.00     Pack years: 60.00     Types: Cigarettes     Start date: 9/1/1969     Quit date: 2009     Years since  quittin.0   • Smokeless tobacco: Never Used   Substance and Sexual Activity   • Alcohol use: Not Currently     Alcohol/week: 1.0 standard drinks     Types: 1 Glasses of wine per week     Frequency: Never     Comment: No alcohol the past 10 years.   • Drug use: No   • Sexual activity: Not Currently     Partners: Female     Birth control/protection: None           Objective   Physical Exam  Neurologic exam is nonfocal.  Patient has full range of motion with pain on palpation of his lower back.  Extremity exam is no cyanosis or edema.  DTRs are symmetrical.  Procedures           ED Course          Xr Spine Lumbar Complete 4+vw    Result Date: 2020  1. No acute osseous abnormality. 2. Multilevel degenerative findings most pronounced at the L5-S1 level with anterolisthesis of L5 on S1 measuring 10 mm secondary to chronic L5 pars defects, unchanged.  Electronically Signed By-Chriss Fountain On:2020 6:26 PM This report was finalized on [id number] by  Chriss Fountain, .                                      MDM  Number of Diagnoses or Management Options  Diagnosis management comments: Patient has no focal neurologic deficits.  Neck x-ray shows severe degenerative joint disease.  Patient will be discharged with a prescription for Ultram and prednisone.  Will follow with MD this week for recheck.    Risk of Complications, Morbidity, and/or Mortality  Presenting problems: moderate  Diagnostic procedures: moderate  Management options: moderate    Patient Progress  Patient progress: stable      Final diagnoses:   Low back pain, unspecified back pain laterality, unspecified chronicity, unspecified whether sciatica present            Lv Hinkle MD  [phone]

## 2020-09-21 DIAGNOSIS — G47.419 PRIMARY NARCOLEPSY WITHOUT CATAPLEXY: ICD-10-CM

## 2020-09-21 DIAGNOSIS — M54.50 CHRONIC BILATERAL LOW BACK PAIN WITHOUT SCIATICA: ICD-10-CM

## 2020-09-21 DIAGNOSIS — G89.29 CHRONIC BILATERAL LOW BACK PAIN WITHOUT SCIATICA: ICD-10-CM

## 2020-09-21 RX ORDER — MODAFINIL 200 MG/1
200 TABLET ORAL DAILY
Qty: 30 TABLET | Refills: 0 | Status: SHIPPED | OUTPATIENT
Start: 2020-09-21 | End: 2020-09-29 | Stop reason: SDUPTHER

## 2020-09-21 RX ORDER — LORATADINE 10 MG/1
10 TABLET ORAL DAILY
Qty: 90 TABLET | Refills: 1 | Status: SHIPPED | OUTPATIENT
Start: 2020-09-21 | End: 2021-04-06 | Stop reason: SDUPTHER

## 2020-09-21 NOTE — TELEPHONE ENCOUNTER
Last visit:  9/9/20  Next visit: 10/19/20  Last labs: 8/19/20    Rx requested: Loratadine   Pharmacy: [facility] in [place]

## 2020-09-22 RX ORDER — PREGABALIN 100 MG/1
CAPSULE ORAL
Qty: 180 CAPSULE | Refills: 1 | Status: SHIPPED | OUTPATIENT
Start: 2020-09-22 | End: 2021-04-21 | Stop reason: SDUPTHER

## 2020-09-23 DIAGNOSIS — G89.29 CHRONIC BILATERAL LOW BACK PAIN WITHOUT SCIATICA: ICD-10-CM

## 2020-09-23 DIAGNOSIS — M54.50 CHRONIC BILATERAL LOW BACK PAIN WITHOUT SCIATICA: ICD-10-CM

## 2020-09-24 ENCOUNTER — TELEPHONE (OUTPATIENT)
Dept: FAMILY MEDICINE CLINIC | Facility: CLINIC | Age: 65
End: 2020-09-24

## 2020-09-24 RX ORDER — HYDROCODONE BITARTRATE AND ACETAMINOPHEN 10; 325 MG/1; MG/1
1 TABLET ORAL 4 TIMES DAILY
Qty: 120 TABLET | Refills: 0 | Status: SHIPPED | OUTPATIENT
Start: 2020-09-24 | End: 2020-10-21 | Stop reason: SDUPTHER

## 2020-09-29 ENCOUNTER — TELEPHONE (OUTPATIENT)
Dept: FAMILY MEDICINE CLINIC | Facility: CLINIC | Age: 65
End: 2020-09-29

## 2020-09-29 DIAGNOSIS — G47.419 PRIMARY NARCOLEPSY WITHOUT CATAPLEXY: ICD-10-CM

## 2020-09-29 RX ORDER — MODAFINIL 200 MG/1
200 TABLET ORAL DAILY
Qty: 30 TABLET | Refills: 0 | Status: SHIPPED | OUTPATIENT
Start: 2020-09-29 | End: 2020-10-27 | Stop reason: SDUPTHER

## 2020-10-16 ENCOUNTER — OFFICE VISIT (OUTPATIENT)
Dept: FAMILY MEDICINE CLINIC | Facility: CLINIC | Age: 65
End: 2020-10-16

## 2020-10-16 VITALS
HEIGHT: 68 IN | SYSTOLIC BLOOD PRESSURE: 129 MMHG | TEMPERATURE: 97.5 F | OXYGEN SATURATION: 98 % | DIASTOLIC BLOOD PRESSURE: 73 MMHG | WEIGHT: 241 LBS | BODY MASS INDEX: 36.53 KG/M2 | HEART RATE: 97 BPM

## 2020-10-16 DIAGNOSIS — N20.0 RENAL CALCULI: ICD-10-CM

## 2020-10-16 DIAGNOSIS — M54.16 LUMBAR RADICULOPATHY: Primary | ICD-10-CM

## 2020-10-16 PROCEDURE — 99214 OFFICE O/P EST MOD 30 MIN: CPT | Performed by: NURSE PRACTITIONER

## 2020-10-16 RX ORDER — TIZANIDINE 4 MG/1
4 TABLET ORAL EVERY 8 HOURS PRN
Qty: 30 TABLET | Refills: 1 | Status: SHIPPED | OUTPATIENT
Start: 2020-10-16 | End: 2020-11-09 | Stop reason: SDUPTHER

## 2020-10-16 NOTE — PROGRESS NOTES
"Answers for HPI/ROS submitted by the patient on 10/12/2020   What is the primary reason for your visit?: Other  Please describe your symptoms.: Leg weakness and pain. Leg just collapses.  Have you had these symptoms before?: No  How long have you been having these symptoms?: Greater than 2 weeks  Please describe any probable cause for these symptoms. : Chronic back pain.  Teresa Osuna is a 65 y.o. male.       HPI   Pt. Is here today with concern of lower back pain with the pain radiating into his left leg.  Left back pain is worsening over past month but he has chronic back pain on both sides; left leg has \"given out\" several times.  He reports intermittent numb/tingling in this leg.  Says he has seen Dr. Morton in the past (last in 2016) and was told he didn't need surgery unless he worsened.  He denies any recent injury.  He already takes Norco 10/325 mg qid; Lyrica 100 mg bid and duloxetine 60 mg daily.  He says this medications don't alleviate the pain.  He recently went to ER for this and xray of L-spine was completed.  It showed significant degenerative changes, unchanged.  He was given a few Tramadol and finished those.  He has not had any loss of bowel or bladder control. He does mention that for a few weeks when he wakes in the morning he has a pain over his abdomen before he urinates.  Once he urinates that pain goes away and he doesn't feel it again.  He denies any pain with urination.  He feels urination is normal; not seeing any blood or cloudy urine.  No fevers.  The x-ray he had in ER did show a possible renal calculi in the left abdomen.         The following portions of the patient's history were reviewed and updated as appropriate: allergies, current medications, past family history, past medical history, past social history, past surgical history and problem list.    Review of Systems   Constitutional: Negative for activity change, appetite change, chills, diaphoresis, fatigue, fever, " unexpected weight gain and unexpected weight loss.   Respiratory: Negative for cough, chest tightness, shortness of breath and wheezing.    Cardiovascular: Negative for chest pain, palpitations and leg swelling.   Gastrointestinal: Positive for abdominal pain (see HPI). Negative for abdominal distention, blood in stool, constipation, diarrhea, nausea, vomiting and indigestion.   Genitourinary: Negative for decreased urine volume, difficulty urinating, dysuria, flank pain, frequency, hematuria, nocturia, urgency and urinary incontinence.   Musculoskeletal: Positive for arthralgias (left leg), back pain and myalgias. Negative for joint swelling.   Skin: Negative for rash and skin lesions.   Neurological: Positive for weakness (left leg) and numbness (intermittent). Negative for dizziness, tremors, light-headedness, headache and confusion.   Hematological: Negative for adenopathy.   Psychiatric/Behavioral: Negative for depressed mood. The patient is not nervous/anxious.        Objective   Physical Exam  Vitals signs reviewed.   Constitutional:       General: He is in acute distress (appears in pain today ).      Appearance: Normal appearance.   HENT:      Head: Normocephalic and atraumatic.      Nose: Nose normal.      Mouth/Throat:      Mouth: Mucous membranes are moist.      Pharynx: Oropharynx is clear.   Eyes:      Conjunctiva/sclera: Conjunctivae normal.   Neck:      Musculoskeletal: Normal range of motion and neck supple. No muscular tenderness.   Cardiovascular:      Rate and Rhythm: Normal rate and regular rhythm.      Pulses: Normal pulses.      Heart sounds: Normal heart sounds. No murmur.   Pulmonary:      Effort: Pulmonary effort is normal. No respiratory distress.      Breath sounds: Normal breath sounds. No wheezing.   Chest:      Chest wall: No tenderness.   Abdominal:      General: Abdomen is flat. Bowel sounds are normal. There is no distension.      Palpations: Abdomen is soft. There is no mass.       Tenderness: There is no abdominal tenderness. There is no right CVA tenderness, left CVA tenderness, guarding or rebound.   Musculoskeletal:      Lumbar back: He exhibits decreased range of motion, tenderness, bony tenderness, pain and spasm. He exhibits no swelling, no edema and normal pulse.      Right lower leg: No edema.      Left lower leg: No edema.   Skin:     General: Skin is warm and dry.      Findings: No erythema.   Neurological:      General: No focal deficit present.      Mental Status: He is alert and oriented to person, place, and time.      Motor: No weakness.      Deep Tendon Reflexes: Reflexes normal.   Psychiatric:         Mood and Affect: Mood normal.         Behavior: Behavior normal.         Thought Content: Thought content normal.         Judgment: Judgment normal.           Assessment/Plan   Diagnoses and all orders for this visit:    1. Lumbar radiculopathy (Primary)  Comments:  MRI ordered.   Given tizanidine PRN.  Cont. pain meds as directed.   ER for worsening.   Orders:  -     MRI Lumbar Spine Without Contrast; Future    2. Renal calculi  Comments:  Referral to Urology for eval and tx   Orders:  -     Ambulatory Referral to Urology    Other orders  -     tiZANidine (ZANAFLEX) 4 MG tablet; Take 1 tablet by mouth Every 8 (Eight) Hours As Needed for Muscle Spasms.  Dispense: 30 tablet; Refill: 1

## 2020-10-16 NOTE — PATIENT INSTRUCTIONS
Go to ER for worsening symptoms.       Kidney Stones  Kidney stones are rock-like masses that form inside of the kidneys. Kidneys are organs that make pee (urine). A kidney stone may move into other parts of the urinary tract, including:  · The tubes that connect the kidneys to the bladder (ureters).  · The bladder.  · The tube that carries urine out of the body (urethra).  Kidney stones can cause very bad pain and can block the flow of pee. The stone usually leaves your body (passes) through your pee. You may need to have a doctor take out the stone.  What are the causes?  Kidney stones may be caused by:  · A condition in which certain glands make too much parathyroid hormone (primary hyperparathyroidism).  · A buildup of a type of crystals in the bladder made of a chemical called uric acid. The body makes uric acid when you eat certain foods.  · Narrowing (stricture) of one or both of the ureters.  · A kidney blockage that you were born with.  · Past surgery on the kidney or the ureters, such as gastric bypass surgery.  What increases the risk?  You are more likely to develop this condition if:  · You have had a kidney stone in the past.  · You have a family history of kidney stones.  · You do not drink enough water.  · You eat a diet that is high in protein, salt (sodium), or sugar.  · You are overweight or very overweight (obese).  What are the signs or symptoms?  Symptoms of a kidney stone may include:  · Pain in the side of the belly, right below the ribs (flank pain). Pain usually spreads (radiates) to the groin.  · Needing to pee often or right away (urgently).  · Pain when going pee (urinating).  · Blood in your pee (hematuria).  · Feeling like you may vomit (nauseous).  · Vomiting.  · Fever and chills.  How is this treated?  Treatment depends on the size, location, and makeup of the kidney stones. The stones will often pass out of the body through peeing. You may need to:  · Drink more fluid to help pass  the stone. In some cases, you may be given fluids through an IV tube put into one of your veins at the hospital.  · Take medicine for pain.  · Make changes in your diet to help keep kidney stones from coming back.  Sometimes, medical procedures are needed to remove a kidney stone. This may involve:  · A procedure to break up kidney stones using a beam of light (laser) or shock waves.  · Surgery to remove the kidney stones.  Follow these instructions at home:  Medicines  · Take over-the-counter and prescription medicines only as told by your doctor.  · Ask your doctor if the medicine prescribed to you requires you to avoid driving or using heavy machinery.  Eating and drinking  · Drink enough fluid to keep your pee pale yellow. You may be told to drink at least 8-10 glasses of water each day. This will help you pass the stone.  · If told by your doctor, change your diet. This may include:  ? Limiting how much salt you eat.  ? Eating more fruits and vegetables.  ? Limiting how much meat, poultry, fish, and eggs you eat.  · Follow instructions from your doctor about eating or drinking restrictions.  General instructions  · Collect pee samples as told by your doctor. You may need to collect a pee sample:  ? 24 hours after a stone comes out.  ? 8-12 weeks after a stone comes out, and every 6-12 months after that.  · Strain your pee every time you pee (urinate), for as long as told. Use the strainer that your doctor recommends.  · Do not throw out the stone. Keep it so that it can be tested by your doctor.  · Keep all follow-up visits as told by your doctor. This is important. You may need follow-up tests.  How is this prevented?  To prevent another kidney stone:  · Drink enough fluid to keep your pee pale yellow. This is the best way to prevent kidney stones.  · Eat healthy foods.  · Avoid certain foods as told by your doctor. You may be told to eat less protein.  · Stay at a healthy weight.  Where to find more  information  · National Kidney Foundation (NKF): www.kidney.org  · [facility] ([facility]): www.urologyhealth.org  Contact a doctor if:  · You have pain that gets worse or does not get better with medicine.  Get help right away if:  · You have a fever or chills.  · You get very bad pain.  · You get new pain in your belly (abdomen).  · You pass out (faint).  · You cannot pee.  Summary  · Kidney stones are rock-like masses that form inside of the kidneys.  · Kidney stones can cause very bad pain and can block the flow of pee.  · The stones will often pass out of the body through peeing.  · Drink enough fluid to keep your pee pale yellow.  This information is not intended to replace advice given to you by your health care provider. Make sure you discuss any questions you have with your health care provider.  Document Released: 06/05/2009 Document Revised: 05/05/2020 Document Reviewed: 05/05/2020  Elsevier Patient Education © 2020 [employer]

## 2020-10-20 ENCOUNTER — TELEPHONE (OUTPATIENT)
Dept: FAMILY MEDICINE CLINIC | Facility: CLINIC | Age: 65
End: 2020-10-20

## 2020-10-20 NOTE — TELEPHONE ENCOUNTER
Can let patient know that his insurance has denied the MRI for his back.  I would recommend follow up with his spine specialist (Dr. Morton) for evaluation.

## 2020-10-21 DIAGNOSIS — G89.29 CHRONIC BILATERAL LOW BACK PAIN WITHOUT SCIATICA: ICD-10-CM

## 2020-10-21 DIAGNOSIS — M54.50 CHRONIC BILATERAL LOW BACK PAIN WITHOUT SCIATICA: ICD-10-CM

## 2020-10-22 RX ORDER — HYDROCODONE BITARTRATE AND ACETAMINOPHEN 10; 325 MG/1; MG/1
1 TABLET ORAL 4 TIMES DAILY
Qty: 120 TABLET | Refills: 0 | Status: SHIPPED | OUTPATIENT
Start: 2020-10-22 | End: 2020-11-18 | Stop reason: SDUPTHER

## 2020-10-27 DIAGNOSIS — G47.419 PRIMARY NARCOLEPSY WITHOUT CATAPLEXY: ICD-10-CM

## 2020-10-27 RX ORDER — MODAFINIL 200 MG/1
200 TABLET ORAL DAILY
Qty: 30 TABLET | Refills: 0 | Status: SHIPPED | OUTPATIENT
Start: 2020-10-27 | End: 2020-12-11 | Stop reason: SDUPTHER

## 2020-11-03 ENCOUNTER — TELEPHONE (OUTPATIENT)
Dept: CARDIOLOGY | Facility: CLINIC | Age: 65
End: 2020-11-03

## 2020-11-03 NOTE — TELEPHONE ENCOUNTER
Patient had a nehemiah episode noted on 10/2/20 at 6:16 am. Spoke to patient, he got up at 4 am and fell asleep on couch that morning. he is unaware if he had any episodes. May have fallen asleep during that time. Wears sleep apnea machine while in bed, did not use it on couch.

## 2020-11-05 ENCOUNTER — OFFICE VISIT (OUTPATIENT)
Dept: CARDIOLOGY | Facility: CLINIC | Age: 65
End: 2020-11-05

## 2020-11-05 VITALS
BODY MASS INDEX: 37.44 KG/M2 | DIASTOLIC BLOOD PRESSURE: 78 MMHG | WEIGHT: 247 LBS | TEMPERATURE: 97.7 F | HEART RATE: 80 BPM | OXYGEN SATURATION: 93 % | SYSTOLIC BLOOD PRESSURE: 128 MMHG | HEIGHT: 68 IN

## 2020-11-05 DIAGNOSIS — Z95.818 STATUS POST PLACEMENT OF IMPLANTABLE LOOP RECORDER: Primary | ICD-10-CM

## 2020-11-05 DIAGNOSIS — I10 ESSENTIAL HYPERTENSION: ICD-10-CM

## 2020-11-05 DIAGNOSIS — E78.5 DYSLIPIDEMIA: ICD-10-CM

## 2020-11-05 DIAGNOSIS — Z95.820 STATUS POST ANGIOPLASTY WITH STENT: ICD-10-CM

## 2020-11-05 DIAGNOSIS — R55 SYNCOPE AND COLLAPSE: ICD-10-CM

## 2020-11-05 PROCEDURE — 99214 OFFICE O/P EST MOD 30 MIN: CPT | Performed by: INTERNAL MEDICINE

## 2020-11-05 PROCEDURE — 93000 ELECTROCARDIOGRAM COMPLETE: CPT | Performed by: INTERNAL MEDICINE

## 2020-11-05 NOTE — PROGRESS NOTES
Encounter Date:11/05/2020  Last seen April 2020      Patient ID: Neptali Osuna is a 65 y.o. male.    Chief Complaint:  Status post stent  History of syncope  Status post loop recorder  Hypertension  Dyslipidemia        History of Present Illness    Recently patient had a loop recorder transmission and had bradycardia with rates of about 30/min.  Patient was not symptomatic.  Patient apparently went to sleep and apparently was not wearing the CPAP.    Since I have last seen, the patient has been without any chest discomfort ,shortness of breath, palpitations, dizziness or syncope.  Denies having any headache ,abdominal pain ,nausea, vomiting , diarrhea constipation, loss of weight or loss of appetite.  Denies having any excessive bruising ,hematuria or blood in the stool.    Review of all systems negative except as indicated.    Reviewed ROS.    Assessment and Plan         ]]]]]]]]]]]]]]]]]]]   impression  =============  - Recent recurrent syncopal episodes concerning for bradyarrhythmias.  Recent loop recorder monitor showed second-degree AV block type I and type II.  Patient had one episode of bradycardia with heart rates of 30/min while he was sleeping without CPAP.     -Status post [employer] loop recorder 11/15/2019 ([employer])  Negative tilt table test 12/20/2019      -Status post stent to proximal LAD 11/05/2018     Cardiac catheterization 11/05/2018 revealed  Left ventricular size and contractility is normal with ejection fraction of 65%.  Left main coronary artery is normal.  Left anterior descending artery has proximal calcific plaquing with 95-99% proximal LAD disease.  Circumflex coronary artery is normal.  Right coronary artery is a large and dominant vessel that has mid segment 50% disease.      -Chest pain suggestive of unstable angina pectoris. prior to stent placement   CT scan of the chest showed significant coronary artery calcifications(prior to stent placement).  EKG is normal  Troponin levels are  negative.      -COPD dyslipidemia hypertension      -Former smoker      -Strong family history of coronary artery disease      -No known allergies  =========  Plan  ===========  EKG showed sinus rhythm without ischemic changes  patient is not having any angina pectoris or congestive heart failure  Patient did not have any symptoms of dizziness or syncope since last visit.  Patient had bradycardia with a rate of 30/min while he was sleeping without CPAP machine.  History of Loop recorder monitoring showed second-degree Mobitz type I and type II (shorter duration).  Medications were reviewed and updated.  Continue Plavix and other medications.  Consideration would be given for discontinuation of Plavix since it has been more than a year since he had stent placement.  No need for pacemaker at this time  However they would like to wait and observe at this time.  Follow-up in the office in 6 months.  Further plan will depend on patient's progress  ]]]]]]]]]]]]]]]                      Diagnosis Plan   1. Status post placement of implantable loop recorder     2. Syncope and collapse     3. Status post angioplasty with stent     4. Essential hypertension     5. Dyslipidemia     LAB RESULTS (LAST 7 DAYS)    CBC        BMP        CMP         BNP        TROPONIN        CoAg        Creatinine Clearance  CrCl cannot be calculated (Patient's most recent lab result is older than the maximum 30 days allowed.).    ABG        Radiology  No radiology results for the last day                The following portions of the patient's history were reviewed and updated as appropriate: allergies, current medications, past family history, past medical history, past social history, past surgical history and problem list.    Review of Systems   Constitution: Negative for malaise/fatigue.   Cardiovascular: Negative for chest pain, leg swelling, palpitations and syncope.   Respiratory: Negative for shortness of breath.    Skin: Negative for rash.    Gastrointestinal: Negative for nausea and vomiting.   Neurological: Negative for dizziness, light-headedness and numbness.         Current Outpatient Medications:   •  amLODIPine (NORVASC) 10 MG tablet, TAKE 1 TABLET DAILY, Disp: 90 tablet, Rfl: 1  •  aspirin (ASPIR-LOW) 81 MG EC tablet, Take 81 mg by mouth Daily., Disp: , Rfl:   •  atorvastatin (LIPITOR) 40 MG tablet, Take 40 mg by mouth Daily., Disp: , Rfl:   •  clopidogrel (PLAVIX) 75 MG tablet, TAKE 1 TABLET BY MOUTH DAILY, Disp: 90 tablet, Rfl: 1  •  dicyclomine (BENTYL) 10 MG capsule, TAKE 1 CAPSULE BY MOUTH FOUR TIMES DAILY BEFORE MEALS AND AT BEDTIME, Disp: 120 capsule, Rfl: 5  •  DULoxetine (CYMBALTA) 60 MG capsule, TAKE 1 CAPSULE BY MOUTH EVERY DAY, Disp: 90 capsule, Rfl: 0  •  HYDROcodone-acetaminophen (NORCO)  MG per tablet, Take 1 tablet by mouth 4 (Four) Times a Day., Disp: 120 tablet, Rfl: 0  •  ipratropium-albuterol (DUO-NEB) 0.5-2.5 mg/3 ml nebulizer, Take 3 mL by nebulization Every 4 (Four) Hours As Needed for Wheezing., Disp: 360 mL, Rfl: 5  •  lisinopril (PRINIVIL,ZESTRIL) 30 MG tablet, TAKE 1 TABLET BY MOUTH DAILY, Disp: 90 tablet, Rfl: 1  •  loratadine (CLARITIN) 10 MG tablet, TAKE 1 TABLET BY MOUTH DAILY, Disp: 90 tablet, Rfl: 1  •  modafinil (PROVIGIL) 200 MG tablet, Take 1 tablet by mouth Daily., Disp: 30 tablet, Rfl: 0  •  omeprazole (priLOSEC) 40 MG capsule, Take 40 mg by mouth Daily., Disp: , Rfl:   •  pregabalin (LYRICA) 100 MG capsule, TAKE 1 CAPSULE BY MOUTH TWICE DAILY, Disp: 180 capsule, Rfl: 1  •  tiZANidine (ZANAFLEX) 4 MG tablet, Take 1 tablet by mouth Every 8 (Eight) Hours As Needed for Muscle Spasms., Disp: 30 tablet, Rfl: 1  •  TRELEGY ELLIPTA 100-62.5-25 MCG/INH aerosol powder , INHALE 1 PUFF BY MOUTH EVERY DAY, Disp: 60 each, Rfl: 5  •  triamcinolone (KENALOG) 0.1 % ointment, Apply  topically to the appropriate area as directed 2 (Two) Times a Day., Disp: 30 g, Rfl: 1  •  Vortioxetine HBr (TRINTELLIX) 10 MG tablet,  Take 10 mg by mouth Daily., Disp: 30 tablet, Rfl:     No Known Allergies    Family History   Problem Relation Age of Onset   • Colon cancer Mother    • Heart disease Mother    • Colon cancer Father    • Heart disease Father    • Heart attack Father    • Diabetes Brother    • Diabetes Maternal Grandmother    • Diabetes Maternal Grandfather        Past Surgical History:   Procedure Laterality Date   • CARDIAC CATHETERIZATION  2018    Dr. Sher   • COLONOSCOPY  2016   • CORONARY STENT PLACEMENT     • LIVER BIOPSY     • TONSILLECTOMY AND ADENOIDECTOMY         Past Medical History:   Diagnosis Date   • Acute diverticulitis     with perforation   • Allergic    • Anemia    • Anxiety    • Back pain    • CAD S/P percutaneous coronary angioplasty    • COPD (chronic obstructive pulmonary disease) (CMS/HCC)    • Depression    • Diverticulitis    • GERD (gastroesophageal reflux disease)    • Hyperlipidemia    • Hypertension    • Low back pain    • Myocardial infarction (CMS/HCC)    • TEIXEIRA (nonalcoholic steatohepatitis)    • Obesity    • BRITTNEY on CPAP    • Sleep apnea    • Sleep disorder, shift work    • Spinal stenosis of lumbar region at multiple levels        Family History   Problem Relation Age of Onset   • Colon cancer Mother    • Heart disease Mother    • Colon cancer Father    • Heart disease Father    • Heart attack Father    • Diabetes Brother    • Diabetes Maternal Grandmother    • Diabetes Maternal Grandfather        Social History     Socioeconomic History   • Marital status:      Spouse name: Not on file   • Number of children: Not on file   • Years of education: Not on file   • Highest education level: Not on file   Tobacco Use   • Smoking status: Former Smoker     Packs/day: 1.50     Years: 40.00     Pack years: 60.00     Types: Cigarettes     Start date: 1969     Quit date:      Years since quittin.8   • Smokeless tobacco: Never Used   Substance and Sexual Activity   • Alcohol use: Not  "Currently     Alcohol/week: 1.0 standard drinks     Types: 1 Glasses of wine per week     Frequency: Never     Comment: No alcohol the past 10 years.   • Drug use: No   • Sexual activity: Not Currently     Partners: Female     Birth control/protection: None           ECG 12 Lead    Date/Time: 11/5/2020 2:33 PM  Performed by: Mesha Sher MD  Authorized by: Mesha Sher MD   Comparison: compared with previous ECG   Similar to previous ECG  Comparison to previous ECG: Normal sinus rhythm normal ECG 74/min normal axis normal intervals no ectopy no change from 10/27/2019                Objective:       Physical Exam    /78 (BP Location: Left arm, Patient Position: Sitting, Cuff Size: Large Adult)   Pulse 80   Temp 97.7 °F (36.5 °C)   Ht 172.7 cm (68\")   Wt 112 kg (247 lb)   SpO2 93%   BMI 37.56 kg/m²   The patient is alert, oriented and in no distress.    Vital signs as noted above.    Head and neck revealed no carotid bruits or jugular venous distension.  No thyromegaly or lymphadenopathy is present.    Lungs clear.  No wheezing.  Breath sounds are normal bilaterally.    Heart normal first and second heart sounds.  No murmur..  No pericardial rub is present.  No gallop is present.    Abdomen soft and nontender.  No organomegaly is present.    Extremities revealed good peripheral pulses without any pedal edema.    Skin warm and dry.    Musculoskeletal system is grossly normal.    CNS grossly normal.    Reviewed and unchanged from last visit.      "

## 2020-11-10 RX ORDER — TIZANIDINE 4 MG/1
4 TABLET ORAL EVERY 8 HOURS PRN
Qty: 30 TABLET | Refills: 1 | Status: SHIPPED | OUTPATIENT
Start: 2020-11-10 | End: 2020-12-28

## 2020-11-10 RX ORDER — CLOPIDOGREL BISULFATE 75 MG/1
TABLET ORAL
Qty: 90 TABLET | Refills: 1 | Status: SHIPPED | OUTPATIENT
Start: 2020-11-10 | End: 2021-03-16 | Stop reason: SDUPTHER

## 2020-11-10 RX ORDER — DULOXETIN HYDROCHLORIDE 60 MG/1
CAPSULE, DELAYED RELEASE ORAL
Qty: 90 CAPSULE | Refills: 0 | Status: SHIPPED | OUTPATIENT
Start: 2020-11-10 | End: 2021-02-04 | Stop reason: SDUPTHER

## 2020-11-18 DIAGNOSIS — G89.29 CHRONIC BILATERAL LOW BACK PAIN WITHOUT SCIATICA: ICD-10-CM

## 2020-11-18 DIAGNOSIS — M54.50 CHRONIC BILATERAL LOW BACK PAIN WITHOUT SCIATICA: ICD-10-CM

## 2020-11-18 RX ORDER — HYDROCODONE BITARTRATE AND ACETAMINOPHEN 10; 325 MG/1; MG/1
1 TABLET ORAL 4 TIMES DAILY
Qty: 120 TABLET | Refills: 0 | Status: SHIPPED | OUTPATIENT
Start: 2020-11-18 | End: 2020-12-16 | Stop reason: SDUPTHER

## 2020-12-11 DIAGNOSIS — G47.419 PRIMARY NARCOLEPSY WITHOUT CATAPLEXY: ICD-10-CM

## 2020-12-11 RX ORDER — MODAFINIL 200 MG/1
200 TABLET ORAL DAILY
Qty: 30 TABLET | Refills: 0 | Status: SHIPPED | OUTPATIENT
Start: 2020-12-11 | End: 2020-12-11 | Stop reason: SDUPTHER

## 2020-12-11 RX ORDER — MODAFINIL 200 MG/1
200 TABLET ORAL DAILY
Qty: 30 TABLET | Refills: 0 | Status: SHIPPED | OUTPATIENT
Start: 2020-12-11 | End: 2021-01-17 | Stop reason: SDUPTHER

## 2020-12-16 DIAGNOSIS — G89.29 CHRONIC BILATERAL LOW BACK PAIN WITHOUT SCIATICA: ICD-10-CM

## 2020-12-16 DIAGNOSIS — M54.50 CHRONIC BILATERAL LOW BACK PAIN WITHOUT SCIATICA: ICD-10-CM

## 2020-12-16 RX ORDER — HYDROCODONE BITARTRATE AND ACETAMINOPHEN 10; 325 MG/1; MG/1
1 TABLET ORAL 4 TIMES DAILY
Qty: 120 TABLET | Refills: 0 | Status: SHIPPED | OUTPATIENT
Start: 2020-12-16 | End: 2021-01-13 | Stop reason: SDUPTHER

## 2020-12-28 RX ORDER — TIZANIDINE 4 MG/1
TABLET ORAL
Qty: 30 TABLET | Refills: 1 | Status: SHIPPED | OUTPATIENT
Start: 2020-12-28 | End: 2021-01-21

## 2021-01-13 DIAGNOSIS — M54.50 CHRONIC BILATERAL LOW BACK PAIN WITHOUT SCIATICA: ICD-10-CM

## 2021-01-13 DIAGNOSIS — G89.29 CHRONIC BILATERAL LOW BACK PAIN WITHOUT SCIATICA: ICD-10-CM

## 2021-01-13 RX ORDER — HYDROCODONE BITARTRATE AND ACETAMINOPHEN 10; 325 MG/1; MG/1
1 TABLET ORAL 4 TIMES DAILY
Qty: 120 TABLET | Refills: 0 | Status: SHIPPED | OUTPATIENT
Start: 2021-01-13 | End: 2021-02-10 | Stop reason: SDUPTHER

## 2021-01-17 DIAGNOSIS — G47.419 PRIMARY NARCOLEPSY WITHOUT CATAPLEXY: ICD-10-CM

## 2021-01-18 RX ORDER — MODAFINIL 200 MG/1
200 TABLET ORAL DAILY
Qty: 30 TABLET | Refills: 0 | Status: SHIPPED | OUTPATIENT
Start: 2021-01-18 | End: 2021-03-18 | Stop reason: SDUPTHER

## 2021-01-21 RX ORDER — TIZANIDINE 4 MG/1
TABLET ORAL
Qty: 30 TABLET | Refills: 1 | Status: SHIPPED | OUTPATIENT
Start: 2021-01-21 | End: 2021-02-10

## 2021-02-04 RX ORDER — DULOXETIN HYDROCHLORIDE 60 MG/1
60 CAPSULE, DELAYED RELEASE ORAL DAILY
Qty: 90 CAPSULE | Refills: 3 | Status: SHIPPED | OUTPATIENT
Start: 2021-02-04 | End: 2021-10-12 | Stop reason: SDUPTHER

## 2021-02-07 DIAGNOSIS — G47.419 PRIMARY NARCOLEPSY WITHOUT CATAPLEXY: ICD-10-CM

## 2021-02-08 DIAGNOSIS — G47.419 PRIMARY NARCOLEPSY WITHOUT CATAPLEXY: ICD-10-CM

## 2021-02-08 RX ORDER — MODAFINIL 200 MG/1
TABLET ORAL
Qty: 30 TABLET | Refills: 0 | OUTPATIENT
Start: 2021-02-08

## 2021-02-08 RX ORDER — MODAFINIL 200 MG/1
200 TABLET ORAL DAILY
Qty: 30 TABLET | Refills: 0 | OUTPATIENT
Start: 2021-02-08

## 2021-02-10 DIAGNOSIS — M54.50 CHRONIC BILATERAL LOW BACK PAIN WITHOUT SCIATICA: ICD-10-CM

## 2021-02-10 DIAGNOSIS — G89.29 CHRONIC BILATERAL LOW BACK PAIN WITHOUT SCIATICA: ICD-10-CM

## 2021-02-10 RX ORDER — TIZANIDINE 4 MG/1
TABLET ORAL
Qty: 30 TABLET | Refills: 1 | Status: SHIPPED | OUTPATIENT
Start: 2021-02-10 | End: 2021-07-09 | Stop reason: SDUPTHER

## 2021-02-10 RX ORDER — HYDROCODONE BITARTRATE AND ACETAMINOPHEN 10; 325 MG/1; MG/1
1 TABLET ORAL 4 TIMES DAILY
Qty: 120 TABLET | Refills: 0 | Status: SHIPPED | OUTPATIENT
Start: 2021-02-10 | End: 2021-03-08

## 2021-02-10 NOTE — TELEPHONE ENCOUNTER
Can you remind patient that for chronic pain medication he has to be seen in the office every 4 months to keep those refills going.   His last visit was in Oct. 2020 so he is due this month.   If he gets a follow up appointment scheduled then I can refill his pain medication.

## 2021-02-20 ENCOUNTER — HOSPITAL ENCOUNTER (OUTPATIENT)
Facility: HOSPITAL | Age: 66
Discharge: HOME | End: 2021-02-24
Attending: INTERNAL MEDICINE | Admitting: INTERNAL MEDICINE

## 2021-02-20 ENCOUNTER — APPOINTMENT (OUTPATIENT)
Dept: CT IMAGING | Facility: HOSPITAL | Age: 66
End: 2021-02-20

## 2021-02-20 DIAGNOSIS — R10.13 EPIGASTRIC PAIN: ICD-10-CM

## 2021-02-20 DIAGNOSIS — K81.9 CHOLECYSTITIS: ICD-10-CM

## 2021-02-20 DIAGNOSIS — K80.42 CHOLEDOCHOLITHIASIS WITH ACUTE CHOLECYSTITIS: Primary | ICD-10-CM

## 2021-02-20 DIAGNOSIS — J44.9 CHRONIC OBSTRUCTIVE PULMONARY DISEASE, UNSPECIFIED COPD TYPE (HCC): ICD-10-CM

## 2021-02-20 DIAGNOSIS — R11.0 NAUSEA: ICD-10-CM

## 2021-02-20 LAB
ALBUMIN SERPL-MCNC: 4.1 G/DL (ref 3.5–5.2)
ALBUMIN/GLOB SERPL: 1.3 G/DL
ALP SERPL-CCNC: 146 U/L (ref 39–117)
ALT SERPL W P-5'-P-CCNC: 74 U/L (ref 1–41)
ANION GAP SERPL CALCULATED.3IONS-SCNC: 10 MMOL/L (ref 5–15)
APTT PPP: 30.3 SECONDS (ref 24–31)
AST SERPL-CCNC: 33 U/L (ref 1–40)
BASOPHILS # BLD AUTO: 0.1 10*3/MM3 (ref 0–0.2)
BASOPHILS NFR BLD AUTO: 1 % (ref 0–1.5)
BILIRUB SERPL-MCNC: 0.4 MG/DL (ref 0–1.2)
BUN SERPL-MCNC: 9 MG/DL (ref 8–23)
BUN/CREAT SERPL: 15.5 (ref 7–25)
CALCIUM SPEC-SCNC: 9.5 MG/DL (ref 8.6–10.5)
CHLORIDE SERPL-SCNC: 99 MMOL/L (ref 98–107)
CO2 SERPL-SCNC: 26 MMOL/L (ref 22–29)
CREAT SERPL-MCNC: 0.58 MG/DL (ref 0.76–1.27)
DEPRECATED RDW RBC AUTO: 43.3 FL (ref 37–54)
EOSINOPHIL # BLD AUTO: 0.6 10*3/MM3 (ref 0–0.4)
EOSINOPHIL NFR BLD AUTO: 4.8 % (ref 0.3–6.2)
ERYTHROCYTE [DISTWIDTH] IN BLOOD BY AUTOMATED COUNT: 14 % (ref 12.3–15.4)
GFR SERPL CREATININE-BSD FRML MDRD: 141 ML/MIN/1.73
GLOBULIN UR ELPH-MCNC: 3.2 GM/DL
GLUCOSE SERPL-MCNC: 127 MG/DL (ref 65–99)
HCT VFR BLD AUTO: 41.9 % (ref 37.5–51)
HGB BLD-MCNC: 14.1 G/DL (ref 13–17.7)
INR PPP: <0.93 (ref 0.93–1.1)
LIPASE SERPL-CCNC: 13 U/L (ref 13–60)
LYMPHOCYTES # BLD AUTO: 1.6 10*3/MM3 (ref 0.7–3.1)
LYMPHOCYTES NFR BLD AUTO: 12.8 % (ref 19.6–45.3)
MCH RBC QN AUTO: 29.3 PG (ref 26.6–33)
MCHC RBC AUTO-ENTMCNC: 33.6 G/DL (ref 31.5–35.7)
MCV RBC AUTO: 87.2 FL (ref 79–97)
MONOCYTES # BLD AUTO: 1.3 10*3/MM3 (ref 0.1–0.9)
MONOCYTES NFR BLD AUTO: 10.7 % (ref 5–12)
NEUTROPHILS NFR BLD AUTO: 70.7 % (ref 42.7–76)
NEUTROPHILS NFR BLD AUTO: 8.6 10*3/MM3 (ref 1.7–7)
NRBC BLD AUTO-RTO: 0 /100 WBC (ref 0–0.2)
PLATELET # BLD AUTO: 388 10*3/MM3 (ref 140–450)
PMV BLD AUTO: 7.7 FL (ref 6–12)
POTASSIUM SERPL-SCNC: 4.7 MMOL/L (ref 3.5–5.2)
PROT SERPL-MCNC: 7.3 G/DL (ref 6–8.5)
PROTHROMBIN TIME: 10.1 SECONDS (ref 9.6–11.7)
RBC # BLD AUTO: 4.81 10*6/MM3 (ref 4.14–5.8)
SARS-COV-2 RNA PNL SPEC NAA+PROBE: NOT DETECTED
SODIUM SERPL-SCNC: 135 MMOL/L (ref 136–145)
TROPONIN T SERPL-MCNC: <0.01 NG/ML (ref 0–0.03)
WBC # BLD AUTO: 12.2 10*3/MM3 (ref 3.4–10.8)

## 2021-02-20 PROCEDURE — G0378 HOSPITAL OBSERVATION PER HR: HCPCS

## 2021-02-20 PROCEDURE — 85730 THROMBOPLASTIN TIME PARTIAL: CPT | Performed by: PHYSICIAN ASSISTANT

## 2021-02-20 PROCEDURE — 96374 THER/PROPH/DIAG INJ IV PUSH: CPT

## 2021-02-20 PROCEDURE — C9803 HOPD COVID-19 SPEC COLLECT: HCPCS

## 2021-02-20 PROCEDURE — 25010000002 CEFTRIAXONE PER 250 MG: Performed by: INTERNAL MEDICINE

## 2021-02-20 PROCEDURE — 74176 CT ABD & PELVIS W/O CONTRAST: CPT

## 2021-02-20 PROCEDURE — 80053 COMPREHEN METABOLIC PANEL: CPT | Performed by: PHYSICIAN ASSISTANT

## 2021-02-20 PROCEDURE — 85025 COMPLETE CBC W/AUTO DIFF WBC: CPT | Performed by: PHYSICIAN ASSISTANT

## 2021-02-20 PROCEDURE — U0003 INFECTIOUS AGENT DETECTION BY NUCLEIC ACID (DNA OR RNA); SEVERE ACUTE RESPIRATORY SYNDROME CORONAVIRUS 2 (SARS-COV-2) (CORONAVIRUS DISEASE [COVID-19]), AMPLIFIED PROBE TECHNIQUE, MAKING USE OF HIGH THROUGHPUT TECHNOLOGIES AS DESCRIBED BY CMS-2020-01-R: HCPCS | Performed by: PHYSICIAN ASSISTANT

## 2021-02-20 PROCEDURE — 94640 AIRWAY INHALATION TREATMENT: CPT

## 2021-02-20 PROCEDURE — 84484 ASSAY OF TROPONIN QUANT: CPT | Performed by: PHYSICIAN ASSISTANT

## 2021-02-20 PROCEDURE — 94799 UNLISTED PULMONARY SVC/PX: CPT

## 2021-02-20 PROCEDURE — 25010000002 ONDANSETRON PER 1 MG: Performed by: PHYSICIAN ASSISTANT

## 2021-02-20 PROCEDURE — 83690 ASSAY OF LIPASE: CPT | Performed by: PHYSICIAN ASSISTANT

## 2021-02-20 PROCEDURE — 99285 EMERGENCY DEPT VISIT HI MDM: CPT

## 2021-02-20 PROCEDURE — 99284 EMERGENCY DEPT VISIT MOD MDM: CPT

## 2021-02-20 PROCEDURE — 85610 PROTHROMBIN TIME: CPT | Performed by: PHYSICIAN ASSISTANT

## 2021-02-20 PROCEDURE — 87040 BLOOD CULTURE FOR BACTERIA: CPT | Performed by: INTERNAL MEDICINE

## 2021-02-20 PROCEDURE — 99220 PR INITIAL OBSERVATION CARE/DAY 70 MINUTES: CPT | Performed by: INTERNAL MEDICINE

## 2021-02-20 RX ORDER — HYDROCODONE BITARTRATE AND ACETAMINOPHEN 10; 325 MG/1; MG/1
1 TABLET ORAL 4 TIMES DAILY
Status: DISCONTINUED | OUTPATIENT
Start: 2021-02-20 | End: 2021-02-22

## 2021-02-20 RX ORDER — CLOPIDOGREL BISULFATE 75 MG/1
75 TABLET ORAL DAILY
Status: DISCONTINUED | OUTPATIENT
Start: 2021-02-21 | End: 2021-02-21

## 2021-02-20 RX ORDER — BUDESONIDE AND FORMOTEROL FUMARATE DIHYDRATE 80; 4.5 UG/1; UG/1
2 AEROSOL RESPIRATORY (INHALATION)
Status: DISCONTINUED | OUTPATIENT
Start: 2021-02-20 | End: 2021-02-24 | Stop reason: HOSPADM

## 2021-02-20 RX ORDER — DULOXETIN HYDROCHLORIDE 30 MG/1
60 CAPSULE, DELAYED RELEASE ORAL DAILY
Status: DISCONTINUED | OUTPATIENT
Start: 2021-02-21 | End: 2021-02-24 | Stop reason: HOSPADM

## 2021-02-20 RX ORDER — ACETAMINOPHEN 160 MG/5ML
650 SOLUTION ORAL EVERY 4 HOURS PRN
Status: DISCONTINUED | OUTPATIENT
Start: 2021-02-20 | End: 2021-02-24 | Stop reason: HOSPADM

## 2021-02-20 RX ORDER — ALUMINA, MAGNESIA, AND SIMETHICONE 2400; 2400; 240 MG/30ML; MG/30ML; MG/30ML
15 SUSPENSION ORAL EVERY 6 HOURS PRN
Status: DISCONTINUED | OUTPATIENT
Start: 2021-02-20 | End: 2021-02-24 | Stop reason: HOSPADM

## 2021-02-20 RX ORDER — ONDANSETRON 2 MG/ML
4 INJECTION INTRAMUSCULAR; INTRAVENOUS EVERY 6 HOURS PRN
Status: DISCONTINUED | OUTPATIENT
Start: 2021-02-20 | End: 2021-02-21

## 2021-02-20 RX ORDER — CHOLECALCIFEROL (VITAMIN D3) 125 MCG
5 CAPSULE ORAL NIGHTLY PRN
Status: DISCONTINUED | OUTPATIENT
Start: 2021-02-20 | End: 2021-02-24 | Stop reason: HOSPADM

## 2021-02-20 RX ORDER — IPRATROPIUM BROMIDE AND ALBUTEROL SULFATE 2.5; .5 MG/3ML; MG/3ML
3 SOLUTION RESPIRATORY (INHALATION) EVERY 4 HOURS PRN
Status: DISCONTINUED | OUTPATIENT
Start: 2021-02-20 | End: 2021-02-24 | Stop reason: HOSPADM

## 2021-02-20 RX ORDER — SODIUM CHLORIDE 0.9 % (FLUSH) 0.9 %
10 SYRINGE (ML) INJECTION AS NEEDED
Status: DISCONTINUED | OUTPATIENT
Start: 2021-02-20 | End: 2021-02-24 | Stop reason: HOSPADM

## 2021-02-20 RX ORDER — CETIRIZINE HYDROCHLORIDE 10 MG/1
10 TABLET ORAL DAILY
Status: DISCONTINUED | OUTPATIENT
Start: 2021-02-21 | End: 2021-02-24 | Stop reason: HOSPADM

## 2021-02-20 RX ORDER — AMLODIPINE BESYLATE 5 MG/1
10 TABLET ORAL DAILY
Status: DISCONTINUED | OUTPATIENT
Start: 2021-02-21 | End: 2021-02-24 | Stop reason: HOSPADM

## 2021-02-20 RX ORDER — SODIUM CHLORIDE 0.9 % (FLUSH) 0.9 %
3-10 SYRINGE (ML) INJECTION AS NEEDED
Status: DISCONTINUED | OUTPATIENT
Start: 2021-02-20 | End: 2021-02-24 | Stop reason: HOSPADM

## 2021-02-20 RX ORDER — MODAFINIL 100 MG/1
200 TABLET ORAL DAILY
Status: DISCONTINUED | OUTPATIENT
Start: 2021-02-21 | End: 2021-02-24 | Stop reason: HOSPADM

## 2021-02-20 RX ORDER — TIZANIDINE 4 MG/1
4 TABLET ORAL EVERY 8 HOURS PRN
Status: DISCONTINUED | OUTPATIENT
Start: 2021-02-20 | End: 2021-02-24 | Stop reason: HOSPADM

## 2021-02-20 RX ORDER — ATORVASTATIN CALCIUM 40 MG/1
40 TABLET, FILM COATED ORAL NIGHTLY
Status: DISCONTINUED | OUTPATIENT
Start: 2021-02-20 | End: 2021-02-24 | Stop reason: HOSPADM

## 2021-02-20 RX ORDER — CALCIUM CARBONATE 200(500)MG
1 TABLET,CHEWABLE ORAL 2 TIMES DAILY PRN
Status: DISCONTINUED | OUTPATIENT
Start: 2021-02-20 | End: 2021-02-24 | Stop reason: HOSPADM

## 2021-02-20 RX ORDER — ACETAMINOPHEN 325 MG/1
650 TABLET ORAL EVERY 4 HOURS PRN
Status: DISCONTINUED | OUTPATIENT
Start: 2021-02-20 | End: 2021-02-24 | Stop reason: HOSPADM

## 2021-02-20 RX ORDER — HYDROCODONE BITARTRATE AND ACETAMINOPHEN 10; 325 MG/1; MG/1
1 TABLET ORAL ONCE
Status: COMPLETED | OUTPATIENT
Start: 2021-02-20 | End: 2021-02-20

## 2021-02-20 RX ORDER — ASPIRIN 81 MG/1
81 TABLET ORAL DAILY
Status: DISCONTINUED | OUTPATIENT
Start: 2021-02-21 | End: 2021-02-24 | Stop reason: HOSPADM

## 2021-02-20 RX ORDER — ACETAMINOPHEN 650 MG/1
650 SUPPOSITORY RECTAL EVERY 4 HOURS PRN
Status: DISCONTINUED | OUTPATIENT
Start: 2021-02-20 | End: 2021-02-24 | Stop reason: HOSPADM

## 2021-02-20 RX ORDER — ONDANSETRON 4 MG/1
4 TABLET, FILM COATED ORAL EVERY 6 HOURS PRN
Status: DISCONTINUED | OUTPATIENT
Start: 2021-02-20 | End: 2021-02-21

## 2021-02-20 RX ORDER — PANTOPRAZOLE SODIUM 40 MG/1
40 TABLET, DELAYED RELEASE ORAL EVERY MORNING
Status: DISCONTINUED | OUTPATIENT
Start: 2021-02-21 | End: 2021-02-24 | Stop reason: HOSPADM

## 2021-02-20 RX ORDER — NITROGLYCERIN 0.4 MG/1
0.4 TABLET SUBLINGUAL
Status: DISCONTINUED | OUTPATIENT
Start: 2021-02-20 | End: 2021-02-24 | Stop reason: HOSPADM

## 2021-02-20 RX ORDER — PREGABALIN 100 MG/1
100 CAPSULE ORAL 2 TIMES DAILY
Status: DISCONTINUED | OUTPATIENT
Start: 2021-02-20 | End: 2021-02-24 | Stop reason: HOSPADM

## 2021-02-20 RX ORDER — SODIUM CHLORIDE 0.9 % (FLUSH) 0.9 %
3 SYRINGE (ML) INJECTION EVERY 12 HOURS SCHEDULED
Status: DISCONTINUED | OUTPATIENT
Start: 2021-02-20 | End: 2021-02-24 | Stop reason: HOSPADM

## 2021-02-20 RX ORDER — DICYCLOMINE HYDROCHLORIDE 10 MG/1
10 CAPSULE ORAL
Status: DISCONTINUED | OUTPATIENT
Start: 2021-02-20 | End: 2021-02-24 | Stop reason: HOSPADM

## 2021-02-20 RX ORDER — ONDANSETRON 2 MG/ML
4 INJECTION INTRAMUSCULAR; INTRAVENOUS ONCE
Status: COMPLETED | OUTPATIENT
Start: 2021-02-20 | End: 2021-02-20

## 2021-02-20 RX ADMIN — HYDROCODONE BITARTRATE AND ACETAMINOPHEN 1 TABLET: 10; 325 TABLET ORAL at 16:56

## 2021-02-20 RX ADMIN — ONDANSETRON 4 MG: 2 INJECTION, SOLUTION INTRAMUSCULAR; INTRAVENOUS at 14:23

## 2021-02-20 RX ADMIN — DICYCLOMINE HYDROCHLORIDE 10 MG: 10 CAPSULE ORAL at 21:37

## 2021-02-20 RX ADMIN — HYDROCODONE BITARTRATE AND ACETAMINOPHEN 1 TABLET: 10; 325 TABLET ORAL at 21:37

## 2021-02-20 RX ADMIN — ATORVASTATIN CALCIUM 40 MG: 40 TABLET, FILM COATED ORAL at 21:37

## 2021-02-20 RX ADMIN — Medication 3 ML: at 21:37

## 2021-02-20 RX ADMIN — IPRATROPIUM BROMIDE 0.5 MG: 0.5 SOLUTION RESPIRATORY (INHALATION) at 20:40

## 2021-02-20 RX ADMIN — Medication 10 ML: at 14:24

## 2021-02-20 RX ADMIN — CEFTRIAXONE SODIUM 1 G: 1 INJECTION, POWDER, FOR SOLUTION INTRAMUSCULAR; INTRAVENOUS at 17:57

## 2021-02-20 RX ADMIN — BUDESONIDE AND FORMOTEROL FUMARATE DIHYDRATE 2 PUFF: 80; 4.5 AEROSOL RESPIRATORY (INHALATION) at 20:40

## 2021-02-20 RX ADMIN — IPRATROPIUM BROMIDE 0.5 MG: 0.5 SOLUTION RESPIRATORY (INHALATION) at 23:27

## 2021-02-20 RX ADMIN — PREGABALIN 100 MG: 100 CAPSULE ORAL at 21:37

## 2021-02-21 ENCOUNTER — APPOINTMENT (OUTPATIENT)
Dept: GENERAL RADIOLOGY | Facility: HOSPITAL | Age: 66
End: 2021-02-21

## 2021-02-21 ENCOUNTER — ANESTHESIA EVENT (OUTPATIENT)
Dept: GASTROENTEROLOGY | Facility: HOSPITAL | Age: 66
End: 2021-02-21

## 2021-02-21 ENCOUNTER — ON CAMPUS - OUTPATIENT (OUTPATIENT)
Dept: URBAN - METROPOLITAN AREA HOSPITAL 85 | Facility: HOSPITAL | Age: 66
End: 2021-02-21

## 2021-02-21 ENCOUNTER — ANESTHESIA (OUTPATIENT)
Dept: GASTROENTEROLOGY | Facility: HOSPITAL | Age: 66
End: 2021-02-21

## 2021-02-21 DIAGNOSIS — K80.42 CALCULUS OF BILE DUCT WITH ACUTE CHOLECYSTITIS WITHOUT OBSTR: ICD-10-CM

## 2021-02-21 DIAGNOSIS — K83.1 OBSTRUCTION OF BILE DUCT: ICD-10-CM

## 2021-02-21 DIAGNOSIS — R93.2 ABNORMAL FINDINGS ON DIAGNOSTIC IMAGING OF LIVER AND BILIARY: ICD-10-CM

## 2021-02-21 DIAGNOSIS — R74.8 ABNORMAL LEVELS OF OTHER SERUM ENZYMES: ICD-10-CM

## 2021-02-21 DIAGNOSIS — R10.13 EPIGASTRIC PAIN: ICD-10-CM

## 2021-02-21 LAB
ALBUMIN SERPL-MCNC: 3.8 G/DL (ref 3.5–5.2)
ALBUMIN/GLOB SERPL: 1.4 G/DL
ALP SERPL-CCNC: 125 U/L (ref 39–117)
ALT SERPL W P-5'-P-CCNC: 58 U/L (ref 1–41)
ANION GAP SERPL CALCULATED.3IONS-SCNC: 10 MMOL/L (ref 5–15)
AST SERPL-CCNC: 22 U/L (ref 1–40)
BASOPHILS # BLD AUTO: 0.1 10*3/MM3 (ref 0–0.2)
BASOPHILS NFR BLD AUTO: 0.9 % (ref 0–1.5)
BILIRUB SERPL-MCNC: 0.4 MG/DL (ref 0–1.2)
BUN SERPL-MCNC: 10 MG/DL (ref 8–23)
BUN/CREAT SERPL: 12.5 (ref 7–25)
CALCIUM SPEC-SCNC: 9.6 MG/DL (ref 8.6–10.5)
CHLORIDE SERPL-SCNC: 98 MMOL/L (ref 98–107)
CO2 SERPL-SCNC: 28 MMOL/L (ref 22–29)
CREAT SERPL-MCNC: 0.8 MG/DL (ref 0.76–1.27)
DEPRECATED RDW RBC AUTO: 43.8 FL (ref 37–54)
EOSINOPHIL # BLD AUTO: 0.5 10*3/MM3 (ref 0–0.4)
EOSINOPHIL NFR BLD AUTO: 5 % (ref 0.3–6.2)
ERYTHROCYTE [DISTWIDTH] IN BLOOD BY AUTOMATED COUNT: 14.3 % (ref 12.3–15.4)
GFR SERPL CREATININE-BSD FRML MDRD: 97 ML/MIN/1.73
GLOBULIN UR ELPH-MCNC: 2.8 GM/DL
GLUCOSE SERPL-MCNC: 106 MG/DL (ref 65–99)
HCT VFR BLD AUTO: 42.4 % (ref 37.5–51)
HGB BLD-MCNC: 14.1 G/DL (ref 13–17.7)
LYMPHOCYTES # BLD AUTO: 1.7 10*3/MM3 (ref 0.7–3.1)
LYMPHOCYTES NFR BLD AUTO: 16.8 % (ref 19.6–45.3)
MCH RBC QN AUTO: 29.2 PG (ref 26.6–33)
MCHC RBC AUTO-ENTMCNC: 33.2 G/DL (ref 31.5–35.7)
MCV RBC AUTO: 87.9 FL (ref 79–97)
MONOCYTES # BLD AUTO: 1.1 10*3/MM3 (ref 0.1–0.9)
MONOCYTES NFR BLD AUTO: 10.7 % (ref 5–12)
NEUTROPHILS NFR BLD AUTO: 6.7 10*3/MM3 (ref 1.7–7)
NEUTROPHILS NFR BLD AUTO: 66.6 % (ref 42.7–76)
NRBC BLD AUTO-RTO: 0.1 /100 WBC (ref 0–0.2)
PLATELET # BLD AUTO: 348 10*3/MM3 (ref 140–450)
PMV BLD AUTO: 7.9 FL (ref 6–12)
POTASSIUM SERPL-SCNC: 4.6 MMOL/L (ref 3.5–5.2)
PROT SERPL-MCNC: 6.6 G/DL (ref 6–8.5)
RBC # BLD AUTO: 4.82 10*6/MM3 (ref 4.14–5.8)
SODIUM SERPL-SCNC: 136 MMOL/L (ref 136–145)
WBC # BLD AUTO: 10.1 10*3/MM3 (ref 3.4–10.8)

## 2021-02-21 PROCEDURE — 25010000002 CEFTRIAXONE PER 250 MG: Performed by: INTERNAL MEDICINE

## 2021-02-21 PROCEDURE — 80053 COMPREHEN METABOLIC PANEL: CPT | Performed by: INTERNAL MEDICINE

## 2021-02-21 PROCEDURE — 43264 ERCP REMOVE DUCT CALCULI: CPT | Mod: 59 | Performed by: INTERNAL MEDICINE

## 2021-02-21 PROCEDURE — 25010000002 CEFTRIAXONE PER 250 MG: Performed by: SURGERY

## 2021-02-21 PROCEDURE — 25010000002 PROPOFOL 10 MG/ML EMULSION: Performed by: ANESTHESIOLOGY

## 2021-02-21 PROCEDURE — C2625 STENT, NON-COR, TEM W/DEL SY: HCPCS | Performed by: INTERNAL MEDICINE

## 2021-02-21 PROCEDURE — 25010000002 FENTANYL CITRATE (PF) 100 MCG/2ML SOLUTION: Performed by: ANESTHESIOLOGY

## 2021-02-21 PROCEDURE — 99226 PR SBSQ OBSERVATION CARE/DAY 35 MINUTES: CPT | Performed by: INTERNAL MEDICINE

## 2021-02-21 PROCEDURE — 94760 N-INVAS EAR/PLS OXIMETRY 1: CPT

## 2021-02-21 PROCEDURE — 25010000002 ONDANSETRON PER 1 MG: Performed by: ANESTHESIOLOGY

## 2021-02-21 PROCEDURE — G0378 HOSPITAL OBSERVATION PER HR: HCPCS

## 2021-02-21 PROCEDURE — 94799 UNLISTED PULMONARY SVC/PX: CPT

## 2021-02-21 PROCEDURE — 85025 COMPLETE CBC W/AUTO DIFF WBC: CPT | Performed by: INTERNAL MEDICINE

## 2021-02-21 PROCEDURE — 43274 ERCP DUCT STENT PLACEMENT: CPT | Performed by: INTERNAL MEDICINE

## 2021-02-21 PROCEDURE — 0 IOPAMIDOL 41 % SOLUTION 10 ML VIAL: Performed by: INTERNAL MEDICINE

## 2021-02-21 PROCEDURE — 74330 X-RAY BILE/PANC ENDOSCOPY: CPT

## 2021-02-21 PROCEDURE — 25010000002 GLUCAGON (HUMAN RECOMBINANT) 1 MG RECONSTITUTED SOLUTION: Performed by: ANESTHESIOLOGY

## 2021-02-21 PROCEDURE — C1769 GUIDE WIRE: HCPCS | Performed by: INTERNAL MEDICINE

## 2021-02-21 PROCEDURE — 99243 OFF/OP CNSLTJ NEW/EST LOW 30: CPT | Performed by: SURGERY

## 2021-02-21 DEVICE — BILIARY STENT WITH NAVIFLEXTM RX DELIVERY SYSTEM
Type: IMPLANTABLE DEVICE | Site: BILE DUCT | Status: NON-FUNCTIONAL
Brand: ADVANIX™ BILIARY
Removed: 2021-06-04

## 2021-02-21 RX ORDER — ROCURONIUM BROMIDE 10 MG/ML
INJECTION, SOLUTION INTRAVENOUS AS NEEDED
Status: DISCONTINUED | OUTPATIENT
Start: 2021-02-21 | End: 2021-02-21 | Stop reason: SURG

## 2021-02-21 RX ORDER — HYDROMORPHONE HCL 110MG/55ML
0.25 PATIENT CONTROLLED ANALGESIA SYRINGE INTRAVENOUS
Status: DISCONTINUED | OUTPATIENT
Start: 2021-02-21 | End: 2021-02-21 | Stop reason: HOSPADM

## 2021-02-21 RX ORDER — FENTANYL CITRATE 50 UG/ML
INJECTION, SOLUTION INTRAMUSCULAR; INTRAVENOUS AS NEEDED
Status: DISCONTINUED | OUTPATIENT
Start: 2021-02-21 | End: 2021-02-21 | Stop reason: SURG

## 2021-02-21 RX ORDER — PROPOFOL 10 MG/ML
VIAL (ML) INTRAVENOUS AS NEEDED
Status: DISCONTINUED | OUTPATIENT
Start: 2021-02-21 | End: 2021-02-21 | Stop reason: SURG

## 2021-02-21 RX ORDER — ONDANSETRON 2 MG/ML
4 INJECTION INTRAMUSCULAR; INTRAVENOUS ONCE AS NEEDED
Status: DISCONTINUED | OUTPATIENT
Start: 2021-02-21 | End: 2021-02-21 | Stop reason: HOSPADM

## 2021-02-21 RX ORDER — ONDANSETRON 2 MG/ML
INJECTION INTRAMUSCULAR; INTRAVENOUS AS NEEDED
Status: DISCONTINUED | OUTPATIENT
Start: 2021-02-21 | End: 2021-02-21 | Stop reason: SURG

## 2021-02-21 RX ORDER — GLYCOPYRROLATE 1 MG/5 ML
SYRINGE (ML) INTRAVENOUS AS NEEDED
Status: DISCONTINUED | OUTPATIENT
Start: 2021-02-21 | End: 2021-02-21 | Stop reason: SURG

## 2021-02-21 RX ORDER — SODIUM CHLORIDE 9 MG/ML
INJECTION INTRAVENOUS
Status: COMPLETED
Start: 2021-02-21 | End: 2021-02-21

## 2021-02-21 RX ORDER — SODIUM CHLORIDE 0.9 % (FLUSH) 0.9 %
10 SYRINGE (ML) INJECTION AS NEEDED
Status: CANCELLED | OUTPATIENT
Start: 2021-02-21

## 2021-02-21 RX ORDER — ONDANSETRON 2 MG/ML
4 INJECTION INTRAMUSCULAR; INTRAVENOUS EVERY 6 HOURS PRN
Status: DISCONTINUED | OUTPATIENT
Start: 2021-02-21 | End: 2021-02-24 | Stop reason: HOSPADM

## 2021-02-21 RX ORDER — NEOSTIGMINE METHYLSULFATE 5 MG/5 ML
SYRINGE (ML) INTRAVENOUS AS NEEDED
Status: DISCONTINUED | OUTPATIENT
Start: 2021-02-21 | End: 2021-02-21 | Stop reason: SURG

## 2021-02-21 RX ORDER — PHENYLEPHRINE HCL IN 0.9% NACL 0.5 MG/5ML
SYRINGE (ML) INTRAVENOUS AS NEEDED
Status: DISCONTINUED | OUTPATIENT
Start: 2021-02-21 | End: 2021-02-21 | Stop reason: SURG

## 2021-02-21 RX ORDER — SODIUM CHLORIDE 0.9 % (FLUSH) 0.9 %
3 SYRINGE (ML) INJECTION EVERY 12 HOURS SCHEDULED
Status: CANCELLED | OUTPATIENT
Start: 2021-02-21

## 2021-02-21 RX ORDER — ONDANSETRON 4 MG/1
4 TABLET, FILM COATED ORAL EVERY 6 HOURS PRN
Status: DISCONTINUED | OUTPATIENT
Start: 2021-02-21 | End: 2021-02-24 | Stop reason: HOSPADM

## 2021-02-21 RX ORDER — METOPROLOL TARTRATE 5 MG/5ML
INJECTION INTRAVENOUS AS NEEDED
Status: DISCONTINUED | OUTPATIENT
Start: 2021-02-21 | End: 2021-02-21 | Stop reason: SURG

## 2021-02-21 RX ORDER — SODIUM CHLORIDE 9 MG/ML
INJECTION, SOLUTION INTRAVENOUS CONTINUOUS PRN
Status: DISCONTINUED | OUTPATIENT
Start: 2021-02-21 | End: 2021-02-21 | Stop reason: SURG

## 2021-02-21 RX ORDER — FENTANYL CITRATE 50 UG/ML
50 INJECTION, SOLUTION INTRAMUSCULAR; INTRAVENOUS
Status: DISCONTINUED | OUTPATIENT
Start: 2021-02-21 | End: 2021-02-21 | Stop reason: HOSPADM

## 2021-02-21 RX ADMIN — MODAFINIL 200 MG: 100 TABLET ORAL at 09:51

## 2021-02-21 RX ADMIN — DICYCLOMINE HYDROCHLORIDE 10 MG: 10 CAPSULE ORAL at 17:31

## 2021-02-21 RX ADMIN — ROCURONIUM BROMIDE 50 MG: 10 INJECTION INTRAVENOUS at 14:30

## 2021-02-21 RX ADMIN — DICYCLOMINE HYDROCHLORIDE 10 MG: 10 CAPSULE ORAL at 20:45

## 2021-02-21 RX ADMIN — IPRATROPIUM BROMIDE 0.5 MG: 0.5 SOLUTION RESPIRATORY (INHALATION) at 07:24

## 2021-02-21 RX ADMIN — BUDESONIDE AND FORMOTEROL FUMARATE DIHYDRATE 2 PUFF: 80; 4.5 AEROSOL RESPIRATORY (INHALATION) at 07:24

## 2021-02-21 RX ADMIN — Medication 3 ML: at 20:48

## 2021-02-21 RX ADMIN — ATORVASTATIN CALCIUM 40 MG: 40 TABLET, FILM COATED ORAL at 20:45

## 2021-02-21 RX ADMIN — Medication 5 MG: at 15:07

## 2021-02-21 RX ADMIN — METOPROLOL TARTRATE 3 MG: 5 INJECTION INTRAVENOUS at 15:00

## 2021-02-21 RX ADMIN — DULOXETINE HYDROCHLORIDE 60 MG: 30 CAPSULE, DELAYED RELEASE ORAL at 09:52

## 2021-02-21 RX ADMIN — GLUCAGON HYDROCHLORIDE 0.5 MG: 1 INJECTION, POWDER, FOR SOLUTION INTRAMUSCULAR; INTRAVENOUS; SUBCUTANEOUS at 14:54

## 2021-02-21 RX ADMIN — HYDROCODONE BITARTRATE AND ACETAMINOPHEN 1 TABLET: 10; 325 TABLET ORAL at 20:45

## 2021-02-21 RX ADMIN — IPRATROPIUM BROMIDE 0.5 MG: 0.5 SOLUTION RESPIRATORY (INHALATION) at 18:45

## 2021-02-21 RX ADMIN — DICYCLOMINE HYDROCHLORIDE 10 MG: 10 CAPSULE ORAL at 07:30

## 2021-02-21 RX ADMIN — FENTANYL CITRATE 100 MCG: 50 INJECTION, SOLUTION INTRAMUSCULAR; INTRAVENOUS at 14:28

## 2021-02-21 RX ADMIN — PHENYLEPHRINE HYDROCHLORIDE 100 MCG: 10 INJECTION INTRAVENOUS at 14:52

## 2021-02-21 RX ADMIN — PHENYLEPHRINE HYDROCHLORIDE 100 MCG: 10 INJECTION INTRAVENOUS at 14:50

## 2021-02-21 RX ADMIN — PROPOFOL 160 MG: 10 INJECTION, EMULSION INTRAVENOUS at 14:30

## 2021-02-21 RX ADMIN — Medication 0.4 MG: at 15:07

## 2021-02-21 RX ADMIN — HYDROCODONE BITARTRATE AND ACETAMINOPHEN 1 TABLET: 10; 325 TABLET ORAL at 07:30

## 2021-02-21 RX ADMIN — CEFTRIAXONE SODIUM 1 G: 1 INJECTION, POWDER, FOR SOLUTION INTRAMUSCULAR; INTRAVENOUS at 17:31

## 2021-02-21 RX ADMIN — HYDROCODONE BITARTRATE AND ACETAMINOPHEN 1 TABLET: 10; 325 TABLET ORAL at 17:31

## 2021-02-21 RX ADMIN — BUDESONIDE AND FORMOTEROL FUMARATE DIHYDRATE 2 PUFF: 80; 4.5 AEROSOL RESPIRATORY (INHALATION) at 18:51

## 2021-02-21 RX ADMIN — SODIUM CHLORIDE: 0.9 INJECTION, SOLUTION INTRAVENOUS at 14:23

## 2021-02-21 RX ADMIN — CETIRIZINE HYDROCHLORIDE 10 MG: 10 TABLET, FILM COATED ORAL at 09:52

## 2021-02-21 RX ADMIN — Medication 3 ML: at 09:52

## 2021-02-21 RX ADMIN — LISINOPRIL 30 MG: 20 TABLET ORAL at 09:52

## 2021-02-21 RX ADMIN — PREGABALIN 100 MG: 100 CAPSULE ORAL at 09:51

## 2021-02-21 RX ADMIN — PREGABALIN 100 MG: 100 CAPSULE ORAL at 20:45

## 2021-02-21 RX ADMIN — PHENYLEPHRINE HYDROCHLORIDE 100 MCG: 10 INJECTION INTRAVENOUS at 14:51

## 2021-02-21 RX ADMIN — ONDANSETRON 4 MG: 2 INJECTION INTRAMUSCULAR; INTRAVENOUS at 15:01

## 2021-02-21 RX ADMIN — AMLODIPINE BESYLATE 10 MG: 5 TABLET ORAL at 09:52

## 2021-02-21 NOTE — ANESTHESIA PREPROCEDURE EVALUATION
Anesthesia Evaluation     Patient summary reviewed and Nursing notes reviewed   no history of anesthetic complications:  NPO Solid Status: > 8 hours  NPO Liquid Status: > 8 hours           Airway   Mallampati: III  TM distance: >3 FB  Neck ROM: full  No difficulty expected  Dental      Pulmonary - normal exam   (+) a smoker Former, COPD, sleep apnea,   Cardiovascular - normal exam    (+) hypertension, past MI , CAD, cardiac stents unknown timeframe hyperlipidemia,       Neuro/Psych  (+) syncope, psychiatric history Depression and PTSD,     GI/Hepatic/Renal/Endo    (+) obesity,  GERD,  liver disease fatty liver disease,     Musculoskeletal     (+) back pain,   Abdominal   (+) obese,    Substance History - negative use     OB/GYN negative ob/gyn ROS         Other   blood dyscrasia,   history of cancer                    Anesthesia Plan    ASA 3     general     intravenous induction     Anesthetic plan, all risks, benefits, and alternatives have been provided, discussed and informed consent has been obtained with: patient.

## 2021-02-21 NOTE — ANESTHESIA POSTPROCEDURE EVALUATION
Patient: Neptali Osuna    Procedure Summary     Date: 02/21/21 Room / [facility]    Anesthesia Start: 1424 Anesthesia Stop: 1520    Procedure: ENDOSCOPIC RETROGRADE CHOLANGIOPANCREATOGRAPHY, sphincterotomy, balloon clearance of  bile duct, stent placement (N/A ) Diagnosis:       Choledocholithiasis with acute cholecystitis      (Choledocholithiasis with acute cholecystitis [K80.42])    Surgeon: Ramiro Pulido MD Provider: Olvin Everett MD    Anesthesia Type: general ASA Status: 3          Anesthesia Type: general    Vitals  Vitals Value Taken Time   /72 02/21/21 1547   Temp 97.8 °F (36.6 °C) 02/21/21 1520   Pulse 69 02/21/21 1549   Resp 20 02/21/21 1545   SpO2 93 % 02/21/21 1549   Vitals shown include unvalidated device data.        Post Anesthesia Care and Evaluation    Patient location during evaluation: PACU  Patient participation: complete - patient participated  Level of consciousness: awake  Pain scale: See nurse's notes for pain score.  Pain management: adequate  Airway patency: patent  Anesthetic complications: No anesthetic complications  PONV Status: none  Cardiovascular status: acceptable  Respiratory status: acceptable  Hydration status: acceptable    Comments: Patient seen and examined postoperatively; vital signs stable; SpO2 greater than or equal to 90%; cardiopulmonary status stable; nausea/vomiting adequately controlled; pain adequately controlled; no apparent anesthesia complications; patient discharged from anesthesia care when discharge criteria were met

## 2021-02-21 NOTE — ANESTHESIA PROCEDURE NOTES
Airway  Urgency: elective    Date/Time: 2/21/2021 2:34 PM  Airway not difficult    General Information and Staff    Patient location during procedure: OR    Indications and Patient Condition  Indications for airway management: airway protection    Preoxygenated: yes  Mask difficulty assessment: 0 - not attempted    Final Airway Details  Final airway type: endotracheal airway      Successful airway: ETT  Cuffed: yes   Successful intubation technique: direct laryngoscopy  Facilitating devices/methods: intubating stylet  Blade: Willy  Blade size: 4  ETT size (mm): 7.0  Cormack-Lehane Classification: grade IIa - partial view of glottis  Placement verified by: capnometry   Cuff volume (mL): 7  Measured from: lips  ETT/EBT  to lips (cm): 22  Number of attempts at approach: 1  Assessment: lips, teeth, and gum same as pre-op and atraumatic intubation

## 2021-02-22 ENCOUNTER — ON CAMPUS - OUTPATIENT (OUTPATIENT)
Dept: URBAN - METROPOLITAN AREA HOSPITAL 85 | Facility: HOSPITAL | Age: 66
End: 2021-02-22

## 2021-02-22 ENCOUNTER — ANESTHESIA (OUTPATIENT)
Dept: PERIOP | Facility: HOSPITAL | Age: 66
End: 2021-02-22

## 2021-02-22 ENCOUNTER — ANESTHESIA EVENT (OUTPATIENT)
Dept: PERIOP | Facility: HOSPITAL | Age: 66
End: 2021-02-22

## 2021-02-22 DIAGNOSIS — R10.13 EPIGASTRIC PAIN: ICD-10-CM

## 2021-02-22 DIAGNOSIS — K80.00 CALCULUS OF GALLBLADDER WITH ACUTE CHOLECYSTITIS WITHOUT OBS: ICD-10-CM

## 2021-02-22 DIAGNOSIS — R74.8 ABNORMAL LEVELS OF OTHER SERUM ENZYMES: ICD-10-CM

## 2021-02-22 DIAGNOSIS — K21.9 GASTRO-ESOPHAGEAL REFLUX DISEASE WITHOUT ESOPHAGITIS: ICD-10-CM

## 2021-02-22 DIAGNOSIS — R93.3 ABNORMAL FINDINGS ON DIAGNOSTIC IMAGING OF OTHER PARTS OF DI: ICD-10-CM

## 2021-02-22 LAB
ABO GROUP BLD: NORMAL
ALBUMIN SERPL-MCNC: 3.5 G/DL (ref 3.5–5.2)
ALBUMIN/GLOB SERPL: 1.2 G/DL
ALP SERPL-CCNC: 108 U/L (ref 39–117)
ALT SERPL W P-5'-P-CCNC: 40 U/L (ref 1–41)
ANION GAP SERPL CALCULATED.3IONS-SCNC: 12 MMOL/L (ref 5–15)
ANION GAP SERPL CALCULATED.3IONS-SCNC: 12 MMOL/L (ref 5–15)
APTT PPP: 30.2 SECONDS (ref 24–31)
AST SERPL-CCNC: 16 U/L (ref 1–40)
BASOPHILS # BLD AUTO: 0.1 10*3/MM3 (ref 0–0.2)
BASOPHILS NFR BLD AUTO: 0.8 % (ref 0–1.5)
BILIRUB SERPL-MCNC: 0.4 MG/DL (ref 0–1.2)
BLD GP AB SCN SERPL QL: NEGATIVE
BUN SERPL-MCNC: 12 MG/DL (ref 8–23)
BUN SERPL-MCNC: 13 MG/DL (ref 8–23)
BUN/CREAT SERPL: 12.3 (ref 7–25)
BUN/CREAT SERPL: 14.6 (ref 7–25)
CALCIUM SPEC-SCNC: 8.7 MG/DL (ref 8.6–10.5)
CALCIUM SPEC-SCNC: 9.1 MG/DL (ref 8.6–10.5)
CHLORIDE SERPL-SCNC: 100 MMOL/L (ref 98–107)
CHLORIDE SERPL-SCNC: 98 MMOL/L (ref 98–107)
CO2 SERPL-SCNC: 22 MMOL/L (ref 22–29)
CO2 SERPL-SCNC: 25 MMOL/L (ref 22–29)
CREAT SERPL-MCNC: 0.82 MG/DL (ref 0.76–1.27)
CREAT SERPL-MCNC: 1.06 MG/DL (ref 0.76–1.27)
DEPRECATED RDW RBC AUTO: 42 FL (ref 37–54)
EOSINOPHIL # BLD AUTO: 0.4 10*3/MM3 (ref 0–0.4)
EOSINOPHIL NFR BLD AUTO: 2.8 % (ref 0.3–6.2)
ERYTHROCYTE [DISTWIDTH] IN BLOOD BY AUTOMATED COUNT: 13.8 % (ref 12.3–15.4)
GFR SERPL CREATININE-BSD FRML MDRD: 70 ML/MIN/1.73
GFR SERPL CREATININE-BSD FRML MDRD: 95 ML/MIN/1.73
GLOBULIN UR ELPH-MCNC: 2.9 GM/DL
GLUCOSE SERPL-MCNC: 182 MG/DL (ref 65–99)
GLUCOSE SERPL-MCNC: 98 MG/DL (ref 65–99)
HCT VFR BLD AUTO: 39.4 % (ref 37.5–51)
HGB BLD-MCNC: 13 G/DL (ref 13–17.7)
INR PPP: 0.96 (ref 0.93–1.1)
LIPASE SERPL-CCNC: 13 U/L (ref 13–60)
LYMPHOCYTES # BLD AUTO: 2 10*3/MM3 (ref 0.7–3.1)
LYMPHOCYTES NFR BLD AUTO: 13.3 % (ref 19.6–45.3)
MCH RBC QN AUTO: 28.8 PG (ref 26.6–33)
MCHC RBC AUTO-ENTMCNC: 33 G/DL (ref 31.5–35.7)
MCV RBC AUTO: 87.5 FL (ref 79–97)
MONOCYTES # BLD AUTO: 1.7 10*3/MM3 (ref 0.1–0.9)
MONOCYTES NFR BLD AUTO: 11.3 % (ref 5–12)
NEUTROPHILS NFR BLD AUTO: 10.6 10*3/MM3 (ref 1.7–7)
NEUTROPHILS NFR BLD AUTO: 71.8 % (ref 42.7–76)
NRBC BLD AUTO-RTO: 0 /100 WBC (ref 0–0.2)
PLATELET # BLD AUTO: 323 10*3/MM3 (ref 140–450)
PMV BLD AUTO: 7.3 FL (ref 6–12)
POTASSIUM SERPL-SCNC: 4 MMOL/L (ref 3.5–5.2)
POTASSIUM SERPL-SCNC: 5 MMOL/L (ref 3.5–5.2)
PROT SERPL-MCNC: 6.4 G/DL (ref 6–8.5)
PROTHROMBIN TIME: 10.6 SECONDS (ref 9.6–11.7)
RBC # BLD AUTO: 4.51 10*6/MM3 (ref 4.14–5.8)
RH BLD: POSITIVE
SODIUM SERPL-SCNC: 134 MMOL/L (ref 136–145)
SODIUM SERPL-SCNC: 135 MMOL/L (ref 136–145)
T&S EXPIRATION DATE: NORMAL
WBC # BLD AUTO: 14.8 10*3/MM3 (ref 3.4–10.8)

## 2021-02-22 PROCEDURE — 25010000002 CEFTRIAXONE PER 250 MG: Performed by: SURGERY

## 2021-02-22 PROCEDURE — 94799 UNLISTED PULMONARY SVC/PX: CPT

## 2021-02-22 PROCEDURE — 88304 TISSUE EXAM BY PATHOLOGIST: CPT | Performed by: SURGERY

## 2021-02-22 PROCEDURE — 80048 BASIC METABOLIC PNL TOTAL CA: CPT | Performed by: INTERNAL MEDICINE

## 2021-02-22 PROCEDURE — 99212 OFFICE O/P EST SF 10 MIN: CPT | Performed by: NURSE PRACTITIONER

## 2021-02-22 PROCEDURE — G0378 HOSPITAL OBSERVATION PER HR: HCPCS

## 2021-02-22 PROCEDURE — 25010000002 ONDANSETRON PER 1 MG: Performed by: ANESTHESIOLOGY

## 2021-02-22 PROCEDURE — 99225 PR SBSQ OBSERVATION CARE/DAY 25 MINUTES: CPT | Performed by: INTERNAL MEDICINE

## 2021-02-22 PROCEDURE — 47562 LAPAROSCOPIC CHOLECYSTECTOMY: CPT | Performed by: REGISTERED NURSE

## 2021-02-22 PROCEDURE — 85025 COMPLETE CBC W/AUTO DIFF WBC: CPT | Performed by: INTERNAL MEDICINE

## 2021-02-22 PROCEDURE — 86850 RBC ANTIBODY SCREEN: CPT | Performed by: SURGERY

## 2021-02-22 PROCEDURE — 25010000002 HYDROMORPHONE PER 4 MG: Performed by: INTERNAL MEDICINE

## 2021-02-22 PROCEDURE — 25010000002 HEPARIN (PORCINE) PER 1000 UNITS: Performed by: SURGERY

## 2021-02-22 PROCEDURE — 86901 BLOOD TYPING SEROLOGIC RH(D): CPT | Performed by: SURGERY

## 2021-02-22 PROCEDURE — 86901 BLOOD TYPING SEROLOGIC RH(D): CPT

## 2021-02-22 PROCEDURE — 85610 PROTHROMBIN TIME: CPT | Performed by: SURGERY

## 2021-02-22 PROCEDURE — 25010000002 ENOXAPARIN PER 10 MG: Performed by: SURGERY

## 2021-02-22 PROCEDURE — 86900 BLOOD TYPING SEROLOGIC ABO: CPT

## 2021-02-22 PROCEDURE — 25010000003 MEPERIDINE PER 100 MG: Performed by: ANESTHESIOLOGY

## 2021-02-22 PROCEDURE — 80053 COMPREHEN METABOLIC PANEL: CPT | Performed by: INTERNAL MEDICINE

## 2021-02-22 PROCEDURE — 86900 BLOOD TYPING SEROLOGIC ABO: CPT | Performed by: SURGERY

## 2021-02-22 PROCEDURE — 25010000002 HYDROMORPHONE PER 4 MG: Performed by: ANESTHESIOLOGY

## 2021-02-22 PROCEDURE — 25010000002 FENTANYL CITRATE (PF) 100 MCG/2ML SOLUTION: Performed by: ANESTHESIOLOGY

## 2021-02-22 PROCEDURE — 25010000002 DEXAMETHASONE PER 1 MG: Performed by: ANESTHESIOLOGY

## 2021-02-22 PROCEDURE — 83690 ASSAY OF LIPASE: CPT | Performed by: INTERNAL MEDICINE

## 2021-02-22 PROCEDURE — 25010000002 PROPOFOL 10 MG/ML EMULSION: Performed by: ANESTHESIOLOGY

## 2021-02-22 PROCEDURE — 85730 THROMBOPLASTIN TIME PARTIAL: CPT | Performed by: SURGERY

## 2021-02-22 PROCEDURE — 47562 LAPAROSCOPIC CHOLECYSTECTOMY: CPT | Performed by: SURGERY

## 2021-02-22 DEVICE — LIGACLIP 10-M/L, 10MM ENDOSCOPIC ROTATING MULTIPLE CLIP APPLIERS
Type: IMPLANTABLE DEVICE | Site: ABDOMEN | Status: FUNCTIONAL
Brand: LIGACLIP

## 2021-02-22 RX ORDER — LIDOCAINE HYDROCHLORIDE 20 MG/ML
INJECTION, SOLUTION EPIDURAL; INFILTRATION; INTRACAUDAL; PERINEURAL AS NEEDED
Status: DISCONTINUED | OUTPATIENT
Start: 2021-02-22 | End: 2021-02-22 | Stop reason: SURG

## 2021-02-22 RX ORDER — ONDANSETRON 2 MG/ML
4 INJECTION INTRAMUSCULAR; INTRAVENOUS ONCE AS NEEDED
Status: DISCONTINUED | OUTPATIENT
Start: 2021-02-22 | End: 2021-02-22 | Stop reason: HOSPADM

## 2021-02-22 RX ORDER — EPHEDRINE SULFATE 50 MG/ML
5 INJECTION, SOLUTION INTRAVENOUS ONCE AS NEEDED
Status: DISCONTINUED | OUTPATIENT
Start: 2021-02-22 | End: 2021-02-22 | Stop reason: HOSPADM

## 2021-02-22 RX ORDER — FENTANYL CITRATE 50 UG/ML
INJECTION, SOLUTION INTRAMUSCULAR; INTRAVENOUS AS NEEDED
Status: DISCONTINUED | OUTPATIENT
Start: 2021-02-22 | End: 2021-02-22 | Stop reason: SURG

## 2021-02-22 RX ORDER — PROMETHAZINE HYDROCHLORIDE 25 MG/1
25 TABLET ORAL ONCE AS NEEDED
Status: DISCONTINUED | OUTPATIENT
Start: 2021-02-22 | End: 2021-02-22 | Stop reason: HOSPADM

## 2021-02-22 RX ORDER — MEPERIDINE HYDROCHLORIDE 25 MG/ML
12.5 INJECTION INTRAMUSCULAR; INTRAVENOUS; SUBCUTANEOUS
Status: DISCONTINUED | OUTPATIENT
Start: 2021-02-22 | End: 2021-02-22 | Stop reason: HOSPADM

## 2021-02-22 RX ORDER — HYDROCODONE BITARTRATE AND ACETAMINOPHEN 7.5; 325 MG/1; MG/1
1 TABLET ORAL EVERY 6 HOURS PRN
Qty: 30 TABLET | Refills: 0 | Status: SHIPPED | OUTPATIENT
Start: 2021-02-22 | End: 2021-03-08

## 2021-02-22 RX ORDER — HYDROMORPHONE HCL 110MG/55ML
1 PATIENT CONTROLLED ANALGESIA SYRINGE INTRAVENOUS
Status: DISCONTINUED | OUTPATIENT
Start: 2021-02-22 | End: 2021-02-24 | Stop reason: HOSPADM

## 2021-02-22 RX ORDER — DEXAMETHASONE SODIUM PHOSPHATE 4 MG/ML
INJECTION, SOLUTION INTRA-ARTICULAR; INTRALESIONAL; INTRAMUSCULAR; INTRAVENOUS; SOFT TISSUE AS NEEDED
Status: DISCONTINUED | OUTPATIENT
Start: 2021-02-22 | End: 2021-02-22 | Stop reason: SURG

## 2021-02-22 RX ORDER — NICARDIPINE HYDROCHLORIDE 2.5 MG/ML
INJECTION INTRAVENOUS AS NEEDED
Status: DISCONTINUED | OUTPATIENT
Start: 2021-02-22 | End: 2021-02-22 | Stop reason: SURG

## 2021-02-22 RX ORDER — ROCURONIUM BROMIDE 10 MG/ML
INJECTION, SOLUTION INTRAVENOUS AS NEEDED
Status: DISCONTINUED | OUTPATIENT
Start: 2021-02-22 | End: 2021-02-22 | Stop reason: SURG

## 2021-02-22 RX ORDER — AMOXICILLIN AND CLAVULANATE POTASSIUM 875; 125 MG/1; MG/1
1 TABLET, FILM COATED ORAL EVERY 12 HOURS
Qty: 14 TABLET | Refills: 0 | Status: SHIPPED | OUTPATIENT
Start: 2021-02-22 | End: 2021-03-01

## 2021-02-22 RX ORDER — HYDROMORPHONE HCL 110MG/55ML
PATIENT CONTROLLED ANALGESIA SYRINGE INTRAVENOUS AS NEEDED
Status: DISCONTINUED | OUTPATIENT
Start: 2021-02-22 | End: 2021-02-22 | Stop reason: SURG

## 2021-02-22 RX ORDER — PROMETHAZINE HYDROCHLORIDE 25 MG/1
25 SUPPOSITORY RECTAL ONCE AS NEEDED
Status: DISCONTINUED | OUTPATIENT
Start: 2021-02-22 | End: 2021-02-22 | Stop reason: HOSPADM

## 2021-02-22 RX ORDER — DIPHENHYDRAMINE HYDROCHLORIDE 50 MG/ML
12.5 INJECTION INTRAMUSCULAR; INTRAVENOUS
Status: DISCONTINUED | OUTPATIENT
Start: 2021-02-22 | End: 2021-02-22 | Stop reason: HOSPADM

## 2021-02-22 RX ORDER — PROPOFOL 10 MG/ML
VIAL (ML) INTRAVENOUS AS NEEDED
Status: DISCONTINUED | OUTPATIENT
Start: 2021-02-22 | End: 2021-02-22 | Stop reason: SURG

## 2021-02-22 RX ORDER — ONDANSETRON 2 MG/ML
INJECTION INTRAMUSCULAR; INTRAVENOUS AS NEEDED
Status: DISCONTINUED | OUTPATIENT
Start: 2021-02-22 | End: 2021-02-22 | Stop reason: SURG

## 2021-02-22 RX ORDER — GLYCOPYRROLATE 1 MG/5 ML
SYRINGE (ML) INTRAVENOUS AS NEEDED
Status: DISCONTINUED | OUTPATIENT
Start: 2021-02-22 | End: 2021-02-22 | Stop reason: SURG

## 2021-02-22 RX ORDER — HYDROCODONE BITARTRATE AND ACETAMINOPHEN 10; 325 MG/1; MG/1
1 TABLET ORAL EVERY 4 HOURS
Status: DISCONTINUED | OUTPATIENT
Start: 2021-02-22 | End: 2021-02-24 | Stop reason: HOSPADM

## 2021-02-22 RX ORDER — HYDROMORPHONE HCL 110MG/55ML
1 PATIENT CONTROLLED ANALGESIA SYRINGE INTRAVENOUS
Status: DISCONTINUED | OUTPATIENT
Start: 2021-02-22 | End: 2021-02-22

## 2021-02-22 RX ORDER — PHENYLEPHRINE HCL IN 0.9% NACL 0.5 MG/5ML
.5-3 SYRINGE (ML) INTRAVENOUS CONTINUOUS PRN
Status: DISCONTINUED | OUTPATIENT
Start: 2021-02-22 | End: 2021-02-24 | Stop reason: HOSPADM

## 2021-02-22 RX ORDER — HYDROMORPHONE HCL 110MG/55ML
0.5 PATIENT CONTROLLED ANALGESIA SYRINGE INTRAVENOUS
Status: DISCONTINUED | OUTPATIENT
Start: 2021-02-22 | End: 2021-02-22 | Stop reason: HOSPADM

## 2021-02-22 RX ORDER — SODIUM CHLORIDE, SODIUM LACTATE, POTASSIUM CHLORIDE, AND CALCIUM CHLORIDE .6; .31; .03; .02 G/100ML; G/100ML; G/100ML; G/100ML
20 INJECTION, SOLUTION INTRAVENOUS ONCE
Status: DISCONTINUED | OUTPATIENT
Start: 2021-02-22 | End: 2021-02-22 | Stop reason: HOSPADM

## 2021-02-22 RX ORDER — NEOSTIGMINE METHYLSULFATE 5 MG/5 ML
SYRINGE (ML) INTRAVENOUS AS NEEDED
Status: DISCONTINUED | OUTPATIENT
Start: 2021-02-22 | End: 2021-02-22 | Stop reason: SURG

## 2021-02-22 RX ORDER — HYDROCODONE BITARTRATE AND ACETAMINOPHEN 10; 325 MG/1; MG/1
1 TABLET ORAL EVERY 4 HOURS PRN
Status: DISCONTINUED | OUTPATIENT
Start: 2021-02-22 | End: 2021-02-22

## 2021-02-22 RX ORDER — HYDROMORPHONE HCL 110MG/55ML
0.5 PATIENT CONTROLLED ANALGESIA SYRINGE INTRAVENOUS
Status: DISCONTINUED | OUTPATIENT
Start: 2021-02-22 | End: 2021-02-22 | Stop reason: SDUPTHER

## 2021-02-22 RX ADMIN — CEFTRIAXONE SODIUM 1 G: 1 INJECTION, POWDER, FOR SOLUTION INTRAMUSCULAR; INTRAVENOUS at 18:00

## 2021-02-22 RX ADMIN — ONDANSETRON 4 MG: 2 INJECTION INTRAMUSCULAR; INTRAVENOUS at 16:10

## 2021-02-22 RX ADMIN — ENOXAPARIN SODIUM 40 MG: 40 INJECTION SUBCUTANEOUS at 20:03

## 2021-02-22 RX ADMIN — ACETAMINOPHEN 650 MG: 325 TABLET ORAL at 03:02

## 2021-02-22 RX ADMIN — ROCURONIUM BROMIDE 20 MG: 10 INJECTION, SOLUTION INTRAVENOUS at 15:24

## 2021-02-22 RX ADMIN — HYDROCODONE BITARTRATE AND ACETAMINOPHEN 1 TABLET: 10; 325 TABLET ORAL at 08:07

## 2021-02-22 RX ADMIN — Medication 3 MG: at 16:09

## 2021-02-22 RX ADMIN — ATORVASTATIN CALCIUM 40 MG: 40 TABLET, FILM COATED ORAL at 20:04

## 2021-02-22 RX ADMIN — PROPOFOL 200 MG: 10 INJECTION, EMULSION INTRAVENOUS at 14:45

## 2021-02-22 RX ADMIN — HYDROCODONE BITARTRATE AND ACETAMINOPHEN 1 TABLET: 10; 325 TABLET ORAL at 18:00

## 2021-02-22 RX ADMIN — ROCURONIUM BROMIDE 40 MG: 10 INJECTION, SOLUTION INTRAVENOUS at 14:45

## 2021-02-22 RX ADMIN — IPRATROPIUM BROMIDE 0.5 MG: 0.5 SOLUTION RESPIRATORY (INHALATION) at 06:46

## 2021-02-22 RX ADMIN — ROCURONIUM BROMIDE 10 MG: 10 INJECTION, SOLUTION INTRAVENOUS at 15:00

## 2021-02-22 RX ADMIN — HYDROMORPHONE HYDROCHLORIDE 0.5 MG: 2 INJECTION INTRAMUSCULAR; INTRAVENOUS; SUBCUTANEOUS at 15:42

## 2021-02-22 RX ADMIN — HYDROMORPHONE HYDROCHLORIDE 0.5 MG: 2 INJECTION INTRAMUSCULAR; INTRAVENOUS; SUBCUTANEOUS at 15:31

## 2021-02-22 RX ADMIN — HYDROMORPHONE HYDROCHLORIDE 0.5 MG: 2 INJECTION, SOLUTION INTRAMUSCULAR; INTRAVENOUS; SUBCUTANEOUS at 17:08

## 2021-02-22 RX ADMIN — HYDROMORPHONE HYDROCHLORIDE 1 MG: 2 INJECTION, SOLUTION INTRAMUSCULAR; INTRAVENOUS; SUBCUTANEOUS at 20:04

## 2021-02-22 RX ADMIN — NICARDIPINE HYDROCHLORIDE 0.5 MG: 2.5 INJECTION INTRAVENOUS at 15:18

## 2021-02-22 RX ADMIN — FENTANYL CITRATE 100 MCG: 50 INJECTION, SOLUTION INTRAMUSCULAR; INTRAVENOUS at 14:45

## 2021-02-22 RX ADMIN — HYDROCODONE BITARTRATE AND ACETAMINOPHEN 1 TABLET: 10; 325 TABLET ORAL at 22:24

## 2021-02-22 RX ADMIN — HYDROMORPHONE HYDROCHLORIDE 0.5 MG: 2 INJECTION INTRAMUSCULAR; INTRAVENOUS; SUBCUTANEOUS at 15:12

## 2021-02-22 RX ADMIN — DICYCLOMINE HYDROCHLORIDE 10 MG: 10 CAPSULE ORAL at 20:04

## 2021-02-22 RX ADMIN — MEPERIDINE HYDROCHLORIDE 25 MG: 25 INJECTION INTRAMUSCULAR; INTRAVENOUS; SUBCUTANEOUS at 16:38

## 2021-02-22 RX ADMIN — LIDOCAINE HYDROCHLORIDE 50 MG: 20 INJECTION, SOLUTION EPIDURAL; INFILTRATION; INTRACAUDAL; PERINEURAL at 14:45

## 2021-02-22 RX ADMIN — IPRATROPIUM BROMIDE 0.5 MG: 0.5 SOLUTION RESPIRATORY (INHALATION) at 23:29

## 2021-02-22 RX ADMIN — NICARDIPINE HYDROCHLORIDE 0.5 MG: 2.5 INJECTION INTRAVENOUS at 15:08

## 2021-02-22 RX ADMIN — HYDROMORPHONE HYDROCHLORIDE 0.5 MG: 2 INJECTION, SOLUTION INTRAMUSCULAR; INTRAVENOUS; SUBCUTANEOUS at 16:54

## 2021-02-22 RX ADMIN — HYDROMORPHONE HYDROCHLORIDE 0.25 MG: 2 INJECTION INTRAMUSCULAR; INTRAVENOUS; SUBCUTANEOUS at 15:26

## 2021-02-22 RX ADMIN — DEXAMETHASONE SODIUM PHOSPHATE 4 MG: 4 INJECTION, SOLUTION INTRAMUSCULAR; INTRAVENOUS at 14:47

## 2021-02-22 RX ADMIN — Medication 0.6 MG: at 16:09

## 2021-02-22 RX ADMIN — HYDROMORPHONE HYDROCHLORIDE 0.25 MG: 2 INJECTION INTRAMUSCULAR; INTRAVENOUS; SUBCUTANEOUS at 15:19

## 2021-02-22 RX ADMIN — TIZANIDINE 4 MG: 4 TABLET ORAL at 03:02

## 2021-02-22 RX ADMIN — BUDESONIDE AND FORMOTEROL FUMARATE DIHYDRATE 2 PUFF: 80; 4.5 AEROSOL RESPIRATORY (INHALATION) at 06:46

## 2021-02-22 RX ADMIN — Medication 3 ML: at 20:04

## 2021-02-22 RX ADMIN — PREGABALIN 100 MG: 100 CAPSULE ORAL at 20:04

## 2021-02-22 NOTE — ANESTHESIA POSTPROCEDURE EVALUATION
Patient: Neptali Osuna    Procedure Summary     Date: 02/22/21 Room / Location: [facility] OR 09 / BH [facility] MAIN OR    Anesthesia Start: 1434 Anesthesia Stop: 1623    Procedure: CHOLECYSTECTOMY LAPAROSCOPIC (N/A Abdomen) Diagnosis:     Surgeon: Faisal Wilkins DO Provider: Rashad Houser MD    Anesthesia Type: general ASA Status: 3          Anesthesia Type: general    Vitals  Vitals Value Taken Time   BP     Temp     Pulse 110 02/22/21 1625   Resp     SpO2 99 % 02/22/21 1625   Vitals shown include unvalidated device data.        Post Anesthesia Care and Evaluation    Patient location during evaluation: PACU  Patient participation: complete - patient participated  Level of consciousness: awake  Pain scale: See nurse's notes for pain score.  Pain management: adequate  Airway patency: patent  Anesthetic complications: No anesthetic complications  PONV Status: none  Cardiovascular status: acceptable  Respiratory status: acceptable  Hydration status: acceptable    Comments: Patient seen and examined postoperatively; vital signs stable; SpO2 greater than or equal to 90%; cardiopulmonary status stable; nausea/vomiting adequately controlled; pain adequately controlled; no apparent anesthesia complications; patient discharged from anesthesia care when discharge criteria were met

## 2021-02-22 NOTE — ANESTHESIA PREPROCEDURE EVALUATION
Anesthesia Evaluation     Patient summary reviewed and Nursing notes reviewed   NPO Solid Status: > 8 hours  NPO Liquid Status: > 8 hours           Airway   Dental      Pulmonary    (+) COPD, sleep apnea,   Cardiovascular     (+) hypertension, past MI , CAD, hyperlipidemia,       Neuro/Psych  GI/Hepatic/Renal/Endo    (+) obesity, morbid obesity, GERD,  hepatitis, liver disease fatty liver disease,     Musculoskeletal     (+) back pain,   Abdominal    Substance History      OB/GYN          Other   blood dyscrasia,   history of cancer      Other Comment: CLL                  Anesthesia Plan    ASA 3     general     intravenous induction     Anesthetic plan, all risks, benefits, and alternatives have been provided, discussed and informed consent has been obtained with: patient.

## 2021-02-22 NOTE — ANESTHESIA PROCEDURE NOTES
Airway  Urgency: elective    Date/Time: 2/22/2021 2:46 PM  Airway not difficult    General Information and Staff    Patient location during procedure: OR  Anesthesiologist: Rashad Houser MD  CRNA: Rajeev Zazueta AA    Indications and Patient Condition  Indications for airway management: airway protection    Preoxygenated: yes  MILS maintained throughout  Mask difficulty assessment: 2 - vent by mask + OA or adjuvant +/- NMBA    Final Airway Details  Final airway type: endotracheal airway      Successful airway: ETT  Cuffed: yes   Successful intubation technique: video laryngoscopy  Endotracheal tube insertion site: oral  Blade: Willy  Blade size: 4  ETT size (mm): 7.5  Cormack-Lehane Classification: grade I - full view of glottis  Placement verified by: chest auscultation   Measured from: teeth  ETT/EBT  to teeth (cm): 23  Number of attempts at approach: 1  Assessment: lips, teeth, and gum same as pre-op and atraumatic intubation

## 2021-02-23 LAB
ALBUMIN SERPL-MCNC: 3.6 G/DL (ref 3.5–5.2)
ALBUMIN/GLOB SERPL: 1.6 G/DL
ALP SERPL-CCNC: 127 U/L (ref 39–117)
ALT SERPL W P-5'-P-CCNC: 58 U/L (ref 1–41)
ANION GAP SERPL CALCULATED.3IONS-SCNC: 11 MMOL/L (ref 5–15)
ANION GAP SERPL CALCULATED.3IONS-SCNC: 15 MMOL/L (ref 5–15)
AST SERPL-CCNC: 36 U/L (ref 1–40)
BASOPHILS # BLD AUTO: 0.2 10*3/MM3 (ref 0–0.2)
BASOPHILS NFR BLD AUTO: 0.9 % (ref 0–1.5)
BILIRUB SERPL-MCNC: 0.4 MG/DL (ref 0–1.2)
BUN SERPL-MCNC: 14 MG/DL (ref 8–23)
BUN SERPL-MCNC: 15 MG/DL (ref 8–23)
BUN/CREAT SERPL: 14 (ref 7–25)
BUN/CREAT SERPL: 14.6 (ref 7–25)
CALCIUM SPEC-SCNC: 8.4 MG/DL (ref 8.6–10.5)
CALCIUM SPEC-SCNC: 8.7 MG/DL (ref 8.6–10.5)
CHLORIDE SERPL-SCNC: 96 MMOL/L (ref 98–107)
CHLORIDE SERPL-SCNC: 98 MMOL/L (ref 98–107)
CO2 SERPL-SCNC: 20 MMOL/L (ref 22–29)
CO2 SERPL-SCNC: 23 MMOL/L (ref 22–29)
CREAT SERPL-MCNC: 0.96 MG/DL (ref 0.76–1.27)
CREAT SERPL-MCNC: 1.07 MG/DL (ref 0.76–1.27)
DEPRECATED RDW RBC AUTO: 42.4 FL (ref 37–54)
EOSINOPHIL # BLD AUTO: 0 10*3/MM3 (ref 0–0.4)
EOSINOPHIL NFR BLD AUTO: 0 % (ref 0.3–6.2)
ERYTHROCYTE [DISTWIDTH] IN BLOOD BY AUTOMATED COUNT: 13.7 % (ref 12.3–15.4)
GFR SERPL CREATININE-BSD FRML MDRD: 70 ML/MIN/1.73
GFR SERPL CREATININE-BSD FRML MDRD: 79 ML/MIN/1.73
GLOBULIN UR ELPH-MCNC: 2.2 GM/DL
GLUCOSE SERPL-MCNC: 159 MG/DL (ref 65–99)
GLUCOSE SERPL-MCNC: 246 MG/DL (ref 65–99)
HCT VFR BLD AUTO: 32.7 % (ref 37.5–51)
HGB BLD-MCNC: 11 G/DL (ref 13–17.7)
LYMPHOCYTES # BLD AUTO: 1.1 10*3/MM3 (ref 0.7–3.1)
LYMPHOCYTES NFR BLD AUTO: 6.2 % (ref 19.6–45.3)
MCH RBC QN AUTO: 29.3 PG (ref 26.6–33)
MCHC RBC AUTO-ENTMCNC: 33.6 G/DL (ref 31.5–35.7)
MCV RBC AUTO: 87.3 FL (ref 79–97)
MONOCYTES # BLD AUTO: 1 10*3/MM3 (ref 0.1–0.9)
MONOCYTES NFR BLD AUTO: 5.8 % (ref 5–12)
NEUTROPHILS NFR BLD AUTO: 15.4 10*3/MM3 (ref 1.7–7)
NEUTROPHILS NFR BLD AUTO: 87.1 % (ref 42.7–76)
NRBC BLD AUTO-RTO: 0.1 /100 WBC (ref 0–0.2)
PLATELET # BLD AUTO: 389 10*3/MM3 (ref 140–450)
PMV BLD AUTO: 7.6 FL (ref 6–12)
POTASSIUM SERPL-SCNC: 4.7 MMOL/L (ref 3.5–5.2)
POTASSIUM SERPL-SCNC: 4.9 MMOL/L (ref 3.5–5.2)
PROT SERPL-MCNC: 5.8 G/DL (ref 6–8.5)
RBC # BLD AUTO: 3.75 10*6/MM3 (ref 4.14–5.8)
SODIUM SERPL-SCNC: 131 MMOL/L (ref 136–145)
SODIUM SERPL-SCNC: 132 MMOL/L (ref 136–145)
WBC # BLD AUTO: 17.7 10*3/MM3 (ref 3.4–10.8)

## 2021-02-23 PROCEDURE — 94799 UNLISTED PULMONARY SVC/PX: CPT

## 2021-02-23 PROCEDURE — G0378 HOSPITAL OBSERVATION PER HR: HCPCS

## 2021-02-23 PROCEDURE — 80053 COMPREHEN METABOLIC PANEL: CPT | Performed by: SURGERY

## 2021-02-23 PROCEDURE — 25010000002 CEFTRIAXONE PER 250 MG: Performed by: SURGERY

## 2021-02-23 PROCEDURE — 80048 BASIC METABOLIC PNL TOTAL CA: CPT | Performed by: INTERNAL MEDICINE

## 2021-02-23 PROCEDURE — 25010000002 ENOXAPARIN PER 10 MG: Performed by: SURGERY

## 2021-02-23 PROCEDURE — 85025 COMPLETE CBC W/AUTO DIFF WBC: CPT | Performed by: SURGERY

## 2021-02-23 PROCEDURE — 99225 PR SBSQ OBSERVATION CARE/DAY 25 MINUTES: CPT | Performed by: INTERNAL MEDICINE

## 2021-02-23 RX ORDER — AMLODIPINE BESYLATE 10 MG/1
TABLET ORAL
Qty: 90 TABLET | Refills: 1 | OUTPATIENT
Start: 2021-02-23 | End: 2021-02-24 | Stop reason: HOSPADM

## 2021-02-23 RX ADMIN — HYDROCODONE BITARTRATE AND ACETAMINOPHEN 1 TABLET: 10; 325 TABLET ORAL at 05:59

## 2021-02-23 RX ADMIN — ATORVASTATIN CALCIUM 40 MG: 40 TABLET, FILM COATED ORAL at 22:20

## 2021-02-23 RX ADMIN — CETIRIZINE HYDROCHLORIDE 10 MG: 10 TABLET, FILM COATED ORAL at 09:07

## 2021-02-23 RX ADMIN — LISINOPRIL 30 MG: 20 TABLET ORAL at 08:57

## 2021-02-23 RX ADMIN — HYDROCODONE BITARTRATE AND ACETAMINOPHEN 1 TABLET: 10; 325 TABLET ORAL at 14:39

## 2021-02-23 RX ADMIN — PANTOPRAZOLE SODIUM 40 MG: 40 TABLET, DELAYED RELEASE ORAL at 08:58

## 2021-02-23 RX ADMIN — DICYCLOMINE HYDROCHLORIDE 10 MG: 10 CAPSULE ORAL at 10:48

## 2021-02-23 RX ADMIN — BUDESONIDE AND FORMOTEROL FUMARATE DIHYDRATE 2 PUFF: 80; 4.5 AEROSOL RESPIRATORY (INHALATION) at 07:10

## 2021-02-23 RX ADMIN — MODAFINIL 200 MG: 100 TABLET ORAL at 08:57

## 2021-02-23 RX ADMIN — HYDROCODONE BITARTRATE AND ACETAMINOPHEN 1 TABLET: 10; 325 TABLET ORAL at 22:20

## 2021-02-23 RX ADMIN — HYDROCODONE BITARTRATE AND ACETAMINOPHEN 1 TABLET: 10; 325 TABLET ORAL at 10:48

## 2021-02-23 RX ADMIN — IPRATROPIUM BROMIDE 0.5 MG: 0.5 SOLUTION RESPIRATORY (INHALATION) at 07:10

## 2021-02-23 RX ADMIN — DICYCLOMINE HYDROCHLORIDE 10 MG: 10 CAPSULE ORAL at 22:20

## 2021-02-23 RX ADMIN — DULOXETINE HYDROCHLORIDE 60 MG: 30 CAPSULE, DELAYED RELEASE ORAL at 08:57

## 2021-02-23 RX ADMIN — BUDESONIDE AND FORMOTEROL FUMARATE DIHYDRATE 2 PUFF: 80; 4.5 AEROSOL RESPIRATORY (INHALATION) at 18:54

## 2021-02-23 RX ADMIN — IPRATROPIUM BROMIDE 0.5 MG: 0.5 SOLUTION RESPIRATORY (INHALATION) at 18:54

## 2021-02-23 RX ADMIN — HYDROCODONE BITARTRATE AND ACETAMINOPHEN 1 TABLET: 10; 325 TABLET ORAL at 18:43

## 2021-02-23 RX ADMIN — HYDROCODONE BITARTRATE AND ACETAMINOPHEN 1 TABLET: 10; 325 TABLET ORAL at 01:59

## 2021-02-23 RX ADMIN — Medication 3 ML: at 08:59

## 2021-02-23 RX ADMIN — CEFTRIAXONE SODIUM 1 G: 1 INJECTION, POWDER, FOR SOLUTION INTRAMUSCULAR; INTRAVENOUS at 17:32

## 2021-02-23 RX ADMIN — AMLODIPINE BESYLATE 10 MG: 5 TABLET ORAL at 08:58

## 2021-02-23 RX ADMIN — DICYCLOMINE HYDROCHLORIDE 10 MG: 10 CAPSULE ORAL at 08:58

## 2021-02-23 RX ADMIN — Medication 3 ML: at 22:20

## 2021-02-23 RX ADMIN — PREGABALIN 100 MG: 100 CAPSULE ORAL at 08:58

## 2021-02-23 RX ADMIN — PREGABALIN 100 MG: 100 CAPSULE ORAL at 22:20

## 2021-02-23 RX ADMIN — DICYCLOMINE HYDROCHLORIDE 10 MG: 10 CAPSULE ORAL at 17:33

## 2021-02-23 RX ADMIN — ENOXAPARIN SODIUM 40 MG: 40 INJECTION SUBCUTANEOUS at 17:32

## 2021-02-24 ENCOUNTER — READMISSION MANAGEMENT (OUTPATIENT)
Dept: CALL CENTER | Facility: HOSPITAL | Age: 66
End: 2021-02-24

## 2021-02-24 VITALS
TEMPERATURE: 97.8 F | HEART RATE: 104 BPM | DIASTOLIC BLOOD PRESSURE: 56 MMHG | WEIGHT: 229.28 LBS | HEIGHT: 68 IN | BODY MASS INDEX: 34.75 KG/M2 | SYSTOLIC BLOOD PRESSURE: 110 MMHG | RESPIRATION RATE: 18 BRPM | OXYGEN SATURATION: 97 %

## 2021-02-24 LAB
ANION GAP SERPL CALCULATED.3IONS-SCNC: 11 MMOL/L (ref 5–15)
BASOPHILS # BLD AUTO: 0.1 10*3/MM3 (ref 0–0.2)
BASOPHILS NFR BLD AUTO: 0.5 % (ref 0–1.5)
BUN SERPL-MCNC: 16 MG/DL (ref 8–23)
BUN/CREAT SERPL: 15.2 (ref 7–25)
CALCIUM SPEC-SCNC: 8.5 MG/DL (ref 8.6–10.5)
CHLORIDE SERPL-SCNC: 98 MMOL/L (ref 98–107)
CO2 SERPL-SCNC: 26 MMOL/L (ref 22–29)
CREAT SERPL-MCNC: 1.05 MG/DL (ref 0.76–1.27)
DEPRECATED RDW RBC AUTO: 42.9 FL (ref 37–54)
EOSINOPHIL # BLD AUTO: 0.3 10*3/MM3 (ref 0–0.4)
EOSINOPHIL NFR BLD AUTO: 1.8 % (ref 0.3–6.2)
ERYTHROCYTE [DISTWIDTH] IN BLOOD BY AUTOMATED COUNT: 13.8 % (ref 12.3–15.4)
GFR SERPL CREATININE-BSD FRML MDRD: 71 ML/MIN/1.73
GLUCOSE SERPL-MCNC: 135 MG/DL (ref 65–99)
HCT VFR BLD AUTO: 27.3 % (ref 37.5–51)
HGB BLD-MCNC: 9.3 G/DL (ref 13–17.7)
LAB AP CASE REPORT: NORMAL
LYMPHOCYTES # BLD AUTO: 2.2 10*3/MM3 (ref 0.7–3.1)
LYMPHOCYTES NFR BLD AUTO: 15.4 % (ref 19.6–45.3)
MCH RBC QN AUTO: 29.7 PG (ref 26.6–33)
MCHC RBC AUTO-ENTMCNC: 34 G/DL (ref 31.5–35.7)
MCV RBC AUTO: 87.6 FL (ref 79–97)
MONOCYTES # BLD AUTO: 1.6 10*3/MM3 (ref 0.1–0.9)
MONOCYTES NFR BLD AUTO: 10.9 % (ref 5–12)
NEUTROPHILS NFR BLD AUTO: 10.2 10*3/MM3 (ref 1.7–7)
NEUTROPHILS NFR BLD AUTO: 71.4 % (ref 42.7–76)
NRBC BLD AUTO-RTO: 0.1 /100 WBC (ref 0–0.2)
PATH REPORT.FINAL DX SPEC: NORMAL
PATH REPORT.GROSS SPEC: NORMAL
PLATELET # BLD AUTO: 352 10*3/MM3 (ref 140–450)
PMV BLD AUTO: 7.6 FL (ref 6–12)
POTASSIUM SERPL-SCNC: 4.8 MMOL/L (ref 3.5–5.2)
RBC # BLD AUTO: 3.12 10*6/MM3 (ref 4.14–5.8)
SODIUM SERPL-SCNC: 135 MMOL/L (ref 136–145)
WBC # BLD AUTO: 14.3 10*3/MM3 (ref 3.4–10.8)

## 2021-02-24 PROCEDURE — 99217 PR OBSERVATION CARE DISCHARGE MANAGEMENT: CPT | Performed by: INTERNAL MEDICINE

## 2021-02-24 PROCEDURE — 80048 BASIC METABOLIC PNL TOTAL CA: CPT | Performed by: INTERNAL MEDICINE

## 2021-02-24 PROCEDURE — G0378 HOSPITAL OBSERVATION PER HR: HCPCS

## 2021-02-24 PROCEDURE — 85025 COMPLETE CBC W/AUTO DIFF WBC: CPT | Performed by: SURGERY

## 2021-02-24 PROCEDURE — 94799 UNLISTED PULMONARY SVC/PX: CPT

## 2021-02-24 PROCEDURE — 25010000002 ENOXAPARIN PER 10 MG: Performed by: SURGERY

## 2021-02-24 RX ORDER — AMLODIPINE BESYLATE 10 MG/1
10 TABLET ORAL DAILY
Qty: 90 TABLET | Refills: 1 | Status: SHIPPED | OUTPATIENT
Start: 2021-02-24 | End: 2021-11-24 | Stop reason: SDUPTHER

## 2021-02-24 RX ORDER — ONDANSETRON 4 MG/1
4 TABLET, FILM COATED ORAL EVERY 6 HOURS PRN
Qty: 30 TABLET | Refills: 0 | Status: SHIPPED | OUTPATIENT
Start: 2021-02-24 | End: 2021-05-25

## 2021-02-24 RX ADMIN — PREGABALIN 100 MG: 100 CAPSULE ORAL at 08:17

## 2021-02-24 RX ADMIN — PANTOPRAZOLE SODIUM 40 MG: 40 TABLET, DELAYED RELEASE ORAL at 08:18

## 2021-02-24 RX ADMIN — HYDROCODONE BITARTRATE AND ACETAMINOPHEN 1 TABLET: 10; 325 TABLET ORAL at 14:08

## 2021-02-24 RX ADMIN — AMLODIPINE BESYLATE 10 MG: 5 TABLET ORAL at 08:18

## 2021-02-24 RX ADMIN — IPRATROPIUM BROMIDE AND ALBUTEROL SULFATE 3 ML: 2.5; .5 SOLUTION RESPIRATORY (INHALATION) at 14:51

## 2021-02-24 RX ADMIN — Medication 3 ML: at 08:19

## 2021-02-24 RX ADMIN — IPRATROPIUM BROMIDE 0.5 MG: 0.5 SOLUTION RESPIRATORY (INHALATION) at 11:23

## 2021-02-24 RX ADMIN — ASPIRIN 81 MG: 81 TABLET, COATED ORAL at 08:17

## 2021-02-24 RX ADMIN — HYDROCODONE BITARTRATE AND ACETAMINOPHEN 1 TABLET: 10; 325 TABLET ORAL at 10:45

## 2021-02-24 RX ADMIN — HYDROCODONE BITARTRATE AND ACETAMINOPHEN 1 TABLET: 10; 325 TABLET ORAL at 06:35

## 2021-02-24 RX ADMIN — MODAFINIL 200 MG: 100 TABLET ORAL at 08:17

## 2021-02-24 RX ADMIN — BUDESONIDE AND FORMOTEROL FUMARATE DIHYDRATE 2 PUFF: 80; 4.5 AEROSOL RESPIRATORY (INHALATION) at 06:43

## 2021-02-24 RX ADMIN — HYDROCODONE BITARTRATE AND ACETAMINOPHEN 1 TABLET: 10; 325 TABLET ORAL at 02:05

## 2021-02-24 RX ADMIN — CETIRIZINE HYDROCHLORIDE 10 MG: 10 TABLET, FILM COATED ORAL at 08:18

## 2021-02-24 RX ADMIN — IPRATROPIUM BROMIDE 0.5 MG: 0.5 SOLUTION RESPIRATORY (INHALATION) at 06:42

## 2021-02-24 RX ADMIN — DICYCLOMINE HYDROCHLORIDE 10 MG: 10 CAPSULE ORAL at 16:59

## 2021-02-24 RX ADMIN — LISINOPRIL 30 MG: 20 TABLET ORAL at 08:18

## 2021-02-24 RX ADMIN — DICYCLOMINE HYDROCHLORIDE 10 MG: 10 CAPSULE ORAL at 08:18

## 2021-02-24 RX ADMIN — ENOXAPARIN SODIUM 40 MG: 40 INJECTION SUBCUTANEOUS at 16:59

## 2021-02-24 RX ADMIN — DULOXETINE HYDROCHLORIDE 60 MG: 30 CAPSULE, DELAYED RELEASE ORAL at 08:17

## 2021-02-24 RX ADMIN — DICYCLOMINE HYDROCHLORIDE 10 MG: 10 CAPSULE ORAL at 11:58

## 2021-02-24 NOTE — OUTREACH NOTE
Prep Survey      Responses   [facility] patient discharged from?  Ab   Is LACE score < 7 ?  No   Emergency Room discharge w/ pulse ox?  No   Eligibility  [facility]   Date of Admission  02/20/21   Date of Discharge  02/24/21   Discharge Disposition  Home or Self Care   Discharge diagnosis  cholecystectomy   Does the patient have one of the following disease processes/diagnoses(primary or secondary)?  General Surgery   Does the patient have Home health ordered?  No   Is there a DME ordered?  Yes   What DME was ordered?   - [employer]   Prep survey completed?  Yes          Vilma Barnhart RN

## 2021-02-25 ENCOUNTER — TRANSITIONAL CARE MANAGEMENT TELEPHONE ENCOUNTER (OUTPATIENT)
Dept: CALL CENTER | Facility: HOSPITAL | Age: 66
End: 2021-02-25

## 2021-02-25 LAB
BACTERIA SPEC AEROBE CULT: NORMAL
BACTERIA SPEC AEROBE CULT: NORMAL

## 2021-02-25 NOTE — OUTREACH NOTE
Call Center TCM Note      Responses   [facility] patient discharged from?  Ab   Does the patient have one of the following disease processes/diagnoses(primary or secondary)?  General Surgery   TCM attempt successful?  Yes   Discharge diagnosis  cholecystectomy   Meds reviewed with patient/caregiver?  Yes   Is the patient having any side effects they believe may be caused by any medication additions or changes?  No   Does the patient have all medications related to this admission filled (includes all antibiotics, pain medications, etc.)  Yes   Is the patient taking all medications as directed (includes completed medication regime)?  Yes   Does the patient have a follow up appointment scheduled with their surgeon?  Yes   Has the patient kept scheduled appointments due by today?  N/A   Comments  POST OP 03/01/2021   Has home health visited the patient within 72 hours of discharge?  N/A   What DME was ordered?  [facility]   Has all DME been delivered?  Yes   Psychosocial issues?  No   Did the patient receive a copy of their discharge instructions?  Yes   Nursing interventions  Reviewed instructions with patient   What is the patient's perception of their health status since discharge?  Improving   Nursing interventions  Nurse provided patient education   Is the patient /caregiver able to teach back basic post-op care?  Continue use of incentive spirometry at least 1 week post discharge, Drive as instructed by MD in discharge instructions, No tub bath, swimming, or hot tub until instructed by MD, Do not remove steri-strips, Practice 'cough and deep breath', Take showers only when approved by MD-sponge bathe until then, Keep incision areas clean,dry and protected, Lifting as instructed by MD in discharge instructions   Is the patient/caregiver able to teach back signs and symptoms of incisional infection?  Increased redness, swelling or pain at the incisonal site, Pus or odor from incision, Fever,  Increased drainage or bleeding, Incisional warmth   Is the patient/caregiver able to teach back steps to recovery at home?  Set small, achievable goals for return to baseline health, Practice good oral hygiene, Eat a well-balance diet, Rest and rebuild strength, gradually increase activity, Weigh daily   If the patient is a current smoker, are they able to teach back resources for cessation?  Not a smoker   Is the patient/caregiver able to teach back the hierarchy of who to call/visit for symptoms/problems? PCP, Specialist, Home health nurse, Urgent Care, ED, 911  Yes   TCM call completed?  Yes   Wrap up additional comments  This very nice man doing well s/p emergent GB sx. Pain well controlled. Pt understands all meds and changes. Drain functioning properly.  POST OP 03/01/2021, and TCM FWP with PCP is 03/05/2021          Belle Duran MA    2/25/2021, 13:48 EST

## 2021-03-01 ENCOUNTER — OFFICE VISIT (OUTPATIENT)
Dept: SURGERY | Facility: CLINIC | Age: 66
End: 2021-03-01

## 2021-03-01 VITALS
WEIGHT: 243 LBS | HEIGHT: 68 IN | HEART RATE: 89 BPM | TEMPERATURE: 98.2 F | DIASTOLIC BLOOD PRESSURE: 70 MMHG | OXYGEN SATURATION: 99 % | BODY MASS INDEX: 36.83 KG/M2 | SYSTOLIC BLOOD PRESSURE: 113 MMHG

## 2021-03-01 DIAGNOSIS — K80.20 GALLSTONES: Primary | ICD-10-CM

## 2021-03-01 PROCEDURE — 99024 POSTOP FOLLOW-UP VISIT: CPT | Performed by: SURGERY

## 2021-03-01 NOTE — PROGRESS NOTES
SUBJECTIVE:    Mr. Osuna was seen in the office today follow-up from his lap Fidelia 1 week ago.  He is doing well.  No fevers chills nausea or vomiting.  Drain tube with old blood.    OBJECTIVE:    Incisions are healing appropriately without infection.  He does have a lot of bruising of the anterior abdominal wall.    ASSESSMENT:    Satisfactory postop progress    PLAN:    Recheck in the office in 1 week

## 2021-03-05 ENCOUNTER — READMISSION MANAGEMENT (OUTPATIENT)
Dept: CALL CENTER | Facility: HOSPITAL | Age: 66
End: 2021-03-05

## 2021-03-05 ENCOUNTER — OFFICE VISIT (OUTPATIENT)
Dept: FAMILY MEDICINE CLINIC | Facility: CLINIC | Age: 66
End: 2021-03-05

## 2021-03-05 VITALS
DIASTOLIC BLOOD PRESSURE: 68 MMHG | HEIGHT: 68 IN | OXYGEN SATURATION: 96 % | TEMPERATURE: 98.4 F | SYSTOLIC BLOOD PRESSURE: 114 MMHG | WEIGHT: 238 LBS | BODY MASS INDEX: 36.07 KG/M2 | HEART RATE: 107 BPM

## 2021-03-05 DIAGNOSIS — G89.29 CHRONIC BILATERAL LOW BACK PAIN WITHOUT SCIATICA: ICD-10-CM

## 2021-03-05 DIAGNOSIS — M54.50 CHRONIC BILATERAL LOW BACK PAIN WITHOUT SCIATICA: ICD-10-CM

## 2021-03-05 DIAGNOSIS — Z90.49 S/P CHOLECYSTECTOMY: Primary | ICD-10-CM

## 2021-03-05 PROCEDURE — 99495 TRANSJ CARE MGMT MOD F2F 14D: CPT | Performed by: NURSE PRACTITIONER

## 2021-03-05 NOTE — PROGRESS NOTES
"Transitional Care Follow Up Visit  Subjective     Neptali Osuna is a 65 y.o. male who presents for a transitional care management visit.    Within 48 business hours after discharge our office contacted him via telephone to coordinate his care and needs.      I reviewed and discussed the details of that call along with the discharge summary, hospital problems, inpatient lab results, inpatient diagnostic studies, and consultation reports with Neptali.     Current outpatient and discharge medications have been reconciled for the patient.  Reviewed by: IWONA Horowitz      Date of TCM Phone Call 2/24/2021   [facility]   Date of Admission 2/20/2021   Date of Discharge 2/24/2021   Discharge Disposition Home or Self Care     Risk for Readmission (LACE) Score: 11 (2/24/2021  6:00 AM)      History of Present Illness   Course During Hospital Stay:  Hospital course per hospital record: \"Neptali Osuna is a 65 y.o. male who presented with epigastric abdominal pain and was found to have cholecystitis with choledocholithiasis.  The patient was seen by Dr. Wilkins of surgery and was taken to the operating room where he underwent a cholecystectomy.  It was necessary to continue a biliary drain which has been checked by Dr. Wilkins and is functioning well.  The patient was placed on antibiotics of Rocephin which have now been changed to oral antibiotics of Augmentin.  Prior to discharge the patient's diet had been advanced and he was tolerating it well.  The patient also had a rest and exercise oximetry which determined that the patient needed 2 L/min per nasal cannula of oxygen.  This will need to be worn at all times and has been arranged through [facility].  The patient has numerous comorbidities including obstructive sleep apnea, COPD, monoclonal B-cell lymphocytes, coronary artery disease and PCI with stenting, hypertension, hyperlipidemia and depression.   The patient is a full code at the time of " "discharge.  The patient is on a low-fat diet.  The patient's medications are as listed in that section of this report.  The patient should follow-up with Dr. Wilkins in his office on Monday for removal of the biliary drain.  The patient needs to follow-up with his primary care provider in a week.  The patient will need to use his oxygen 24 hours a day.  This is been arranged through case management.  The patient has no pending studies outside of the pathology which was obtained at the date of surgery.  The patient is discharged in satisfactory condition.\"     Biliary drain has been removed.  Dressings are clean, dry.  Still has some lower abdomen bruising.  He will see surgeon again for follow up next week.  Given additional pain meds from the surgeon to supplement what he already had.  Pain has been stable.  Appetite is returning to normal.  Denies any bladder or bowel issues.  Doing well with the continuous O2 at 2L.  Denies any CP; palpitations; SOA; trouble with breathing; cough; wheezes; headache; dizziness; trouble with vision.      Also following up on chronic lower back pain. Takes Norco 10/325 mg qid.  Pain has been stable with current regimen.  INSPECT has been reviewed.       The following portions of the patient's history were reviewed and updated as appropriate: allergies, current medications, past family history, past medical history, past social history, past surgical history and problem list.    Review of Systems   Constitutional: Negative for activity change, appetite change, chills, diaphoresis, fatigue, fever and unexpected weight change.   Eyes: Negative for visual disturbance.   Respiratory: Negative for cough, chest tightness, shortness of breath and wheezing.    Cardiovascular: Negative for chest pain, palpitations and leg swelling.   Gastrointestinal: Negative for abdominal distention, abdominal pain, blood in stool, constipation, diarrhea, nausea and vomiting.   Genitourinary: Negative for " decreased urine volume, difficulty urinating, dysuria, flank pain, frequency, hematuria and urgency.   Musculoskeletal: Positive for back pain and myalgias. Negative for arthralgias.   Skin: Negative for rash.   Neurological: Negative for dizziness, weakness, light-headedness and headaches.   Hematological: Negative for adenopathy.   Psychiatric/Behavioral: Negative for decreased concentration. The patient is not nervous/anxious.        Objective   Physical Exam  Vitals signs reviewed.   Constitutional:       General: He is not in acute distress.     Appearance: Normal appearance.   HENT:      Head: Normocephalic and atraumatic.   Neck:      Musculoskeletal: Normal range of motion and neck supple. No muscular tenderness.   Cardiovascular:      Rate and Rhythm: Normal rate and regular rhythm.      Pulses: Normal pulses.      Heart sounds: Normal heart sounds. No murmur.   Pulmonary:      Effort: Pulmonary effort is normal. No respiratory distress.      Breath sounds: Normal breath sounds. No wheezing.   Chest:      Chest wall: No tenderness.   Abdominal:      General: Abdomen is flat. Bowel sounds are normal. There is no distension.      Palpations: Abdomen is soft.      Tenderness: There is abdominal tenderness. There is no right CVA tenderness or left CVA tenderness.   Musculoskeletal:      Right lower leg: No edema.      Left lower leg: No edema.   Skin:     General: Skin is warm and dry.      Findings: No erythema.   Neurological:      General: No focal deficit present.      Mental Status: He is alert and oriented to person, place, and time.   Psychiatric:         Mood and Affect: Mood normal.         Assessment/Plan   Diagnoses and all orders for this visit:    1. S/P cholecystectomy (Primary)  Comments:  Stable.   Finish all abx. from hospital.    Keep follow up with surgeon       2. Chronic bilateral low back pain without sciatica  Comments:  Stable.   INSPCET reviewed.   Cont. current medication.   RTO in 4  months.                  Answers for HPI/ROS submitted by the patient on 2/26/2021   What is the primary reason for your visit?: Other  Please describe your symptoms.: Post op after gallbladder removal.  Have you had these symptoms before?: No  How long have you been having these symptoms?: Greater than 2 weeks  Please list any medications you are currently taking for this condition.: Increased dosage of Norco, amox-clav 875mg & ondansetron 4mg  Please describe any probable cause for these symptoms. : Surgery

## 2021-03-05 NOTE — OUTREACH NOTE
General Surgery Week 2 Survey      Responses   [facility] patient discharged from?  Ab   Does the patient have one of the following disease processes/diagnoses(primary or secondary)?  General Surgery   Week 2 attempt successful?  Yes   Call start time  1614   Call end time  1616   Discharge diagnosis  cholecystectomy   Meds reviewed with patient/caregiver?  Yes   Is the patient having any side effects they believe may be caused by any medication additions or changes?  No   Does the patient have all medications related to this admission filled (includes all antibiotics, pain medications, etc.)  Yes   Is the patient taking all medications as directed (includes completed medication regime)?  Yes   Does the patient have a follow up appointment scheduled with their surgeon?  Yes   Has the patient kept scheduled appointments due by today?  Yes   Has home health visited the patient within 72 hours of discharge?  N/A   Psychosocial issues?  No   Did the patient receive a copy of their discharge instructions?  Yes   Nursing interventions  Reviewed instructions with patient   What is the patient's perception of their health status since discharge?  Improving   Nursing interventions  Nurse provided patient education   Is the patient /caregiver able to teach back basic post-op care?  Continue use of incentive spirometry at least 1 week post discharge, Drive as instructed by MD in discharge instructions, No tub bath, swimming, or hot tub until instructed by MD, Do not remove steri-strips, Practice 'cough and deep breath', Take showers only when approved by MD-sponge bathe until then, Keep incision areas clean,dry and protected, Lifting as instructed by MD in discharge instructions   Is the patient/caregiver able to teach back signs and symptoms of incisional infection?  Increased redness, swelling or pain at the incisonal site, Pus or odor from incision, Fever, Increased drainage or bleeding, Incisional warmth   Is the  patient/caregiver able to teach back steps to recovery at home?  Set small, achievable goals for return to baseline health, Practice good oral hygiene, Eat a well-balance diet, Rest and rebuild strength, gradually increase activity, Weigh daily   If the patient is a current smoker, are they able to teach back resources for cessation?  Not a smoker   Is the patient/caregiver able to teach back the hierarchy of who to call/visit for symptoms/problems? PCP, Specialist, Home health nurse, Urgent Care, ED, 911  Yes   Week 2 call completed?  Yes          Yared Harding RN

## 2021-03-06 ENCOUNTER — NURSE TRIAGE (OUTPATIENT)
Dept: CALL CENTER | Facility: HOSPITAL | Age: 66
End: 2021-03-06

## 2021-03-06 NOTE — TELEPHONE ENCOUNTER
His wife is the caller, she is worried because he is having blood mixed with the stools, he recently had a endoscope and a cholangiopancreatography sphincerotomy, balloon clearance of bile duct, and stent placement on the 02/21/2021 and a 02/22/2021 Cholecystectomy laparoscopic at [facility]. He had the surgical tube removed on Friday 03/05/2021. It has been draining blood tinge drainage. Today- after straining to have a BM, he had blood mixed in the BM . He wants to know what to do . Advised to call the surgeon. He has a follow up appt on- 03/09/2021.     Reason for Disposition  • [1] Caller has URGENT question AND [2] triager unable to answer question    Additional Information  • Negative: Sounds like a life-threatening emergency to the triager  • Negative: Chest pain  • Negative: Difficulty breathing  • Negative: Acting confused (e.g., disoriented, slurred speech) or excessively sleepy  • Negative: Surgical incision symptoms and questions  • Negative: [1] Discomfort (pain, burning or stinging) when passing urine AND [2] male  • Negative: [1] Discomfort (pain, burning or stinging) when passing urine AND [2] female  • Negative: Constipation  • Negative: New or worsening leg (calf, thigh) pain  • Negative: New or worsening leg swelling  • Negative: Dizziness is severe, or persists > 24 hours after surgery  • Negative: Pain, redness, swelling, or pus at IV Site  • Negative: Symptoms arising from use of a urinary catheter ([place] or Coude)  • Negative: Cast problems or questions  • Negative: Medication question  • Negative: [1] Widespread rash AND [2] bright red, sunburn-like  • Negative: [1] SEVERE headache AND [2] after spinal (epidural) anesthesia  • Negative: [1] Vomiting AND [2] persists > 4 hours  • Negative: [1] Vomiting AND [2] abdomen looks much more swollen than usual  • Negative: [1] Drinking very little AND [2] dehydration suspected (e.g., no urine > 12 hours, very dry mouth, very lightheaded)  • Negative:  "Patient sounds very sick or weak to the triager  • Negative: Sounds like a serious complication to the triager  • Negative: Fever > 100.4 F (38.0 C)  • Negative: [1] SEVERE post-op pain (e.g., excruciating, pain scale 8-10) AND [2] not controlled with pain medications    Answer Assessment - Initial Assessment Questions  1. SYMPTOM: \"What's the main symptom you're concerned about?\" (e.g., pain, fever, vomiting)      Blood in stool.   2. ONSET: \"When did *No Answer*  start?\"      Today   3. SURGERY: \"What surgery was performed?\"      See note   4. DATE of SURGERY: \"When was surgery performed?\"       02/21/ and 02/22/2021  5. ANESTHESIA: \" What type of anesthesia did you have?\" (e.g., general, spinal, epidural, local)      General    6. PAIN: \"Is there any pain?\" If so, ask: \"How bad is it?\"  (Scale 1-10; or mild, moderate, severe)     n  7. FEVER: \"Do you have a fever?\" If so, ask: \"What is your temperature, how was it measured, and when did it start?\"      n  8. VOMITING: \"Is there any vomiting?\" If yes, ask: \"How many times?\"      n  9. BLEEDING: \"Is there any bleeding?\" If so, ask: \"How much?\" and \"Where?\"      See note in stool - mixed in stool.   10. OTHER SYMPTOMS: \"Do you have any other symptoms?\" (e.g., drainage from wound, painful urination, constipation)        See note.    Protocols used: POST-OP SYMPTOMS AND QUESTIONS-ADULT-AH      "

## 2021-03-08 DIAGNOSIS — M54.50 CHRONIC BILATERAL LOW BACK PAIN WITHOUT SCIATICA: ICD-10-CM

## 2021-03-08 DIAGNOSIS — G89.29 CHRONIC BILATERAL LOW BACK PAIN WITHOUT SCIATICA: ICD-10-CM

## 2021-03-08 RX ORDER — HYDROCODONE BITARTRATE AND ACETAMINOPHEN 10; 325 MG/1; MG/1
1 TABLET ORAL 4 TIMES DAILY
Qty: 120 TABLET | Refills: 0 | Status: SHIPPED | OUTPATIENT
Start: 2021-03-08 | End: 2021-04-05 | Stop reason: SDUPTHER

## 2021-03-09 ENCOUNTER — OFFICE VISIT (OUTPATIENT)
Dept: SURGERY | Facility: CLINIC | Age: 66
End: 2021-03-09

## 2021-03-09 VITALS
OXYGEN SATURATION: 97 % | WEIGHT: 231 LBS | SYSTOLIC BLOOD PRESSURE: 115 MMHG | DIASTOLIC BLOOD PRESSURE: 69 MMHG | HEART RATE: 92 BPM | HEIGHT: 68 IN | TEMPERATURE: 96.9 F | BODY MASS INDEX: 35.01 KG/M2

## 2021-03-09 DIAGNOSIS — K80.20 GALLSTONES: Primary | ICD-10-CM

## 2021-03-09 PROCEDURE — 99024 POSTOP FOLLOW-UP VISIT: CPT | Performed by: SURGERY

## 2021-03-09 NOTE — PROGRESS NOTES
SUBJECTIVE:    Mr. Osuna is seen in the office today follow-up from his lap alycia 2 weeks ago now.  We saw him last week and removed his drain tube.  He is doing well.  No fevers or chills.  No nausea or vomiting.    OBJECTIVE:    Incisions are healing appropriately without infection.    ASSESSMENT:    Satisfactory postop progress    PLAN:    Recheck in the office as needed

## 2021-03-14 ENCOUNTER — READMISSION MANAGEMENT (OUTPATIENT)
Dept: CALL CENTER | Facility: HOSPITAL | Age: 66
End: 2021-03-14

## 2021-03-14 PROCEDURE — G2066 INTER DEVC REMOTE 30D: HCPCS | Performed by: INTERNAL MEDICINE

## 2021-03-14 PROCEDURE — 93298 REM INTERROG DEV EVAL SCRMS: CPT | Performed by: INTERNAL MEDICINE

## 2021-03-14 NOTE — OUTREACH NOTE
General Surgery Week 3 Survey      Responses   [facility] patient discharged from?  Ab   Does the patient have one of the following disease processes/diagnoses(primary or secondary)?  General Surgery   Week 3 attempt successful?  Yes   Call start time  1631   Call end time  1635   Discharge diagnosis  cholecystectomy   Person spoke with today (if not patient) and relationship  Daniela, spouse   Meds reviewed with patient/caregiver?  Yes   Is the patient having any side effects they believe may be caused by any medication additions or changes?  No   Does the patient have all medications related to this admission filled (includes all antibiotics, pain medications, etc.)  Yes   Is the patient taking all medications as directed (includes completed medication regime)?  Yes   Does the patient have a follow up appointment scheduled with their surgeon?  Yes   Has the patient kept scheduled appointments due by today?  Yes   Psychosocial issues?  No   Did the patient receive a copy of their discharge instructions?  Yes   Nursing interventions  Reviewed instructions with patient   What is the patient's perception of their health status since discharge?  Improving   Nursing interventions  Nurse provided patient education   Is the patient/caregiver able to teach back steps to recovery at home?  Rest and rebuild strength, gradually increase activity, Set small, achievable goals for return to baseline health   Is the patient/caregiver able to teach back the hierarchy of who to call/visit for symptoms/problems? PCP, Specialist, Home health nurse, Urgent Care, ED, 911  Yes   Additional teach back comments  Incisions without s/s infection. Will continue to monitor. Denies any questions/concerns. Says he is doing well.   Week 3 call completed?  Yes   Wrap up additional comments  Drain removed and states he is doing well.          Sheila Bailey RN

## 2021-03-16 RX ORDER — CLOPIDOGREL BISULFATE 75 MG/1
75 TABLET ORAL DAILY
Qty: 90 TABLET | Refills: 1 | Status: SHIPPED | OUTPATIENT
Start: 2021-03-16 | End: 2021-05-25 | Stop reason: SDUPTHER

## 2021-03-18 DIAGNOSIS — G47.419 PRIMARY NARCOLEPSY WITHOUT CATAPLEXY: ICD-10-CM

## 2021-03-19 RX ORDER — MODAFINIL 200 MG/1
200 TABLET ORAL DAILY
Qty: 30 TABLET | Refills: 0 | Status: SHIPPED | OUTPATIENT
Start: 2021-03-19 | End: 2021-05-13 | Stop reason: SDUPTHER

## 2021-03-22 ENCOUNTER — READMISSION MANAGEMENT (OUTPATIENT)
Dept: CALL CENTER | Facility: HOSPITAL | Age: 66
End: 2021-03-22

## 2021-03-22 NOTE — OUTREACH NOTE
General Surgery Week 4 Survey      Responses   [facility] patient discharged from?  Ab   Does the patient have one of the following disease processes/diagnoses(primary or secondary)?  General Surgery   Week 4 attempt successful?  No          Chinyere Lawrence RN

## 2021-03-31 ENCOUNTER — TELEPHONE (OUTPATIENT)
Dept: CARDIOLOGY | Facility: CLINIC | Age: 66
End: 2021-03-31

## 2021-04-02 DIAGNOSIS — M54.50 CHRONIC BILATERAL LOW BACK PAIN WITHOUT SCIATICA: ICD-10-CM

## 2021-04-02 DIAGNOSIS — G89.29 CHRONIC BILATERAL LOW BACK PAIN WITHOUT SCIATICA: ICD-10-CM

## 2021-04-02 RX ORDER — HYDROCODONE BITARTRATE AND ACETAMINOPHEN 10; 325 MG/1; MG/1
1 TABLET ORAL 4 TIMES DAILY
Qty: 120 TABLET | Refills: 0 | OUTPATIENT
Start: 2021-04-02

## 2021-04-05 DIAGNOSIS — M54.50 CHRONIC BILATERAL LOW BACK PAIN WITHOUT SCIATICA: ICD-10-CM

## 2021-04-05 DIAGNOSIS — G89.29 CHRONIC BILATERAL LOW BACK PAIN WITHOUT SCIATICA: ICD-10-CM

## 2021-04-05 RX ORDER — HYDROCODONE BITARTRATE AND ACETAMINOPHEN 10; 325 MG/1; MG/1
1 TABLET ORAL 4 TIMES DAILY
Qty: 120 TABLET | Refills: 0 | Status: SHIPPED | OUTPATIENT
Start: 2021-04-05 | End: 2021-05-02 | Stop reason: SDUPTHER

## 2021-04-06 RX ORDER — LORATADINE 10 MG/1
10 TABLET ORAL DAILY
Qty: 90 TABLET | Refills: 1 | Status: SHIPPED | OUTPATIENT
Start: 2021-04-06 | End: 2021-10-25

## 2021-04-14 PROCEDURE — G2066 INTER DEVC REMOTE 30D: HCPCS | Performed by: INTERNAL MEDICINE

## 2021-04-14 PROCEDURE — 93298 REM INTERROG DEV EVAL SCRMS: CPT | Performed by: INTERNAL MEDICINE

## 2021-04-21 DIAGNOSIS — M54.50 CHRONIC BILATERAL LOW BACK PAIN WITHOUT SCIATICA: ICD-10-CM

## 2021-04-21 DIAGNOSIS — G89.29 CHRONIC BILATERAL LOW BACK PAIN WITHOUT SCIATICA: ICD-10-CM

## 2021-04-21 RX ORDER — PREGABALIN 100 MG/1
100 CAPSULE ORAL 2 TIMES DAILY
Qty: 60 CAPSULE | Refills: 0 | Status: SHIPPED | OUTPATIENT
Start: 2021-04-21 | End: 2021-05-31

## 2021-05-02 DIAGNOSIS — M54.50 CHRONIC BILATERAL LOW BACK PAIN WITHOUT SCIATICA: ICD-10-CM

## 2021-05-02 DIAGNOSIS — G89.29 CHRONIC BILATERAL LOW BACK PAIN WITHOUT SCIATICA: ICD-10-CM

## 2021-05-03 RX ORDER — HYDROCODONE BITARTRATE AND ACETAMINOPHEN 10; 325 MG/1; MG/1
1 TABLET ORAL 4 TIMES DAILY
Qty: 120 TABLET | Refills: 0 | Status: SHIPPED | OUTPATIENT
Start: 2021-05-03 | End: 2021-05-27 | Stop reason: SDUPTHER

## 2021-05-13 DIAGNOSIS — G47.419 PRIMARY NARCOLEPSY WITHOUT CATAPLEXY: ICD-10-CM

## 2021-05-13 RX ORDER — MODAFINIL 200 MG/1
200 TABLET ORAL DAILY
Qty: 30 TABLET | Refills: 0 | Status: SHIPPED | OUTPATIENT
Start: 2021-05-13 | End: 2021-06-28 | Stop reason: SDUPTHER

## 2021-05-13 RX ORDER — ATORVASTATIN CALCIUM 40 MG/1
40 TABLET, FILM COATED ORAL NIGHTLY
Qty: 90 TABLET | Refills: 1 | Status: SHIPPED | OUTPATIENT
Start: 2021-05-13 | End: 2021-11-03

## 2021-05-15 PROCEDURE — G2066 INTER DEVC REMOTE 30D: HCPCS | Performed by: INTERNAL MEDICINE

## 2021-05-15 PROCEDURE — 93298 REM INTERROG DEV EVAL SCRMS: CPT | Performed by: INTERNAL MEDICINE

## 2021-05-25 ENCOUNTER — OFFICE VISIT (OUTPATIENT)
Dept: CARDIOLOGY | Facility: CLINIC | Age: 66
End: 2021-05-25

## 2021-05-25 VITALS
HEART RATE: 80 BPM | SYSTOLIC BLOOD PRESSURE: 114 MMHG | OXYGEN SATURATION: 100 % | WEIGHT: 230 LBS | DIASTOLIC BLOOD PRESSURE: 75 MMHG | HEIGHT: 68 IN | BODY MASS INDEX: 34.86 KG/M2

## 2021-05-25 DIAGNOSIS — Z95.820 STATUS POST ANGIOPLASTY WITH STENT: ICD-10-CM

## 2021-05-25 DIAGNOSIS — Z95.818 STATUS POST PLACEMENT OF IMPLANTABLE LOOP RECORDER: Primary | ICD-10-CM

## 2021-05-25 DIAGNOSIS — R55 SYNCOPE AND COLLAPSE: ICD-10-CM

## 2021-05-25 DIAGNOSIS — I10 ESSENTIAL HYPERTENSION: ICD-10-CM

## 2021-05-25 DIAGNOSIS — E78.2 MIXED HYPERLIPIDEMIA: ICD-10-CM

## 2021-05-25 PROCEDURE — 93000 ELECTROCARDIOGRAM COMPLETE: CPT | Performed by: INTERNAL MEDICINE

## 2021-05-25 PROCEDURE — 99214 OFFICE O/P EST MOD 30 MIN: CPT | Performed by: INTERNAL MEDICINE

## 2021-05-25 RX ORDER — ASPIRIN 81 MG/1
81 TABLET ORAL DAILY
COMMUNITY

## 2021-05-25 RX ORDER — CLOPIDOGREL BISULFATE 75 MG/1
75 TABLET ORAL DAILY
Qty: 90 TABLET | Refills: 3 | Status: SHIPPED | OUTPATIENT
Start: 2021-05-25 | End: 2021-06-24

## 2021-05-25 NOTE — PROGRESS NOTES
Encounter Date:05/25/2021  Last seen 11/5/2020      Patient ID: Neptali Osuna is a 65 y.o. male.    Chief Complaint:  Status post stent  History of syncope  Status post loop recorder  Hypertension  Dyslipidemia        History of Present Illness  Patient had cholecystectomy.  Patient needs ERCP.  Since I have last seen, the patient has been without any chest discomfort ,shortness of breath, palpitations, dizziness or syncope.  Denies having any headache ,abdominal pain ,nausea, vomiting , diarrhea constipation, loss of weight or loss of appetite.  Denies having any excessive bruising ,hematuria or blood in the stool.    Review of all systems negative except as indicated.    Reviewed ROS.    Assessment and Plan         ]]]]]]]]]]]]]]]]]]]   impression  =============  - Recent recurrent syncopal episodes concerning for bradyarrhythmias.  Recent loop recorder monitor showed second-degree AV block type I and type II.  Patient had one episode of bradycardia with heart rates of 30/min while he was sleeping without CPAP.     -Status post [employer] loop recorder 11/15/2019 (Linq)  Negative tilt table test 12/20/2019      -Status post stent to proximal LAD 11/05/2018     Cardiac catheterization 11/05/2018 revealed  Left ventricular size and contractility is normal with ejection fraction of 65%.  Left main coronary artery is normal.  Left anterior descending artery has proximal calcific plaquing with 95-99% proximal LAD disease.  Circumflex coronary artery is normal.  Right coronary artery is a large and dominant vessel that has mid segment 50% disease.      -Chest pain suggestive of unstable angina pectoris. prior to stent placement   CT scan of the chest showed significant coronary artery calcifications(prior to stent placement).  EKG is normal  Troponin levels are negative.      -COPD dyslipidemia hypertension    -Status post cholecystectomyFormer smoker      -Strong family history of coronary artery disease      -No known  allergies  =========  Plan  ===========  Status post stent  Patient is not having any angina pectoris or congestive heart failure.  EKG showed sinus rhythm without ischemic changes.  Discontinue Plavix since patient had stent in 2018.    Preprocedural cardiovascular evaluation  Okay with planned ERCP procedure.  Patient was provided with supporting document.    History of syncope-no further episodes.  Loop recorder in place.    Hypertension-well-controlled-114/75.    Dyslipidemia-continue atorvastatin.    History of second-degree AV block Mobitz type I and type II-improved.  Patient does not have any symptoms of dizziness or near syncope or syncope  No need for pacemaker at this time  However they would like to wait and observe at this time.    Follow-up in the office in 6 months.  Further plan will depend on patient's progress  ]]]]]]]]]]]]]]]                         Diagnosis Plan   1. Status post placement of implantable loop recorder     2. Syncope and collapse     3. Status post angioplasty with stent     4. Essential hypertension     5. Mixed hyperlipidemia     LAB RESULTS (LAST 7 DAYS)    CBC        BMP        CMP         BNP        TROPONIN        CoAg        Creatinine Clearance  CrCl cannot be calculated (Patient's most recent lab result is older than the maximum 30 days allowed.).    ABG        Radiology  No radiology results for the last day                The following portions of the patient's history were reviewed and updated as appropriate: allergies, current medications, past family history, past medical history, past social history, past surgical history and problem list.    Review of Systems   Constitutional: Negative for malaise/fatigue.   Cardiovascular: Negative for chest pain, leg swelling, palpitations and syncope.   Respiratory: Negative for shortness of breath.    Skin: Negative for rash.   Gastrointestinal: Negative for nausea and vomiting.   Neurological: Negative for dizziness,  light-headedness and numbness.         Current Outpatient Medications:   •  amLODIPine (NORVASC) 10 MG tablet, Take 1 tablet by mouth Daily., Disp: 90 tablet, Rfl: 1  •  atorvastatin (LIPITOR) 40 MG tablet, Take 1 tablet by mouth Every Night., Disp: 90 tablet, Rfl: 1  •  clopidogrel (PLAVIX) 75 MG tablet, Take 1 tablet by mouth Daily., Disp: 90 tablet, Rfl: 3  •  dicyclomine (BENTYL) 10 MG capsule, TAKE 1 CAPSULE BY MOUTH FOUR TIMES DAILY BEFORE MEALS AND AT BEDTIME, Disp: 120 capsule, Rfl: 5  •  DULoxetine (CYMBALTA) 60 MG capsule, Take 1 capsule by mouth Daily., Disp: 90 capsule, Rfl: 3  •  Fluticasone-Umeclidin-Vilant (Trelegy Ellipta) 100-62.5-25 MCG/INH aerosol powder , Inhale 1 puff Daily., Disp: 60 each, Rfl: 5  •  HYDROcodone-acetaminophen (NORCO)  MG per tablet, Take 1 tablet by mouth 4 (Four) Times a Day., Disp: 120 tablet, Rfl: 0  •  ipratropium-albuterol (DUO-NEB) 0.5-2.5 mg/3 ml nebulizer, Take 3 mL by nebulization Every 4 (Four) Hours As Needed for Wheezing., Disp: 360 mL, Rfl: 5  •  lisinopril (PRINIVIL,ZESTRIL) 30 MG tablet, Take 1 tablet by mouth Daily., Disp: 90 tablet, Rfl: 1  •  loratadine (CLARITIN) 10 MG tablet, Take 1 tablet by mouth Daily., Disp: 90 tablet, Rfl: 1  •  modafinil (PROVIGIL) 200 MG tablet, Take 1 tablet by mouth Daily., Disp: 30 tablet, Rfl: 0  •  omeprazole (priLOSEC) 40 MG capsule, Take 40 mg by mouth Daily., Disp: , Rfl:   •  pregabalin (LYRICA) 100 MG capsule, Take 1 capsule by mouth 2 (Two) Times a Day., Disp: 60 capsule, Rfl: 0  •  tiZANidine (ZANAFLEX) 4 MG tablet, TAKE 1 TABLET BY MOUTH EVERY 8 HOURS AS NEEDED FOR MUSCLE SPASMS, Disp: 30 tablet, Rfl: 1    No Known Allergies    Family History   Problem Relation Age of Onset   • Colon cancer Mother    • Heart disease Mother    • Colon cancer Father    • Heart disease Father    • Heart attack Father    • Diabetes Brother    • Diabetes Maternal Grandmother    • Diabetes Maternal Grandfather        Past Surgical  History:   Procedure Laterality Date   • CARDIAC CATHETERIZATION  11/05/2018    Dr. Sher   • CHOLECYSTECTOMY N/A 2/22/2021    Procedure: CHOLECYSTECTOMY LAPAROSCOPIC;  Surgeon: Faisal Wilkins DO;  Location: [facility] MAIN OR;  Service: General;  Laterality: N/A;   • COLONOSCOPY  2016   • CORONARY STENT PLACEMENT     • ERCP N/A 2/21/2021    Procedure: ENDOSCOPIC RETROGRADE CHOLANGIOPANCREATOGRAPHY, sphincterotomy, balloon clearance of  bile duct, stent placement;  Surgeon: Ramiro Pulido MD;  Location: [facility] ENDOSCOPY;  Service: Gastroenterology;  Laterality: N/A;  post: biliary dilation, choledocolithiasis   • LIVER BIOPSY     • TONSILLECTOMY AND ADENOIDECTOMY         Past Medical History:   Diagnosis Date   • Acute diverticulitis     with perforation   • Allergic    • Anemia    • Anxiety    • Back pain    • CAD S/P percutaneous coronary angioplasty    • COPD (chronic obstructive pulmonary disease) (CMS/HCC)    • Depression    • Diverticulitis    • GERD (gastroesophageal reflux disease)    • Hyperlipidemia    • Hypertension    • Low back pain    • Myocardial infarction (CMS/HCC)    • TEIXEIRA (nonalcoholic steatohepatitis)    • Obesity    • BRITTNEY on CPAP    • Sleep apnea    • Sleep disorder, shift work    • Spinal stenosis of lumbar region at multiple levels    • Status post placement of implantable loop recorder 2020    JULIETTEI       Family History   Problem Relation Age of Onset   • Colon cancer Mother    • Heart disease Mother    • Colon cancer Father    • Heart disease Father    • Heart attack Father    • Diabetes Brother    • Diabetes Maternal Grandmother    • Diabetes Maternal Grandfather        Social History     Socioeconomic History   • Marital status:      Spouse name: Not on file   • Number of children: Not on file   • Years of education: Not on file   • Highest education level: Not on file   Tobacco Use   • Smoking status: Former Smoker     Packs/day: 1.50     Years: 40.00     Pack years: 60.00      "Types: Cigarettes     Start date: 1969     Quit date: 2009     Years since quittin.4   • Smokeless tobacco: Never Used   Vaping Use   • Vaping Use: Never used   Substance and Sexual Activity   • Alcohol use: Not Currently     Alcohol/week: 1.0 standard drinks     Types: 1 Glasses of wine per week     Comment: No alcohol the past 10 years.   • Drug use: No   • Sexual activity: Not Currently     Partners: Female     Birth control/protection: None           ECG 12 Lead    Date/Time: 2021 3:42 PM  Performed by: Mesha Sher MD  Authorized by: Mesha Sher MD   Comparison: compared with previous ECG   Similar to previous ECG  Comparison to previous ECG: Normal sinus rhythm normal ECG 74/min normal axis normal intervals no ectopy no significant change from 2020                Objective:       Physical Exam    /75 (BP Location: Left arm, Patient Position: Sitting, Cuff Size: Large Adult)   Pulse 80   Ht 172.7 cm (68\")   Wt 104 kg (230 lb)   SpO2 100%   BMI 34.97 kg/m²   The patient is alert, oriented and in no distress.    Vital signs as noted above.    Head and neck revealed no carotid bruits or jugular venous distension.  No thyromegaly or lymphadenopathy is present.    Lungs clear.  No wheezing.  Breath sounds are normal bilaterally.    Heart normal first and second heart sounds.  No murmur..  No pericardial rub is present.  No gallop is present.    Abdomen soft and nontender.  No organomegaly is present.    Extremities revealed good peripheral pulses without any pedal edema.    Skin warm and dry.    Musculoskeletal system is grossly normal.    CNS grossly normal.    Reviewed and unchanged from last visit.        "

## 2021-05-27 DIAGNOSIS — M54.50 CHRONIC BILATERAL LOW BACK PAIN WITHOUT SCIATICA: ICD-10-CM

## 2021-05-27 DIAGNOSIS — G89.29 CHRONIC BILATERAL LOW BACK PAIN WITHOUT SCIATICA: ICD-10-CM

## 2021-05-27 RX ORDER — HYDROCODONE BITARTRATE AND ACETAMINOPHEN 10; 325 MG/1; MG/1
1 TABLET ORAL 4 TIMES DAILY
Qty: 120 TABLET | Refills: 0 | Status: SHIPPED | OUTPATIENT
Start: 2021-05-27 | End: 2021-06-28 | Stop reason: SDUPTHER

## 2021-05-30 DIAGNOSIS — M54.50 CHRONIC BILATERAL LOW BACK PAIN WITHOUT SCIATICA: ICD-10-CM

## 2021-05-30 DIAGNOSIS — G89.29 CHRONIC BILATERAL LOW BACK PAIN WITHOUT SCIATICA: ICD-10-CM

## 2021-05-31 RX ORDER — PREGABALIN 100 MG/1
CAPSULE ORAL
Qty: 60 CAPSULE | Refills: 0 | Status: SHIPPED | OUTPATIENT
Start: 2021-05-31 | End: 2021-06-28

## 2021-06-02 ENCOUNTER — LAB (OUTPATIENT)
Dept: LAB | Facility: HOSPITAL | Age: 66
End: 2021-06-02

## 2021-06-02 LAB
ANION GAP SERPL CALCULATED.3IONS-SCNC: 11.4 MMOL/L (ref 5–15)
BUN SERPL-MCNC: 12 MG/DL (ref 8–23)
BUN/CREAT SERPL: 13.3 (ref 7–25)
CALCIUM SPEC-SCNC: 9.4 MG/DL (ref 8.6–10.5)
CHLORIDE SERPL-SCNC: 102 MMOL/L (ref 98–107)
CO2 SERPL-SCNC: 25.6 MMOL/L (ref 22–29)
CREAT SERPL-MCNC: 0.9 MG/DL (ref 0.76–1.27)
DEPRECATED RDW RBC AUTO: 45.6 FL (ref 37–54)
ERYTHROCYTE [DISTWIDTH] IN BLOOD BY AUTOMATED COUNT: 14.5 % (ref 12.3–15.4)
GFR SERPL CREATININE-BSD FRML MDRD: 85 ML/MIN/1.73
GLUCOSE SERPL-MCNC: 129 MG/DL (ref 65–99)
HCT VFR BLD AUTO: 42.2 % (ref 37.5–51)
HGB BLD-MCNC: 13.4 G/DL (ref 13–17.7)
MCH RBC QN AUTO: 27.2 PG (ref 26.6–33)
MCHC RBC AUTO-ENTMCNC: 31.8 G/DL (ref 31.5–35.7)
MCV RBC AUTO: 85.8 FL (ref 79–97)
PLATELET # BLD AUTO: 395 10*3/MM3 (ref 140–450)
PMV BLD AUTO: 9.8 FL (ref 6–12)
POTASSIUM SERPL-SCNC: 4.5 MMOL/L (ref 3.5–5.2)
RBC # BLD AUTO: 4.92 10*6/MM3 (ref 4.14–5.8)
SARS-COV-2 ORF1AB RESP QL NAA+PROBE: NOT DETECTED
SODIUM SERPL-SCNC: 139 MMOL/L (ref 136–145)
WBC # BLD AUTO: 10.14 10*3/MM3 (ref 3.4–10.8)

## 2021-06-02 PROCEDURE — C9803 HOPD COVID-19 SPEC COLLECT: HCPCS

## 2021-06-02 PROCEDURE — 85027 COMPLETE CBC AUTOMATED: CPT

## 2021-06-02 PROCEDURE — U0004 COV-19 TEST NON-CDC HGH THRU: HCPCS

## 2021-06-02 PROCEDURE — 80048 BASIC METABOLIC PNL TOTAL CA: CPT

## 2021-06-04 ENCOUNTER — ANESTHESIA EVENT (OUTPATIENT)
Dept: GASTROENTEROLOGY | Facility: HOSPITAL | Age: 66
End: 2021-06-04

## 2021-06-04 ENCOUNTER — HOSPITAL ENCOUNTER (OUTPATIENT)
Facility: HOSPITAL | Age: 66
Setting detail: HOSPITAL OUTPATIENT SURGERY
Discharge: HOME | End: 2021-06-04
Attending: INTERNAL MEDICINE | Admitting: INTERNAL MEDICINE

## 2021-06-04 ENCOUNTER — ANESTHESIA (OUTPATIENT)
Dept: GASTROENTEROLOGY | Facility: HOSPITAL | Age: 66
End: 2021-06-04

## 2021-06-04 ENCOUNTER — ON CAMPUS - OUTPATIENT (OUTPATIENT)
Dept: URBAN - METROPOLITAN AREA HOSPITAL 85 | Facility: HOSPITAL | Age: 66
End: 2021-06-04

## 2021-06-04 ENCOUNTER — APPOINTMENT (OUTPATIENT)
Dept: GENERAL RADIOLOGY | Facility: HOSPITAL | Age: 66
End: 2021-06-04

## 2021-06-04 VITALS
WEIGHT: 231.04 LBS | HEIGHT: 68 IN | TEMPERATURE: 98.5 F | DIASTOLIC BLOOD PRESSURE: 72 MMHG | RESPIRATION RATE: 12 BRPM | SYSTOLIC BLOOD PRESSURE: 128 MMHG | HEART RATE: 83 BPM | BODY MASS INDEX: 35.02 KG/M2 | OXYGEN SATURATION: 96 %

## 2021-06-04 DIAGNOSIS — T85.590A OTHER MECHANICAL COMPLICATION OF BILE DUCT PROSTHESIS, INITI: ICD-10-CM

## 2021-06-04 DIAGNOSIS — K83.1 OBSTRUCTION OF BILE DUCT: ICD-10-CM

## 2021-06-04 DIAGNOSIS — Z46.59 ENCOUNTER FOR FITTING AND ADJUSTMENT OF OTHER GASTROINTESTIN: ICD-10-CM

## 2021-06-04 PROCEDURE — C1769 GUIDE WIRE: HCPCS | Performed by: INTERNAL MEDICINE

## 2021-06-04 PROCEDURE — 25010000002 ONDANSETRON PER 1 MG: Performed by: NURSE ANESTHETIST, CERTIFIED REGISTERED

## 2021-06-04 PROCEDURE — 25010000002 IOPAMIDOL 61 % SOLUTION 30 ML VIAL: Performed by: INTERNAL MEDICINE

## 2021-06-04 PROCEDURE — 25010000002 FENTANYL CITRATE (PF) 100 MCG/2ML SOLUTION: Performed by: ANESTHESIOLOGY

## 2021-06-04 PROCEDURE — 74330 X-RAY BILE/PANC ENDOSCOPY: CPT

## 2021-06-04 PROCEDURE — 25010000002 PROPOFOL 10 MG/ML EMULSION: Performed by: ANESTHESIOLOGY

## 2021-06-04 PROCEDURE — 43275 ERCP REMOVE FORGN BODY DUCT: CPT | Performed by: INTERNAL MEDICINE

## 2021-06-04 RX ORDER — SODIUM CHLORIDE 9 MG/ML
9 INJECTION, SOLUTION INTRAVENOUS ONCE
Status: COMPLETED | OUTPATIENT
Start: 2021-06-04 | End: 2021-06-04

## 2021-06-04 RX ORDER — PROPOFOL 10 MG/ML
VIAL (ML) INTRAVENOUS AS NEEDED
Status: DISCONTINUED | OUTPATIENT
Start: 2021-06-04 | End: 2021-06-04 | Stop reason: SURG

## 2021-06-04 RX ORDER — ONDANSETRON 2 MG/ML
INJECTION INTRAMUSCULAR; INTRAVENOUS AS NEEDED
Status: DISCONTINUED | OUTPATIENT
Start: 2021-06-04 | End: 2021-06-04 | Stop reason: SURG

## 2021-06-04 RX ORDER — SODIUM CHLORIDE 0.9 % (FLUSH) 0.9 %
3 SYRINGE (ML) INJECTION EVERY 12 HOURS SCHEDULED
Status: DISCONTINUED | OUTPATIENT
Start: 2021-06-04 | End: 2021-06-04 | Stop reason: HOSPADM

## 2021-06-04 RX ORDER — ROCURONIUM BROMIDE 10 MG/ML
INJECTION, SOLUTION INTRAVENOUS AS NEEDED
Status: DISCONTINUED | OUTPATIENT
Start: 2021-06-04 | End: 2021-06-04 | Stop reason: SURG

## 2021-06-04 RX ORDER — FENTANYL CITRATE 50 UG/ML
INJECTION, SOLUTION INTRAMUSCULAR; INTRAVENOUS AS NEEDED
Status: DISCONTINUED | OUTPATIENT
Start: 2021-06-04 | End: 2021-06-04 | Stop reason: SURG

## 2021-06-04 RX ORDER — ONDANSETRON 2 MG/ML
4 INJECTION INTRAMUSCULAR; INTRAVENOUS ONCE AS NEEDED
Status: DISCONTINUED | OUTPATIENT
Start: 2021-06-04 | End: 2021-06-04 | Stop reason: HOSPADM

## 2021-06-04 RX ORDER — SODIUM CHLORIDE 9 MG/ML
INJECTION INTRAVENOUS
Status: COMPLETED
Start: 2021-06-04 | End: 2021-06-04

## 2021-06-04 RX ORDER — SODIUM CHLORIDE 0.9 % (FLUSH) 0.9 %
3-10 SYRINGE (ML) INJECTION AS NEEDED
Status: DISCONTINUED | OUTPATIENT
Start: 2021-06-04 | End: 2021-06-04 | Stop reason: HOSPADM

## 2021-06-04 RX ADMIN — FENTANYL CITRATE 100 MCG: 50 INJECTION, SOLUTION INTRAMUSCULAR; INTRAVENOUS at 13:41

## 2021-06-04 RX ADMIN — SUGAMMADEX 200 MG: 100 INJECTION, SOLUTION INTRAVENOUS at 14:08

## 2021-06-04 RX ADMIN — PROPOFOL 200 MG: 10 INJECTION, EMULSION INTRAVENOUS at 13:41

## 2021-06-04 RX ADMIN — ONDANSETRON 4 MG: 2 INJECTION INTRAMUSCULAR; INTRAVENOUS at 14:08

## 2021-06-04 RX ADMIN — ROCURONIUM BROMIDE 50 MG: 10 INJECTION INTRAVENOUS at 13:42

## 2021-06-04 RX ADMIN — SODIUM CHLORIDE: 0.9 INJECTION, SOLUTION INTRAVENOUS at 13:35

## 2021-06-04 NOTE — ANESTHESIA POSTPROCEDURE EVALUATION
Patient: Neptali Osuna    Procedure Summary     Date: 06/04/21 Room / Location: [facility] ENDOSCOPY 2 / [facility] ENDOSCOPY    Anesthesia Start: 1332 Anesthesia Stop: 1415    Procedure: ENDOSCOPIC RETROGRADE CHOLANGIOPANCREATOGRAPHY WITH STENT REMOVAL and balloon sweeping of common bile duct (12-15mm balloon up to 15mm) (N/A ) Diagnosis:       Choledocholithiasis      (Choledocholithiasis [K80.50])    Surgeons: Pranay White MD Provider: Olvin Everett MD    Anesthesia Type: general ASA Status: 3          Anesthesia Type: general    Vitals  Vitals Value Taken Time   /73 06/04/21 1444   Temp     Pulse 74 06/04/21 1453   Resp 12 06/04/21 1425   SpO2 95 % 06/04/21 1435   Vitals shown include unvalidated device data.        Post Anesthesia Care and Evaluation    Patient location during evaluation: PACU  Patient participation: complete - patient participated  Level of consciousness: awake  Pain scale: See nurse's notes for pain score.  Pain management: adequate  Airway patency: patent  Anesthetic complications: No anesthetic complications  PONV Status: none  Cardiovascular status: acceptable  Respiratory status: acceptable  Hydration status: acceptable    Comments: Patient seen and examined postoperatively; vital signs stable; SpO2 greater than or equal to 90%; cardiopulmonary status stable; nausea/vomiting adequately controlled; pain adequately controlled; no apparent anesthesia complications; patient discharged from anesthesia care when discharge criteria were met

## 2021-06-04 NOTE — DISCHARGE INSTRUCTIONS
A responsible adult should stay with you and you should rest quietly for the rest of the day.    Do not drink alcohol, drive, operate any heavy machinery or power tools or make any legal/important decisions for the next 24 hours.     Progress your diet as tolerated.  If you begin to experience severe pain, increased shortness of breath, racing heartbeat or a fever above 101 F, seek immediate medical attention.     Follow up with MD as instructed. Call office for results in 3 to 5 days if needed. [phone]      Impression:  1.  Dilated common bile duct  2.  Successful removal of stent     Recommendations:  1.  Regular diet as tolerated  2.  Follow-up my office as needed

## 2021-06-04 NOTE — ANESTHESIA PREPROCEDURE EVALUATION
Anesthesia Evaluation     Patient summary reviewed and Nursing notes reviewed   NPO Solid Status: > 8 hours  NPO Liquid Status: > 2 hours           Airway   Mallampati: III  TM distance: >3 FB  Neck ROM: full  No difficulty expected, Small opening and Narrow palate  Dental    (+) upper dentures    Pulmonary    (+) COPD, sleep apnea,   Cardiovascular     (+) hypertension, past MI , CAD, cardiac stents dysrhythmias Bradycardia, hyperlipidemia,       Neuro/Psych  (+) syncope, numbness, psychiatric history Anxiety, Depression and PTSD,     GI/Hepatic/Renal/Endo    (+) obesity,  GERD,  hepatitis, liver disease fatty liver disease,     Musculoskeletal     (+) back pain,   Abdominal    Substance History      OB/GYN          Other   blood dyscrasia,   history of cancer                    Anesthesia Plan    ASA 3     general     intravenous induction     Anesthetic plan, all risks, benefits, and alternatives have been provided, discussed and informed consent has been obtained with: patient.

## 2021-06-04 NOTE — H&P
GI PREOPERATIVE HISTORY AND PHYSICAL:    Referring Provider:    Honey Steele APRN    Chief complaint: Choledocholithiasis    Subjective .     History of present illness:      Neptali Osuna is a 65 y.o. male who presents today for Procedure(s):  ENDOSCOPIC RETROGRADE CHOLANGIOPANCREATOGRAPHY WITH STENT REMOVAL for the indications listed below.     Choledocholithiasis    The updated Patient Profile was reviewed prior to the procedure, in conjunction with the Physical Exam, including medical conditions, surgical procedures, medications, allergies, family history and social history.     Pre-operatively, I reviewed the indication(s) for the procedure, the risks of the procedure [including but not limited to: unexpected bleeding possibly requiring hospitalization and/or unplanned repeat procedures, perforation possibly requiring surgical treatment, missed lesions and complications of sedation/MAC (also explained by anesthesia staff)].     I have evaluated the patient for risks associated with the planned anesthesia and the procedure to be performed and find the patient an acceptable candidate for IV sedation.    Multiple opportunities were provided for any questions or concerns, and all questions were answered satisfactorily before any anesthesia was administered. We will proceed with the planned procedure.    Past Medical History:  Past Medical History:   Diagnosis Date   • Acute diverticulitis     with perforation   • Allergic    • Anemia    • Anxiety    • Back pain    • CAD S/P percutaneous coronary angioplasty    • Cancer (CMS/HCC)     cll    • COPD (chronic obstructive pulmonary disease) (CMS/HCC)    • Depression    • Diverticulitis    • GERD (gastroesophageal reflux disease)    • Hyperlipidemia    • Hypertension    • Low back pain    • Myocardial infarction (CMS/HCC)    • TEIXEIRA (nonalcoholic steatohepatitis)    • Obesity    • BRITTNEY on CPAP    • Sleep apnea    • Sleep disorder, shift work    • Spinal stenosis of  lumbar region at multiple levels    • Status post placement of implantable loop recorder 2020       Past Surgical History:  Past Surgical History:   Procedure Laterality Date   • CARDIAC CATHETERIZATION  2018    Dr. Sher   • CHOLECYSTECTOMY N/A 2021    Procedure: CHOLECYSTECTOMY LAPAROSCOPIC;  Surgeon: Faisal Wilkins DO;  Location: [facility] MAIN OR;  Service: General;  Laterality: N/A;   • COLONOSCOPY  2016   • CORONARY STENT PLACEMENT     • ERCP N/A 2021    Procedure: ENDOSCOPIC RETROGRADE CHOLANGIOPANCREATOGRAPHY, sphincterotomy, balloon clearance of  bile duct, stent placement;  Surgeon: Ramiro Pulido MD;  Location: [facility] ENDOSCOPY;  Service: Gastroenterology;  Laterality: N/A;  post: biliary dilation, choledocolithiasis   • LIVER BIOPSY     • TONSILLECTOMY AND ADENOIDECTOMY         Social History:  Social History     Tobacco Use   • Smoking status: Former Smoker     Packs/day: 1.50     Years: 40.00     Pack years: 60.00     Types: Cigarettes     Start date: 1969     Quit date:      Years since quittin.4   • Smokeless tobacco: Never Used   Vaping Use   • Vaping Use: Never used   Substance Use Topics   • Alcohol use: Not Currently     Alcohol/week: 1.0 standard drinks     Types: 1 Glasses of wine per week     Comment: No alcohol the past 10 years.   • Drug use: No       Family History:  Family History   Problem Relation Age of Onset   • Colon cancer Mother    • Heart disease Mother    • Colon cancer Father    • Heart disease Father    • Heart attack Father    • Diabetes Brother    • Diabetes Maternal Grandmother    • Diabetes Maternal Grandfather        Medications:  No medications prior to admission.       Scheduled Meds:  Continuous Infusions:No current facility-administered medications for this encounter.    PRN Meds:.    ALLERGIES:  Patient has no known allergies.    ROS:  The following systems were reviewed and negative;   Constitution:  No fevers, chills, no  "unintentional weight loss  Skin: no rash, no jaundice  Eyes:  No blurry vision, no eye pain  HENT:  No change in hearing or smell  Resp:  No dyspnea or cough  CV:  No chest pain or palpitations  :  No dysuria, hematuria  Musculoskeletal:  No leg cramps or arthralgias  Neuro:  No tremor, no numbness  Psych:  No depression or confsuion    Objective     Vital Signs:   Vitals:    05/25/21 1709   Weight: 104 kg (230 lb)   Height: 172.7 cm (68\")       Physical Exam:       General Appearance:    Awake and alert, in no acute distress   Head:    Normocephalic, without obvious abnormality, atraumatic   Throat:   No oral lesions, no thrush, oral mucosa moist   Lungs  Cardiac:  Abdomen:  Extremities:     Respirations regular, even and unlabored    Regular rate and rhythm, no murmur, gallop, rub    Non-distended, good bowel sounds, non tender, no masses     No edema, pulses 2+   Skin:   No rash, no jaundice       Results Review:  Lab Results (last 24 hours)     ** No results found for the last 24 hours. **          Imaging Results (Last 24 Hours)     ** No results found for the last 24 hours. **           I reviewed the patient's labs and imaging.    ASSESSMENT AND PLAN:  Choledocholithiasis    Active Problems:    * No active hospital problems. *       Procedure(s):  ENDOSCOPIC RETROGRADE CHOLANGIOPANCREATOGRAPHY WITH STENT REMOVAL      I discussed the patients findings and my recommendations with the patient.    Pranay White MD  06/04/21  07:11 EDT  "

## 2021-06-04 NOTE — OP NOTE
ENDOSCOPIC RETROGRADE CHOLANGIOPANCREATOGRAPHY Procedure Report    Patient Name:  Neptali Osuna  YOB: 1955    Date of Surgery:  6/4/2021     Pre-Op Diagnosis:  Choledocholithiasis [K80.50]        Procedure/CPT® Codes:      Procedure(s):  ENDOSCOPIC RETROGRADE CHOLANGIOPANCREATOGRAPHY WITH STENT REMOVAL and balloon sweeping of common bile duct (12-15mm balloon up to 15mm)    Staff:  Surgeon(s):  Pranay White MD      Anesthesia: Monitored Anesthesia Care    Description of Procedure:  A description of the procedure as well as risks, benefits and alternative methods were explained to the patient who voiced understanding and signed the corresponding consent form.Specifically risks of post-ERCP pancreatitis, bleeding, perforation, failure to canulate and adverse reaction to sedation were discussed. A physical exam was performed and vital signs were monitored throughout the procedure.    A  film was performed which was normal. With the patient in the semi-prone position, an Olympus side viewing endoscope was placed into the mouth and proceeded through the esophagus, stomach and second portion of the duodenum without difficulty. Limited views of the esophagus and stomach were normal. The ampulla was visualized and appeared normal. A [employer] hydrotome was used to cannulate the ampulla using wire guided technique. Bile was aspirated to ensure this was the duct of interest. Contrast was injected into the bile duct.      The scope was then retroflexed and the fundus was visualized. The procedure was not difficult and there were no immediate complications. There was no blood loss.    Findings:   Bile duct stent noted in good position.  Snare passed and stent removed without difficulty.  Common bile duct cannulated with a 1215 stone extraction balloon using a 0.035 Jagwire.  A cholangiogram was obtained.  The bile duct was dilated to approximately 18 mm.  After removal of the stent I did not  see any filling defects.  The balloon was inflated to 15 mm and pulled down through the duct on multiple occasions and I did not see stone debris.  There was good drainage of bile and contrast from the bile duct.  The cystic duct stump was unremarkable.  An occlusion cholangiogram was obtained and revealed no filling defects.  Therefore the balloon and wire were withdrawn endoscope was withdrawn procedure was completed per the patient tolerated procedure well there were no immediate complications.    Impression:  1.  Dilated common bile duct  2.  Successful removal of stent    Recommendations:  1.  Regular diet as tolerated  2.  Follow-up my office as needed      Pranay White MD     Date: 6/4/2021    Time: 14:17 EDT

## 2021-06-04 NOTE — ANESTHESIA PROCEDURE NOTES
Airway  Date/Time: 6/4/2021 1:41 PM  Airway not difficult    General Information and Staff    Anesthesiologist: Iker Zhu MD    Indications and Patient Condition  Indications for airway management: CNS depression    Preoxygenated: yes  Mask difficulty assessment: 2 - vent by mask + OA or adjuvant +/- NMBA    Final Airway Details  Final airway type: endotracheal airway      Successful airway: ETT  Cuffed: yes   Successful intubation technique: direct laryngoscopy  Endotracheal tube insertion site: oral  Blade: Willy  Blade size: 4  ETT size (mm): 7.0  Cormack-Lehane Classification: grade IIa - partial view of glottis  Placement verified by: capnometry   Measured from: teeth  Number of attempts at approach: 1  Assessment: lips, teeth, and gum same as pre-op and atraumatic intubation

## 2021-06-15 PROCEDURE — 93298 REM INTERROG DEV EVAL SCRMS: CPT | Performed by: INTERNAL MEDICINE

## 2021-06-15 PROCEDURE — G2066 INTER DEVC REMOTE 30D: HCPCS | Performed by: INTERNAL MEDICINE

## 2021-06-24 ENCOUNTER — OFFICE VISIT (OUTPATIENT)
Dept: PULMONOLOGY | Facility: HOSPITAL | Age: 66
End: 2021-06-24

## 2021-06-24 VITALS
RESPIRATION RATE: 15 BRPM | WEIGHT: 235 LBS | HEART RATE: 84 BPM | OXYGEN SATURATION: 95 % | DIASTOLIC BLOOD PRESSURE: 70 MMHG | TEMPERATURE: 98.6 F | SYSTOLIC BLOOD PRESSURE: 110 MMHG | HEIGHT: 68 IN | BODY MASS INDEX: 35.61 KG/M2

## 2021-06-24 DIAGNOSIS — G47.30 SLEEP APNEA, UNSPECIFIED TYPE: ICD-10-CM

## 2021-06-24 DIAGNOSIS — J44.9 CHRONIC OBSTRUCTIVE PULMONARY DISEASE, UNSPECIFIED COPD TYPE (HCC): Primary | ICD-10-CM

## 2021-06-24 PROCEDURE — G0463 HOSPITAL OUTPT CLINIC VISIT: HCPCS

## 2021-06-24 NOTE — PROGRESS NOTES
PULMONARY  CONSULT NOTE      PATIENT IDENTIFICATION:  Name: Neptali Osuna  Age: 65 y.o.  Sex: male  :  1955  MRN: [id number]    DATE OF CONSULTATION:  2021                     CHIEF COMPLAINT: Follow-up COPD    History of Present Illness:   Neptali Osuna is a 65 y.o. male.  Feeling much better less shortness of breath less coughing no dyspnea exertion no severe chills no abscesses no dizziness no lightheadedness    Pt on CPAP feeling better more energy especially the night he use it more than 4 hours, no sleepiness no fatigue no tiredness, no mask irritation no dryness, compliance report reviewed with pt AHI< 5 with good usage.       Review of Systems:   Constitutional: negative   Eyes: negative   ENT/oropharynx: negative   Cardiovascular: negative   Respiratory: negative   Gastrointestinal: negative   Genitourinary: negative   Neurological: negative   Musculoskeletal: negative   Integument/breast: negative   Endocrine: negative   Allergic/Immunologic: negative     Past Medical History:  Past Medical History:   Diagnosis Date   • Acute diverticulitis     with perforation   • Allergic    • Anemia    • Anxiety    • Back pain    • CAD S/P percutaneous coronary angioplasty    • Cancer (CMS/HCC)     cll    • COPD (chronic obstructive pulmonary disease) (CMS/HCC)    • Depression    • Diverticulitis    • GERD (gastroesophageal reflux disease)    • Hyperlipidemia    • Hypertension    • Low back pain    • Myocardial infarction (CMS/HCC)    • TEIXEIRA (nonalcoholic steatohepatitis)    • Obesity    • BRITTNEY on CPAP    • Sleep apnea    • Sleep disorder, shift work    • Spinal stenosis of lumbar region at multiple levels    • Status post placement of implantable loop recorder 2020       Past Surgical History:  Past Surgical History:   Procedure Laterality Date   • CARDIAC CATHETERIZATION  2018    Dr. Sher   • CHOLECYSTECTOMY N/A 2021    Procedure: CHOLECYSTECTOMY LAPAROSCOPIC;  Surgeon: Faisal Wilkins  D, DO;  Location: [facility] MAIN OR;  Service: General;  Laterality: N/A;   • COLONOSCOPY  2016   • CORONARY STENT PLACEMENT     • ERCP N/A 2021    Procedure: ENDOSCOPIC RETROGRADE CHOLANGIOPANCREATOGRAPHY, sphincterotomy, balloon clearance of  bile duct, stent placement;  Surgeon: Ramiro Pulido MD;  Location: [facility] ENDOSCOPY;  Service: Gastroenterology;  Laterality: N/A;  post: biliary dilation, choledocolithiasis   • ERCP N/A 2021    Procedure: ENDOSCOPIC RETROGRADE CHOLANGIOPANCREATOGRAPHY WITH STENT REMOVAL and balloon sweeping of common bile duct (12-15mm balloon up to 15mm);  Surgeon: Pranay White MD;  Location: [facility] ENDOSCOPY;  Service: Gastroenterology;  Laterality: N/A;  post op: biliary stent removal, common bile duct sweeping with 12-15mm balloon (up to 15mm), dialated bile duct   • LIVER BIOPSY     • TONSILLECTOMY AND ADENOIDECTOMY          Family History:  Family History   Problem Relation Age of Onset   • Colon cancer Mother    • Heart disease Mother    • Colon cancer Father    • Heart disease Father    • Heart attack Father    • Diabetes Brother    • Diabetes Maternal Grandmother    • Diabetes Maternal Grandfather         Social History:   Social History     Tobacco Use   • Smoking status: Former Smoker     Packs/day: 1.50     Years: 40.00     Pack years: 60.00     Types: Cigarettes     Start date: 1969     Quit date:      Years since quittin.4   • Smokeless tobacco: Never Used   Substance Use Topics   • Alcohol use: Not Currently     Alcohol/week: 1.0 standard drinks     Types: 1 Glasses of wine per week     Comment: No alcohol the past 10 years.        Allergies:  No Known Allergies    Home Meds:  (Not in a hospital admission)      Objective:    Vitals Ranges:   Temp:  [98.6 °F (37 °C)] 98.6 °F (37 °C)  Heart Rate:  [84] 84  Resp:  [15] 15  BP: (110)/(70) 110/70  Body mass index is 35.73 kg/m².     Exam:  General Appearance:  WDWN    HEENT:   without obvious  abnormality,  Conjunctiva/corneas clear,  Normal external ear canals, no drainage    Clear orsalmucosa,  Mallampati score 3    Neck:  Supple, symmetrical, trachea midline. No JVD.  Lungs:   Bilateral basal rhonchi bilaterally, respirations unlabored symmetrical wall movement.    Chest wall:  No tenderness or deformity.    Heart:  Regular rate and rhythm, S1 and S2 normal.  Extremities: Trace edema no clubbing or Cyanosis        Data Review:  All labs (24hrs): No results found for this or any previous visit (from the past 24 hour(s)).     Imaging:  FL ERCP pancreatic and biliary ducts  Narrative: DATE OF EXAM:  6/4/2021 1:20 PM     PROCEDURE:  FL ERCP PANCREATIC AND BILIARY DUCTS-     INDICATIONS:  ENDOSCOPIC RETROGRADE CHOLANGIOPANCREATOGRAPHY WITH STENT REMOVAL and  balloon sweeping of common bile duct (12-15mm balloon up to  15mm)N/AMonitored Anesthesia Care     COMPARISON:  ERCP 2/21/2021     TECHNIQUE:   A series of radiographic digital spot films were obtained in conjunction  with a endoscopic catheterization of the biliary and pancreatic ductal  system, performed by the gastroenterologist.     Fluoroscopic time:   1.6 minutes.     FINDINGS:  Initial image demonstrates a CBD stent which was removed subsequently.  Presumed cholecystectomy clips are also seen in the right upper  quadrant. Contrast was injected in a retrograde fashion into the common  bile duct demonstrating diffuse fusiform dilation. Balloon sweep was  performed.      Impression: ERCP with fluoroscopy. Please refer to the procedure report for findings  and recommendations.     Electronically Signed By-Criss Hunter MD On:6/4/2021 2:36 PM  This report was finalized on [id number] by  Criss Hunter MD.       ASSESSMENT:  Diagnoses and all orders for this visit:    Chronic obstructive pulmonary disease, unspecified COPD type (CMS/[facility])    Sleep apnea, unspecified type        PLAN:    Bronchodilator inhaled corticosteroid    Education how to use  inhalers    Encouraged to use incentive spirometer    Continue to exercise slowly as tolerated    Monitor for any change in the color of the sputum    Avoid any exposure to fumes, gas or any irritant        This patient with obstructive sleep apnea, compliance is improved. Encourage to use it more frequent, I re-emphasized on pt the long and short term benefit of treating BRITTNEY.        Follow-up 1 year    Goyo Gracia MD. D, ABSM.  6/24/2021  14:38 EDT

## 2021-06-28 DIAGNOSIS — G47.419 PRIMARY NARCOLEPSY WITHOUT CATAPLEXY: ICD-10-CM

## 2021-06-28 DIAGNOSIS — M54.50 CHRONIC BILATERAL LOW BACK PAIN WITHOUT SCIATICA: ICD-10-CM

## 2021-06-28 DIAGNOSIS — G89.29 CHRONIC BILATERAL LOW BACK PAIN WITHOUT SCIATICA: ICD-10-CM

## 2021-06-28 RX ORDER — HYDROCODONE BITARTRATE AND ACETAMINOPHEN 10; 325 MG/1; MG/1
1 TABLET ORAL 4 TIMES DAILY
Qty: 120 TABLET | Refills: 0 | Status: SHIPPED | OUTPATIENT
Start: 2021-06-28 | End: 2021-07-26 | Stop reason: SDUPTHER

## 2021-06-28 RX ORDER — PREGABALIN 100 MG/1
CAPSULE ORAL
Qty: 60 CAPSULE | Refills: 0 | Status: SHIPPED | OUTPATIENT
Start: 2021-06-28 | End: 2021-07-26

## 2021-06-28 RX ORDER — MODAFINIL 200 MG/1
200 TABLET ORAL DAILY
Qty: 30 TABLET | Refills: 0 | Status: SHIPPED | OUTPATIENT
Start: 2021-06-28 | End: 2021-08-04 | Stop reason: SDUPTHER

## 2021-07-09 ENCOUNTER — OFFICE VISIT (OUTPATIENT)
Dept: FAMILY MEDICINE CLINIC | Facility: CLINIC | Age: 66
End: 2021-07-09

## 2021-07-09 VITALS
SYSTOLIC BLOOD PRESSURE: 132 MMHG | DIASTOLIC BLOOD PRESSURE: 77 MMHG | BODY MASS INDEX: 34.86 KG/M2 | HEART RATE: 97 BPM | HEIGHT: 68 IN | OXYGEN SATURATION: 95 % | WEIGHT: 230 LBS

## 2021-07-09 DIAGNOSIS — M54.50 CHRONIC MIDLINE LOW BACK PAIN WITHOUT SCIATICA: Primary | ICD-10-CM

## 2021-07-09 DIAGNOSIS — F33.0 MAJOR DEPRESSIVE DISORDER, RECURRENT, MILD (HCC): ICD-10-CM

## 2021-07-09 DIAGNOSIS — E78.2 MIXED HYPERLIPIDEMIA: ICD-10-CM

## 2021-07-09 DIAGNOSIS — I10 ESSENTIAL HYPERTENSION: ICD-10-CM

## 2021-07-09 DIAGNOSIS — G47.419 PRIMARY NARCOLEPSY WITHOUT CATAPLEXY: ICD-10-CM

## 2021-07-09 DIAGNOSIS — G89.29 CHRONIC MIDLINE LOW BACK PAIN WITHOUT SCIATICA: Primary | ICD-10-CM

## 2021-07-09 DIAGNOSIS — J42 CHRONIC BRONCHITIS, UNSPECIFIED CHRONIC BRONCHITIS TYPE (HCC): ICD-10-CM

## 2021-07-09 PROCEDURE — 99214 OFFICE O/P EST MOD 30 MIN: CPT | Performed by: NURSE PRACTITIONER

## 2021-07-09 RX ORDER — TIZANIDINE 4 MG/1
4 TABLET ORAL EVERY 8 HOURS PRN
Qty: 30 TABLET | Refills: 1 | Status: SHIPPED | OUTPATIENT
Start: 2021-07-09 | End: 2021-08-03

## 2021-07-09 NOTE — PATIENT INSTRUCTIONS
"https://www.nhlbi.nih.gov/files/docs/public/heart/dash_brief.pdf\">   DASH Eating Plan  DASH stands for Dietary Approaches to Stop Hypertension. The DASH eating plan is a healthy eating plan that has been shown to:  · Reduce high blood pressure (hypertension).  · Reduce your risk for type 2 diabetes, heart disease, and stroke.  · Help with weight loss.  What are tips for following this plan?  Reading food labels  · Check food labels for the amount of salt (sodium) per serving. Choose foods with less than 5 percent of the Daily Value of sodium. Generally, foods with less than 300 milligrams (mg) of sodium per serving fit into this eating plan.  · To find whole grains, look for the word \"whole\" as the first word in the ingredient list.  Shopping  · Buy products labeled as \"low-sodium\" or \"no salt added.\"  · Buy fresh foods. Avoid canned foods and pre-made or frozen meals.  Cooking  · Avoid adding salt when cooking. Use salt-free seasonings or herbs instead of table salt or sea salt. Check with your health care provider or pharmacist before using salt substitutes.  · Do not russo foods. Cook foods using healthy methods such as baking, boiling, grilling, roasting, and broiling instead.  · Cook with heart-healthy oils, such as olive, canola, avocado, soybean, or sunflower oil.  Meal planning    · Eat a balanced diet that includes:  ? 4 or more servings of fruits and 4 or more servings of vegetables each day. Try to fill one-half of your plate with fruits and vegetables.  ? 6-8 servings of whole grains each day.  ? Less than 6 oz (170 g) of lean meat, poultry, or fish each day. A 3-oz (85-g) serving of meat is about the same size as a deck of cards. One egg equals 1 oz (28 g).  ? 2-3 servings of low-fat dairy each day. One serving is 1 cup (237 mL).  ? 1 serving of nuts, seeds, or beans 5 times each week.  ? 2-3 servings of heart-healthy fats. Healthy fats called omega-3 fatty acids are found in foods such as walnuts, " flaxseeds, fortified milks, and eggs. These fats are also found in cold-water fish, such as sardines, salmon, and mackerel.  · Limit how much you eat of:  ? Canned or prepackaged foods.  ? Food that is high in trans fat, such as some fried foods.  ? Food that is high in saturated fat, such as fatty meat.  ? Desserts and other sweets, sugary drinks, and other foods with added sugar.  ? Full-fat dairy products.  · Do not salt foods before eating.  · Do not eat more than 4 egg yolks a week.  · Try to eat at least 2 vegetarian meals a week.  · Eat more home-cooked food and less restaurant, buffet, and fast food.  Lifestyle  · When eating at a restaurant, ask that your food be prepared with less salt or no salt, if possible.  · If you drink alcohol:  ? Limit how much you use to:  § 0-1 drink a day for women who are not pregnant.  § 0-2 drinks a day for men.  ? Be aware of how much alcohol is in your drink. In the [place] one drink equals one 12 oz bottle of beer (355 mL), one 5 oz glass of wine (148 mL), or one 1½ oz glass of hard liquor (44 mL).  General information  · Avoid eating more than 2,300 mg of salt a day. If you have hypertension, you may need to reduce your sodium intake to 1,500 mg a day.  · Work with your health care provider to maintain a healthy body weight or to lose weight. Ask what an ideal weight is for you.  · Get at least 30 minutes of exercise that causes your heart to beat faster (aerobic exercise) most days of the week. Activities may include walking, swimming, or biking.  · Work with your health care provider or dietitian to adjust your eating plan to your individual calorie needs.  What foods should I eat?  Fruits  All fresh, dried, or frozen fruit. Canned fruit in natural juice (without added sugar).  Vegetables  Fresh or frozen vegetables (raw, steamed, roasted, or grilled). Low-sodium or reduced-sodium tomato and vegetable juice. Low-sodium or reduced-sodium tomato sauce and tomato paste.  Low-sodium or reduced-sodium canned vegetables.  Grains  Whole-grain or whole-wheat bread. Whole-grain or whole-wheat pasta. Brown rice. Oatmeal. Quinoa. Bulgur. Whole-grain and low-sodium cereals. Adela bread. Low-fat, low-sodium crackers. Whole-wheat flour tortillas.  Meats and other proteins  Skinless chicken or turkey. Ground chicken or turkey. Pork with fat trimmed off. Fish and seafood. Egg whites. Dried beans, peas, or lentils. Unsalted nuts, nut butters, and seeds. Unsalted canned beans. Lean cuts of beef with fat trimmed off. Low-sodium, lean precooked or cured meat, such as sausages or meat loaves.  Dairy  Low-fat (1%) or fat-free (skim) milk. Reduced-fat, low-fat, or fat-free cheeses. Nonfat, low-sodium ricotta or cottage cheese. Low-fat or nonfat yogurt. Low-fat, low-sodium cheese.  Fats and oils  Soft margarine without trans fats. Vegetable oil. Reduced-fat, low-fat, or light mayonnaise and salad dressings (reduced-sodium). Canola, safflower, olive, avocado, soybean, and sunflower oils. Avocado.  Seasonings and condiments  Herbs. Spices. Seasoning mixes without salt.  Other foods  Unsalted popcorn and pretzels. Fat-free sweets.  The items listed above may not be a complete list of foods and beverages you can eat. Contact a dietitian for more information.  What foods should I avoid?  Fruits  Canned fruit in a light or heavy syrup. Fried fruit. Fruit in cream or butter sauce.  Vegetables  Creamed or fried vegetables. Vegetables in a cheese sauce. Regular canned vegetables (not low-sodium or reduced-sodium). Regular canned tomato sauce and paste (not low-sodium or reduced-sodium). Regular tomato and vegetable juice (not low-sodium or reduced-sodium). Pickles. Olives.  Grains  Baked goods made with fat, such as croissants, muffins, or some breads. Dry pasta or rice meal packs.  Meats and other proteins  Fatty cuts of meat. Ribs. Fried meat. Caldwell. Bologna, salami, and other precooked or cured meats, such as  sausages or meat loaves. Fat from the back of a pig (fatback). Bratwurst. Salted nuts and seeds. Canned beans with added salt. Canned or smoked fish. Whole eggs or egg yolks. Chicken or turkey with skin.  Dairy  Whole or 2% milk, cream, and half-and-half. Whole or full-fat cream cheese. Whole-fat or sweetened yogurt. Full-fat cheese. Nondairy creamers. Whipped toppings. Processed cheese and cheese spreads.  Fats and oils  Butter. Stick margarine. Lard. Shortening. Ghee. Caldwell fat. Tropical oils, such as coconut, palm kernel, or palm oil.  Seasonings and condiments  Onion salt, garlic salt, seasoned salt, table salt, and sea salt. Worcestershire sauce. Tartar sauce. Barbecue sauce. Teriyaki sauce. Soy sauce, including reduced-sodium. Steak sauce. Canned and packaged gravies. Fish sauce. Oyster sauce. Cocktail sauce. Store-bought horseradish. Ketchup. Mustard. Meat flavorings and tenderizers. Bouillon cubes. Hot sauces. Pre-made or packaged marinades. Pre-made or packaged taco seasonings. Relishes. Regular salad dressings.  Other foods  Salted popcorn and pretzels.  The items listed above may not be a complete list of foods and beverages you should avoid. Contact a dietitian for more information.  Where to find more information  · National Heart, Lung, and Blood [place]: www.nhlbi.nih.gov  · American Heart Association: www.heart.org  · Academy of Nutrition and Dietetics: www.eatright.org  · National Kidney Foundation: www.kidney.org  Summary  · The DASH eating plan is a healthy eating plan that has been shown to reduce high blood pressure (hypertension). It may also reduce your risk for type 2 diabetes, heart disease, and stroke.  · When on the DASH eating plan, aim to eat more fresh fruits and vegetables, whole grains, lean proteins, low-fat dairy, and heart-healthy fats.  · With the DASH eating plan, you should limit salt (sodium) intake to 2,300 mg a day. If you have hypertension, you may need to reduce your  sodium intake to 1,500 mg a day.  · Work with your health care provider or dietitian to adjust your eating plan to your individual calorie needs.  This information is not intended to replace advice given to you by your health care provider. Make sure you discuss any questions you have with your health care provider.  Document Revised: 11/20/2020 Document Reviewed: 11/20/2020  [employer] Patient Education © 2021 [employer]

## 2021-07-09 NOTE — PROGRESS NOTES
Teresa Osuna is a 66 y.o. male.       HPI   Pt. is here today for 4 month follow up on medication.   1) Chronic low back pain with radiculopathy - currently has regimen of Norco 10/325 mg qid; Lyrica 100 mg bid; tizanidine 4 mg tid PRN. Feels pain is in control with this regimen.  Able to function thru day.  Normal appetite; normal bowel and bladder.    2) HTN -   Currently on amlodipine 10 mg daily; lisinopril 30 mg daily.  BP at home runs around 110/70.  Denies any CP; palpitations; SOA; dizziness; headache; trouble with vision.   3) Hyperlipdiemia - currently on atorvastatin 40 mg daily; ASA 81 mg daily.   4) COPD - currently on Trelegy Ellipta inhaler; albuterol inhaler PRN. Doesn't need the albuterol much.  Denies any trouble with breathing, cough or wheezes.    5) Narcolepsy - currently taking Provigil 200 mg daily.    6) Depression/Anxiety - currently taking Cymbalta 60 mg daily.  Moods are stable; anxiety felt to be low.  Denies any SI or HI.     The following portions of the patient's history were reviewed and updated as appropriate: allergies, current medications, past family history, past medical history, past social history, past surgical history and problem list.    Review of Systems   Constitutional: Negative for activity change, appetite change, chills, diaphoresis, fatigue, fever, unexpected weight gain and unexpected weight loss.   Eyes: Negative for visual disturbance.   Respiratory: Negative for cough, chest tightness, shortness of breath and wheezing.    Cardiovascular: Negative for chest pain, palpitations and leg swelling.   Gastrointestinal: Negative for abdominal distention, abdominal pain, blood in stool, constipation, diarrhea, nausea, vomiting and indigestion.   Endocrine: Negative for polydipsia, polyphagia and polyuria.   Genitourinary: Negative for dysuria, flank pain, frequency, hematuria, nocturia and urgency.   Musculoskeletal: Positive for arthralgias, back pain and  myalgias.   Skin: Negative for rash.   Neurological: Negative for dizziness, weakness, light-headedness, headache and confusion.   Psychiatric/Behavioral: Negative for self-injury, suicidal ideas and depressed mood. The patient is not nervous/anxious.        Objective   Physical Exam  Vitals reviewed.   Constitutional:       General: He is not in acute distress.     Appearance: Normal appearance.   Cardiovascular:      Rate and Rhythm: Normal rate and regular rhythm.      Pulses: Normal pulses.      Heart sounds: Normal heart sounds. No murmur heard.     Pulmonary:      Effort: Pulmonary effort is normal. No respiratory distress.      Breath sounds: Normal breath sounds. No wheezing or rhonchi.   Chest:      Chest wall: No tenderness.   Abdominal:      General: Bowel sounds are normal. There is no distension.      Palpations: Abdomen is soft.      Tenderness: There is no abdominal tenderness. There is no right CVA tenderness or left CVA tenderness.   Musculoskeletal:      Cervical back: Normal range of motion and neck supple. No tenderness.      Right lower leg: No edema.      Left lower leg: No edema.   Skin:     General: Skin is warm and dry.      Findings: No erythema.   Neurological:      General: No focal deficit present.      Mental Status: He is alert and oriented to person, place, and time.   Psychiatric:         Mood and Affect: Mood normal.           Assessment/Plan   Diagnoses and all orders for this visit:    1. Chronic midline low back pain without sciatica (Primary)  Comments:  Stable.   INSPECT reviewed.   Cont. current medication regimen.   RTO in 4 mo.   Orders:  -     tiZANidine (ZANAFLEX) 4 MG tablet; Take 1 tablet by mouth Every 8 (Eight) Hours As Needed for Muscle Spasms.  Dispense: 30 tablet; Refill: 1    2. Essential hypertension  Comments:  Stable.   Cont. current medicaiton.   Labs with next visit.       3. Mixed hyperlipidemia  Comments:  Stable.   Cont. current medicaiton.   Labs with next  visit.     4. Chronic bronchitis, unspecified chronic bronchitis type (CMS/HCC)  Comments:  Stable.   Cont. current medicaiton.   Labs with mnext visit.     5. Primary narcolepsy without cataplexy  Comments:  Stable.   INSPECT reviewed.   Cont. current medication.   RTO in 4 mo    6. Major depressive disorder, recurrent, mild (CMS/HCC)  Comments:  Stable.   Cont. current medication.

## 2021-07-16 PROCEDURE — 93298 REM INTERROG DEV EVAL SCRMS: CPT | Performed by: INTERNAL MEDICINE

## 2021-07-16 PROCEDURE — G2066 INTER DEVC REMOTE 30D: HCPCS | Performed by: INTERNAL MEDICINE

## 2021-07-25 DIAGNOSIS — M54.50 CHRONIC BILATERAL LOW BACK PAIN WITHOUT SCIATICA: ICD-10-CM

## 2021-07-25 DIAGNOSIS — G89.29 CHRONIC BILATERAL LOW BACK PAIN WITHOUT SCIATICA: ICD-10-CM

## 2021-07-26 DIAGNOSIS — M54.50 CHRONIC BILATERAL LOW BACK PAIN WITHOUT SCIATICA: ICD-10-CM

## 2021-07-26 DIAGNOSIS — G89.29 CHRONIC BILATERAL LOW BACK PAIN WITHOUT SCIATICA: ICD-10-CM

## 2021-07-26 RX ORDER — HYDROCODONE BITARTRATE AND ACETAMINOPHEN 10; 325 MG/1; MG/1
1 TABLET ORAL 4 TIMES DAILY
Qty: 120 TABLET | Refills: 0 | Status: SHIPPED | OUTPATIENT
Start: 2021-07-26 | End: 2021-08-23 | Stop reason: SDUPTHER

## 2021-07-26 RX ORDER — PREGABALIN 100 MG/1
CAPSULE ORAL
Qty: 60 CAPSULE | Refills: 0 | Status: SHIPPED | OUTPATIENT
Start: 2021-07-26 | End: 2021-09-06

## 2021-08-03 DIAGNOSIS — M54.50 CHRONIC MIDLINE LOW BACK PAIN WITHOUT SCIATICA: ICD-10-CM

## 2021-08-03 DIAGNOSIS — G89.29 CHRONIC MIDLINE LOW BACK PAIN WITHOUT SCIATICA: ICD-10-CM

## 2021-08-03 RX ORDER — TIZANIDINE 4 MG/1
TABLET ORAL
Qty: 30 TABLET | Refills: 1 | Status: SHIPPED | OUTPATIENT
Start: 2021-08-03 | End: 2021-08-23

## 2021-08-04 DIAGNOSIS — G47.419 PRIMARY NARCOLEPSY WITHOUT CATAPLEXY: ICD-10-CM

## 2021-08-05 RX ORDER — MODAFINIL 200 MG/1
200 TABLET ORAL DAILY
Qty: 30 TABLET | Refills: 0 | Status: SHIPPED | OUTPATIENT
Start: 2021-08-05 | End: 2021-09-03

## 2021-08-13 NOTE — PROGRESS NOTES
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 Osuna reports that an episode of fall loss of consciousness happened, and that he drove for about 45 minutes without knowing and ended up in his neighbor's backyard which happened in October 2019.  Ever since then he had had a few more spells of passing out.    The passing out spells happen for a few seconds.  Patient has obstructive sleep apnea and uses CPAP machine.  No seizure episodes.  He underwent video examination for 3 days had one episode during the evaluation.  · Patient was sent to the cancer [facility] center for further evaluation given his abnormal CBC that happened with leukocytosis and monocytosis.  · 12/30/2019 peripheral blood flow cytometry -A small monoclonal B-cell population with a CLL phenotype is present. If the patient has a prior diagnosis of CLL, then the findings may represent persistent involvement. If the patient is not known to have CLL or SLL, further investigation is recommended if clinically indicated. In the absence of CLL or SLL, small (<5000/uL) clonal B-cell populations in the blood are classified as monoclonal B-cell lymphocytosis. Clinical correlation is required.  · 1/3/2020 CT scan of the soft tissue of the neck chest abdomen and pelvis were obtained-CT soft tissue neck shows some soft tissue prominence in the nasopharynx which could relate to slightly prominent adenoid tissue.  Vascular calcification is noted in the carotid bifurcations.  DJD C-spine.  Chest abdomen and pelvis show some areas of atelectasis or scarring involving the lungs.  Bilateral renal cysts.  Prostate appears enlarged.  Colonic diverticulosis.  DJD spine.  Cholelithiasis.  No obvious pathologically enlarged  lymphadenopathy.  · 8/19/2020 - WBC 11.32. ALC 7.83      Past Medical History:   Diagnosis Date   • Acute diverticulitis     with perforation   • Allergic    • Anemia    • Anxiety    • Back pain    • CAD S/P percutaneous coronary angioplasty    • Cancer (CMS/HCC)     cll    • COPD (chronic obstructive pulmonary disease) (CMS/HCC)    • Depression    • Diverticulitis    • GERD (gastroesophageal reflux disease)    • Hyperlipidemia    • Hypertension    • Low back pain    • Myocardial infarction (CMS/HCC)    • TEIXEIRA (nonalcoholic steatohepatitis)    • Obesity    • BRITTNEY on CPAP    • Sleep apnea    • Sleep disorder, shift work    • Spinal stenosis of lumbar region at multiple levels    • Status post placement of implantable loop recorder 2020    JULIETTEI       Past Surgical History:   Procedure Laterality Date   • CARDIAC CATHETERIZATION  11/05/2018    Dr. Sher   • CHOLECYSTECTOMY N/A 2/22/2021    Procedure: CHOLECYSTECTOMY LAPAROSCOPIC;  Surgeon: Faisal Wilkins DO;  Location: [facility] MAIN OR;  Service: General;  Laterality: N/A;   • COLONOSCOPY  2016   • CORONARY STENT PLACEMENT     • ERCP N/A 2/21/2021    Procedure: ENDOSCOPIC RETROGRADE CHOLANGIOPANCREATOGRAPHY, sphincterotomy, balloon clearance of  bile duct, stent placement;  Surgeon: Ramiro Pulido MD;  Location: [facility] ENDOSCOPY;  Service: Gastroenterology;  Laterality: N/A;  post: biliary dilation, choledocolithiasis   • ERCP N/A 6/4/2021    Procedure: ENDOSCOPIC RETROGRADE CHOLANGIOPANCREATOGRAPHY WITH STENT REMOVAL and balloon sweeping of common bile duct (12-15mm balloon up to 15mm);  Surgeon: Pranay White MD;  Location: [facility] ENDOSCOPY;  Service: Gastroenterology;  Laterality: N/A;  post op: biliary stent removal, common bile duct sweeping with 12-15mm balloon (up to 15mm), dialated bile duct   • LIVER BIOPSY     • TONSILLECTOMY AND ADENOIDECTOMY           Current Outpatient Medications:   •  amLODIPine (NORVASC) 10 MG tablet, Take 1 tablet by mouth  Daily. (Patient taking differently: Take 10 mg by mouth Daily. Take DOS), Disp: 90 tablet, Rfl: 1  •  aspirin 81 MG EC tablet, Take 81 mg by mouth Daily. LD 5/25, Disp: , Rfl:   •  atorvastatin (LIPITOR) 40 MG tablet, Take 1 tablet by mouth Every Night., Disp: 90 tablet, Rfl: 1  •  DULoxetine (CYMBALTA) 60 MG capsule, Take 1 capsule by mouth Daily. (Patient taking differently: Take 60 mg by mouth Daily. Take DOS), Disp: 90 capsule, Rfl: 3  •  Fluticasone-Umeclidin-Vilant (Trelegy Ellipta) 100-62.5-25 MCG/INH aerosol powder , Inhale 1 puff Daily. (Patient taking differently: Inhale 1 puff Daily. Use DOS), Disp: 60 each, Rfl: 5  •  HYDROcodone-acetaminophen (NORCO)  MG per tablet, Take 1 tablet by mouth 4 (Four) Times a Day., Disp: 120 tablet, Rfl: 0  •  ipratropium-albuterol (DUO-NEB) 0.5-2.5 mg/3 ml nebulizer, Take 3 mL by nebulization Every 4 (Four) Hours As Needed for Wheezing., Disp: 360 mL, Rfl: 5  •  lisinopril (PRINIVIL,ZESTRIL) 30 MG tablet, Take 1 tablet by mouth Daily. (Patient taking differently: Take 30 mg by mouth Daily. Hold 24 hours prior), Disp: 90 tablet, Rfl: 1  •  loratadine (CLARITIN) 10 MG tablet, Take 1 tablet by mouth Daily., Disp: 90 tablet, Rfl: 1  •  modafinil (PROVIGIL) 200 MG tablet, Take 1 tablet by mouth Daily., Disp: 30 tablet, Rfl: 0  •  pregabalin (LYRICA) 100 MG capsule, TAKE 1 CAPSULE BY MOUTH TWICE DAILY, Disp: 60 capsule, Rfl: 0  •  tiZANidine (ZANAFLEX) 4 MG tablet, TAKE 1 TABLET BY MOUTH EVERY 8 HOURS AS NEEDED FOR MUSCLE SPASMS, Disp: 30 tablet, Rfl: 1    No Known Allergies    Family History   Problem Relation Age of Onset   • Colon cancer Mother    • Heart disease Mother    • Colon cancer Father    • Heart disease Father    • Heart attack Father    • Diabetes Brother    • Diabetes Maternal Grandmother    • Diabetes Maternal Grandfather        Cancer-related family history includes Colon cancer in his father and mother.    Social History     Tobacco Use   • Smoking  "status: Former Smoker     Packs/day: 1.50     Years: 40.00     Pack years: 60.00     Types: Cigarettes     Start date: 1969     Quit date: 2009     Years since quittin.6   • Smokeless tobacco: Never Used   Vaping Use   • Vaping Use: Never used   Substance Use Topics   • Alcohol use: Not Currently     Alcohol/week: 1.0 standard drinks     Types: 1 Glasses of wine per week     Comment: No alcohol the past 10 years.   • Drug use: No       I have reviewed the history of present illness, past medical history, family history, social history, lab results, all notes and other records since the patient was last seen at the cancer care center.    SUBJECTIVE:      Patient is seen for follow up. He has had cholecystectomy in the interim. No constitutional symptoms of weight loss, fatigue, night sweats.    ROS:      Review of Systems   Constitutional: Negative for fever.   HENT: Negative for nosebleeds and trouble swallowing.    Eyes: Negative for visual disturbance.   Respiratory: Negative for cough, shortness of breath and wheezing.    Cardiovascular: Negative for chest pain.   Gastrointestinal: Negative for abdominal pain and blood in stool.   Endocrine: Negative for cold intolerance.   Genitourinary: Negative for dysuria and hematuria.   Musculoskeletal: Negative for joint swelling.   Skin: Negative for rash.   Allergic/Immunologic: Negative for environmental allergies.   Neurological: Negative for seizures.   Hematological: Does not bruise/bleed easily.   Psychiatric/Behavioral: The patient is not nervous/anxious.       MD performed ROS and are negative except as mentioned in Subjective.unchanged      Objective:       Vitals:    21 0934   BP: 127/72   Pulse: 88   Resp: 18   Temp: 98.2 °F (36.8 °C)   Weight: 104 kg (230 lb)   Height: 172.7 cm (68\")   PainSc: 0-No pain     /72   Pulse 88   Temp 98.2 °F (36.8 °C)   Resp 18   Ht 172.7 cm (68\")   Wt 104 kg (230 lb)   BMI 34.97 kg/m²       PHYSICAL " EXAM:      Physical Exam   Constitutional: He is oriented to person, place, and time. No distress.   HENT:   Head: Normocephalic and atraumatic.   Eyes: Conjunctivae are normal. Right eye exhibits no discharge. Left eye exhibits no discharge. No scleral icterus.   Neck: No thyromegaly present.   Cardiovascular: Normal rate, regular rhythm and normal heart sounds. Exam reveals no gallop and no friction rub.   Pulmonary/Chest: Effort normal. No stridor. No respiratory distress. He has no wheezes.   Distant breath sounds   Abdominal: Soft. Bowel sounds are normal. He exhibits no mass. There is no abdominal tenderness. There is no rebound and no guarding.   Musculoskeletal: Normal range of motion. No tenderness.   Lymphadenopathy:     He has no cervical adenopathy.   Neurological: He is alert and oriented to person, place, and time. He exhibits normal muscle tone.   Skin: Skin is warm. No rash noted. He is not diaphoretic. No erythema.   Psychiatric: His behavior is normal.        RECENT LABS:     Lab Results   Component Value Date    WBC 11.61 (H) 08/18/2021    WBC 10.14 06/02/2021    HGB 14.2 08/18/2021    HGB 13.4 06/02/2021    HCT 43.8 08/18/2021    HCT 42.2 06/02/2021    NEUTROABS 7.88 (H) 08/18/2021         Lab Results   Component Value Date    GLUCOSE 129 (H) 06/02/2021    BUN 12 06/02/2021    CREATININE 0.90 06/02/2021    EGFRIFNONA 85 06/02/2021    BCR 13.3 06/02/2021    K 4.5 06/02/2021    CO2 25.6 06/02/2021    CALCIUM 9.4 06/02/2021    ALBUMIN 3.60 02/23/2021    LABIL2 1.5 05/20/2019    AST 36 02/23/2021    ALT 58 (H) 02/23/2021         Assessment/Plan      ASSESSMENT:     1. Monoclonal B-cell lymphocytosis  2. Obesity  3. Posttraumatic stress disorder   4. obstructive sleep apnea      PLAN:      1. Flow cytometry was consistent with monoclonal B-cell lymphocytosis.    No evidence of lymphadenopathy.  Repeat labs in 1 year.  He has lymphocytosis/leukocytosis treatment is not indicated.  2. Patient is to  report to us if he develops any constitutional symptoms.  3. Patient to continue to use CPAP machine.  Blood pressure is controlled well now   4. He received disability now for his PTSD.  Controlled.  5. I will see him back in my office in 1 year and check CBC, CMP and LDH    I have reviewed labs results, imaging, vitals, and medications with the patient today.   Patient verbalized understanding and is in agreement of the above plan  Patient new to me, time spent reviewing record and summarizing  Time spent on encounter including record review, history taking, exam, discussion, counseling and documentation at: 30 minutes        This report was compiled using Dragon voice recognition software. I have made every effort to proof read this document; however, typographical errors may persist.

## 2021-08-16 PROCEDURE — 93298 REM INTERROG DEV EVAL SCRMS: CPT | Performed by: INTERNAL MEDICINE

## 2021-08-16 PROCEDURE — G2066 INTER DEVC REMOTE 30D: HCPCS | Performed by: INTERNAL MEDICINE

## 2021-08-18 ENCOUNTER — OFFICE VISIT (OUTPATIENT)
Dept: ONCOLOGY | Facility: CLINIC | Age: 66
End: 2021-08-18

## 2021-08-18 ENCOUNTER — APPOINTMENT (OUTPATIENT)
Dept: LAB | Facility: HOSPITAL | Age: 66
End: 2021-08-18

## 2021-08-18 VITALS
HEART RATE: 88 BPM | SYSTOLIC BLOOD PRESSURE: 127 MMHG | DIASTOLIC BLOOD PRESSURE: 72 MMHG | RESPIRATION RATE: 18 BRPM | WEIGHT: 230 LBS | BODY MASS INDEX: 34.86 KG/M2 | TEMPERATURE: 98.2 F | HEIGHT: 68 IN

## 2021-08-18 DIAGNOSIS — C91.10 CLL (CHRONIC LYMPHOCYTIC LEUKEMIA) (HCC): Primary | ICD-10-CM

## 2021-08-18 LAB
BASOPHILS # BLD AUTO: 0.04 10*3/MM3 (ref 0–0.2)
BASOPHILS NFR BLD AUTO: 0.3 % (ref 0–1.5)
DEPRECATED RDW RBC AUTO: 47.9 FL (ref 37–54)
EOSINOPHIL # BLD AUTO: 0.3 10*3/MM3 (ref 0–0.4)
EOSINOPHIL NFR BLD AUTO: 2.6 % (ref 0.3–6.2)
ERYTHROCYTE [DISTWIDTH] IN BLOOD BY AUTOMATED COUNT: 14.8 % (ref 12.3–15.4)
HCT VFR BLD AUTO: 43.8 % (ref 37.5–51)
HGB BLD-MCNC: 14.2 G/DL (ref 13–17.7)
LYMPHOCYTES # BLD AUTO: 2.31 10*3/MM3 (ref 0.7–3.1)
LYMPHOCYTES NFR BLD AUTO: 19.9 % (ref 19.6–45.3)
MCH RBC QN AUTO: 29.4 PG (ref 26.6–33)
MCHC RBC AUTO-ENTMCNC: 32.4 G/DL (ref 31.5–35.7)
MCV RBC AUTO: 90.7 FL (ref 79–97)
MONOCYTES # BLD AUTO: 1.08 10*3/MM3 (ref 0.1–0.9)
MONOCYTES NFR BLD AUTO: 9.3 % (ref 5–12)
NEUTROPHILS NFR BLD AUTO: 67.9 % (ref 42.7–76)
NEUTROPHILS NFR BLD AUTO: 7.88 10*3/MM3 (ref 1.7–7)
PLATELET # BLD AUTO: 420 10*3/MM3 (ref 140–450)
PMV BLD AUTO: 8.9 FL (ref 6–12)
RBC # BLD AUTO: 4.83 10*6/MM3 (ref 4.14–5.8)
WBC # BLD AUTO: 11.61 10*3/MM3 (ref 3.4–10.8)

## 2021-08-18 PROCEDURE — 85025 COMPLETE CBC W/AUTO DIFF WBC: CPT | Performed by: INTERNAL MEDICINE

## 2021-08-18 PROCEDURE — 99214 OFFICE O/P EST MOD 30 MIN: CPT | Performed by: INTERNAL MEDICINE

## 2021-08-18 PROCEDURE — 36415 COLL VENOUS BLD VENIPUNCTURE: CPT | Performed by: INTERNAL MEDICINE

## 2021-08-23 DIAGNOSIS — G89.29 CHRONIC MIDLINE LOW BACK PAIN WITHOUT SCIATICA: ICD-10-CM

## 2021-08-23 DIAGNOSIS — M54.50 CHRONIC MIDLINE LOW BACK PAIN WITHOUT SCIATICA: ICD-10-CM

## 2021-08-23 DIAGNOSIS — M54.50 CHRONIC BILATERAL LOW BACK PAIN WITHOUT SCIATICA: ICD-10-CM

## 2021-08-23 DIAGNOSIS — G89.29 CHRONIC BILATERAL LOW BACK PAIN WITHOUT SCIATICA: ICD-10-CM

## 2021-08-23 RX ORDER — TIZANIDINE 4 MG/1
TABLET ORAL
Qty: 30 TABLET | Refills: 1 | Status: SHIPPED | OUTPATIENT
Start: 2021-08-23 | End: 2021-09-20

## 2021-08-23 RX ORDER — HYDROCODONE BITARTRATE AND ACETAMINOPHEN 10; 325 MG/1; MG/1
1 TABLET ORAL 4 TIMES DAILY
Qty: 120 TABLET | Refills: 0 | Status: SHIPPED | OUTPATIENT
Start: 2021-08-23 | End: 2021-09-20 | Stop reason: SDUPTHER

## 2021-09-03 DIAGNOSIS — G47.419 PRIMARY NARCOLEPSY WITHOUT CATAPLEXY: ICD-10-CM

## 2021-09-03 RX ORDER — MODAFINIL 200 MG/1
TABLET ORAL
Qty: 30 TABLET | Refills: 0 | Status: SHIPPED | OUTPATIENT
Start: 2021-09-03 | End: 2021-10-18 | Stop reason: SDUPTHER

## 2021-09-04 DIAGNOSIS — G89.29 CHRONIC BILATERAL LOW BACK PAIN WITHOUT SCIATICA: ICD-10-CM

## 2021-09-04 DIAGNOSIS — M54.50 CHRONIC BILATERAL LOW BACK PAIN WITHOUT SCIATICA: ICD-10-CM

## 2021-09-06 RX ORDER — PREGABALIN 100 MG/1
CAPSULE ORAL
Qty: 60 CAPSULE | Refills: 0 | Status: SHIPPED | OUTPATIENT
Start: 2021-09-06 | End: 2021-10-12

## 2021-09-06 RX ORDER — LISINOPRIL 30 MG/1
30 TABLET ORAL DAILY
Qty: 90 TABLET | Refills: 1 | Status: SHIPPED | OUTPATIENT
Start: 2021-09-06 | End: 2022-03-03

## 2021-09-16 PROCEDURE — G2066 INTER DEVC REMOTE 30D: HCPCS | Performed by: INTERNAL MEDICINE

## 2021-09-16 PROCEDURE — 93298 REM INTERROG DEV EVAL SCRMS: CPT | Performed by: INTERNAL MEDICINE

## 2021-09-20 DIAGNOSIS — M54.50 CHRONIC MIDLINE LOW BACK PAIN WITHOUT SCIATICA: ICD-10-CM

## 2021-09-20 DIAGNOSIS — M54.50 CHRONIC BILATERAL LOW BACK PAIN WITHOUT SCIATICA: ICD-10-CM

## 2021-09-20 DIAGNOSIS — G89.29 CHRONIC MIDLINE LOW BACK PAIN WITHOUT SCIATICA: ICD-10-CM

## 2021-09-20 DIAGNOSIS — G89.29 CHRONIC BILATERAL LOW BACK PAIN WITHOUT SCIATICA: ICD-10-CM

## 2021-09-20 RX ORDER — HYDROCODONE BITARTRATE AND ACETAMINOPHEN 10; 325 MG/1; MG/1
1 TABLET ORAL 4 TIMES DAILY
Qty: 120 TABLET | Refills: 0 | Status: SHIPPED | OUTPATIENT
Start: 2021-09-20 | End: 2021-10-19 | Stop reason: SDUPTHER

## 2021-09-20 RX ORDER — TIZANIDINE 4 MG/1
TABLET ORAL
Qty: 30 TABLET | Refills: 1 | Status: SHIPPED | OUTPATIENT
Start: 2021-09-20 | End: 2021-10-18

## 2021-10-12 DIAGNOSIS — M54.50 CHRONIC BILATERAL LOW BACK PAIN WITHOUT SCIATICA: ICD-10-CM

## 2021-10-12 DIAGNOSIS — G89.29 CHRONIC BILATERAL LOW BACK PAIN WITHOUT SCIATICA: ICD-10-CM

## 2021-10-12 RX ORDER — PREGABALIN 100 MG/1
100 CAPSULE ORAL 2 TIMES DAILY
Qty: 60 CAPSULE | Refills: 0 | Status: CANCELLED | OUTPATIENT
Start: 2021-10-12

## 2021-10-12 RX ORDER — PREGABALIN 100 MG/1
CAPSULE ORAL
Qty: 60 CAPSULE | Refills: 0 | Status: SHIPPED | OUTPATIENT
Start: 2021-10-12 | End: 2021-11-15

## 2021-10-12 RX ORDER — DULOXETIN HYDROCHLORIDE 60 MG/1
60 CAPSULE, DELAYED RELEASE ORAL DAILY
Qty: 90 CAPSULE | Refills: 3 | Status: SHIPPED | OUTPATIENT
Start: 2021-10-12 | End: 2022-01-04

## 2021-10-17 DIAGNOSIS — G89.29 CHRONIC MIDLINE LOW BACK PAIN WITHOUT SCIATICA: ICD-10-CM

## 2021-10-17 DIAGNOSIS — M54.50 CHRONIC MIDLINE LOW BACK PAIN WITHOUT SCIATICA: ICD-10-CM

## 2021-10-17 PROCEDURE — G2066 INTER DEVC REMOTE 30D: HCPCS | Performed by: INTERNAL MEDICINE

## 2021-10-17 PROCEDURE — 93298 REM INTERROG DEV EVAL SCRMS: CPT | Performed by: INTERNAL MEDICINE

## 2021-10-18 DIAGNOSIS — G47.419 PRIMARY NARCOLEPSY WITHOUT CATAPLEXY: ICD-10-CM

## 2021-10-18 RX ORDER — MODAFINIL 200 MG/1
200 TABLET ORAL DAILY
Qty: 30 TABLET | Refills: 0 | Status: SHIPPED | OUTPATIENT
Start: 2021-10-18 | End: 2021-12-08 | Stop reason: SDUPTHER

## 2021-10-18 RX ORDER — TIZANIDINE 4 MG/1
TABLET ORAL
Qty: 30 TABLET | Refills: 1 | Status: SHIPPED | OUTPATIENT
Start: 2021-10-18

## 2021-10-19 DIAGNOSIS — M54.50 CHRONIC BILATERAL LOW BACK PAIN WITHOUT SCIATICA: ICD-10-CM

## 2021-10-19 DIAGNOSIS — G89.29 CHRONIC BILATERAL LOW BACK PAIN WITHOUT SCIATICA: ICD-10-CM

## 2021-10-19 RX ORDER — HYDROCODONE BITARTRATE AND ACETAMINOPHEN 10; 325 MG/1; MG/1
1 TABLET ORAL 4 TIMES DAILY
Qty: 120 TABLET | Refills: 0 | Status: SHIPPED | OUTPATIENT
Start: 2021-10-19 | End: 2021-10-19 | Stop reason: SDUPTHER

## 2021-10-19 RX ORDER — HYDROCODONE BITARTRATE AND ACETAMINOPHEN 10; 325 MG/1; MG/1
1 TABLET ORAL 4 TIMES DAILY
Qty: 120 TABLET | Refills: 0 | Status: SHIPPED | OUTPATIENT
Start: 2021-10-19 | End: 2021-11-15 | Stop reason: SDUPTHER

## 2021-10-25 RX ORDER — LORATADINE 10 MG/1
10 TABLET ORAL DAILY
Qty: 90 TABLET | Refills: 1 | Status: SHIPPED | OUTPATIENT
Start: 2021-10-25 | End: 2022-07-13

## 2021-11-03 RX ORDER — ATORVASTATIN CALCIUM 40 MG/1
40 TABLET, FILM COATED ORAL NIGHTLY
Qty: 90 TABLET | Refills: 1 | Status: SHIPPED | OUTPATIENT
Start: 2021-11-03 | End: 2022-05-16

## 2021-11-09 ENCOUNTER — OFFICE VISIT (OUTPATIENT)
Dept: FAMILY MEDICINE CLINIC | Facility: CLINIC | Age: 66
End: 2021-11-09

## 2021-11-09 VITALS
HEART RATE: 97 BPM | SYSTOLIC BLOOD PRESSURE: 120 MMHG | WEIGHT: 224 LBS | DIASTOLIC BLOOD PRESSURE: 75 MMHG | HEIGHT: 68 IN | BODY MASS INDEX: 33.95 KG/M2 | OXYGEN SATURATION: 97 %

## 2021-11-09 DIAGNOSIS — G47.419 PRIMARY NARCOLEPSY WITHOUT CATAPLEXY: ICD-10-CM

## 2021-11-09 DIAGNOSIS — M54.50 CHRONIC MIDLINE LOW BACK PAIN, UNSPECIFIED WHETHER SCIATICA PRESENT: Primary | ICD-10-CM

## 2021-11-09 DIAGNOSIS — F41.9 ANXIETY: ICD-10-CM

## 2021-11-09 DIAGNOSIS — M79.641 BILATERAL HAND PAIN: ICD-10-CM

## 2021-11-09 DIAGNOSIS — E78.2 MIXED HYPERLIPIDEMIA: ICD-10-CM

## 2021-11-09 DIAGNOSIS — M79.642 BILATERAL HAND PAIN: ICD-10-CM

## 2021-11-09 DIAGNOSIS — F33.0 MAJOR DEPRESSIVE DISORDER, RECURRENT, MILD (HCC): ICD-10-CM

## 2021-11-09 DIAGNOSIS — I10 ESSENTIAL HYPERTENSION: ICD-10-CM

## 2021-11-09 DIAGNOSIS — J42 CHRONIC BRONCHITIS, UNSPECIFIED CHRONIC BRONCHITIS TYPE (HCC): ICD-10-CM

## 2021-11-09 DIAGNOSIS — G89.29 CHRONIC MIDLINE LOW BACK PAIN, UNSPECIFIED WHETHER SCIATICA PRESENT: Primary | ICD-10-CM

## 2021-11-09 PROCEDURE — 99214 OFFICE O/P EST MOD 30 MIN: CPT | Performed by: NURSE PRACTITIONER

## 2021-11-09 NOTE — PROGRESS NOTES
"Teresa Osuna is a 66 y.o. male.       HPI   Pt. is here today for 4 month follow up on medication.   1) Chronic low back pain with radiculopathy - currently has regimen of Norco 10/325 mg qid; Lyrica 100 mg bid; tizanidine 4 mg tid PRN. Pain level has been stable.    2) HTN -   Currently on amlodipine 10 mg daily; lisinopril 30 mg daily.  /75 today.  Denies any CP; palpitations; SOA; dizziness; headache; trouble with vision.  Has been working to improve diet and has lost about 6 pounds since last visit.   3) Hyperlipdiemia - currently on atorvastatin 40 mg daily; ASA 81 mg daily.   4) COPD - currently on Trelegy Ellipta inhaler; albuterol inhaler PRN.   Denies any SOA; cough or wheezes.    5) Narcolepsy - currently taking Provigil 200 mg daily.    6) Depression/Anxiety - currently taking Cymbalta 60 mg daily. Moods stable.  Denies any SI or HI.   7) Hand pain - having stiffness in all fingers; worse in morning. Just started a couple of weeks ago.  No injury.  Not seeing redness, swelling or feeling warmth.  Has to \"pull up ring fingers\" to get them to move.  Eases as day goes on.     The following portions of the patient's history were reviewed and updated as appropriate: allergies, current medications, past family history, past medical history, past social history, past surgical history and problem list.    Review of Systems   Constitutional: Negative for activity change, appetite change, chills, diaphoresis, fatigue and fever.   Eyes: Negative for visual disturbance.   Respiratory: Negative for cough, chest tightness, shortness of breath and wheezing.    Cardiovascular: Negative for chest pain, palpitations and leg swelling.   Gastrointestinal: Negative for abdominal distention, abdominal pain, blood in stool, constipation, diarrhea, nausea, vomiting and GERD.   Endocrine: Negative for polydipsia, polyphagia and polyuria.   Genitourinary: Negative for difficulty urinating, dysuria, flank pain, " frequency, hematuria and urgency.   Musculoskeletal: Positive for arthralgias (bilateral hand), back pain and myalgias.   Skin: Negative for rash.   Neurological: Negative for dizziness, weakness, light-headedness, headache and confusion.   Psychiatric/Behavioral: Negative for depressed mood. The patient is not nervous/anxious.        Objective   Physical Exam  Vitals reviewed.   Constitutional:       General: He is not in acute distress.     Appearance: Normal appearance. He is obese.   HENT:      Head: Normocephalic.   Cardiovascular:      Rate and Rhythm: Normal rate and regular rhythm.      Pulses: Normal pulses.      Heart sounds: Normal heart sounds. No murmur heard.      Pulmonary:      Effort: Pulmonary effort is normal. No respiratory distress.      Breath sounds: Normal breath sounds. No wheezing.   Chest:      Chest wall: No tenderness.   Abdominal:      Tenderness: There is no right CVA tenderness or left CVA tenderness.   Musculoskeletal:      Right hand: Tenderness (general stiffness noted in fingers) present. No swelling or deformity. Normal sensation. Normal capillary refill. Normal pulse.      Left hand: Tenderness (general stiffness noted in fingers) present. No swelling or deformity. Normal sensation. Normal capillary refill. Normal pulse.      Cervical back: Normal range of motion and neck supple. No tenderness.      Thoracic back: Tenderness present.      Lumbar back: Tenderness present.   Skin:     General: Skin is warm and dry.      Findings: No erythema.   Neurological:      General: No focal deficit present.      Mental Status: He is alert and oriented to person, place, and time.   Psychiatric:         Mood and Affect: Mood normal.           Assessment/Plan   Diagnoses and all orders for this visit:    1. Chronic midline low back pain, unspecified whether sciatica present (Primary)  Comments:  Stable.   INSPECT reviewed.   Cont. current medication.   RTO in 4 mo.     2. Essential  hypertension  Comments:  Stable.   Cont. current medication.   Labs ordered.   RTO in 4 mo.   Orders:  -     Comprehensive metabolic panel; Future  -     Lipid panel; Future    3. Mixed hyperlipidemia  Comments:  Stable.   Cont. current medication.   Labs ordered.   RTO in 4 mo.   Orders:  -     Comprehensive metabolic panel; Future  -     Lipid panel; Future    4. Chronic bronchitis, unspecified chronic bronchitis type (HCC)  Comments:  Stable.   Cont. current medication.   Labs ordered.   RTO in 4 mo.     5. Primary narcolepsy without cataplexy  Comments:  Stable.   INSPECT reviewed.   Cont. current medication.   RTO in 4 mo.     6. Anxiety  Comments:  Stable.   Cont. current medication.   RTO in 4 mo.     7. Major depressive disorder, recurrent, mild (HCC)  Comments:  Stable.   Cont. current medication.   RTO in 4 mo.     8. Bilateral hand pain  Comments:  Referral to Ortho for evaluation.   Orders:  -     Ambulatory Referral to Orthopedic Surgery

## 2021-11-09 NOTE — PATIENT INSTRUCTIONS
"https://www.nhlbi.nih.gov/files/docs/public/heart/dash_brief.pdf\">   DASH Eating Plan  DASH stands for Dietary Approaches to Stop Hypertension. The DASH eating plan is a healthy eating plan that has been shown to:  · Reduce high blood pressure (hypertension).  · Reduce your risk for type 2 diabetes, heart disease, and stroke.  · Help with weight loss.  What are tips for following this plan?  Reading food labels  · Check food labels for the amount of salt (sodium) per serving. Choose foods with less than 5 percent of the Daily Value of sodium. Generally, foods with less than 300 milligrams (mg) of sodium per serving fit into this eating plan.  · To find whole grains, look for the word \"whole\" as the first word in the ingredient list.  Shopping  · Buy products labeled as \"low-sodium\" or \"no salt added.\"  · Buy fresh foods. Avoid canned foods and pre-made or frozen meals.  Cooking  · Avoid adding salt when cooking. Use salt-free seasonings or herbs instead of table salt or sea salt. Check with your health care provider or pharmacist before using salt substitutes.  · Do not russo foods. Cook foods using healthy methods such as baking, boiling, grilling, roasting, and broiling instead.  · Cook with heart-healthy oils, such as olive, canola, avocado, soybean, or sunflower oil.  Meal planning    · Eat a balanced diet that includes:  ? 4 or more servings of fruits and 4 or more servings of vegetables each day. Try to fill one-half of your plate with fruits and vegetables.  ? 6-8 servings of whole grains each day.  ? Less than 6 oz (170 g) of lean meat, poultry, or fish each day. A 3-oz (85-g) serving of meat is about the same size as a deck of cards. One egg equals 1 oz (28 g).  ? 2-3 servings of low-fat dairy each day. One serving is 1 cup (237 mL).  ? 1 serving of nuts, seeds, or beans 5 times each week.  ? 2-3 servings of heart-healthy fats. Healthy fats called omega-3 fatty acids are found in foods such as walnuts, " flaxseeds, fortified milks, and eggs. These fats are also found in cold-water fish, such as sardines, salmon, and mackerel.  · Limit how much you eat of:  ? Canned or prepackaged foods.  ? Food that is high in trans fat, such as some fried foods.  ? Food that is high in saturated fat, such as fatty meat.  ? Desserts and other sweets, sugary drinks, and other foods with added sugar.  ? Full-fat dairy products.  · Do not salt foods before eating.  · Do not eat more than 4 egg yolks a week.  · Try to eat at least 2 vegetarian meals a week.  · Eat more home-cooked food and less restaurant, buffet, and fast food.    Lifestyle  · When eating at a restaurant, ask that your food be prepared with less salt or no salt, if possible.  · If you drink alcohol:  ? Limit how much you use to:  § 0-1 drink a day for women who are not pregnant.  § 0-2 drinks a day for men.  ? Be aware of how much alcohol is in your drink. In the [place] one drink equals one 12 oz bottle of beer (355 mL), one 5 oz glass of wine (148 mL), or one 1½ oz glass of hard liquor (44 mL).  General information  · Avoid eating more than 2,300 mg of salt a day. If you have hypertension, you may need to reduce your sodium intake to 1,500 mg a day.  · Work with your health care provider to maintain a healthy body weight or to lose weight. Ask what an ideal weight is for you.  · Get at least 30 minutes of exercise that causes your heart to beat faster (aerobic exercise) most days of the week. Activities may include walking, swimming, or biking.  · Work with your health care provider or dietitian to adjust your eating plan to your individual calorie needs.  What foods should I eat?  Fruits  All fresh, dried, or frozen fruit. Canned fruit in natural juice (without added sugar).  Vegetables  Fresh or frozen vegetables (raw, steamed, roasted, or grilled). Low-sodium or reduced-sodium tomato and vegetable juice. Low-sodium or reduced-sodium tomato sauce and tomato paste.  Low-sodium or reduced-sodium canned vegetables.  Grains  Whole-grain or whole-wheat bread. Whole-grain or whole-wheat pasta. Brown rice. Oatmeal. Quinoa. Bulgur. Whole-grain and low-sodium cereals. Adela bread. Low-fat, low-sodium crackers. Whole-wheat flour tortillas.  Meats and other proteins  Skinless chicken or turkey. Ground chicken or turkey. Pork with fat trimmed off. Fish and seafood. Egg whites. Dried beans, peas, or lentils. Unsalted nuts, nut butters, and seeds. Unsalted canned beans. Lean cuts of beef with fat trimmed off. Low-sodium, lean precooked or cured meat, such as sausages or meat loaves.  Dairy  Low-fat (1%) or fat-free (skim) milk. Reduced-fat, low-fat, or fat-free cheeses. Nonfat, low-sodium ricotta or cottage cheese. Low-fat or nonfat yogurt. Low-fat, low-sodium cheese.  Fats and oils  Soft margarine without trans fats. Vegetable oil. Reduced-fat, low-fat, or light mayonnaise and salad dressings (reduced-sodium). Canola, safflower, olive, avocado, soybean, and sunflower oils. Avocado.  Seasonings and condiments  Herbs. Spices. Seasoning mixes without salt.  Other foods  Unsalted popcorn and pretzels. Fat-free sweets.  The items listed above may not be a complete list of foods and beverages you can eat. Contact a dietitian for more information.  What foods should I avoid?  Fruits  Canned fruit in a light or heavy syrup. Fried fruit. Fruit in cream or butter sauce.  Vegetables  Creamed or fried vegetables. Vegetables in a cheese sauce. Regular canned vegetables (not low-sodium or reduced-sodium). Regular canned tomato sauce and paste (not low-sodium or reduced-sodium). Regular tomato and vegetable juice (not low-sodium or reduced-sodium). Pickles. Olives.  Grains  Baked goods made with fat, such as croissants, muffins, or some breads. Dry pasta or rice meal packs.  Meats and other proteins  Fatty cuts of meat. Ribs. Fried meat. Caldwell. Bologna, salami, and other precooked or cured meats, such as  sausages or meat loaves. Fat from the back of a pig (fatback). Bratwurst. Salted nuts and seeds. Canned beans with added salt. Canned or smoked fish. Whole eggs or egg yolks. Chicken or turkey with skin.  Dairy  Whole or 2% milk, cream, and half-and-half. Whole or full-fat cream cheese. Whole-fat or sweetened yogurt. Full-fat cheese. Nondairy creamers. Whipped toppings. Processed cheese and cheese spreads.  Fats and oils  Butter. Stick margarine. Lard. Shortening. Ghee. Caldwell fat. Tropical oils, such as coconut, palm kernel, or palm oil.  Seasonings and condiments  Onion salt, garlic salt, seasoned salt, table salt, and sea salt. Worcestershire sauce. Tartar sauce. Barbecue sauce. Teriyaki sauce. Soy sauce, including reduced-sodium. Steak sauce. Canned and packaged gravies. Fish sauce. Oyster sauce. Cocktail sauce. Store-bought horseradish. Ketchup. Mustard. Meat flavorings and tenderizers. Bouillon cubes. Hot sauces. Pre-made or packaged marinades. Pre-made or packaged taco seasonings. Relishes. Regular salad dressings.  Other foods  Salted popcorn and pretzels.  The items listed above may not be a complete list of foods and beverages you should avoid. Contact a dietitian for more information.  Where to find more information  · National Heart, Lung, and Blood [employer]: www.nhlbi.nih.gov  · American Heart Association: www.heart.org  · Academy of Nutrition and Dietetics: www.eatright.org  · National Kidney Foundation: www.kidney.org  Summary  · The DASH eating plan is a healthy eating plan that has been shown to reduce high blood pressure (hypertension). It may also reduce your risk for type 2 diabetes, heart disease, and stroke.  · When on the DASH eating plan, aim to eat more fresh fruits and vegetables, whole grains, lean proteins, low-fat dairy, and heart-healthy fats.  · With the DASH eating plan, you should limit salt (sodium) intake to 2,300 mg a day. If you have hypertension, you may need to reduce your  sodium intake to 1,500 mg a day.  · Work with your health care provider or dietitian to adjust your eating plan to your individual calorie needs.  This information is not intended to replace advice given to you by your health care provider. Make sure you discuss any questions you have with your health care provider.  Document Revised: 11/20/2020 Document Reviewed: 11/20/2020  [employer] Patient Education © 2021 [employer]

## 2021-11-14 DIAGNOSIS — G89.29 CHRONIC BILATERAL LOW BACK PAIN WITHOUT SCIATICA: ICD-10-CM

## 2021-11-14 DIAGNOSIS — M54.50 CHRONIC BILATERAL LOW BACK PAIN WITHOUT SCIATICA: ICD-10-CM

## 2021-11-15 DIAGNOSIS — M54.50 CHRONIC BILATERAL LOW BACK PAIN WITHOUT SCIATICA: ICD-10-CM

## 2021-11-15 DIAGNOSIS — G89.29 CHRONIC BILATERAL LOW BACK PAIN WITHOUT SCIATICA: ICD-10-CM

## 2021-11-15 RX ORDER — PREGABALIN 100 MG/1
CAPSULE ORAL
Qty: 60 CAPSULE | Refills: 0 | Status: SHIPPED | OUTPATIENT
Start: 2021-11-15 | End: 2021-12-21

## 2021-11-16 RX ORDER — HYDROCODONE BITARTRATE AND ACETAMINOPHEN 10; 325 MG/1; MG/1
1 TABLET ORAL 4 TIMES DAILY
Qty: 120 TABLET | Refills: 0 | Status: SHIPPED | OUTPATIENT
Start: 2021-11-16 | End: 2021-12-13 | Stop reason: SDUPTHER

## 2021-11-17 ENCOUNTER — OFFICE VISIT (OUTPATIENT)
Dept: ORTHOPEDIC SURGERY | Facility: CLINIC | Age: 66
End: 2021-11-17

## 2021-11-17 VITALS
HEART RATE: 78 BPM | DIASTOLIC BLOOD PRESSURE: 72 MMHG | HEIGHT: 68 IN | WEIGHT: 224 LBS | SYSTOLIC BLOOD PRESSURE: 117 MMHG | BODY MASS INDEX: 33.95 KG/M2

## 2021-11-17 DIAGNOSIS — M72.0 DUPUYTREN'S CONTRACTURE OF BOTH HANDS: ICD-10-CM

## 2021-11-17 DIAGNOSIS — M79.642 BILATERAL HAND PAIN: Primary | ICD-10-CM

## 2021-11-17 DIAGNOSIS — M79.641 BILATERAL HAND PAIN: Primary | ICD-10-CM

## 2021-11-17 DIAGNOSIS — M65.312 TRIGGER THUMB OF LEFT HAND: ICD-10-CM

## 2021-11-17 PROCEDURE — 93298 REM INTERROG DEV EVAL SCRMS: CPT | Performed by: INTERNAL MEDICINE

## 2021-11-17 PROCEDURE — 20550 NJX 1 TENDON SHEATH/LIGAMENT: CPT | Performed by: FAMILY MEDICINE

## 2021-11-17 PROCEDURE — 99203 OFFICE O/P NEW LOW 30 MIN: CPT | Performed by: FAMILY MEDICINE

## 2021-11-17 PROCEDURE — G2066 INTER DEVC REMOTE 30D: HCPCS | Performed by: INTERNAL MEDICINE

## 2021-11-17 NOTE — PROGRESS NOTES
"Primary Care Sports Medicine Office Visit Note     Patient ID: Neptali Osuna is a 66 y.o. male.    Chief Complaint:  Chief Complaint   Patient presents with   • Left Hand - Pain   • Right Hand - Pain     HPI:    Mr. Neptali Osuna is a 66 y.o. male who presents to the clinic today for evaluation of L thumb pain, and R first digit pain. Pt states near 2 weeks ago he noticed some achy changes to many of his joints in bilateral hands. Worked more than he is used to around the house, had moderate amount of achy pain onset. No obvious injury. Thumb now feels like it wants to \"lock up\", catching. The R first digit is also bothering him, severe pain to the PIP joint with use.     Past Medical History:   Diagnosis Date   • Acute diverticulitis     with perforation   • Allergic    • Anemia    • Anxiety    • Back pain    • CAD S/P percutaneous coronary angioplasty    • Cancer (HCC)     cll    • COPD (chronic obstructive pulmonary disease) (HCC)    • Depression    • Diverticulitis    • GERD (gastroesophageal reflux disease)    • Hyperlipidemia    • Hypertension    • Low back pain    • Myocardial infarction (HCC)    • TEIXEIRA (nonalcoholic steatohepatitis)    • Obesity    • BRITTNEY on CPAP    • Sleep apnea    • Sleep disorder, shift work    • Spinal stenosis of lumbar region at multiple levels    • Status post placement of implantable loop recorder 2020 [facility]       Past Surgical History:   Procedure Laterality Date   • CARDIAC CATHETERIZATION  11/05/2018    Dr. Sher   • CHOLECYSTECTOMY N/A 2/22/2021    Procedure: CHOLECYSTECTOMY LAPAROSCOPIC;  Surgeon: Faisal Wilkins DO;  Location: [facility] MAIN OR;  Service: General;  Laterality: N/A;   • COLONOSCOPY  2016   • CORONARY STENT PLACEMENT     • ERCP N/A 2/21/2021    Procedure: ENDOSCOPIC RETROGRADE CHOLANGIOPANCREATOGRAPHY, sphincterotomy, balloon clearance of  bile duct, stent placement;  Surgeon: Ramiro Pulido MD;  Location: [facility] ENDOSCOPY;  Service: Gastroenterology;  " "Laterality: N/A;  post: biliary dilation, choledocolithiasis   • ERCP N/A 2021    Procedure: ENDOSCOPIC RETROGRADE CHOLANGIOPANCREATOGRAPHY WITH STENT REMOVAL and balloon sweeping of common bile duct (12-15mm balloon up to 15mm);  Surgeon: Pranay White MD;  Location: [facility] ENDOSCOPY;  Service: Gastroenterology;  Laterality: N/A;  post op: biliary stent removal, common bile duct sweeping with 12-15mm balloon (up to 15mm), dialated bile duct   • LIVER BIOPSY     • TONSILLECTOMY AND ADENOIDECTOMY         Family History   Problem Relation Age of Onset   • Colon cancer Mother    • Heart disease Mother    • Colon cancer Father    • Heart disease Father    • Heart attack Father    • Diabetes Brother    • Diabetes Maternal Grandmother    • Diabetes Maternal Grandfather      Social History     Occupational History   • Not on file   Tobacco Use   • Smoking status: Former Smoker     Packs/day: 1.50     Years: 40.00     Pack years: 60.00     Types: Cigarettes     Start date: 1969     Quit date:      Years since quittin.9   • Smokeless tobacco: Never Used   Vaping Use   • Vaping Use: Never used   Substance and Sexual Activity   • Alcohol use: Not Currently     Alcohol/week: 1.0 standard drink     Types: 1 Glasses of wine per week     Comment: No alcohol the past 10 years.   • Drug use: No   • Sexual activity: Not Currently     Partners: Female     Birth control/protection: None      Review of Systems   Constitutional: Negative for activity change and fever.   Respiratory: Negative for cough and shortness of breath.    Cardiovascular: Negative for chest pain.   Gastrointestinal: Negative for constipation, diarrhea, nausea and vomiting.   Musculoskeletal: Positive for arthralgias.   Skin: Negative for color change and rash.   Neurological: Negative for weakness.   Hematological: Does not bruise/bleed easily.     Objective:    /72   Pulse 78   Ht 172.7 cm (68\")   Wt 102 kg (224 lb)   BMI 34.06 " "kg/m²     Physical Examination:  Physical Exam  Vitals and nursing note reviewed.   Constitutional:       General: He is not in acute distress.     Appearance: He is well-developed. He is not diaphoretic.   HENT:      Head: Normocephalic and atraumatic.   Eyes:      Conjunctiva/sclera: Conjunctivae normal.   Pulmonary:      Effort: Pulmonary effort is normal. No respiratory distress.   Skin:     General: Skin is warm.      Capillary Refill: Capillary refill takes less than 2 seconds.   Neurological:      Mental Status: He is alert.       Right Hand Exam     Comments:  Palpable cord of the fourth and fifth digits, palmar aspect of the right hand      Left Hand Exam     Tenderness   Left hand tenderness location: palpable tender nodule L first A2 pulley.     Range of Motion   Wrist   Extension: normal   Flexion: normal   Pronation: normal   Supination: normal     Other   Erythema: absent  Sensation: normal  Pulse: present    Comments:  Locking of first digit on repetitive F/E activity        Imaging and other tests:  Three-view XR bilateral hands yields no acute bony pathology.  Assessment and Plan:    1. Bilateral hand pain  - XR hand 3+ vw bilateral    2. Dupuytren's contracture of both hands    3. Trigger thumb of left hand    After discussion of risks and benefits, the patient elected to proceed with corticosteroid injection to the left first flexor tenosynovium.  The patient tolerated this procedure well without any complaints or problems.  I recommended continuation of conservative management as previous, RTC in 3-6 months or sooner if symptoms recur.      Deputryens contracture of R hand, discuss at next visit.     Andi FENTON \"Chance\" Conrado PARK DO, CAQSM  11/29/21  11:18 EST    Disclaimer: Please note that areas of this note were completed with computer voice recognition software.  Quite often unanticipated grammatical, syntax, homophones, and other interpretive errors are inadvertently transcribed by the " computer software. Please excuse any errors that have escaped final proofreading.

## 2021-11-24 RX ORDER — AMLODIPINE BESYLATE 10 MG/1
10 TABLET ORAL DAILY
Qty: 90 TABLET | Refills: 1 | Status: SHIPPED | OUTPATIENT
Start: 2021-11-24 | End: 2022-05-27

## 2021-11-29 RX ORDER — TRIAMCINOLONE ACETONIDE 40 MG/ML
40 INJECTION, SUSPENSION INTRA-ARTICULAR; INTRAMUSCULAR
Status: COMPLETED | OUTPATIENT
Start: 2021-11-29 | End: 2021-11-29

## 2021-11-29 RX ADMIN — TRIAMCINOLONE ACETONIDE 40 MG: 40 INJECTION, SUSPENSION INTRA-ARTICULAR; INTRAMUSCULAR at 11:19

## 2021-11-29 NOTE — PROGRESS NOTES
Procedure   Injection Tendon or Ligament    Date/Time: 11/29/2021 11:19 AM  Performed by: Andi Camp II, DO  Authorized by: Andi Camp II, DO   Preparation: Patient was prepped and draped in the usual sterile fashion.  Local anesthesia used: no    Anesthesia:  Local anesthesia used: no  Patient tolerance: patient tolerated the procedure well with no immediate complications  Comments: A left first trigger finger injection was performed today.  After risks and benefits were discussed, and patient was identified, the palmar aspect of the left hand was prepped and draped in usual fashion.  Alcohol and Betadine was used to cleanse the skin.  Ethyl chloride spray was used for skin anesthesia. A 27-gauge 1/2 inch needle was then used infiltrate the triggering tendon sheath.  0.5 cc of 1% lidocaine without epinephrine and 0.5 cc of Kenalog 40 mg was then injected into the sheath.  The needle was then removed without complication.  Blood loss negligible.  Patient admits to near immediate pain relief with gentle range of motion post injection.     Medications administered: 40 mg triamcinolone acetonide 40 MG/ML

## 2021-12-08 DIAGNOSIS — G47.419 PRIMARY NARCOLEPSY WITHOUT CATAPLEXY: ICD-10-CM

## 2021-12-08 RX ORDER — MODAFINIL 200 MG/1
200 TABLET ORAL DAILY
Qty: 30 TABLET | Refills: 0 | Status: SHIPPED | OUTPATIENT
Start: 2021-12-08 | End: 2022-01-10 | Stop reason: SDUPTHER

## 2021-12-09 ENCOUNTER — OFFICE VISIT (OUTPATIENT)
Dept: CARDIOLOGY | Facility: CLINIC | Age: 66
End: 2021-12-09

## 2021-12-09 VITALS
SYSTOLIC BLOOD PRESSURE: 123 MMHG | DIASTOLIC BLOOD PRESSURE: 78 MMHG | WEIGHT: 229 LBS | HEART RATE: 87 BPM | OXYGEN SATURATION: 97 % | HEIGHT: 68 IN | BODY MASS INDEX: 34.71 KG/M2

## 2021-12-09 DIAGNOSIS — I10 ESSENTIAL HYPERTENSION: ICD-10-CM

## 2021-12-09 DIAGNOSIS — E78.2 MIXED HYPERLIPIDEMIA: ICD-10-CM

## 2021-12-09 DIAGNOSIS — R55 SYNCOPE AND COLLAPSE: ICD-10-CM

## 2021-12-09 DIAGNOSIS — Z95.820 STATUS POST ANGIOPLASTY WITH STENT: ICD-10-CM

## 2021-12-09 DIAGNOSIS — Z95.818 STATUS POST PLACEMENT OF IMPLANTABLE LOOP RECORDER: Primary | ICD-10-CM

## 2021-12-09 DIAGNOSIS — Z95.5 STATUS POST CORONARY ARTERY STENT PLACEMENT: ICD-10-CM

## 2021-12-09 PROCEDURE — 99214 OFFICE O/P EST MOD 30 MIN: CPT | Performed by: INTERNAL MEDICINE

## 2021-12-09 PROCEDURE — 93000 ELECTROCARDIOGRAM COMPLETE: CPT | Performed by: INTERNAL MEDICINE

## 2021-12-09 NOTE — PROGRESS NOTES
Encounter Date:12/09/2021  Last seen 5/25/2021      Patient ID: Neptali Osuna is a 66 y.o. male.    Chief Complaint:    Status post stent  History of syncope  Status post loop recorder  Hypertension  Dyslipidemia        History of Present Illness  Since I have last seen, the patient has been without any chest discomfort ,shortness of breath, palpitations, dizziness or syncope.  Denies having any headache ,abdominal pain ,nausea, vomiting , diarrhea constipation, loss of weight or loss of appetite.  Denies having any excessive bruising ,hematuria or blood in the stool.    Review of all systems negative except as indicated.    Reviewed ROS.    Assessment and Plan         ]]]]]]]]]]]]]]]]]]]   impression  =============  - Recent recurrent syncopal episodes concerning for bradyarrhythmias.  Recent loop recorder monitor showed second-degree AV block type I and type II.  Patient had one episode of bradycardia with heart rates of 30/min while he was sleeping without CPAP.     -Status post [employer] loop recorder 11/15/2019 ([employer])  Negative tilt table test 12/20/2019      -Status post stent to proximal LAD 11/05/2018     Cardiac catheterization 11/05/2018 revealed  Left ventricular size and contractility is normal with ejection fraction of 65%.  Left main coronary artery is normal.  Left anterior descending artery has proximal calcific plaquing with 95-99% proximal LAD disease.  Circumflex coronary artery is normal.  Right coronary artery is a large and dominant vessel that has mid segment 50% disease.      -Chest pain suggestive of unstable angina pectoris. prior to stent placement   CT scan of the chest showed significant coronary artery calcifications(prior to stent placement).  EKG is normal  Troponin levels are negative.      -COPD dyslipidemia hypertension     -Status post cholecystectomyFormer smoker      -Strong family history of coronary artery disease      -No known allergies  =========  Plan  ===========  Status  post stent  Patient is not having any angina pectoris or congestive heart failure.  EKG showed sinus rhythm without ischemic changes.-12/9/2021  Patient is off Plavix since patient had stent in 2018.      History of syncope-no further episodes.  Loop recorder in place.     Hypertension-well-controlled-123/78    Dyslipidemia-continue atorvastatin.     History of second-degree AV block Mobitz type I and type II-improved.  Patient does not have any symptoms of dizziness or near syncope or syncope  No need for pacemaker at this time  However they would like to wait and observe at this time.     Follow-up in the office in 6 months.  Further plan will depend on patient's progress  ]]]]]]]]]]]]]]]                            Diagnosis Plan   1. Status post placement of implantable loop recorder  ECG 12 Lead   2. Syncope and collapse  ECG 12 Lead   3. Status post angioplasty with stent  ECG 12 Lead   4. Mixed hyperlipidemia  ECG 12 Lead   5. Essential hypertension  ECG 12 Lead   6. Status post coronary artery stent placement  ECG 12 Lead   LAB RESULTS (LAST 7 DAYS)    CBC        BMP        CMP         BNP        TROPONIN        CoAg        Creatinine Clearance  CrCl cannot be calculated (Patient's most recent lab result is older than the maximum 30 days allowed.).    ABG        Radiology  No radiology results for the last day                The following portions of the patient's history were reviewed and updated as appropriate: allergies, current medications, past family history, past medical history, past social history, past surgical history and problem list.    Review of Systems   Constitutional: Negative for malaise/fatigue.   Cardiovascular: Negative for chest pain, leg swelling, palpitations and syncope.   Respiratory: Negative for shortness of breath.    Skin: Negative for rash.   Gastrointestinal: Negative for nausea and vomiting.   Neurological: Negative for dizziness, light-headedness and numbness.   All other  systems reviewed and are negative.        Current Outpatient Medications:   •  amLODIPine (NORVASC) 10 MG tablet, Take 1 tablet by mouth Daily., Disp: 90 tablet, Rfl: 1  •  aspirin 81 MG EC tablet, Take 81 mg by mouth Daily. LD 5/25, Disp: , Rfl:   •  atorvastatin (LIPITOR) 40 MG tablet, TAKE 1 TABLET BY MOUTH EVERY NIGHT, Disp: 90 tablet, Rfl: 1  •  DULoxetine (CYMBALTA) 60 MG capsule, Take 1 capsule by mouth Daily., Disp: 90 capsule, Rfl: 3  •  HYDROcodone-acetaminophen (NORCO)  MG per tablet, Take 1 tablet by mouth 4 (Four) Times a Day., Disp: 120 tablet, Rfl: 0  •  ipratropium-albuterol (DUO-NEB) 0.5-2.5 mg/3 ml nebulizer, Take 3 mL by nebulization Every 4 (Four) Hours As Needed for Wheezing., Disp: 360 mL, Rfl: 5  •  lisinopril (PRINIVIL,ZESTRIL) 30 MG tablet, TAKE 1 TABLET BY MOUTH DAILY, Disp: 90 tablet, Rfl: 1  •  loratadine (CLARITIN) 10 MG tablet, TAKE 1 TABLET BY MOUTH DAILY, Disp: 90 tablet, Rfl: 1  •  modafinil (PROVIGIL) 200 MG tablet, Take 1 tablet by mouth Daily., Disp: 30 tablet, Rfl: 0  •  pregabalin (LYRICA) 100 MG capsule, TAKE 1 CAPSULE BY MOUTH TWICE DAILY, Disp: 60 capsule, Rfl: 0  •  tiZANidine (ZANAFLEX) 4 MG tablet, TAKE 1 TABLET BY MOUTH EVERY 8 HOURS AS NEEDED FOR MUSCLE SPASMS, Disp: 30 tablet, Rfl: 1  •  Trelegy Ellipta 100-62.5-25 MCG/INH inhaler, INHALE 1 PUFF BY MOUTH DAILY, Disp: 60 each, Rfl: 5    No Known Allergies    Family History   Problem Relation Age of Onset   • Colon cancer Mother    • Heart disease Mother    • Colon cancer Father    • Heart disease Father    • Heart attack Father    • Diabetes Brother    • Diabetes Maternal Grandmother    • Diabetes Maternal Grandfather        Past Surgical History:   Procedure Laterality Date   • CARDIAC CATHETERIZATION  11/05/2018    Dr. Sher   • CHOLECYSTECTOMY N/A 2/22/2021    Procedure: CHOLECYSTECTOMY LAPAROSCOPIC;  Surgeon: Faisal Wilkins DO;  Location: [facility] MAIN OR;  Service: General;  Laterality: N/A;   • COLONOSCOPY   2016   • CORONARY STENT PLACEMENT     • ERCP N/A 2/21/2021    Procedure: ENDOSCOPIC RETROGRADE CHOLANGIOPANCREATOGRAPHY, sphincterotomy, balloon clearance of  bile duct, stent placement;  Surgeon: Ramiro Pulido MD;  Location: [facility] ENDOSCOPY;  Service: Gastroenterology;  Laterality: N/A;  post: biliary dilation, choledocolithiasis   • ERCP N/A 6/4/2021    Procedure: ENDOSCOPIC RETROGRADE CHOLANGIOPANCREATOGRAPHY WITH STENT REMOVAL and balloon sweeping of common bile duct (12-15mm balloon up to 15mm);  Surgeon: Pranay White MD;  Location: [facility] ENDOSCOPY;  Service: Gastroenterology;  Laterality: N/A;  post op: biliary stent removal, common bile duct sweeping with 12-15mm balloon (up to 15mm), dialated bile duct   • LIVER BIOPSY     • TONSILLECTOMY AND ADENOIDECTOMY         Past Medical History:   Diagnosis Date   • Acute diverticulitis     with perforation   • Allergic    • Anemia    • Anxiety    • Back pain    • CAD S/P percutaneous coronary angioplasty    • Cancer (HCC)     cll    • COPD (chronic obstructive pulmonary disease) (HCC)    • Depression    • Diverticulitis    • GERD (gastroesophageal reflux disease)    • Hyperlipidemia    • Hypertension    • Low back pain    • Myocardial infarction (HCC)    • TEIXEIRA (nonalcoholic steatohepatitis)    • Obesity    • BRITTNEY on CPAP    • Sleep apnea    • Sleep disorder, shift work    • Spinal stenosis of lumbar region at multiple levels    • [facility]       Family History   Problem Relation Age of Onset   • Colon cancer Mother    • Heart disease Mother    • Colon cancer Father    • Heart disease Father    • Heart attack Father    • Diabetes Brother    • Diabetes Maternal Grandmother    • Diabetes Maternal Grandfather        Social History     Socioeconomic History   • Marital status:    Tobacco Use   • Smoking status: Former Smoker     Packs/day: 1.50     Years: 40.00     Pack years: 60.00     Types:  "Cigarettes     Start date: 1969     Quit date: 2009     Years since quittin.9   • Smokeless tobacco: Never Used   Vaping Use   • Vaping Use: Never used   Substance and Sexual Activity   • Alcohol use: Not Currently     Alcohol/week: 1.0 standard drink     Types: 1 Glasses of wine per week     Comment: No alcohol the past 10 years.   • Drug use: No   • Sexual activity: Not Currently     Partners: Female     Birth control/protection: None           ECG 12 Lead    Date/Time: 2021 11:09 AM  Performed by: Mesha Sher MD  Authorized by: Mesha Sher MD   Comparison: compared with previous ECG   Similar to previous ECG  Comparison to previous ECG: Normal sinus rhythm normal ECG 77/min normal axis normal intervals no ectopy no significant change from 2021                Objective:       Physical Exam    /78 (BP Location: Left arm, Patient Position: Sitting, Cuff Size: Large Adult)   Pulse 87   Ht 172.7 cm (68\")   Wt 104 kg (229 lb)   SpO2 97%   BMI 34.82 kg/m²   The patient is alert, oriented and in no distress.    Vital signs as noted above.    Head and neck revealed no carotid bruits or jugular venous distension.  No thyromegaly or lymphadenopathy is present.    Lungs clear.  No wheezing.  Breath sounds are normal bilaterally.    Heart normal first and second heart sounds.  No murmur..  No pericardial rub is present.  No gallop is present.    Abdomen soft and nontender.  No organomegaly is present.    Extremities revealed good peripheral pulses without any pedal edema.    Skin warm and dry.    Musculoskeletal system is grossly normal.    CNS grossly normal.    Reviewed and unchanged from last visit.        "

## 2021-12-13 DIAGNOSIS — M54.50 CHRONIC BILATERAL LOW BACK PAIN WITHOUT SCIATICA: ICD-10-CM

## 2021-12-13 DIAGNOSIS — G89.29 CHRONIC BILATERAL LOW BACK PAIN WITHOUT SCIATICA: ICD-10-CM

## 2021-12-13 RX ORDER — HYDROCODONE BITARTRATE AND ACETAMINOPHEN 10; 325 MG/1; MG/1
1 TABLET ORAL 4 TIMES DAILY
Qty: 120 TABLET | Refills: 0 | Status: SHIPPED | OUTPATIENT
Start: 2021-12-13 | End: 2022-01-07 | Stop reason: SDUPTHER

## 2021-12-21 DIAGNOSIS — M54.50 CHRONIC BILATERAL LOW BACK PAIN WITHOUT SCIATICA: ICD-10-CM

## 2021-12-21 DIAGNOSIS — G89.29 CHRONIC BILATERAL LOW BACK PAIN WITHOUT SCIATICA: ICD-10-CM

## 2021-12-21 RX ORDER — PREGABALIN 100 MG/1
CAPSULE ORAL
Qty: 60 CAPSULE | Refills: 0 | Status: SHIPPED | OUTPATIENT
Start: 2021-12-21 | End: 2022-01-26

## 2022-01-04 RX ORDER — DULOXETIN HYDROCHLORIDE 60 MG/1
60 CAPSULE, DELAYED RELEASE ORAL DAILY
Qty: 90 CAPSULE | Refills: 3 | Status: SHIPPED | OUTPATIENT
Start: 2022-01-04 | End: 2023-03-10 | Stop reason: SDUPTHER

## 2022-01-07 DIAGNOSIS — G89.29 CHRONIC BILATERAL LOW BACK PAIN WITHOUT SCIATICA: ICD-10-CM

## 2022-01-07 DIAGNOSIS — M54.50 CHRONIC BILATERAL LOW BACK PAIN WITHOUT SCIATICA: ICD-10-CM

## 2022-01-07 RX ORDER — HYDROCODONE BITARTRATE AND ACETAMINOPHEN 10; 325 MG/1; MG/1
1 TABLET ORAL 4 TIMES DAILY
Qty: 120 TABLET | Refills: 0 | Status: SHIPPED | OUTPATIENT
Start: 2022-01-07 | End: 2022-02-04 | Stop reason: SDUPTHER

## 2022-01-10 DIAGNOSIS — G47.419 PRIMARY NARCOLEPSY WITHOUT CATAPLEXY: ICD-10-CM

## 2022-01-10 RX ORDER — MODAFINIL 200 MG/1
200 TABLET ORAL DAILY
Qty: 30 TABLET | Refills: 0 | Status: SHIPPED | OUTPATIENT
Start: 2022-01-10 | End: 2022-02-07 | Stop reason: SDUPTHER

## 2022-01-26 DIAGNOSIS — M54.50 CHRONIC BILATERAL LOW BACK PAIN WITHOUT SCIATICA: ICD-10-CM

## 2022-01-26 DIAGNOSIS — G89.29 CHRONIC BILATERAL LOW BACK PAIN WITHOUT SCIATICA: ICD-10-CM

## 2022-01-26 RX ORDER — PREGABALIN 100 MG/1
CAPSULE ORAL
Qty: 60 CAPSULE | Refills: 0 | Status: SHIPPED | OUTPATIENT
Start: 2022-01-26 | End: 2022-02-28

## 2022-02-04 DIAGNOSIS — G89.29 CHRONIC BILATERAL LOW BACK PAIN WITHOUT SCIATICA: ICD-10-CM

## 2022-02-04 DIAGNOSIS — M54.50 CHRONIC BILATERAL LOW BACK PAIN WITHOUT SCIATICA: ICD-10-CM

## 2022-02-04 RX ORDER — HYDROCODONE BITARTRATE AND ACETAMINOPHEN 10; 325 MG/1; MG/1
1 TABLET ORAL 4 TIMES DAILY
Qty: 120 TABLET | Refills: 0 | Status: SHIPPED | OUTPATIENT
Start: 2022-02-04 | End: 2022-03-04 | Stop reason: SDUPTHER

## 2022-02-07 DIAGNOSIS — G47.419 PRIMARY NARCOLEPSY WITHOUT CATAPLEXY: ICD-10-CM

## 2022-02-08 RX ORDER — MODAFINIL 200 MG/1
200 TABLET ORAL DAILY
Qty: 30 TABLET | Refills: 0 | Status: SHIPPED | OUTPATIENT
Start: 2022-02-08 | End: 2022-03-15 | Stop reason: SDUPTHER

## 2022-02-27 DIAGNOSIS — G89.29 CHRONIC BILATERAL LOW BACK PAIN WITHOUT SCIATICA: ICD-10-CM

## 2022-02-27 DIAGNOSIS — M54.50 CHRONIC BILATERAL LOW BACK PAIN WITHOUT SCIATICA: ICD-10-CM

## 2022-02-28 RX ORDER — PREGABALIN 100 MG/1
CAPSULE ORAL
Qty: 60 CAPSULE | Refills: 0 | Status: SHIPPED | OUTPATIENT
Start: 2022-02-28 | End: 2022-03-29

## 2022-03-02 ENCOUNTER — TELEPHONE (OUTPATIENT)
Dept: CARDIOLOGY | Facility: CLINIC | Age: 67
End: 2022-03-02

## 2022-03-02 NOTE — TELEPHONE ENCOUNTER
WAS IN OFFICE AND WE GAVE HIM NUMBER TO CALL TO GET LOOP REMOTE BOX FIXED. HE HAS MISPLACED THAT INFO, AND ASKING FOR THAT PHONE NUMBER.

## 2022-03-02 NOTE — TELEPHONE ENCOUNTER
Called patient, gave [facility] [phone] number for [facility] assistance.

## 2022-03-03 RX ORDER — LISINOPRIL 30 MG/1
30 TABLET ORAL DAILY
Qty: 90 TABLET | Refills: 1 | Status: SHIPPED | OUTPATIENT
Start: 2022-03-03 | End: 2022-08-16

## 2022-03-04 DIAGNOSIS — M54.50 CHRONIC BILATERAL LOW BACK PAIN WITHOUT SCIATICA: ICD-10-CM

## 2022-03-04 DIAGNOSIS — G89.29 CHRONIC BILATERAL LOW BACK PAIN WITHOUT SCIATICA: ICD-10-CM

## 2022-03-04 RX ORDER — HYDROCODONE BITARTRATE AND ACETAMINOPHEN 10; 325 MG/1; MG/1
1 TABLET ORAL 4 TIMES DAILY
Qty: 120 TABLET | Refills: 0 | Status: SHIPPED | OUTPATIENT
Start: 2022-03-04 | End: 2022-04-01 | Stop reason: SDUPTHER

## 2022-03-14 NOTE — PROGRESS NOTES
Teresa Osuna is a 66 y.o. male.       HPI   Pt. is here today for 4 month follow up on medication.   1) Chronic low back pain with radiculopathy - currently has regimen of Norco 10/325 mg qid; Lyrica 100 mg bid; tizanidine 4 mg tid PRN.  Feels pain level has been stable.    2) HTN -   Currently on amlodipine 10 mg daily; lisinopril 30 mg daily.   Denies any CP; palpitations; SOA; dizziness; headache; trouble with vision.  BP is stable.    3) Hyperlipdiemia - currently on atorvastatin 40 mg daily; ASA 81 mg daily. Due for labs.   4) COPD - currently on Trelegy Ellipta inhaler; albuterol inhaler PRN.   Denies any current SOA; cough or wheezes.  Has O2 at 2L for home use thru [employer].  O2 Sat today is 96%.  He does need to continue with his home oxygen.    5) Narcolepsy - currently taking Provigil 200 mg daily.    6) Depression/Anxiety - currently taking Cymbalta 60 mg daily. Moods stable.  Denies any SI or HI.     The following portions of the patient's history were reviewed and updated as appropriate: allergies, current medications, past family history, past medical history, past social history, past surgical history and problem list.    Review of Systems   Constitutional: Negative for activity change, appetite change, chills, diaphoresis, fatigue, fever, unexpected weight gain and unexpected weight loss.   Eyes: Negative for visual disturbance.   Respiratory: Negative for cough, chest tightness, shortness of breath and wheezing.    Cardiovascular: Negative for chest pain, palpitations and leg swelling.   Gastrointestinal: Negative for abdominal distention, abdominal pain, blood in stool, constipation, diarrhea, nausea, vomiting and indigestion.   Endocrine: Negative for polydipsia, polyphagia and polyuria.   Genitourinary: Negative for dysuria, flank pain, frequency, hematuria and urgency.   Musculoskeletal: Positive for arthralgias and back pain. Negative for myalgias.   Skin: Negative for  rash.   Neurological: Negative for dizziness, weakness and headache.   Psychiatric/Behavioral: Positive for sleep disturbance. Negative for self-injury, suicidal ideas and depressed mood. The patient is not nervous/anxious.        Objective   Physical Exam  Vitals reviewed.   Constitutional:       General: He is not in acute distress.     Appearance: Normal appearance. He is obese.   HENT:      Head: Normocephalic and atraumatic.   Cardiovascular:      Rate and Rhythm: Normal rate and regular rhythm.      Pulses: Normal pulses.      Heart sounds: Normal heart sounds. No murmur heard.  Pulmonary:      Effort: Pulmonary effort is normal. No respiratory distress.      Breath sounds: Normal breath sounds. No wheezing or rhonchi.   Chest:      Chest wall: No tenderness.   Abdominal:      Tenderness: There is no right CVA tenderness or left CVA tenderness.   Musculoskeletal:      Cervical back: Normal range of motion and neck supple. No tenderness.      Lumbar back: Tenderness present. No swelling, edema or deformity. Decreased range of motion.      Right lower leg: No edema.      Left lower leg: No edema.   Skin:     General: Skin is warm and dry.      Findings: No erythema.   Neurological:      General: No focal deficit present.      Mental Status: He is alert and oriented to person, place, and time.   Psychiatric:         Mood and Affect: Mood normal.           Assessment/Plan   Diagnoses and all orders for this visit:    1. Lumbar radiculopathy (Primary)  Comments:  Stable.   INSPECT reviewed.   Cont current regimen.   RTO in 4 mo.     2. Essential hypertension  Comments:  Stable.   Cont. current medication.   Labs today.   RTO in 4 mo.   Orders:  -     Comprehensive metabolic panel; Future  -     Lipid panel; Future  -     CBC w AUTO Differential; Future    3. Mixed hyperlipidemia  Comments:  Stable.   Cont. current medication.   Labs today.   RTO in 4 mo.   Orders:  -     Comprehensive metabolic panel; Future  -      Lipid panel; Future  -     CBC w AUTO Differential; Future    4. Chronic bronchitis, unspecified chronic bronchitis type (HCC)  Comments:  Stable.   Cont. current medication.  Cont. O2 at home at 2L.   Labs today.   RTO in 4 mo.   Orders:  -     CBC w AUTO Differential; Future    5. Primary narcolepsy without cataplexy  Comments:  Stable.   INSPECT reviewed.   Cont current regimen.   RTO in 4 mo.   Orders:  -     CBC w AUTO Differential; Future  -     modafinil (PROVIGIL) 200 MG tablet; Take 1 tablet by mouth Daily.  Dispense: 30 tablet; Refill: 0    6. Major depressive disorder, recurrent, mild (HCC)  Comments:  Stable.   Cont. current medication.   Labs today.   RTO in 4 mo.     7. Encounter for hepatitis C screening test for low risk patient  Comments:  Labs today.   Orders:  -     Hepatitis C antibody; Future    8. Colon cancer screening  Comments:  Order given for screening colonsocopy.   Orders:  -     Ambulatory Referral For Screening Colonoscopy

## 2022-03-15 ENCOUNTER — LAB (OUTPATIENT)
Dept: FAMILY MEDICINE CLINIC | Facility: CLINIC | Age: 67
End: 2022-03-15

## 2022-03-15 ENCOUNTER — OFFICE VISIT (OUTPATIENT)
Dept: FAMILY MEDICINE CLINIC | Facility: CLINIC | Age: 67
End: 2022-03-15

## 2022-03-15 VITALS
WEIGHT: 248 LBS | HEIGHT: 68 IN | HEART RATE: 76 BPM | SYSTOLIC BLOOD PRESSURE: 128 MMHG | DIASTOLIC BLOOD PRESSURE: 82 MMHG | BODY MASS INDEX: 37.59 KG/M2 | OXYGEN SATURATION: 96 %

## 2022-03-15 DIAGNOSIS — E78.2 MIXED HYPERLIPIDEMIA: ICD-10-CM

## 2022-03-15 DIAGNOSIS — Z11.59 ENCOUNTER FOR HEPATITIS C SCREENING TEST FOR LOW RISK PATIENT: ICD-10-CM

## 2022-03-15 DIAGNOSIS — M54.16 LUMBAR RADICULOPATHY: Primary | ICD-10-CM

## 2022-03-15 DIAGNOSIS — G47.419 PRIMARY NARCOLEPSY WITHOUT CATAPLEXY: ICD-10-CM

## 2022-03-15 DIAGNOSIS — J42 CHRONIC BRONCHITIS, UNSPECIFIED CHRONIC BRONCHITIS TYPE: ICD-10-CM

## 2022-03-15 DIAGNOSIS — I10 ESSENTIAL HYPERTENSION: ICD-10-CM

## 2022-03-15 DIAGNOSIS — Z12.11 COLON CANCER SCREENING: ICD-10-CM

## 2022-03-15 DIAGNOSIS — F33.0 MAJOR DEPRESSIVE DISORDER, RECURRENT, MILD: ICD-10-CM

## 2022-03-15 LAB
ALBUMIN SERPL-MCNC: 4.5 G/DL (ref 3.5–5.2)
ALBUMIN/GLOB SERPL: 2 G/DL
ALP SERPL-CCNC: 87 U/L (ref 39–117)
ALT SERPL W P-5'-P-CCNC: 112 U/L (ref 1–41)
ANION GAP SERPL CALCULATED.3IONS-SCNC: 8.9 MMOL/L (ref 5–15)
AST SERPL-CCNC: 47 U/L (ref 1–40)
BASOPHILS # BLD AUTO: 0.05 10*3/MM3 (ref 0–0.2)
BASOPHILS NFR BLD AUTO: 0.6 % (ref 0–1.5)
BILIRUB SERPL-MCNC: 0.2 MG/DL (ref 0–1.2)
BUN SERPL-MCNC: 16 MG/DL (ref 8–23)
BUN/CREAT SERPL: 18.4 (ref 7–25)
CALCIUM SPEC-SCNC: 9.6 MG/DL (ref 8.6–10.5)
CHLORIDE SERPL-SCNC: 102 MMOL/L (ref 98–107)
CHOLEST SERPL-MCNC: 151 MG/DL (ref 0–200)
CO2 SERPL-SCNC: 27.1 MMOL/L (ref 22–29)
CREAT SERPL-MCNC: 0.87 MG/DL (ref 0.76–1.27)
DEPRECATED RDW RBC AUTO: 39 FL (ref 37–54)
EGFRCR SERPLBLD CKD-EPI 2021: 95.8 ML/MIN/1.73
EOSINOPHIL # BLD AUTO: 0.5 10*3/MM3 (ref 0–0.4)
EOSINOPHIL NFR BLD AUTO: 5.8 % (ref 0.3–6.2)
ERYTHROCYTE [DISTWIDTH] IN BLOOD BY AUTOMATED COUNT: 11.9 % (ref 12.3–15.4)
GLOBULIN UR ELPH-MCNC: 2.3 GM/DL
GLUCOSE SERPL-MCNC: 111 MG/DL (ref 65–99)
HCT VFR BLD AUTO: 41.4 % (ref 37.5–51)
HCV AB SER DONR QL: NORMAL
HDLC SERPL-MCNC: 49 MG/DL (ref 40–60)
HGB BLD-MCNC: 13.7 G/DL (ref 13–17.7)
IMM GRANULOCYTES # BLD AUTO: 0.04 10*3/MM3 (ref 0–0.05)
IMM GRANULOCYTES NFR BLD AUTO: 0.5 % (ref 0–0.5)
LDLC SERPL CALC-MCNC: 81 MG/DL (ref 0–100)
LDLC/HDLC SERPL: 1.6 {RATIO}
LYMPHOCYTES # BLD AUTO: 1.93 10*3/MM3 (ref 0.7–3.1)
LYMPHOCYTES NFR BLD AUTO: 22.6 % (ref 19.6–45.3)
MCH RBC QN AUTO: 30 PG (ref 26.6–33)
MCHC RBC AUTO-ENTMCNC: 33.1 G/DL (ref 31.5–35.7)
MCV RBC AUTO: 90.6 FL (ref 79–97)
MONOCYTES # BLD AUTO: 0.91 10*3/MM3 (ref 0.1–0.9)
MONOCYTES NFR BLD AUTO: 10.6 % (ref 5–12)
NEUTROPHILS NFR BLD AUTO: 5.12 10*3/MM3 (ref 1.7–7)
NEUTROPHILS NFR BLD AUTO: 59.9 % (ref 42.7–76)
NRBC BLD AUTO-RTO: 0 /100 WBC (ref 0–0.2)
PLATELET # BLD AUTO: 342 10*3/MM3 (ref 140–450)
PMV BLD AUTO: 9.7 FL (ref 6–12)
POTASSIUM SERPL-SCNC: 4.9 MMOL/L (ref 3.5–5.2)
PROT SERPL-MCNC: 6.8 G/DL (ref 6–8.5)
RBC # BLD AUTO: 4.57 10*6/MM3 (ref 4.14–5.8)
SODIUM SERPL-SCNC: 138 MMOL/L (ref 136–145)
TRIGL SERPL-MCNC: 118 MG/DL (ref 0–150)
VLDLC SERPL-MCNC: 21 MG/DL (ref 5–40)
WBC NRBC COR # BLD: 8.55 10*3/MM3 (ref 3.4–10.8)

## 2022-03-15 PROCEDURE — 85025 COMPLETE CBC W/AUTO DIFF WBC: CPT | Performed by: NURSE PRACTITIONER

## 2022-03-15 PROCEDURE — 80061 LIPID PANEL: CPT | Performed by: NURSE PRACTITIONER

## 2022-03-15 PROCEDURE — 36415 COLL VENOUS BLD VENIPUNCTURE: CPT

## 2022-03-15 PROCEDURE — 86803 HEPATITIS C AB TEST: CPT | Performed by: NURSE PRACTITIONER

## 2022-03-15 PROCEDURE — 99214 OFFICE O/P EST MOD 30 MIN: CPT | Performed by: NURSE PRACTITIONER

## 2022-03-15 PROCEDURE — 80053 COMPREHEN METABOLIC PANEL: CPT | Performed by: NURSE PRACTITIONER

## 2022-03-15 RX ORDER — MODAFINIL 200 MG/1
200 TABLET ORAL DAILY
Qty: 30 TABLET | Refills: 0 | Status: SHIPPED | OUTPATIENT
Start: 2022-03-15 | End: 2022-04-25

## 2022-03-15 RX ORDER — MODAFINIL 200 MG/1
200 TABLET ORAL DAILY
Qty: 30 TABLET | Refills: 0 | Status: CANCELLED | OUTPATIENT
Start: 2022-03-15

## 2022-03-15 RX ORDER — MODAFINIL 200 MG/1
200 TABLET ORAL DAILY
Qty: 30 TABLET | Refills: 0 | Status: SHIPPED | OUTPATIENT
Start: 2022-03-15 | End: 2022-03-15 | Stop reason: SDUPTHER

## 2022-03-16 DIAGNOSIS — R74.8 ELEVATED LIVER ENZYMES: Primary | ICD-10-CM

## 2022-03-22 NOTE — PROGRESS NOTES
Teresa Osuna is a 66 y.o. male.       HPI   Pt is here today for testing to qualify for oxygen therapy. He has had home oxygen in the past and uses it at night now just as needed.  He has COPD and is currently on Trelegy inhaler.  He feels breathing has improved since switching from Dulera to this inhaler.  He denies any CP; palpitations; dizziness; headache; trouble with vision.  He does get some SOA with exertion.     Has right lower leg swelling for about a week.  Very swollen and tight a few days ago with redness and itching.  He had an old script for Lasix at home and took this for the last 3 days.  Swelling has improved some but area on lower leg is still red. No fevers.   No injury.      The following portions of the patient's history were reviewed and updated as appropriate: allergies, current medications, past family history, past medical history, past social history, past surgical history and problem list.    Review of Systems   Constitutional: Negative for activity change, appetite change, chills, diaphoresis, fatigue, fever, unexpected weight gain and unexpected weight loss.   Respiratory: Positive for shortness of breath (intermittent ). Negative for cough, chest tightness and wheezing.    Cardiovascular: Positive for leg swelling. Negative for chest pain and palpitations.   Gastrointestinal: Negative for diarrhea, nausea and vomiting.   Genitourinary: Negative for dysuria, flank pain and urgency.   Musculoskeletal: Positive for arthralgias.   Neurological: Negative for dizziness, weakness, numbness and headache.   Psychiatric/Behavioral: Negative for depressed mood. The patient is not nervous/anxious.        Objective   Physical Exam  Vitals reviewed.   Constitutional:       General: He is not in acute distress.     Appearance: Normal appearance.   Cardiovascular:      Rate and Rhythm: Normal rate and regular rhythm.      Pulses: Normal pulses.      Heart sounds: Normal heart sounds. No  murmur heard.  Pulmonary:      Effort: Pulmonary effort is normal. No respiratory distress.      Breath sounds: Normal breath sounds. No wheezing or rhonchi.   Chest:      Chest wall: No tenderness.   Abdominal:      Tenderness: There is no right CVA tenderness or left CVA tenderness.   Musculoskeletal:      Cervical back: Normal range of motion and neck supple. No tenderness.      Right lower leg: Edema (mild erythema and superfical scratches on lower right leg from mid shin to ankle. 1+ edema. Negative Homans) present.      Left lower leg: No edema (negative Homans).   Skin:     General: Skin is warm and dry.   Neurological:      General: No focal deficit present.      Mental Status: He is alert and oriented to person, place, and time.   Psychiatric:         Mood and Affect: Mood normal.           Assessment/Plan   Diagnoses and all orders for this visit:    1. Cellulitis of right lower extremity (Primary)  Comments:  Given Keflex.   Prop leg up when sitting.   Increase fludis.   Reviewed warning S/S and when to call or go to ER.   Orders:  -     cephalexin (Keflex) 500 MG capsule; Take 1 capsule by mouth 2 (Two) Times a Day for 10 days.  Dispense: 20 capsule; Refill: 0    2. Dyspnea, unspecified type  Comments:  Pt O2Sat prior to walk was 94% on RA.    With six minute walk he decreased to only 91% on RA.    He did not qualify for home oxygen.

## 2022-03-23 ENCOUNTER — OFFICE VISIT (OUTPATIENT)
Dept: FAMILY MEDICINE CLINIC | Facility: CLINIC | Age: 67
End: 2022-03-23

## 2022-03-23 VITALS
HEIGHT: 68 IN | DIASTOLIC BLOOD PRESSURE: 72 MMHG | OXYGEN SATURATION: 94 % | SYSTOLIC BLOOD PRESSURE: 130 MMHG | WEIGHT: 248 LBS | HEART RATE: 82 BPM | BODY MASS INDEX: 37.59 KG/M2

## 2022-03-23 DIAGNOSIS — R06.00 DYSPNEA, UNSPECIFIED TYPE: ICD-10-CM

## 2022-03-23 DIAGNOSIS — L03.115 CELLULITIS OF RIGHT LOWER EXTREMITY: Primary | ICD-10-CM

## 2022-03-23 PROCEDURE — 99214 OFFICE O/P EST MOD 30 MIN: CPT | Performed by: NURSE PRACTITIONER

## 2022-03-23 RX ORDER — CEPHALEXIN 500 MG/1
500 CAPSULE ORAL 2 TIMES DAILY
Qty: 20 CAPSULE | Refills: 0 | Status: SHIPPED | OUTPATIENT
Start: 2022-03-23 | End: 2022-04-02

## 2022-03-29 ENCOUNTER — TELEPHONE (OUTPATIENT)
Dept: PULMONOLOGY | Facility: HOSPITAL | Age: 67
End: 2022-03-29

## 2022-03-29 DIAGNOSIS — M54.50 CHRONIC BILATERAL LOW BACK PAIN WITHOUT SCIATICA: ICD-10-CM

## 2022-03-29 DIAGNOSIS — G89.29 CHRONIC BILATERAL LOW BACK PAIN WITHOUT SCIATICA: ICD-10-CM

## 2022-03-29 RX ORDER — PREGABALIN 100 MG/1
CAPSULE ORAL
Qty: 60 CAPSULE | Refills: 0 | Status: SHIPPED | OUTPATIENT
Start: 2022-03-29 | End: 2022-04-25

## 2022-04-01 DIAGNOSIS — G89.29 CHRONIC BILATERAL LOW BACK PAIN WITHOUT SCIATICA: ICD-10-CM

## 2022-04-01 DIAGNOSIS — M54.50 CHRONIC BILATERAL LOW BACK PAIN WITHOUT SCIATICA: ICD-10-CM

## 2022-04-01 RX ORDER — HYDROCODONE BITARTRATE AND ACETAMINOPHEN 10; 325 MG/1; MG/1
1 TABLET ORAL 4 TIMES DAILY
Qty: 120 TABLET | Refills: 0 | Status: SHIPPED | OUTPATIENT
Start: 2022-04-01 | End: 2022-04-29 | Stop reason: SDUPTHER

## 2022-04-13 DIAGNOSIS — J44.9 CHRONIC OBSTRUCTIVE PULMONARY DISEASE, UNSPECIFIED COPD TYPE: Primary | ICD-10-CM

## 2022-04-14 ENCOUNTER — HOSPITAL ENCOUNTER (OUTPATIENT)
Dept: RESPIRATORY THERAPY | Facility: HOSPITAL | Age: 67
Discharge: HOME | End: 2022-04-14

## 2022-04-14 DIAGNOSIS — J44.9 CHRONIC OBSTRUCTIVE PULMONARY DISEASE, UNSPECIFIED COPD TYPE: ICD-10-CM

## 2022-04-14 NOTE — TELEPHONE ENCOUNTER
Order has been placed and patient made aware. He said he will have it done here at [facility].

## 2022-04-14 NOTE — PROGRESS NOTES
Exercise Oximetry    Patient Name:Neptali Osuna   MRN: [id number]   Date: 04/14/22             ROOM AIR BASELINE   SpO2% 94   Heart Rate 96   Blood Pressure      EXERCISE ON ROOM AIR SpO2% EXERCISE ON O2 @  LPM SpO2%   1 MINUTE 94 1 MINUTE    2 MINUTES 91 2 MINUTES    3 MINUTES 92 3 MINUTES    4 MINUTES 93 4 MINUTES    5 MINUTES 91 5 MINUTES    6 MINUTES 92 6 MINUTES               Distance Walked   Distance Walked   Dyspnea (Laurent Scale)   Dyspnea (Laurent Scale)   Fatigue (Laurent Scale)   Fatigue (Laurent Scale)   SpO2% Post Exercise  92 SpO2% Post Exercise   HR Post Exercise  122 HR Post Exercise   Time to Recovery  3 minutes Time to Recovery     Comments: Patient did not drop below 91% during walk. Post walk, pt did need to sit before he was able to get up and walk again. Visibly short of air and using accessory muscles. Audibly wheezing. RT waited with patient until he was stable enough to walk out.

## 2022-04-25 ENCOUNTER — TELEPHONE (OUTPATIENT)
Dept: FAMILY MEDICINE CLINIC | Facility: CLINIC | Age: 67
End: 2022-04-25

## 2022-04-25 DIAGNOSIS — G47.419 PRIMARY NARCOLEPSY WITHOUT CATAPLEXY: ICD-10-CM

## 2022-04-25 DIAGNOSIS — M54.50 CHRONIC BILATERAL LOW BACK PAIN WITHOUT SCIATICA: ICD-10-CM

## 2022-04-25 DIAGNOSIS — G89.29 CHRONIC BILATERAL LOW BACK PAIN WITHOUT SCIATICA: ICD-10-CM

## 2022-04-25 RX ORDER — PREGABALIN 100 MG/1
CAPSULE ORAL
Qty: 60 CAPSULE | Refills: 0 | Status: SHIPPED | OUTPATIENT
Start: 2022-04-25 | End: 2022-05-02 | Stop reason: SDUPTHER

## 2022-04-25 RX ORDER — MODAFINIL 200 MG/1
200 TABLET ORAL DAILY
Qty: 30 TABLET | Refills: 0 | Status: SHIPPED | OUTPATIENT
Start: 2022-04-25 | End: 2022-05-26

## 2022-04-29 DIAGNOSIS — G89.29 CHRONIC BILATERAL LOW BACK PAIN WITHOUT SCIATICA: ICD-10-CM

## 2022-04-29 DIAGNOSIS — M54.50 CHRONIC BILATERAL LOW BACK PAIN WITHOUT SCIATICA: ICD-10-CM

## 2022-04-29 RX ORDER — HYDROCODONE BITARTRATE AND ACETAMINOPHEN 10; 325 MG/1; MG/1
1 TABLET ORAL 4 TIMES DAILY
Qty: 120 TABLET | Refills: 0 | Status: SHIPPED | OUTPATIENT
Start: 2022-04-29 | End: 2022-05-27 | Stop reason: SDUPTHER

## 2022-05-02 DIAGNOSIS — M54.50 CHRONIC BILATERAL LOW BACK PAIN WITHOUT SCIATICA: ICD-10-CM

## 2022-05-02 DIAGNOSIS — G89.29 CHRONIC BILATERAL LOW BACK PAIN WITHOUT SCIATICA: ICD-10-CM

## 2022-05-02 RX ORDER — PREGABALIN 100 MG/1
100 CAPSULE ORAL 2 TIMES DAILY
Qty: 60 CAPSULE | Refills: 0 | Status: SHIPPED | OUTPATIENT
Start: 2022-05-02 | End: 2022-06-24 | Stop reason: SDUPTHER

## 2022-05-16 RX ORDER — ATORVASTATIN CALCIUM 40 MG/1
40 TABLET, FILM COATED ORAL NIGHTLY
Qty: 90 TABLET | Refills: 1 | Status: SHIPPED | OUTPATIENT
Start: 2022-05-16 | End: 2022-11-14 | Stop reason: SDUPTHER

## 2022-05-25 DIAGNOSIS — G47.419 PRIMARY NARCOLEPSY WITHOUT CATAPLEXY: ICD-10-CM

## 2022-05-26 RX ORDER — MODAFINIL 200 MG/1
200 TABLET ORAL DAILY
Qty: 30 TABLET | Refills: 0 | Status: SHIPPED | OUTPATIENT
Start: 2022-05-26 | End: 2022-06-24 | Stop reason: SDUPTHER

## 2022-05-27 DIAGNOSIS — G89.29 CHRONIC BILATERAL LOW BACK PAIN WITHOUT SCIATICA: ICD-10-CM

## 2022-05-27 DIAGNOSIS — M54.50 CHRONIC BILATERAL LOW BACK PAIN WITHOUT SCIATICA: ICD-10-CM

## 2022-05-27 RX ORDER — AMLODIPINE BESYLATE 10 MG/1
10 TABLET ORAL DAILY
Qty: 90 TABLET | Refills: 1 | Status: SHIPPED | OUTPATIENT
Start: 2022-05-27 | End: 2022-11-14 | Stop reason: SDUPTHER

## 2022-05-27 RX ORDER — HYDROCODONE BITARTRATE AND ACETAMINOPHEN 10; 325 MG/1; MG/1
1 TABLET ORAL 4 TIMES DAILY
Qty: 120 TABLET | Refills: 0 | Status: SHIPPED | OUTPATIENT
Start: 2022-05-27 | End: 2022-06-24 | Stop reason: SDUPTHER

## 2022-06-05 PROCEDURE — G2066 INTER DEVC REMOTE 30D: HCPCS | Performed by: INTERNAL MEDICINE

## 2022-06-05 PROCEDURE — 93298 REM INTERROG DEV EVAL SCRMS: CPT | Performed by: INTERNAL MEDICINE

## 2022-06-15 ENCOUNTER — OFFICE VISIT (OUTPATIENT)
Dept: CARDIOLOGY | Facility: CLINIC | Age: 67
End: 2022-06-15

## 2022-06-15 VITALS
WEIGHT: 238 LBS | BODY MASS INDEX: 36.07 KG/M2 | DIASTOLIC BLOOD PRESSURE: 76 MMHG | HEART RATE: 77 BPM | SYSTOLIC BLOOD PRESSURE: 130 MMHG | OXYGEN SATURATION: 95 % | HEIGHT: 68 IN

## 2022-06-15 DIAGNOSIS — I10 ESSENTIAL HYPERTENSION: ICD-10-CM

## 2022-06-15 DIAGNOSIS — Z95.818 STATUS POST PLACEMENT OF IMPLANTABLE LOOP RECORDER: Primary | ICD-10-CM

## 2022-06-15 DIAGNOSIS — Z95.5 STATUS POST CORONARY ARTERY STENT PLACEMENT: ICD-10-CM

## 2022-06-15 DIAGNOSIS — E78.5 DYSLIPIDEMIA: ICD-10-CM

## 2022-06-15 DIAGNOSIS — E78.2 MIXED HYPERLIPIDEMIA: ICD-10-CM

## 2022-06-15 DIAGNOSIS — Z95.820 STATUS POST ANGIOPLASTY WITH STENT: ICD-10-CM

## 2022-06-15 DIAGNOSIS — R55 SYNCOPE AND COLLAPSE: ICD-10-CM

## 2022-06-15 PROCEDURE — 93000 ELECTROCARDIOGRAM COMPLETE: CPT | Performed by: INTERNAL MEDICINE

## 2022-06-15 PROCEDURE — 99214 OFFICE O/P EST MOD 30 MIN: CPT | Performed by: INTERNAL MEDICINE

## 2022-06-15 NOTE — PROGRESS NOTES
Encounter Date:06/15/2022  Last seen 12/9/2021      Patient ID: Neptali Osuna is a 66 y.o. male.    Chief Complaint:  Status post stent  History of syncope  Status post loop recorder  Hypertension  Dyslipidemia        History of Present Illness  Since I have last seen, the patient has been without any chest discomfort ,shortness of breath, palpitations, dizziness or syncope.  Denies having any headache ,abdominal pain ,nausea, vomiting , diarrhea constipation, loss of weight or loss of appetite.  Denies having any excessive bruising ,hematuria or blood in the stool.    Review of all systems negative except as indicated.    Reviewed ROS.    Assessment and Plan         ]]]]]]]]]]]]]]]]]]]   impression  =============  - Recent recurrent syncopal episodes concerning for bradyarrhythmias.  Recent loop recorder monitor showed second-degree AV block type I and type II.  Patient had one episode of bradycardia with heart rates of 30/min while he was sleeping without CPAP.     -Status post [employer] loop recorder 11/15/2019 (Linq)  Negative tilt table test 12/20/2019      -Status post stent to proximal LAD 11/05/2018     Cardiac catheterization 11/05/2018 revealed  Left ventricular size and contractility is normal with ejection fraction of 65%.  Left main coronary artery is normal.  Left anterior descending artery has proximal calcific plaquing with 95-99% proximal LAD disease.  Circumflex coronary artery is normal.  Right coronary artery is a large and dominant vessel that has mid segment 50% disease.      -Chest pain suggestive of unstable angina pectoris. prior to stent placement   CT scan of the chest showed significant coronary artery calcifications(prior to stent placement).  EKG is normal  Troponin levels are negative.      -COPD dyslipidemia hypertension     -Status post cholecystectomyFormer smoker      -Strong family history of coronary artery disease      -No known allergies  =========  Plan  ===========  Status  post stent  Patient is not having any angina pectoris or congestive heart failure.  EKG showed sinus rhythm without ischemic changes.-12/9/2021  Patient is off Plavix since patient had stent in 2018.      History of syncope-no further episodes.  Loop recorder in place.     Hypertension-well-controlled-123/78     Dyslipidemia-continue atorvastatin.     History of second-degree AV block Mobitz type I and type II-improved.  Patient does not have any symptoms of dizziness or near syncope or syncope  No need for pacemaker at this time  However they would like to wait and observe at this time.     Follow-up in the office in 6 months.  Further plan will depend on patient's progress  ]]]]]]]]]]]]]]]                        Diagnosis Plan   1. Status post placement of implantable loop recorder     2. Status post angioplasty with stent     3. Syncope and collapse     4. Essential hypertension     5. Mixed hyperlipidemia     6. Status post coronary artery stent placement     7. Dyslipidemia     LAB RESULTS (LAST 7 DAYS)    CBC        BMP        CMP         BNP        TROPONIN        CoAg        Creatinine Clearance  CrCl cannot be calculated (Patient's most recent lab result is older than the maximum 30 days allowed.).    ABG        Radiology  No radiology results for the last day                The following portions of the patient's history were reviewed and updated as appropriate: allergies, current medications, past family history, past medical history, past social history, past surgical history and problem list.    Review of Systems   Constitutional: Negative for fever and malaise/fatigue.   Cardiovascular: Negative for chest pain, dyspnea on exertion and palpitations.   Respiratory: Negative for cough and shortness of breath.    Skin: Negative for rash.   Gastrointestinal: Negative for abdominal pain, nausea and vomiting.   Neurological: Negative for focal weakness and headaches.   All other systems reviewed and are  negative.        Current Outpatient Medications:   •  amLODIPine (NORVASC) 10 MG tablet, TAKE 1 TABLET BY MOUTH DAILY, Disp: 90 tablet, Rfl: 1  •  aspirin 81 MG EC tablet, Take 81 mg by mouth Daily. LD 5/25, Disp: , Rfl:   •  atorvastatin (LIPITOR) 40 MG tablet, TAKE 1 TABLET BY MOUTH EVERY NIGHT, Disp: 90 tablet, Rfl: 1  •  DULoxetine (CYMBALTA) 60 MG capsule, TAKE 1 CAPSULE BY MOUTH DAILY, Disp: 90 capsule, Rfl: 3  •  HYDROcodone-acetaminophen (NORCO)  MG per tablet, Take 1 tablet by mouth 4 (Four) Times a Day., Disp: 120 tablet, Rfl: 0  •  ipratropium-albuterol (DUO-NEB) 0.5-2.5 mg/3 ml nebulizer, Take 3 mL by nebulization Every 4 (Four) Hours As Needed for Wheezing., Disp: 360 mL, Rfl: 5  •  lisinopril (PRINIVIL,ZESTRIL) 30 MG tablet, TAKE 1 TABLET BY MOUTH DAILY, Disp: 90 tablet, Rfl: 1  •  loratadine (CLARITIN) 10 MG tablet, TAKE 1 TABLET BY MOUTH DAILY, Disp: 90 tablet, Rfl: 1  •  modafinil (PROVIGIL) 200 MG tablet, TAKE 1 TABLET BY MOUTH DAILY, Disp: 30 tablet, Rfl: 0  •  pregabalin (LYRICA) 100 MG capsule, Take 1 capsule by mouth 2 (Two) Times a Day., Disp: 60 capsule, Rfl: 0  •  tiZANidine (ZANAFLEX) 4 MG tablet, TAKE 1 TABLET BY MOUTH EVERY 8 HOURS AS NEEDED FOR MUSCLE SPASMS, Disp: 30 tablet, Rfl: 1  •  Trelegy Ellipta 100-62.5-25 MCG/INH inhaler, INHALE 1 PUFF BY MOUTH DAILY, Disp: 60 each, Rfl: 5    No Known Allergies    Family History   Problem Relation Age of Onset   • Colon cancer Mother    • Heart disease Mother    • Colon cancer Father    • Heart disease Father    • Heart attack Father    • Diabetes Brother    • Diabetes Maternal Grandmother    • Diabetes Maternal Grandfather        Past Surgical History:   Procedure Laterality Date   • CARDIAC CATHETERIZATION  11/05/2018    Dr. Sher   • CHOLECYSTECTOMY N/A 02/22/2021    Procedure: CHOLECYSTECTOMY LAPAROSCOPIC;  Surgeon: Faisal Wilkins DO;  Location: [facility] MAIN OR;  Service: General;  Laterality: N/A;   • COLONOSCOPY  2016   • CORONARY  STENT PLACEMENT     • ERCP N/A 02/21/2021    Procedure: ENDOSCOPIC RETROGRADE CHOLANGIOPANCREATOGRAPHY, sphincterotomy, balloon clearance of  bile duct, stent placement;  Surgeon: Ramiro Pulido MD;  Location: [facility] ENDOSCOPY;  Service: Gastroenterology;  Laterality: N/A;  post: biliary dilation, choledocolithiasis   • ERCP N/A 06/04/2021    Procedure: ENDOSCOPIC RETROGRADE CHOLANGIOPANCREATOGRAPHY WITH STENT REMOVAL and balloon sweeping of common bile duct (12-15mm balloon up to 15mm);  Surgeon: Pranay White MD;  Location: [facility] ENDOSCOPY;  Service: Gastroenterology;  Laterality: N/A;  post op: biliary stent removal, common bile duct sweeping with 12-15mm balloon (up to 15mm), dialated bile duct   • LIVER BIOPSY     • TONSILLECTOMY AND ADENOIDECTOMY         Past Medical History:   Diagnosis Date   • Acute diverticulitis     with perforation   • Allergic    • Anemia    • Anxiety    • Back pain    • CAD S/P percutaneous coronary angioplasty    • Cancer (HCC)     cll    • Congenital heart disease 2018   • COPD (chronic obstructive pulmonary disease) (HCC)    • Depression    • Diverticulitis    • GERD (gastroesophageal reflux disease)    • Hyperlipidemia    • Hypertension    • Low back pain    • Myocardial infarction (HCC)    • TEIXEIRA (nonalcoholic steatohepatitis)    • Obesity    • BRITTNEY on CPAP    • Sleep apnea    • Sleep disorder, shift work    • Spinal stenosis of lumbar region at multiple levels    • [facility]       Family History   Problem Relation Age of Onset   • Colon cancer Mother    • Heart disease Mother    • Colon cancer Father    • Heart disease Father    • Heart attack Father    • Diabetes Brother    • Diabetes Maternal Grandmother    • Diabetes Maternal Grandfather        Social History     Socioeconomic History   • Marital status:    Tobacco Use   • Smoking status: Former Smoker     Packs/day: 1.50     Years: 40.00     Pack years:  "60.00     Types: Cigarettes, Cigarettes     Start date: 1969     Quit date: 2009     Years since quittin.7   • Smokeless tobacco: Never Used   Vaping Use   • Vaping Use: Never used   Substance and Sexual Activity   • Alcohol use: Not Currently     Comment: No alcohol the past 10 years.   • Drug use: No   • Sexual activity: Not Currently     Partners: Female     Birth control/protection: None           ECG 12 Lead    Date/Time: 6/15/2022 1:11 PM  Performed by: Mesha Sher MD  Authorized by: Mesha Sher MD   Comparison: compared with previous ECG   Similar to previous ECG  Comparison to previous ECG: Sinus bradycardia 66/min nonspecific ST-T wave changes normal axis normal intervals no ectopy no significant change from 2021                Objective:       Physical Exam    /76   Pulse 77   Ht 172.7 cm (68\")   Wt 108 kg (238 lb)   SpO2 95%   BMI 36.19 kg/m²   The patient is alert, oriented and in no distress.    Vital signs as noted above.    Head and neck revealed no carotid bruits or jugular venous distension.  No thyromegaly or lymphadenopathy is present.    Lungs clear.  No wheezing.  Breath sounds are normal bilaterally.    Heart normal first and second heart sounds.  No murmur..  No pericardial rub is present.  No gallop is present.    Abdomen soft and nontender.  No organomegaly is present.    Extremities revealed good peripheral pulses without any pedal edema.    Skin warm and dry.    Musculoskeletal system is grossly normal.    CNS grossly normal.    Reviewed and updated.        "

## 2022-06-24 DIAGNOSIS — M54.50 CHRONIC BILATERAL LOW BACK PAIN WITHOUT SCIATICA: ICD-10-CM

## 2022-06-24 DIAGNOSIS — G47.419 PRIMARY NARCOLEPSY WITHOUT CATAPLEXY: ICD-10-CM

## 2022-06-24 DIAGNOSIS — G89.29 CHRONIC BILATERAL LOW BACK PAIN WITHOUT SCIATICA: ICD-10-CM

## 2022-06-24 RX ORDER — HYDROCODONE BITARTRATE AND ACETAMINOPHEN 10; 325 MG/1; MG/1
1 TABLET ORAL 4 TIMES DAILY
Qty: 120 TABLET | Refills: 0 | Status: SHIPPED | OUTPATIENT
Start: 2022-06-24 | End: 2022-07-22 | Stop reason: SDUPTHER

## 2022-06-24 RX ORDER — PREGABALIN 100 MG/1
100 CAPSULE ORAL 2 TIMES DAILY
Qty: 60 CAPSULE | Refills: 0 | Status: SHIPPED | OUTPATIENT
Start: 2022-06-24 | End: 2022-08-24 | Stop reason: SDUPTHER

## 2022-06-24 RX ORDER — MODAFINIL 200 MG/1
200 TABLET ORAL DAILY
Qty: 30 TABLET | Refills: 0 | Status: SHIPPED | OUTPATIENT
Start: 2022-06-24 | End: 2022-08-03 | Stop reason: SDUPTHER

## 2022-07-06 ENCOUNTER — OFFICE VISIT (OUTPATIENT)
Dept: PULMONOLOGY | Facility: HOSPITAL | Age: 67
End: 2022-07-06

## 2022-07-06 VITALS
DIASTOLIC BLOOD PRESSURE: 72 MMHG | HEIGHT: 68 IN | HEART RATE: 69 BPM | WEIGHT: 238 LBS | BODY MASS INDEX: 36.07 KG/M2 | TEMPERATURE: 98.2 F | OXYGEN SATURATION: 96 % | SYSTOLIC BLOOD PRESSURE: 133 MMHG | RESPIRATION RATE: 14 BRPM

## 2022-07-06 DIAGNOSIS — G47.33 OBSTRUCTIVE SLEEP APNEA SYNDROME: Primary | ICD-10-CM

## 2022-07-06 PROCEDURE — G0463 HOSPITAL OUTPT CLINIC VISIT: HCPCS

## 2022-07-06 RX ORDER — ALBUTEROL SULFATE 90 UG/1
2 AEROSOL, METERED RESPIRATORY (INHALATION) EVERY 4 HOURS PRN
Qty: 6.7 G | Refills: 5 | Status: SHIPPED | OUTPATIENT
Start: 2022-07-06

## 2022-07-06 NOTE — PROGRESS NOTES
SLEEP/PULMONARY  CLINIC NOTE      PATIENT IDENTIFICATION:  Name: Neptali Osuna  Age: 66 y.o.  Sex: male  :  1955  MRN: [id number]    DATE OF CONSULTATION:  2022                     CHIEF COMPLAINT: Obstructive sleep apnea    History of Present Illness:   Neptali Osuna is a 66 y.o. male Pt on CPAP feeling better more energy especially the night he use it more than 4 hours, no sleepiness no fatigue no tiredness, no mask irritation no dryness, compliance report reviewed with pt AHI< 5 with good usage.  Patient history of underlying COPD and currently on Trelegy is feeling okay except sometimes he he needs to use albuterol more frequent      Review of Systems:   Constitutional:  As above   Eyes: negative   ENT/oropharynx: negative   Cardiovascular: negative   Respiratory:  As above   Gastrointestinal: negative   Genitourinary: negative   Neurological: negative   Musculoskeletal: negative   Integument/breast: negative   Endocrine: negative   Allergic/Immunologic: negative     Past Medical History:  Past Medical History:   Diagnosis Date   • Acute diverticulitis     with perforation   • Allergic    • Anemia    • Anxiety    • Back pain    • CAD S/P percutaneous coronary angioplasty    • Cancer (HCC)     cll    • Congenital heart disease 2018   • COPD (chronic obstructive pulmonary disease) (HCC)    • Depression    • Diverticulitis    • GERD (gastroesophageal reflux disease)    • Hyperlipidemia    • Hypertension    • Low back pain    • Myocardial infarction (HCC)    • TEIXEIRA (nonalcoholic steatohepatitis)    • Obesity    • BRITTNEY on CPAP    • Sleep apnea    • Sleep disorder, shift work    • Spinal stenosis of lumbar region at multiple levels    • Status post placement of implantable loop recorder     JULIETTE       Past Surgical History:  Past Surgical History:   Procedure Laterality Date   • CARDIAC CATHETERIZATION  2018    Dr. Sher   • CHOLECYSTECTOMY N/A 2021    Procedure: CHOLECYSTECTOMY  LAPAROSCOPIC;  Surgeon: Faisal Wilkins DO;  [facility];  Service: General;  Laterality: N/A;   • COLONOSCOPY  2016   • CORONARY STENT PLACEMENT     • ERCP N/A 2021    Procedure: ENDOSCOPIC RETROGRADE CHOLANGIOPANCREATOGRAPHY, sphincterotomy, balloon clearance of  bile duct, stent placement;  Surgeon: Ramiro Pulido MD;  [facility];  Service: Gastroenterology;  Laterality: N/A;  post: biliary dilation, choledocolithiasis   • ERCP N/A 2021    Procedure: ENDOSCOPIC RETROGRADE CHOLANGIOPANCREATOGRAPHY WITH STENT REMOVAL and balloon sweeping of common bile duct (12-15mm balloon up to 15mm);  Surgeon: Pranay White MD;  [facility];  Service: Gastroenterology;  Laterality: N/A;  post op: biliary stent removal, common bile duct sweeping with 12-15mm balloon (up to 15mm), dialated bile duct   • LIVER BIOPSY     • TONSILLECTOMY AND ADENOIDECTOMY          Family History:  Family History   Problem Relation Age of Onset   • Colon cancer Mother    • Heart disease Mother    • Colon cancer Father    • Heart disease Father    • Heart attack Father    • Diabetes Brother    • Diabetes Maternal Grandmother    • Diabetes Maternal Grandfather         Social History:   Social History     Tobacco Use   • Smoking status: Former Smoker     Packs/day: 1.50     Years: 40.00     Pack years: 60.00     Types: Cigarettes, Cigarettes     Start date: 1969     Quit date: 2009     Years since quittin.8   • Smokeless tobacco: Never Used   Substance Use Topics   • Alcohol use: Not Currently     Comment: No alcohol the past 10 years.        Allergies:  No Known Allergies    Home Meds:  (Not in a hospital admission)      Objective:    Vitals Ranges:   Temp:  [98.2 °F (36.8 °C)] 98.2 °F (36.8 °C)  Heart Rate:  [69] 69  Resp:  [14] 14  BP: (133)/(72) 133/72  Body mass index is 36.19 kg/m².     Exam:  General Appearance:  WDWN    HEENT:   without obvious abnormality,   Conjunctiva/corneas clear,  Normal external ear canals, no drainage    Clear orsalmucosa,  Mallampati score 3    Neck:  Supple, symmetrical, trachea midline. No JVD.  Lungs:   Bilateral basal rhonchi bilaterally, respirations unlabored symmetrical wall movement.    Chest wall:  No tenderness or deformity.    Heart:  Regular rate and rhythm, S1 and S2 normal.  Extremities: Trace edema no clubbing or Cyanosis        Data Review:  All labs (24hrs): No results found for this or any previous visit (from the past 24 hour(s)).     Imaging:  XR Chest 2Vw  RADIOLOGY REPORT    FACILITY:    UNIT/AGE/GENDER: P.NOMN  OP      AGE:63 Y          SEX:M  PATIENT NAME/:  SRINATH LIN    1955  UNIT NUMBER:  [id number]  ACCOUNT NUMBER:  [id number]  ACCESSION NUMBER:  [id number]    XR CHEST 2VW    2018 at 1356    HISTORY: Fume exposure, decreased breath sounds, end inspiratory wheeze, febrile.        COMPARISON: None available    FINDINGS:    The lungs are clear. Heart and mediastinal borders are within normal limits. There is no evidence of acute focal opacity or pneumothorax. The bony structures are unremarkable.        IMPRESSION: No active cardiopulmonary disease.    Dictated by: Johnathon Reno M.D.    Images and Report reviewed and interpreted by: Johnathon Reno M.D.    <PS><Electronically signed by: Johnathon Reno M.D.>  2018 1420    D: 2018 1418  T: 2018 1418       ASSESSMENT:  Diagnoses and all orders for this visit:    Obstructive sleep apnea syndrome    Other orders  -     albuterol sulfate  (90 Base) MCG/ACT inhaler; Inhale 2 puffs Every 4 (Four) Hours As Needed for Wheezing.    Chronic obstructive airway disease    PLAN:    Bronchodilator inhaled corticosteroid on Trelegy add albuterol as needed    Education how to use inhalers    Encouraged to use incentive spirometer    Continue to exercise slowly as tolerated    Monitor for any change in the color of the sputum    Avoid  any exposure to fumes, gas or any irritant        This patient with obstructive sleep apnea, compliance is improved. Encourage to use it more frequent, I re-emphasized on pt the long and short term benefit of treating BRITTNEY.        Follow-up 1 year    Goyo Gracia MD. D, ABSM.  7/6/2022  13:56 EDT

## 2022-07-13 NOTE — PROGRESS NOTES
Teresa Osuna is a 67 y.o. male.       HPI   Pt. is here today for 4 month follow up on medication.   1) Chronic low back pain with radiculopathy - currently has regimen of Norco 10/325 mg qid; Lyrica 100 mg bid; tizanidine 4 mg tid PRN.  Feels pain level has been stable.    2) HTN -   Currently on amlodipine 10 mg daily; lisinopril 30 mg daily.   Denies any CP; palpitations; SOA; dizziness; headache; trouble with vision.  BP is stable.    3) Hyperlipdiemia - currently on atorvastatin 40 mg daily; ASA 81 mg daily.   4) COPD - currently on Trelegy Ellipta inhaler; albuterol inhaler PRN.   Denies any current SOA; cough or wheezes.   Seeing Dr. Gracia.   5) Narcolepsy - currently taking Provigil 200 mg daily.    6) Depression/Anxiety - currently taking Cymbalta 60 mg daily. Moods stable.  Denies any SI or HI.   7) Memorial day weekend fell in shower and hurt left knee.  Swelling has improved but still having some pain.      The following portions of the patient's history were reviewed and updated as appropriate: allergies, current medications, past family history, past medical history, past social history, past surgical history and problem list.    Review of Systems   Constitutional: Negative for chills, fatigue and fever.   Eyes: Negative for visual disturbance.   Respiratory: Negative for cough, chest tightness, shortness of breath and wheezing.    Cardiovascular: Negative for chest pain and palpitations.   Gastrointestinal: Negative for abdominal pain, blood in stool, constipation, diarrhea, nausea, vomiting and indigestion.   Endocrine: Negative for cold intolerance, heat intolerance, polydipsia, polyphagia and polyuria.   Genitourinary: Negative for difficulty urinating, dysuria, flank pain, frequency, nocturia and urgency.   Musculoskeletal: Positive for arthralgias, back pain and myalgias.   Skin: Negative for rash.   Neurological: Negative for dizziness, weakness, light-headedness, headache and  confusion.   Psychiatric/Behavioral: Negative for depressed mood. The patient is not nervous/anxious.        Objective   Physical Exam  Vitals reviewed.   Constitutional:       General: He is not in acute distress.     Appearance: Normal appearance. He is obese.   HENT:      Head: Normocephalic and atraumatic.   Cardiovascular:      Rate and Rhythm: Normal rate and regular rhythm.      Pulses: Normal pulses.      Heart sounds: Normal heart sounds. No murmur heard.  Pulmonary:      Effort: Pulmonary effort is normal. No respiratory distress.      Breath sounds: Normal breath sounds. No wheezing.   Chest:      Chest wall: No tenderness.   Abdominal:      Tenderness: There is no right CVA tenderness or left CVA tenderness.   Musculoskeletal:      Cervical back: Normal range of motion and neck supple. No tenderness.      Left knee: No swelling, deformity or erythema. Decreased range of motion. Tenderness present over the medial joint line. No lateral joint line tenderness. Normal patellar mobility.      Instability Tests: Anterior drawer test negative. Posterior drawer test negative.   Skin:     General: Skin is warm and dry.      Findings: No erythema.   Neurological:      General: No focal deficit present.      Mental Status: He is alert and oriented to person, place, and time.   Psychiatric:         Mood and Affect: Mood normal.           Assessment & Plan   Diagnoses and all orders for this visit:    1. Chronic low back pain with sciatica, sciatica laterality unspecified, unspecified back pain laterality (Primary)  Comments:  Stable.   Urine drug screen today.   Cont. current medication.   RTO in 4 mo.   Orders:  -     Urine Drug Screen - Urine, Clean Catch; Future    2. Essential hypertension  Comments:  Stable.   Cont. current medication.   Labs today.   RTO in 4 mo.   Orders:  -     Comprehensive metabolic panel; Future  -     Lipid panel; Future    3. Mixed hyperlipidemia  Comments:  Stable.   Cont. current  medication.   Labs today.   RTO in 4 mo.   Orders:  -     Comprehensive metabolic panel; Future  -     Lipid panel; Future    4. Obstructive chronic bronchitis without exacerbation (HCC)  Comments:  Stable.   Cont. current medication.   Labs today.   RTO in 4 mo.     5. Primary narcolepsy without cataplexy  Comments:  Stable.   Cont. current medication.   Labs today.   RTO in 4 mo.     6. Major depressive disorder, recurrent, mild (HCC)  Comments:  Stable.   Cont. current medication.   Labs today.   RTO in 4 mo.     7. Acute pain of left knee  Comments:  Xray ordered.   Orders:  -     XR Knee 1 or 2 View Left; Future

## 2022-07-15 ENCOUNTER — LAB (OUTPATIENT)
Dept: FAMILY MEDICINE CLINIC | Facility: CLINIC | Age: 67
End: 2022-07-15

## 2022-07-15 ENCOUNTER — OFFICE VISIT (OUTPATIENT)
Dept: FAMILY MEDICINE CLINIC | Facility: CLINIC | Age: 67
End: 2022-07-15

## 2022-07-15 VITALS
TEMPERATURE: 96.9 F | SYSTOLIC BLOOD PRESSURE: 130 MMHG | OXYGEN SATURATION: 96 % | BODY MASS INDEX: 35.25 KG/M2 | HEIGHT: 68 IN | HEART RATE: 77 BPM | DIASTOLIC BLOOD PRESSURE: 75 MMHG | WEIGHT: 232.6 LBS

## 2022-07-15 DIAGNOSIS — E78.2 MIXED HYPERLIPIDEMIA: ICD-10-CM

## 2022-07-15 DIAGNOSIS — J44.9 OBSTRUCTIVE CHRONIC BRONCHITIS WITHOUT EXACERBATION: ICD-10-CM

## 2022-07-15 DIAGNOSIS — I10 ESSENTIAL HYPERTENSION: ICD-10-CM

## 2022-07-15 DIAGNOSIS — G47.419 PRIMARY NARCOLEPSY WITHOUT CATAPLEXY: ICD-10-CM

## 2022-07-15 DIAGNOSIS — M25.562 ACUTE PAIN OF LEFT KNEE: ICD-10-CM

## 2022-07-15 DIAGNOSIS — G89.29 CHRONIC LOW BACK PAIN WITH SCIATICA, SCIATICA LATERALITY UNSPECIFIED, UNSPECIFIED BACK PAIN LATERALITY: ICD-10-CM

## 2022-07-15 DIAGNOSIS — M54.40 CHRONIC LOW BACK PAIN WITH SCIATICA, SCIATICA LATERALITY UNSPECIFIED, UNSPECIFIED BACK PAIN LATERALITY: Primary | ICD-10-CM

## 2022-07-15 DIAGNOSIS — M54.40 CHRONIC LOW BACK PAIN WITH SCIATICA, SCIATICA LATERALITY UNSPECIFIED, UNSPECIFIED BACK PAIN LATERALITY: ICD-10-CM

## 2022-07-15 DIAGNOSIS — G89.29 CHRONIC LOW BACK PAIN WITH SCIATICA, SCIATICA LATERALITY UNSPECIFIED, UNSPECIFIED BACK PAIN LATERALITY: Primary | ICD-10-CM

## 2022-07-15 DIAGNOSIS — F33.0 MAJOR DEPRESSIVE DISORDER, RECURRENT, MILD: ICD-10-CM

## 2022-07-15 LAB
AMPHET+METHAMPHET UR QL: NEGATIVE
BARBITURATES UR QL SCN: NEGATIVE
BENZODIAZ UR QL SCN: NEGATIVE
CANNABINOIDS SERPL QL: POSITIVE
COCAINE UR QL: NEGATIVE
METHADONE UR QL SCN: NEGATIVE
OPIATES UR QL: POSITIVE
OXYCODONE UR QL SCN: NEGATIVE

## 2022-07-15 PROCEDURE — 80307 DRUG TEST PRSMV CHEM ANLYZR: CPT | Performed by: NURSE PRACTITIONER

## 2022-07-15 PROCEDURE — 80061 LIPID PANEL: CPT | Performed by: NURSE PRACTITIONER

## 2022-07-15 PROCEDURE — 36415 COLL VENOUS BLD VENIPUNCTURE: CPT

## 2022-07-15 PROCEDURE — 99214 OFFICE O/P EST MOD 30 MIN: CPT | Performed by: NURSE PRACTITIONER

## 2022-07-15 PROCEDURE — 80053 COMPREHEN METABOLIC PANEL: CPT | Performed by: NURSE PRACTITIONER

## 2022-07-16 LAB
ALBUMIN SERPL-MCNC: 4.4 G/DL (ref 3.5–5.2)
ALBUMIN/GLOB SERPL: 1.9 G/DL
ALP SERPL-CCNC: 73 U/L (ref 39–117)
ALT SERPL W P-5'-P-CCNC: 15 U/L (ref 1–41)
ANION GAP SERPL CALCULATED.3IONS-SCNC: 11.8 MMOL/L (ref 5–15)
AST SERPL-CCNC: 15 U/L (ref 1–40)
BILIRUB SERPL-MCNC: 0.3 MG/DL (ref 0–1.2)
BUN SERPL-MCNC: 11 MG/DL (ref 8–23)
BUN/CREAT SERPL: 14.3 (ref 7–25)
CALCIUM SPEC-SCNC: 9.6 MG/DL (ref 8.6–10.5)
CHLORIDE SERPL-SCNC: 102 MMOL/L (ref 98–107)
CHOLEST SERPL-MCNC: 149 MG/DL (ref 0–200)
CO2 SERPL-SCNC: 23.2 MMOL/L (ref 22–29)
CREAT SERPL-MCNC: 0.77 MG/DL (ref 0.76–1.27)
EGFRCR SERPLBLD CKD-EPI 2021: 98.7 ML/MIN/1.73
GLOBULIN UR ELPH-MCNC: 2.3 GM/DL
GLUCOSE SERPL-MCNC: 116 MG/DL (ref 65–99)
HDLC SERPL-MCNC: 39 MG/DL (ref 40–60)
LDLC SERPL CALC-MCNC: 88 MG/DL (ref 0–100)
LDLC/HDLC SERPL: 2.2 {RATIO}
POTASSIUM SERPL-SCNC: 4.2 MMOL/L (ref 3.5–5.2)
PROT SERPL-MCNC: 6.7 G/DL (ref 6–8.5)
SODIUM SERPL-SCNC: 137 MMOL/L (ref 136–145)
TRIGL SERPL-MCNC: 121 MG/DL (ref 0–150)
VLDLC SERPL-MCNC: 22 MG/DL (ref 5–40)

## 2022-07-17 DIAGNOSIS — R73.9 HYPERGLYCEMIA: Primary | ICD-10-CM

## 2022-07-20 ENCOUNTER — HOSPITAL ENCOUNTER (OUTPATIENT)
Dept: GENERAL RADIOLOGY | Facility: HOSPITAL | Age: 67
Discharge: HOME | End: 2022-07-20
Admitting: NURSE PRACTITIONER

## 2022-07-20 DIAGNOSIS — M25.562 ACUTE PAIN OF LEFT KNEE: ICD-10-CM

## 2022-07-20 PROCEDURE — 73560 X-RAY EXAM OF KNEE 1 OR 2: CPT

## 2022-07-22 DIAGNOSIS — M54.50 CHRONIC BILATERAL LOW BACK PAIN WITHOUT SCIATICA: ICD-10-CM

## 2022-07-22 DIAGNOSIS — G89.29 CHRONIC BILATERAL LOW BACK PAIN WITHOUT SCIATICA: ICD-10-CM

## 2022-07-22 RX ORDER — HYDROCODONE BITARTRATE AND ACETAMINOPHEN 10; 325 MG/1; MG/1
1 TABLET ORAL 4 TIMES DAILY
Qty: 120 TABLET | Refills: 0 | Status: SHIPPED | OUTPATIENT
Start: 2022-07-22 | End: 2022-08-19 | Stop reason: SDUPTHER

## 2022-08-03 DIAGNOSIS — G47.419 PRIMARY NARCOLEPSY WITHOUT CATAPLEXY: ICD-10-CM

## 2022-08-03 RX ORDER — MODAFINIL 200 MG/1
200 TABLET ORAL DAILY
Qty: 30 TABLET | Refills: 0 | Status: SHIPPED | OUTPATIENT
Start: 2022-08-03 | End: 2022-09-06 | Stop reason: SDUPTHER

## 2022-08-08 NOTE — PROGRESS NOTES
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 Osuna reports that an episode of fall loss of consciousness happened, and that he drove for about 45 minutes without knowing and ended up in his neighbor's backyard which happened in October 2019.  Ever since then he had had a few more spells of passing out.    The passing out spells happen for a few seconds.  Patient has obstructive sleep apnea and uses CPAP machine.  No seizure episodes.  He underwent video examination for 3 days had one episode during the evaluation.  · Patient was sent to the cancer [facility] center for further evaluation given his abnormal CBC that happened with leukocytosis and monocytosis.  · 12/30/2019 peripheral blood flow cytometry -A small monoclonal B-cell population with a CLL phenotype is present. If the patient has a prior diagnosis of CLL, then the findings may represent persistent involvement. If the patient is not known to have CLL or SLL, further investigation is recommended if clinically indicated. In the absence of CLL or SLL, small (<5000/uL) clonal B-cell populations in the blood are classified as monoclonal B-cell lymphocytosis. Clinical correlation is required.  · 1/3/2020 CT scan of the soft tissue of the neck chest abdomen and pelvis were obtained-CT soft tissue neck shows some soft tissue prominence in the nasopharynx which could relate to slightly prominent adenoid tissue.  Vascular calcification is noted in the carotid bifurcations.  DJD C-spine.  Chest abdomen and pelvis show some areas of atelectasis or scarring involving the lungs.  Bilateral renal cysts.  Prostate appears enlarged.  Colonic diverticulosis.  DJD spine.  Cholelithiasis.  No obvious pathologically enlarged  lymphadenopathy.  · 8/19/2020 - WBC 11.32. ALC 7.83  · 8/15/22: WBC 10.5, Hgb 14.8, platelets 357, ANC 6.64.  No B symptoms or recurrent infections    History of present illness was reviewed and is unchanged from the previous visit. 08/16/22        Past Medical History:   Diagnosis Date   • Acute diverticulitis     with perforation   • Allergic    • Anemia    • Anxiety    • Back pain    • CAD S/P percutaneous coronary angioplasty    • Cancer (HCC)     cll    • Congenital heart disease 2018   • COPD (chronic obstructive pulmonary disease) (HCC)    • Depression    • Diverticulitis    • GERD (gastroesophageal reflux disease)    • Hyperlipidemia    • Hypertension    • Low back pain    • Myocardial infarction (HCC)    • TEIXEIRA (nonalcoholic steatohepatitis)    • Obesity    • BRITTNEY on CPAP    • Sleep apnea    • Sleep disorder, shift work    • Spinal stenosis of lumbar region at multiple levels    • Status post placement of implantable loop recorder 2020    JULIETTEI       Past Surgical History:   Procedure Laterality Date   • CARDIAC CATHETERIZATION  11/05/2018    Dr. Sher   • CHOLECYSTECTOMY N/A 02/22/2021    Procedure: CHOLECYSTECTOMY LAPAROSCOPIC;  Surgeon: Faisal Wilkins DO;  [facility];  Service: General;  Laterality: N/A;   • COLONOSCOPY  2016   • CORONARY STENT PLACEMENT     • ERCP N/A 02/21/2021    Procedure: ENDOSCOPIC RETROGRADE CHOLANGIOPANCREATOGRAPHY, sphincterotomy, balloon clearance of  bile duct, stent placement;  Surgeon: Ramiro Pulido MD;  [facility];  Service: Gastroenterology;  Laterality: N/A;  post: biliary dilation, choledocolithiasis   • ERCP N/A 06/04/2021    Procedure: ENDOSCOPIC RETROGRADE CHOLANGIOPANCREATOGRAPHY WITH STENT REMOVAL and balloon sweeping of common bile duct (12-15mm balloon up to 15mm);  Surgeon: Pranay White MD;  [facility];  Service: Gastroenterology;  Laterality: N/A;  post op: biliary stent removal, common bile duct  sweeping with 12-15mm balloon (up to 15mm), dialated bile duct   • LIVER BIOPSY     • TONSILLECTOMY AND ADENOIDECTOMY           Current Outpatient Medications:   •  albuterol sulfate  (90 Base) MCG/ACT inhaler, Inhale 2 puffs Every 4 (Four) Hours As Needed for Wheezing., Disp: 6.7 g, Rfl: 5  •  amLODIPine (NORVASC) 10 MG tablet, TAKE 1 TABLET BY MOUTH DAILY, Disp: 90 tablet, Rfl: 1  •  aspirin 81 MG EC tablet, Take 81 mg by mouth Daily. LD 5/25, Disp: , Rfl:   •  atorvastatin (LIPITOR) 40 MG tablet, TAKE 1 TABLET BY MOUTH EVERY NIGHT, Disp: 90 tablet, Rfl: 1  •  DULoxetine (CYMBALTA) 60 MG capsule, TAKE 1 CAPSULE BY MOUTH DAILY, Disp: 90 capsule, Rfl: 3  •  HYDROcodone-acetaminophen (NORCO)  MG per tablet, Take 1 tablet by mouth 4 (Four) Times a Day., Disp: 120 tablet, Rfl: 0  •  ipratropium-albuterol (DUO-NEB) 0.5-2.5 mg/3 ml nebulizer, Take 3 mL by nebulization Every 4 (Four) Hours As Needed for Wheezing., Disp: 360 mL, Rfl: 5  •  lisinopril (PRINIVIL,ZESTRIL) 30 MG tablet, TAKE 1 TABLET BY MOUTH DAILY, Disp: 90 tablet, Rfl: 1  •  modafinil (PROVIGIL) 200 MG tablet, Take 1 tablet by mouth Daily., Disp: 30 tablet, Rfl: 0  •  pregabalin (LYRICA) 100 MG capsule, Take 1 capsule by mouth 2 (Two) Times a Day., Disp: 60 capsule, Rfl: 0  •  tiZANidine (ZANAFLEX) 4 MG tablet, TAKE 1 TABLET BY MOUTH EVERY 8 HOURS AS NEEDED FOR MUSCLE SPASMS, Disp: 30 tablet, Rfl: 1  •  Trelegy Ellipta 100-62.5-25 MCG/INH inhaler, INHALE 1 PUFF BY MOUTH DAILY, Disp: 60 each, Rfl: 5    No Known Allergies    Family History   Problem Relation Age of Onset   • Colon cancer Mother    • Heart disease Mother    • Depression Mother    • Colon cancer Father    • Heart disease Father    • Heart attack Father    • Cancer Father    • Diabetes Brother    • Diabetes Maternal Grandmother    • Diabetes Maternal Grandfather        Cancer-related family history includes Cancer in his father; Colon cancer in his father and mother.    Social  "History     Tobacco Use   • Smoking status: Former Smoker     Packs/day: 1.50     Years: 40.00     Pack years: 60.00     Types: Cigarettes, Cigarettes, Cigarettes     Start date: 1969     Quit date: 2009     Years since quittin.9   • Smokeless tobacco: Never Used   Vaping Use   • Vaping Use: Never used   Substance Use Topics   • Alcohol use: Not Currently     Comment: No alcohol the past 10 years.   • Drug use: No       I have reviewed the history of present illness, past medical history, family history, social history, lab results, all notes and other records since the patient was last seen at the cancer care center.    SUBJECTIVE:   Patient is here for routine follow-up visit.  Patient denies unintended weight loss, fatigue, night sweats, fevers, or recurrent infections.  He feels well and has some SOB with his COPD.  He denies chest pain.       ROS:      Review of Systems   Constitutional: Negative for fever.   HENT: Negative for nosebleeds and trouble swallowing.    Eyes: Negative for visual disturbance.   Respiratory: Negative for cough, shortness of breath and wheezing.    Cardiovascular: Negative for chest pain.   Gastrointestinal: Negative for abdominal pain and blood in stool.   Endocrine: Negative for cold intolerance.   Genitourinary: Negative for dysuria and hematuria.   Musculoskeletal: Negative for joint swelling.   Skin: Negative for rash.   Allergic/Immunologic: Negative for environmental allergies.   Neurological: Negative for seizures.   Hematological: Does not bruise/bleed easily.   Psychiatric/Behavioral: The patient is not nervous/anxious.       MD performed ROS and are negative except as mentioned in Subjective.unchanged      Objective:       Vitals:    22 0854   BP: 133/78   Pulse: 82   Resp: 18   Temp: 97.1 °F (36.2 °C)   SpO2: 98%   Weight: 105 kg (232 lb)   Height: 172.7 cm (68\")   PainSc: 0-No pain     /78   Pulse 82   Temp 97.1 °F (36.2 °C)   Resp 18   Ht " "172.7 cm (68\")   Wt 105 kg (232 lb)   SpO2 98%   BMI 35.28 kg/m²       PHYSICAL EXAM:      Physical Exam   Constitutional: He is oriented to person, place, and time. No distress.   HENT:   Head: Normocephalic and atraumatic.   Eyes: Conjunctivae are normal. Right eye exhibits no discharge. Left eye exhibits no discharge. No scleral icterus.   Neck: No thyromegaly present.   Cardiovascular: Normal rate, regular rhythm and normal heart sounds. Exam reveals no gallop and no friction rub.   Pulmonary/Chest: Effort normal. No stridor. No respiratory distress. He has no wheezes.   Distant breath sounds   Abdominal: Soft. Bowel sounds are normal. He exhibits no mass. There is no abdominal tenderness. There is no rebound and no guarding.   Musculoskeletal: Normal range of motion. No tenderness.   Lymphadenopathy:     He has no cervical adenopathy.   Neurological: He is alert and oriented to person, place, and time. He exhibits normal muscle tone.   Skin: Skin is warm. No rash noted. He is not diaphoretic. No erythema.   Psychiatric: His behavior is normal.        RECENT LABS:     Lab Results   Component Value Date    WBC 10.54 08/16/2022    WBC 8.55 03/15/2022    HGB 14.8 08/16/2022    HGB 13.7 03/15/2022    HCT 44.7 08/16/2022    HCT 41.4 03/15/2022    NEUTROABS 6.64 08/16/2022         Lab Results   Component Value Date    GLUCOSE 116 (H) 07/15/2022    BUN 11 07/15/2022    CREATININE 0.77 07/15/2022    EGFRIFNONA 85 06/02/2021    BCR 14.3 07/15/2022    K 4.2 07/15/2022    CO2 23.2 07/15/2022    CALCIUM 9.6 07/15/2022    ALBUMIN 4.40 07/15/2022    LABIL2 1.5 05/20/2019    AST 15 07/15/2022    ALT 15 07/15/2022         Assessment & Plan      ASSESSMENT:     1. Monoclonal B-cell lymphocytosis: WBC 10.5; ANC 6.64.  No B symptoms or recurrent infections  2. COPD: Patient has some SOB with his COPD but not worsening  3. Obesity  4. Posttraumatic stress disorder   5. obstructive sleep apnea      PLAN:      1. Flow cytometry " was consistent with monoclonal B-cell lymphocytosis. No evidence of lymphadenopathy.  He has lymphocytosis/leukocytosis treatment is not indicated.  2. CBC, LDH today  3. CMP done in July 2022 reviewed  4. CBC reviewed with patient  5. Patient is instructed to report to us if he develops any constitutional symptoms.  6. Patient to continue to use CPAP machine.  Blood pressure is controlled well now   7. He received disability now for his PTSD.  Controlled.  8. RTC Dr Huddleston in 1 year and check CBC, CMP and LDH    I spent 30 minutes in physical assessment, diagnostic evaluation, review of any B symptoms, review of any recent or recurring infections, discussion to report any B symptoms between now and next appt., ordering, and documentation

## 2022-08-16 ENCOUNTER — OFFICE VISIT (OUTPATIENT)
Dept: ONCOLOGY | Facility: CLINIC | Age: 67
End: 2022-08-16

## 2022-08-16 ENCOUNTER — LAB (OUTPATIENT)
Dept: LAB | Facility: HOSPITAL | Age: 67
End: 2022-08-16

## 2022-08-16 ENCOUNTER — TELEPHONE (OUTPATIENT)
Dept: FAMILY MEDICINE CLINIC | Facility: CLINIC | Age: 67
End: 2022-08-16

## 2022-08-16 VITALS
WEIGHT: 232 LBS | DIASTOLIC BLOOD PRESSURE: 78 MMHG | OXYGEN SATURATION: 98 % | TEMPERATURE: 97.1 F | RESPIRATION RATE: 18 BRPM | BODY MASS INDEX: 35.16 KG/M2 | HEART RATE: 82 BPM | HEIGHT: 68 IN | SYSTOLIC BLOOD PRESSURE: 133 MMHG

## 2022-08-16 DIAGNOSIS — D72.820 MONOCLONAL B-CELL LYMPHOCYTOSIS: ICD-10-CM

## 2022-08-16 DIAGNOSIS — R73.9 HYPERGLYCEMIA: ICD-10-CM

## 2022-08-16 DIAGNOSIS — C91.10 CLL (CHRONIC LYMPHOCYTIC LEUKEMIA): Primary | ICD-10-CM

## 2022-08-16 DIAGNOSIS — R73.9 HYPERGLYCEMIA: Primary | ICD-10-CM

## 2022-08-16 LAB
ALBUMIN SERPL-MCNC: 4.5 G/DL (ref 3.5–5.2)
ALBUMIN/GLOB SERPL: 2 G/DL
ALP SERPL-CCNC: 86 U/L (ref 39–117)
ALT SERPL W P-5'-P-CCNC: 15 U/L (ref 1–41)
ANION GAP SERPL CALCULATED.3IONS-SCNC: 13 MMOL/L (ref 5–15)
AST SERPL-CCNC: 14 U/L (ref 1–40)
BASOPHILS # BLD AUTO: 0.04 10*3/MM3 (ref 0–0.2)
BASOPHILS NFR BLD AUTO: 0.4 % (ref 0–1.5)
BILIRUB SERPL-MCNC: 0.6 MG/DL (ref 0–1.2)
BUN SERPL-MCNC: 11 MG/DL (ref 8–23)
BUN/CREAT SERPL: 12.8 (ref 7–25)
CALCIUM SPEC-SCNC: 9.4 MG/DL (ref 8.6–10.5)
CHLORIDE SERPL-SCNC: 103 MMOL/L (ref 98–107)
CO2 SERPL-SCNC: 26 MMOL/L (ref 22–29)
CREAT SERPL-MCNC: 0.86 MG/DL (ref 0.76–1.27)
DEPRECATED RDW RBC AUTO: 42.3 FL (ref 37–54)
EGFRCR SERPLBLD CKD-EPI 2021: 95.5 ML/MIN/1.73
EOSINOPHIL # BLD AUTO: 0.34 10*3/MM3 (ref 0–0.4)
EOSINOPHIL NFR BLD AUTO: 3.2 % (ref 0.3–6.2)
ERYTHROCYTE [DISTWIDTH] IN BLOOD BY AUTOMATED COUNT: 12.9 % (ref 12.3–15.4)
GLOBULIN UR ELPH-MCNC: 2.3 GM/DL
GLUCOSE SERPL-MCNC: 117 MG/DL (ref 65–99)
HBA1C MFR BLD: 6.1 % (ref 3.5–5.6)
HCT VFR BLD AUTO: 44.7 % (ref 37.5–51)
HGB BLD-MCNC: 14.8 G/DL (ref 13–17.7)
HOLD SPECIMEN: NORMAL
LDH SERPL-CCNC: 135 U/L (ref 135–225)
LYMPHOCYTES # BLD AUTO: 2.65 10*3/MM3 (ref 0.7–3.1)
LYMPHOCYTES NFR BLD AUTO: 25.1 % (ref 19.6–45.3)
MCH RBC QN AUTO: 30.3 PG (ref 26.6–33)
MCHC RBC AUTO-ENTMCNC: 33.1 G/DL (ref 31.5–35.7)
MCV RBC AUTO: 91.4 FL (ref 79–97)
MONOCYTES # BLD AUTO: 0.87 10*3/MM3 (ref 0.1–0.9)
MONOCYTES NFR BLD AUTO: 8.3 % (ref 5–12)
NEUTROPHILS NFR BLD AUTO: 6.64 10*3/MM3 (ref 1.7–7)
NEUTROPHILS NFR BLD AUTO: 63 % (ref 42.7–76)
PLATELET # BLD AUTO: 357 10*3/MM3 (ref 140–450)
PMV BLD AUTO: 9.2 FL (ref 6–12)
POTASSIUM SERPL-SCNC: 3.8 MMOL/L (ref 3.5–5.2)
PROT SERPL-MCNC: 6.8 G/DL (ref 6–8.5)
RBC # BLD AUTO: 4.89 10*6/MM3 (ref 4.14–5.8)
SODIUM SERPL-SCNC: 142 MMOL/L (ref 136–145)
WBC NRBC COR # BLD: 10.54 10*3/MM3 (ref 3.4–10.8)

## 2022-08-16 PROCEDURE — 99214 OFFICE O/P EST MOD 30 MIN: CPT | Performed by: NURSE PRACTITIONER

## 2022-08-16 PROCEDURE — 83036 HEMOGLOBIN GLYCOSYLATED A1C: CPT

## 2022-08-16 PROCEDURE — 83615 LACTATE (LD) (LDH) ENZYME: CPT

## 2022-08-16 PROCEDURE — 85025 COMPLETE CBC W/AUTO DIFF WBC: CPT

## 2022-08-16 PROCEDURE — 80053 COMPREHEN METABOLIC PANEL: CPT

## 2022-08-16 PROCEDURE — 36415 COLL VENOUS BLD VENIPUNCTURE: CPT

## 2022-08-16 RX ORDER — LISINOPRIL 30 MG/1
30 TABLET ORAL DAILY
Qty: 90 TABLET | Refills: 1 | Status: SHIPPED | OUTPATIENT
Start: 2022-08-16 | End: 2023-03-01 | Stop reason: SDUPTHER

## 2022-08-16 NOTE — PATIENT INSTRUCTIONS
Call for any lumps or bumps, fevers, night sweats, extreme fatigue, unintended wt loss, recurrent infections

## 2022-08-16 NOTE — TELEPHONE ENCOUNTER
Patient is getting labs done at the cancer center and wants to know if you will order the a1c that you wanted him to have done

## 2022-08-19 DIAGNOSIS — G89.29 CHRONIC BILATERAL LOW BACK PAIN WITHOUT SCIATICA: ICD-10-CM

## 2022-08-19 DIAGNOSIS — M54.50 CHRONIC BILATERAL LOW BACK PAIN WITHOUT SCIATICA: ICD-10-CM

## 2022-08-19 RX ORDER — HYDROCODONE BITARTRATE AND ACETAMINOPHEN 10; 325 MG/1; MG/1
1 TABLET ORAL 4 TIMES DAILY
Qty: 120 TABLET | Refills: 0 | OUTPATIENT
Start: 2022-08-19

## 2022-08-21 RX ORDER — HYDROCODONE BITARTRATE AND ACETAMINOPHEN 10; 325 MG/1; MG/1
1 TABLET ORAL 4 TIMES DAILY
Qty: 120 TABLET | Refills: 0 | Status: SHIPPED | OUTPATIENT
Start: 2022-08-21 | End: 2022-09-19 | Stop reason: SDUPTHER

## 2022-08-24 DIAGNOSIS — G89.29 CHRONIC BILATERAL LOW BACK PAIN WITHOUT SCIATICA: ICD-10-CM

## 2022-08-24 DIAGNOSIS — M54.50 CHRONIC BILATERAL LOW BACK PAIN WITHOUT SCIATICA: ICD-10-CM

## 2022-08-24 RX ORDER — PREGABALIN 100 MG/1
100 CAPSULE ORAL 2 TIMES DAILY
Qty: 60 CAPSULE | Refills: 0 | Status: SHIPPED | OUTPATIENT
Start: 2022-08-24 | End: 2022-10-31 | Stop reason: SDUPTHER

## 2022-09-06 DIAGNOSIS — G47.419 PRIMARY NARCOLEPSY WITHOUT CATAPLEXY: ICD-10-CM

## 2022-09-06 PROCEDURE — 93298 REM INTERROG DEV EVAL SCRMS: CPT | Performed by: INTERNAL MEDICINE

## 2022-09-06 PROCEDURE — G2066 INTER DEVC REMOTE 30D: HCPCS | Performed by: INTERNAL MEDICINE

## 2022-09-07 RX ORDER — MODAFINIL 200 MG/1
200 TABLET ORAL DAILY
Qty: 30 TABLET | Refills: 0 | Status: SHIPPED | OUTPATIENT
Start: 2022-09-07 | End: 2022-10-11 | Stop reason: SDUPTHER

## 2022-09-19 DIAGNOSIS — M54.50 CHRONIC BILATERAL LOW BACK PAIN WITHOUT SCIATICA: ICD-10-CM

## 2022-09-19 DIAGNOSIS — G89.29 CHRONIC BILATERAL LOW BACK PAIN WITHOUT SCIATICA: ICD-10-CM

## 2022-09-19 RX ORDER — HYDROCODONE BITARTRATE AND ACETAMINOPHEN 10; 325 MG/1; MG/1
1 TABLET ORAL 4 TIMES DAILY
Qty: 120 TABLET | Refills: 0 | Status: SHIPPED | OUTPATIENT
Start: 2022-09-19 | End: 2022-10-17 | Stop reason: SDUPTHER

## 2022-10-03 ENCOUNTER — TELEPHONE (OUTPATIENT)
Dept: FAMILY MEDICINE CLINIC | Facility: CLINIC | Age: 67
End: 2022-10-03

## 2022-10-03 NOTE — TELEPHONE ENCOUNTER
Hub staff attempted to follow warm transfer process and was unsuccessful     Caller: Neptali Osuna    Relationship to patient: Self    Best call back number: [phone] (H)    Patient is needing: PATIENT NEEDS AN APPT ASAP, HUB WAS UNABLE TO WARM TRANSFER, PATIENT HAS COMPLAINTS OF RIGHT KNEE AND CARTAGENA PAIN, RASH AND HAVING DIFFICULTY WALKING ON IT, HOT TO THE TOUCH, PLEASE CALL PATIENT BACK ASAP

## 2022-10-04 ENCOUNTER — HOSPITAL ENCOUNTER (OUTPATIENT)
Dept: CARDIOLOGY | Facility: HOSPITAL | Age: 67
Discharge: HOME | End: 2022-10-04
Admitting: FAMILY MEDICINE

## 2022-10-04 DIAGNOSIS — M79.89 REDNESS AND SWELLING OF LOWER LEG: ICD-10-CM

## 2022-10-04 DIAGNOSIS — M79.89 PAIN AND SWELLING OF RIGHT LOWER LEG: ICD-10-CM

## 2022-10-04 DIAGNOSIS — M79.661 PAIN AND SWELLING OF RIGHT LOWER LEG: ICD-10-CM

## 2022-10-04 DIAGNOSIS — R23.8 REDNESS AND SWELLING OF LOWER LEG: ICD-10-CM

## 2022-10-04 LAB
BH CV LOWER VASCULAR LEFT COMMON FEMORAL AUGMENT: NORMAL
BH CV LOWER VASCULAR LEFT COMMON FEMORAL COMPETENT: NORMAL
BH CV LOWER VASCULAR LEFT COMMON FEMORAL COMPRESS: NORMAL
BH CV LOWER VASCULAR LEFT COMMON FEMORAL PHASIC: NORMAL
BH CV LOWER VASCULAR LEFT COMMON FEMORAL SPONT: NORMAL
BH CV LOWER VASCULAR RIGHT COMMON FEMORAL AUGMENT: NORMAL
BH CV LOWER VASCULAR RIGHT COMMON FEMORAL COMPETENT: NORMAL
BH CV LOWER VASCULAR RIGHT COMMON FEMORAL COMPRESS: NORMAL
BH CV LOWER VASCULAR RIGHT COMMON FEMORAL PHASIC: NORMAL
BH CV LOWER VASCULAR RIGHT COMMON FEMORAL SPONT: NORMAL
BH CV LOWER VASCULAR RIGHT DISTAL FEMORAL COMPRESS: NORMAL
BH CV LOWER VASCULAR RIGHT GASTRONEMIUS COMPRESS: NORMAL
BH CV LOWER VASCULAR RIGHT GREATER SAPH AK COMPRESS: NORMAL
BH CV LOWER VASCULAR RIGHT GREATER SAPH BK COMPRESS: NORMAL
BH CV LOWER VASCULAR RIGHT LESSER SAPH COMPRESS: NORMAL
BH CV LOWER VASCULAR RIGHT MID FEMORAL AUGMENT: NORMAL
BH CV LOWER VASCULAR RIGHT MID FEMORAL COMPETENT: NORMAL
BH CV LOWER VASCULAR RIGHT MID FEMORAL COMPRESS: NORMAL
BH CV LOWER VASCULAR RIGHT MID FEMORAL PHASIC: NORMAL
BH CV LOWER VASCULAR RIGHT MID FEMORAL SPONT: NORMAL
BH CV LOWER VASCULAR RIGHT PERONEAL COMPRESS: NORMAL
BH CV LOWER VASCULAR RIGHT POPLITEAL AUGMENT: NORMAL
BH CV LOWER VASCULAR RIGHT POPLITEAL COMPETENT: NORMAL
BH CV LOWER VASCULAR RIGHT POPLITEAL COMPRESS: NORMAL
BH CV LOWER VASCULAR RIGHT POPLITEAL PHASIC: NORMAL
BH CV LOWER VASCULAR RIGHT POPLITEAL SPONT: NORMAL
BH CV LOWER VASCULAR RIGHT POSTERIOR TIBIAL COMPRESS: NORMAL
BH CV LOWER VASCULAR RIGHT PROXIMAL FEMORAL COMPRESS: NORMAL
BH CV LOWER VASCULAR RIGHT SAPHENOFEMORAL JUNCTION COMPRESS: NORMAL
MAXIMAL PREDICTED HEART RATE: 154 BPM
STRESS TARGET HR: 131 BPM

## 2022-10-04 PROCEDURE — 93971 EXTREMITY STUDY: CPT

## 2022-10-07 PROCEDURE — 93298 REM INTERROG DEV EVAL SCRMS: CPT | Performed by: INTERNAL MEDICINE

## 2022-10-07 PROCEDURE — G2066 INTER DEVC REMOTE 30D: HCPCS | Performed by: INTERNAL MEDICINE

## 2022-10-11 DIAGNOSIS — G47.419 PRIMARY NARCOLEPSY WITHOUT CATAPLEXY: ICD-10-CM

## 2022-10-12 RX ORDER — MODAFINIL 200 MG/1
200 TABLET ORAL DAILY
Qty: 30 TABLET | Refills: 0 | Status: SHIPPED | OUTPATIENT
Start: 2022-10-12 | End: 2022-11-13 | Stop reason: SDUPTHER

## 2022-10-14 ENCOUNTER — OFFICE VISIT (OUTPATIENT)
Dept: FAMILY MEDICINE CLINIC | Facility: CLINIC | Age: 67
End: 2022-10-14

## 2022-10-14 VITALS
DIASTOLIC BLOOD PRESSURE: 78 MMHG | HEART RATE: 80 BPM | WEIGHT: 234 LBS | OXYGEN SATURATION: 96 % | BODY MASS INDEX: 35.46 KG/M2 | HEIGHT: 68 IN | SYSTOLIC BLOOD PRESSURE: 134 MMHG

## 2022-10-14 DIAGNOSIS — L03.115 CELLULITIS OF RIGHT LEG: Primary | ICD-10-CM

## 2022-10-14 PROCEDURE — 99213 OFFICE O/P EST LOW 20 MIN: CPT | Performed by: NURSE PRACTITIONER

## 2022-10-14 NOTE — PROGRESS NOTES
Teresa Osuna is a 67 y.o. male.       HPI   Pt is here today for [facility] urgent care follow up from 10/4/22.   Seen with concern of right knee and lower leg pain.  Xrays showed no fractures. Venous doppler negative for DVT.  He was tx for cellulitis with a gram of Rocephin IM that day and scripts for Septra and Keflex.    Will complete abx this weekend.  No fevers.  Redness and swelling in leg have improved.     The following portions of the patient's history were reviewed and updated as appropriate: allergies, current medications, past family history, past medical history, past social history, past surgical history and problem list.    Review of Systems   Constitutional: Negative for chills, fatigue and fever.   Respiratory: Negative for cough, chest tightness, shortness of breath and wheezing.    Cardiovascular: Negative for chest pain and palpitations.   Gastrointestinal: Negative for diarrhea, nausea and vomiting.   Musculoskeletal: Positive for arthralgias.   Neurological: Negative for dizziness and headache.   Psychiatric/Behavioral: Negative for depressed mood. The patient is not nervous/anxious.        Objective   Physical Exam  Vitals reviewed.   Constitutional:       General: He is not in acute distress.     Appearance: Normal appearance. He is obese.   Cardiovascular:      Rate and Rhythm: Normal rate and regular rhythm.      Pulses: Normal pulses.      Heart sounds: Normal heart sounds. No murmur heard.  Pulmonary:      Effort: Pulmonary effort is normal. No respiratory distress.      Breath sounds: Normal breath sounds. No wheezing or rhonchi.   Chest:      Chest wall: No tenderness.   Musculoskeletal:      Right lower leg: No swelling, deformity, lacerations, tenderness or bony tenderness. No edema.      Comments: Minimal erythema from ankle to lower shin; no warmth over the skin.     Skin:     General: Skin is warm and dry.   Neurological:      General: No focal deficit present.      Mental  Status: He is alert and oriented to person, place, and time.   Psychiatric:         Mood and Affect: Mood normal.           Assessment & Plan   Diagnoses and all orders for this visit:    1. Cellulitis of right leg (Primary)  Comments:  Stable.   Complete all abx.   reviewed warning S/S and when to call.

## 2022-10-17 DIAGNOSIS — G89.29 CHRONIC BILATERAL LOW BACK PAIN WITHOUT SCIATICA: ICD-10-CM

## 2022-10-17 DIAGNOSIS — M54.50 CHRONIC BILATERAL LOW BACK PAIN WITHOUT SCIATICA: ICD-10-CM

## 2022-10-17 RX ORDER — HYDROCODONE BITARTRATE AND ACETAMINOPHEN 10; 325 MG/1; MG/1
1 TABLET ORAL 4 TIMES DAILY
Qty: 120 TABLET | Refills: 0 | Status: SHIPPED | OUTPATIENT
Start: 2022-10-17 | End: 2022-11-14 | Stop reason: SDUPTHER

## 2022-10-31 DIAGNOSIS — G89.29 CHRONIC BILATERAL LOW BACK PAIN WITHOUT SCIATICA: ICD-10-CM

## 2022-10-31 DIAGNOSIS — M54.50 CHRONIC BILATERAL LOW BACK PAIN WITHOUT SCIATICA: ICD-10-CM

## 2022-10-31 RX ORDER — PREGABALIN 100 MG/1
100 CAPSULE ORAL 2 TIMES DAILY
Qty: 60 CAPSULE | Refills: 0 | Status: SHIPPED | OUTPATIENT
Start: 2022-10-31 | End: 2023-01-04 | Stop reason: SDUPTHER

## 2022-11-07 PROCEDURE — G2066 INTER DEVC REMOTE 30D: HCPCS | Performed by: INTERNAL MEDICINE

## 2022-11-07 PROCEDURE — 93298 REM INTERROG DEV EVAL SCRMS: CPT | Performed by: INTERNAL MEDICINE

## 2022-11-13 DIAGNOSIS — G47.419 PRIMARY NARCOLEPSY WITHOUT CATAPLEXY: ICD-10-CM

## 2022-11-14 DIAGNOSIS — G89.29 CHRONIC BILATERAL LOW BACK PAIN WITHOUT SCIATICA: ICD-10-CM

## 2022-11-14 DIAGNOSIS — M54.50 CHRONIC BILATERAL LOW BACK PAIN WITHOUT SCIATICA: ICD-10-CM

## 2022-11-14 RX ORDER — ATORVASTATIN CALCIUM 40 MG/1
40 TABLET, FILM COATED ORAL NIGHTLY
Qty: 90 TABLET | Refills: 1 | Status: SHIPPED | OUTPATIENT
Start: 2022-11-14 | End: 2023-03-10 | Stop reason: SDUPTHER

## 2022-11-14 RX ORDER — MODAFINIL 200 MG/1
200 TABLET ORAL DAILY
Qty: 30 TABLET | Refills: 0 | Status: SHIPPED | OUTPATIENT
Start: 2022-11-14 | End: 2022-12-12 | Stop reason: SDUPTHER

## 2022-11-14 RX ORDER — AMLODIPINE BESYLATE 10 MG/1
10 TABLET ORAL DAILY
Qty: 90 TABLET | Refills: 1 | Status: SHIPPED | OUTPATIENT
Start: 2022-11-14

## 2022-11-14 RX ORDER — HYDROCODONE BITARTRATE AND ACETAMINOPHEN 10; 325 MG/1; MG/1
1 TABLET ORAL 4 TIMES DAILY
Qty: 120 TABLET | Refills: 0 | Status: SHIPPED | OUTPATIENT
Start: 2022-11-14 | End: 2022-12-12 | Stop reason: SDUPTHER

## 2022-12-08 PROCEDURE — G2066 INTER DEVC REMOTE 30D: HCPCS | Performed by: INTERNAL MEDICINE

## 2022-12-08 PROCEDURE — 93298 REM INTERROG DEV EVAL SCRMS: CPT | Performed by: INTERNAL MEDICINE

## 2022-12-12 DIAGNOSIS — M54.50 CHRONIC BILATERAL LOW BACK PAIN WITHOUT SCIATICA: ICD-10-CM

## 2022-12-12 DIAGNOSIS — G89.29 CHRONIC BILATERAL LOW BACK PAIN WITHOUT SCIATICA: ICD-10-CM

## 2022-12-12 DIAGNOSIS — G47.419 PRIMARY NARCOLEPSY WITHOUT CATAPLEXY: ICD-10-CM

## 2022-12-12 RX ORDER — HYDROCODONE BITARTRATE AND ACETAMINOPHEN 10; 325 MG/1; MG/1
1 TABLET ORAL 4 TIMES DAILY
Qty: 120 TABLET | Refills: 0 | Status: SHIPPED | OUTPATIENT
Start: 2022-12-12 | End: 2023-01-09 | Stop reason: SDUPTHER

## 2022-12-12 RX ORDER — MODAFINIL 200 MG/1
200 TABLET ORAL DAILY
Qty: 30 TABLET | Refills: 0 | Status: SHIPPED | OUTPATIENT
Start: 2022-12-12 | End: 2023-01-09 | Stop reason: SDUPTHER

## 2022-12-12 RX ORDER — FLUTICASONE FUROATE, UMECLIDINIUM BROMIDE AND VILANTEROL TRIFENATATE 100; 62.5; 25 UG/1; UG/1; UG/1
POWDER RESPIRATORY (INHALATION)
Qty: 60 EACH | Refills: 5 | Status: SHIPPED | OUTPATIENT
Start: 2022-12-12

## 2022-12-12 NOTE — TELEPHONE ENCOUNTER
Last ov 10/14/22  Scheduled 1/16/23  INSPECT reviewed-  norco last filled 11/15/22  Modafinil last filled- 11/14/22

## 2023-01-04 DIAGNOSIS — G89.29 CHRONIC BILATERAL LOW BACK PAIN WITHOUT SCIATICA: ICD-10-CM

## 2023-01-04 DIAGNOSIS — M54.50 CHRONIC BILATERAL LOW BACK PAIN WITHOUT SCIATICA: ICD-10-CM

## 2023-01-04 RX ORDER — PREGABALIN 100 MG/1
100 CAPSULE ORAL 2 TIMES DAILY
Qty: 60 CAPSULE | Refills: 0 | Status: SHIPPED | OUTPATIENT
Start: 2023-01-04 | End: 2023-03-03 | Stop reason: SDUPTHER

## 2023-01-08 PROCEDURE — 93298 REM INTERROG DEV EVAL SCRMS: CPT | Performed by: INTERNAL MEDICINE

## 2023-01-08 PROCEDURE — G2066 INTER DEVC REMOTE 30D: HCPCS | Performed by: INTERNAL MEDICINE

## 2023-01-09 DIAGNOSIS — G89.29 CHRONIC BILATERAL LOW BACK PAIN WITHOUT SCIATICA: ICD-10-CM

## 2023-01-09 DIAGNOSIS — M54.50 CHRONIC BILATERAL LOW BACK PAIN WITHOUT SCIATICA: ICD-10-CM

## 2023-01-09 DIAGNOSIS — G47.419 PRIMARY NARCOLEPSY WITHOUT CATAPLEXY: ICD-10-CM

## 2023-01-09 RX ORDER — HYDROCODONE BITARTRATE AND ACETAMINOPHEN 10; 325 MG/1; MG/1
1 TABLET ORAL 4 TIMES DAILY
Qty: 120 TABLET | Refills: 0 | Status: SHIPPED | OUTPATIENT
Start: 2023-01-09 | End: 2023-02-06 | Stop reason: SDUPTHER

## 2023-01-09 RX ORDER — MODAFINIL 200 MG/1
200 TABLET ORAL DAILY
Qty: 30 TABLET | Refills: 0 | Status: SHIPPED | OUTPATIENT
Start: 2023-01-09 | End: 2023-02-06 | Stop reason: SDUPTHER

## 2023-01-16 ENCOUNTER — OFFICE VISIT (OUTPATIENT)
Dept: FAMILY MEDICINE CLINIC | Facility: CLINIC | Age: 68
End: 2023-01-16
Payer: COMMERCIAL

## 2023-01-16 ENCOUNTER — LAB (OUTPATIENT)
Dept: FAMILY MEDICINE CLINIC | Facility: CLINIC | Age: 68
End: 2023-01-16
Payer: COMMERCIAL

## 2023-01-16 VITALS
HEIGHT: 68 IN | BODY MASS INDEX: 33.95 KG/M2 | HEART RATE: 70 BPM | SYSTOLIC BLOOD PRESSURE: 144 MMHG | WEIGHT: 224 LBS | DIASTOLIC BLOOD PRESSURE: 76 MMHG | OXYGEN SATURATION: 95 %

## 2023-01-16 DIAGNOSIS — G47.419 PRIMARY NARCOLEPSY WITHOUT CATAPLEXY: ICD-10-CM

## 2023-01-16 DIAGNOSIS — E78.2 MIXED HYPERLIPIDEMIA: ICD-10-CM

## 2023-01-16 DIAGNOSIS — J44.9 OBSTRUCTIVE CHRONIC BRONCHITIS WITHOUT EXACERBATION: ICD-10-CM

## 2023-01-16 DIAGNOSIS — I10 ESSENTIAL HYPERTENSION: ICD-10-CM

## 2023-01-16 DIAGNOSIS — F32.A ANXIETY AND DEPRESSION: ICD-10-CM

## 2023-01-16 DIAGNOSIS — M54.16 LUMBAR RADICULOPATHY: Primary | ICD-10-CM

## 2023-01-16 DIAGNOSIS — F41.9 ANXIETY AND DEPRESSION: ICD-10-CM

## 2023-01-16 DIAGNOSIS — M77.11 RIGHT LATERAL EPICONDYLITIS: ICD-10-CM

## 2023-01-16 DIAGNOSIS — M54.16 LUMBAR RADICULOPATHY: ICD-10-CM

## 2023-01-16 LAB
ALBUMIN SERPL-MCNC: 4.6 G/DL (ref 3.5–5.2)
ALBUMIN/GLOB SERPL: 2 G/DL
ALP SERPL-CCNC: 81 U/L (ref 39–117)
ALT SERPL W P-5'-P-CCNC: 16 U/L (ref 1–41)
AMPHET+METHAMPHET UR QL: POSITIVE
ANION GAP SERPL CALCULATED.3IONS-SCNC: 8 MMOL/L (ref 5–15)
AST SERPL-CCNC: 21 U/L (ref 1–40)
BARBITURATES UR QL SCN: NEGATIVE
BENZODIAZ UR QL SCN: NEGATIVE
BILIRUB SERPL-MCNC: 0.4 MG/DL (ref 0–1.2)
BUN SERPL-MCNC: 11 MG/DL (ref 8–23)
BUN/CREAT SERPL: 12.5 (ref 7–25)
CALCIUM SPEC-SCNC: 9.8 MG/DL (ref 8.6–10.5)
CANNABINOIDS SERPL QL: POSITIVE
CHLORIDE SERPL-SCNC: 101 MMOL/L (ref 98–107)
CHOLEST SERPL-MCNC: 145 MG/DL (ref 0–200)
CO2 SERPL-SCNC: 28 MMOL/L (ref 22–29)
COCAINE UR QL: NEGATIVE
CREAT SERPL-MCNC: 0.88 MG/DL (ref 0.76–1.27)
EGFRCR SERPLBLD CKD-EPI 2021: 94.8 ML/MIN/1.73
GLOBULIN UR ELPH-MCNC: 2.3 GM/DL
GLUCOSE SERPL-MCNC: 120 MG/DL (ref 65–99)
HDLC SERPL-MCNC: 42 MG/DL (ref 40–60)
LDLC SERPL CALC-MCNC: 83 MG/DL (ref 0–100)
LDLC/HDLC SERPL: 1.94 {RATIO}
METHADONE UR QL SCN: NEGATIVE
OPIATES UR QL: POSITIVE
OXYCODONE UR QL SCN: NEGATIVE
POTASSIUM SERPL-SCNC: 4.6 MMOL/L (ref 3.5–5.2)
PROT SERPL-MCNC: 6.9 G/DL (ref 6–8.5)
SODIUM SERPL-SCNC: 137 MMOL/L (ref 136–145)
TRIGL SERPL-MCNC: 108 MG/DL (ref 0–150)
VLDLC SERPL-MCNC: 20 MG/DL (ref 5–40)

## 2023-01-16 PROCEDURE — 80307 DRUG TEST PRSMV CHEM ANLYZR: CPT | Performed by: NURSE PRACTITIONER

## 2023-01-16 PROCEDURE — 80053 COMPREHEN METABOLIC PANEL: CPT | Performed by: NURSE PRACTITIONER

## 2023-01-16 PROCEDURE — 80061 LIPID PANEL: CPT | Performed by: NURSE PRACTITIONER

## 2023-01-16 PROCEDURE — 36415 COLL VENOUS BLD VENIPUNCTURE: CPT

## 2023-01-16 PROCEDURE — 99214 OFFICE O/P EST MOD 30 MIN: CPT | Performed by: NURSE PRACTITIONER

## 2023-01-16 RX ORDER — METHYLPREDNISOLONE 4 MG/1
TABLET ORAL
Qty: 21 TABLET | Refills: 0 | Status: SHIPPED | OUTPATIENT
Start: 2023-01-16

## 2023-01-16 NOTE — PROGRESS NOTES
Teresa Osuna is a 67 y.o. male.       HPI   Pt. is here today for 4 month follow up on medication.   1) Chronic low back pain with radiculopathy - currently has regimen of Norco 10/325 mg qid; Lyrica 100 mg bid; tizanidine 4 mg tid PRN.  Feels pain symptoms are stable.    2) HTN -   Currently on amlodipine 10 mg daily; lisinopril 30 mg daily.   Denies any CP; palpitations; SOA; dizziness; headache; trouble with vision.  BP is stable.  Has been running 120's/70's at home.    3) Hyperlipdiemia - currently on atorvastatin 40 mg daily; ASA 81 mg daily.   4) COPD - currently on Trelegy Ellipta inhaler; albuterol inhaler PRN.   Denies any current SOA; cough or wheezes.   Seeing Dr. Gracia.   5) Narcolepsy - currently taking Provigil 200 mg daily.    6) Depression/Anxiety - currently taking Cymbalta 60 mg daily. Moods stable.  Denies any SI or HI.   7) Right arm pain for over a month; lifted something heavy when it started; worsens as day goes on.      The following portions of the patient's history were reviewed and updated as appropriate: allergies, current medications, past family history, past medical history, past social history, past surgical history and problem list.    Review of Systems   Constitutional: Negative for chills, fatigue and fever.   Respiratory: Negative for cough, shortness of breath and wheezing.    Cardiovascular: Negative for chest pain and palpitations.   Gastrointestinal: Negative for blood in stool, constipation, diarrhea, nausea, vomiting and indigestion.   Endocrine: Negative for polydipsia, polyphagia and polyuria.   Genitourinary: Negative for dysuria, flank pain, frequency, hematuria and urgency.   Musculoskeletal: Positive for arthralgias, back pain and myalgias. Negative for joint swelling.   Neurological: Negative for dizziness, weakness and headache.   Psychiatric/Behavioral: Negative for self-injury, suicidal ideas and depressed mood. The patient is not nervous/anxious.         Objective   Physical Exam  Vitals reviewed.   Constitutional:       General: He is not in acute distress.     Appearance: Normal appearance. He is obese.   Cardiovascular:      Rate and Rhythm: Normal rate and regular rhythm.      Pulses: Normal pulses.      Heart sounds: Normal heart sounds. No murmur heard.  Pulmonary:      Effort: Pulmonary effort is normal. No respiratory distress.      Breath sounds: Normal breath sounds. No wheezing or rhonchi.   Chest:      Chest wall: No tenderness.   Abdominal:      Tenderness: There is no right CVA tenderness or left CVA tenderness.   Musculoskeletal:      Right elbow: No swelling or deformity. Normal range of motion. Tenderness present in lateral epicondyle.      Lumbar back: Tenderness present. No swelling, edema or deformity. Decreased range of motion.   Neurological:      General: No focal deficit present.      Mental Status: He is alert and oriented to person, place, and time.   Psychiatric:         Mood and Affect: Mood normal.           Assessment & Plan   Diagnoses and all orders for this visit:    1. Lumbar radiculopathy (Primary)  Comments:  Stable.   INSPECT reviewed.   Cont. current pain regimen.   UDS ordered.   Orders:  -     Urine Drug Screen - Urine, Clean Catch; Future    2. Essential hypertension  Comments:  Stable.   Cont. current medication.  Labs ordered.   RTO in 4 mo.   Orders:  -     Comprehensive metabolic panel; Future  -     Lipid panel; Future    3. Mixed hyperlipidemia  Comments:  Stable.   Cont. current medication.   Labs ordered.   RTO in 4 mo.   Orders:  -     Comprehensive metabolic panel; Future  -     Lipid panel; Future    4. Obstructive chronic bronchitis without exacerbation (HCC)  Comments:  Stable.   Cont. current medication.   Labs ordered.   RTO in 4 mo.     5. Primary narcolepsy without cataplexy  Comments:  Stable.   INSPECT reviewed.   Cont. current medication.   Labs ordered.   RTO in 4 mo.     6. Anxiety and  depression  Comments:  Stable.   Cont. current medication.   RTO in 4 mo.     7. Right lateral epicondylitis  Comments:  Given medrol dose jeff.   Heat/ice and rest.   Orders:  -     methylPREDNISolone (MEDROL) 4 MG dose pack; Take as directed on package instructions.  Dispense: 21 tablet; Refill: 0              High BMI Plan  General weight loss/lifestyle modification strategies discussed (elicit support from others; identify saboteurs; non-food rewards, etc).  Informal exercise measures discussed, e.g. taking stairs instead of elevator.

## 2023-02-01 ENCOUNTER — TELEPHONE (OUTPATIENT)
Dept: CARDIOLOGY | Facility: CLINIC | Age: 68
End: 2023-02-01
Payer: COMMERCIAL

## 2023-02-01 NOTE — TELEPHONE ENCOUNTER
Just an FYI, patients loop recorder reached battery RRT on 1/31/23. Pt has upcoming appt next week, so you may want to discuss options for removal, etc. Thanks. Aravind

## 2023-02-06 DIAGNOSIS — G89.29 CHRONIC BILATERAL LOW BACK PAIN WITHOUT SCIATICA: ICD-10-CM

## 2023-02-06 DIAGNOSIS — M54.50 CHRONIC BILATERAL LOW BACK PAIN WITHOUT SCIATICA: ICD-10-CM

## 2023-02-06 DIAGNOSIS — G47.419 PRIMARY NARCOLEPSY WITHOUT CATAPLEXY: ICD-10-CM

## 2023-02-06 RX ORDER — HYDROCODONE BITARTRATE AND ACETAMINOPHEN 10; 325 MG/1; MG/1
1 TABLET ORAL 4 TIMES DAILY
Qty: 120 TABLET | Refills: 0 | Status: SHIPPED | OUTPATIENT
Start: 2023-02-06 | End: 2023-03-06 | Stop reason: SDUPTHER

## 2023-02-06 RX ORDER — MODAFINIL 200 MG/1
200 TABLET ORAL DAILY
Qty: 30 TABLET | Refills: 0 | Status: SHIPPED | OUTPATIENT
Start: 2023-02-06 | End: 2023-03-06 | Stop reason: SDUPTHER

## 2023-02-07 ENCOUNTER — OFFICE VISIT (OUTPATIENT)
Dept: CARDIOLOGY | Facility: CLINIC | Age: 68
End: 2023-02-07
Payer: COMMERCIAL

## 2023-02-07 VITALS
SYSTOLIC BLOOD PRESSURE: 126 MMHG | HEART RATE: 77 BPM | OXYGEN SATURATION: 95 % | BODY MASS INDEX: 34.4 KG/M2 | DIASTOLIC BLOOD PRESSURE: 79 MMHG | HEIGHT: 68 IN | WEIGHT: 227 LBS

## 2023-02-07 DIAGNOSIS — R55 SYNCOPE AND COLLAPSE: ICD-10-CM

## 2023-02-07 DIAGNOSIS — E78.2 MIXED HYPERLIPIDEMIA: ICD-10-CM

## 2023-02-07 DIAGNOSIS — Z95.818 STATUS POST PLACEMENT OF IMPLANTABLE LOOP RECORDER: Primary | ICD-10-CM

## 2023-02-07 DIAGNOSIS — I10 ESSENTIAL HYPERTENSION: ICD-10-CM

## 2023-02-07 DIAGNOSIS — Z95.5 STATUS POST CORONARY ARTERY STENT PLACEMENT: ICD-10-CM

## 2023-02-07 DIAGNOSIS — E78.5 DYSLIPIDEMIA: ICD-10-CM

## 2023-02-07 DIAGNOSIS — Z95.820 STATUS POST ANGIOPLASTY WITH STENT: ICD-10-CM

## 2023-02-07 PROCEDURE — 93000 ELECTROCARDIOGRAM COMPLETE: CPT | Performed by: INTERNAL MEDICINE

## 2023-02-07 PROCEDURE — 99214 OFFICE O/P EST MOD 30 MIN: CPT | Performed by: INTERNAL MEDICINE

## 2023-02-08 PROCEDURE — G2066 INTER DEVC REMOTE 30D: HCPCS | Performed by: INTERNAL MEDICINE

## 2023-02-08 PROCEDURE — 93298 REM INTERROG DEV EVAL SCRMS: CPT | Performed by: INTERNAL MEDICINE

## 2023-02-17 NOTE — TELEPHONE ENCOUNTER
LMOM for patient, his loop recorder battery is near depletion, he may leave loop recorder in place until future time or if he opts to have it removed, he may call the office and we can discuss with Dr Sher. No pacemaker required at this time per last OV 2/7/23. Please call office if any questions.

## 2023-03-01 RX ORDER — LISINOPRIL 30 MG/1
30 TABLET ORAL DAILY
Qty: 90 TABLET | Refills: 1 | Status: SHIPPED | OUTPATIENT
Start: 2023-03-01

## 2023-03-03 DIAGNOSIS — G89.29 CHRONIC BILATERAL LOW BACK PAIN WITHOUT SCIATICA: ICD-10-CM

## 2023-03-03 DIAGNOSIS — M54.50 CHRONIC BILATERAL LOW BACK PAIN WITHOUT SCIATICA: ICD-10-CM

## 2023-03-03 RX ORDER — PREGABALIN 100 MG/1
100 CAPSULE ORAL 2 TIMES DAILY
Qty: 60 CAPSULE | Refills: 0 | Status: SHIPPED | OUTPATIENT
Start: 2023-03-03

## 2023-03-06 DIAGNOSIS — G47.419 PRIMARY NARCOLEPSY WITHOUT CATAPLEXY: ICD-10-CM

## 2023-03-06 DIAGNOSIS — M54.50 CHRONIC BILATERAL LOW BACK PAIN WITHOUT SCIATICA: ICD-10-CM

## 2023-03-06 DIAGNOSIS — G89.29 CHRONIC BILATERAL LOW BACK PAIN WITHOUT SCIATICA: ICD-10-CM

## 2023-03-06 RX ORDER — HYDROCODONE BITARTRATE AND ACETAMINOPHEN 10; 325 MG/1; MG/1
1 TABLET ORAL 4 TIMES DAILY
Qty: 120 TABLET | Refills: 0 | Status: SHIPPED | OUTPATIENT
Start: 2023-03-06 | End: 2023-04-03 | Stop reason: SDUPTHER

## 2023-03-06 RX ORDER — MODAFINIL 200 MG/1
200 TABLET ORAL DAILY
Qty: 30 TABLET | Refills: 0 | Status: SHIPPED | OUTPATIENT
Start: 2023-03-06 | End: 2023-04-03 | Stop reason: SDUPTHER

## 2023-03-10 RX ORDER — DULOXETIN HYDROCHLORIDE 60 MG/1
60 CAPSULE, DELAYED RELEASE ORAL DAILY
Qty: 90 CAPSULE | Refills: 3 | Status: SHIPPED | OUTPATIENT
Start: 2023-03-10

## 2023-03-10 RX ORDER — ATORVASTATIN CALCIUM 40 MG/1
40 TABLET, FILM COATED ORAL NIGHTLY
Qty: 90 TABLET | Refills: 1 | Status: SHIPPED | OUTPATIENT
Start: 2023-03-10

## 2023-03-11 PROCEDURE — 93298 REM INTERROG DEV EVAL SCRMS: CPT | Performed by: INTERNAL MEDICINE

## 2023-03-11 PROCEDURE — G2066 INTER DEVC REMOTE 30D: HCPCS | Performed by: INTERNAL MEDICINE

## 2023-04-03 DIAGNOSIS — G89.29 CHRONIC BILATERAL LOW BACK PAIN WITHOUT SCIATICA: ICD-10-CM

## 2023-04-03 DIAGNOSIS — G47.419 PRIMARY NARCOLEPSY WITHOUT CATAPLEXY: ICD-10-CM

## 2023-04-03 DIAGNOSIS — M54.50 CHRONIC BILATERAL LOW BACK PAIN WITHOUT SCIATICA: ICD-10-CM

## 2023-04-03 RX ORDER — MODAFINIL 200 MG/1
200 TABLET ORAL DAILY
Qty: 30 TABLET | Refills: 0 | Status: SHIPPED | OUTPATIENT
Start: 2023-04-03 | End: 2023-04-18 | Stop reason: SDUPTHER

## 2023-04-03 RX ORDER — HYDROCODONE BITARTRATE AND ACETAMINOPHEN 10; 325 MG/1; MG/1
1 TABLET ORAL 4 TIMES DAILY
Qty: 120 TABLET | Refills: 0 | Status: SHIPPED | OUTPATIENT
Start: 2023-04-03

## 2023-04-17 DIAGNOSIS — G47.419 PRIMARY NARCOLEPSY WITHOUT CATAPLEXY: ICD-10-CM

## 2023-04-17 RX ORDER — MODAFINIL 200 MG/1
200 TABLET ORAL DAILY
Qty: 30 TABLET | Refills: 0 | OUTPATIENT
Start: 2023-04-17

## 2023-04-18 DIAGNOSIS — G89.29 CHRONIC BILATERAL LOW BACK PAIN WITHOUT SCIATICA: ICD-10-CM

## 2023-04-18 DIAGNOSIS — M54.50 CHRONIC BILATERAL LOW BACK PAIN WITHOUT SCIATICA: ICD-10-CM

## 2023-04-18 DIAGNOSIS — G47.419 PRIMARY NARCOLEPSY WITHOUT CATAPLEXY: ICD-10-CM

## 2023-04-18 DIAGNOSIS — J44.9 OBSTRUCTIVE CHRONIC BRONCHITIS WITHOUT EXACERBATION: Primary | ICD-10-CM

## 2023-04-18 RX ORDER — FLUTICASONE FUROATE, UMECLIDINIUM BROMIDE AND VILANTEROL TRIFENATATE 100; 62.5; 25 UG/1; UG/1; UG/1
1 POWDER RESPIRATORY (INHALATION) DAILY
Qty: 60 EACH | Refills: 11 | Status: SHIPPED | OUTPATIENT
Start: 2023-04-18

## 2023-04-18 RX ORDER — PREGABALIN 100 MG/1
100 CAPSULE ORAL 2 TIMES DAILY
Qty: 60 CAPSULE | Refills: 0 | Status: SHIPPED | OUTPATIENT
Start: 2023-04-18 | End: 2023-04-21 | Stop reason: SDUPTHER

## 2023-04-18 RX ORDER — MODAFINIL 200 MG/1
200 TABLET ORAL DAILY
Qty: 30 TABLET | Refills: 0 | Status: SHIPPED | OUTPATIENT
Start: 2023-04-18

## 2023-04-19 ENCOUNTER — TELEPHONE (OUTPATIENT)
Dept: FAMILY MEDICINE CLINIC | Facility: CLINIC | Age: 68
End: 2023-04-19
Payer: COMMERCIAL

## 2023-04-19 NOTE — TELEPHONE ENCOUNTER
Prior Auth completed for Modafinil 200MG tablets via covermymeds. Pending determination.  Key: [id number] -   PA Case ID: [id number] -   Rx #: [id number]

## 2023-04-19 NOTE — TELEPHONE ENCOUNTER
Prior Authorization for Modafinil 200MG tablets approved.  Faxed approval to pharmacy.  PA Case: [id number],   Status: Approved,   Coverage Starts on: 4/1/2023 12:00:00 AM,   Coverage Ends on: 4/18/2024 12:00:00 AM.

## 2023-04-20 ENCOUNTER — TELEPHONE (OUTPATIENT)
Dept: FAMILY MEDICINE CLINIC | Facility: CLINIC | Age: 68
End: 2023-04-20
Payer: COMMERCIAL

## 2023-04-20 NOTE — TELEPHONE ENCOUNTER
[facility] with [facility] for Modafinil 200 mg completed via phone call. Ref ID: [id number]  Determination within 3 days. Will receive a fax and letter sent to office and to the patient.

## 2023-04-20 NOTE — TELEPHONE ENCOUNTER
Eunice with [employer] called for tier exception on Duloxetine 60 mg capsules. Completed via over the phone. Ref ID: [id number]. Determination within 3 days. Will receive a fax and letter to the office and patient.

## 2023-04-21 DIAGNOSIS — G89.29 CHRONIC BILATERAL LOW BACK PAIN WITHOUT SCIATICA: ICD-10-CM

## 2023-04-21 DIAGNOSIS — M54.50 CHRONIC BILATERAL LOW BACK PAIN WITHOUT SCIATICA: ICD-10-CM

## 2023-04-21 RX ORDER — AMLODIPINE BESYLATE 10 MG/1
10 TABLET ORAL DAILY
Qty: 90 TABLET | Refills: 1 | Status: SHIPPED | OUTPATIENT
Start: 2023-04-21

## 2023-04-21 RX ORDER — PREGABALIN 100 MG/1
100 CAPSULE ORAL 2 TIMES DAILY
Qty: 60 CAPSULE | Refills: 0 | Status: SHIPPED | OUTPATIENT
Start: 2023-04-21

## 2023-04-26 ENCOUNTER — TELEPHONE (OUTPATIENT)
Dept: FAMILY MEDICINE CLINIC | Facility: CLINIC | Age: 68
End: 2023-04-26
Payer: COMMERCIAL

## 2023-04-26 NOTE — TELEPHONE ENCOUNTER
Patient called insurance to get a prior authorization started for AMPHETAMINE. [facility] know that he haven't been seen for this medication. I confirm that he haven't and in order to get this medication he would need to be seen first. Stephy will give patient this information.

## 2023-04-27 DIAGNOSIS — G89.29 CHRONIC BILATERAL LOW BACK PAIN WITHOUT SCIATICA: ICD-10-CM

## 2023-04-27 DIAGNOSIS — M54.50 CHRONIC BILATERAL LOW BACK PAIN WITHOUT SCIATICA: ICD-10-CM

## 2023-04-27 RX ORDER — HYDROCODONE BITARTRATE AND ACETAMINOPHEN 10; 325 MG/1; MG/1
1 TABLET ORAL 4 TIMES DAILY
Qty: 120 TABLET | Refills: 0 | Status: SHIPPED | OUTPATIENT
Start: 2023-04-27

## 2023-05-16 NOTE — TELEPHONE ENCOUNTER
Last ov 3/5/21  Scheduled 7/6/21   List in media.     Ondansetron 8mg tbdl  in pt chart. Rx reloaded. Please review.

## 2023-05-19 ENCOUNTER — OFFICE VISIT (OUTPATIENT)
Dept: FAMILY MEDICINE CLINIC | Facility: CLINIC | Age: 68
End: 2023-05-19
Payer: MEDICARE

## 2023-05-19 VITALS
HEART RATE: 84 BPM | DIASTOLIC BLOOD PRESSURE: 88 MMHG | BODY MASS INDEX: 34.56 KG/M2 | OXYGEN SATURATION: 94 % | HEIGHT: 68 IN | WEIGHT: 228 LBS | SYSTOLIC BLOOD PRESSURE: 147 MMHG

## 2023-05-19 DIAGNOSIS — F41.9 ANXIETY AND DEPRESSION: ICD-10-CM

## 2023-05-19 DIAGNOSIS — E78.2 MIXED HYPERLIPIDEMIA: ICD-10-CM

## 2023-05-19 DIAGNOSIS — G47.419 PRIMARY NARCOLEPSY WITHOUT CATAPLEXY: ICD-10-CM

## 2023-05-19 DIAGNOSIS — J44.9 OBSTRUCTIVE CHRONIC BRONCHITIS WITHOUT EXACERBATION: ICD-10-CM

## 2023-05-19 DIAGNOSIS — M54.16 LUMBAR RADICULOPATHY: Primary | ICD-10-CM

## 2023-05-19 DIAGNOSIS — F32.A ANXIETY AND DEPRESSION: ICD-10-CM

## 2023-05-19 DIAGNOSIS — I10 ESSENTIAL HYPERTENSION: ICD-10-CM

## 2023-05-19 DIAGNOSIS — M79.601 RIGHT ARM PAIN: ICD-10-CM

## 2023-05-19 PROCEDURE — 1160F RVW MEDS BY RX/DR IN RCRD: CPT | Performed by: NURSE PRACTITIONER

## 2023-05-19 PROCEDURE — 3077F SYST BP >= 140 MM HG: CPT | Performed by: NURSE PRACTITIONER

## 2023-05-19 PROCEDURE — 1159F MED LIST DOCD IN RCRD: CPT | Performed by: NURSE PRACTITIONER

## 2023-05-19 PROCEDURE — 3079F DIAST BP 80-89 MM HG: CPT | Performed by: NURSE PRACTITIONER

## 2023-05-19 PROCEDURE — 99214 OFFICE O/P EST MOD 30 MIN: CPT | Performed by: NURSE PRACTITIONER

## 2023-05-19 NOTE — PROGRESS NOTES
Teresa Osuna is a 67 y.o. male.       HPI   Pt. is here today for 4 month follow up on medication.   1) Chronic low back pain with radiculopathy - currently has regimen of Norco 10/325 mg qid; Lyrica 100 mg bid; tizanidine 4 mg tid PRN.  Feels pain symptoms are in good control/ stable.  UDS at last visit.   2) HTN -   Currently on amlodipine 10 mg daily; lisinopril 30 mg daily.   Denies any CP; palpitations; SOA; dizziness; headache; trouble with vision.    BP has been 130's/80's at home.    3) Hyperlipdiemia - currently on atorvastatin 40 mg daily; ASA 81 mg daily.   4) COPD - currently on Trelegy Ellipta inhaler; albuterol inhaler PRN.   Denies any current SOA; cough or wheezes.   Continues with pulmonology -Dr. Gracia.   5) Narcolepsy - currently taking Provigil 200 mg daily.  Stable.   6) Depression/Anxiety - currently taking Cymbalta 60 mg daily. Moods stable.  Denies any SI or HI.   7) Right arm pain is continuing; lifted something heavy when it started; worsens as day goes on.  Given a medrol pack in Jan 2023 for this and this helped but pain has come back.      The following portions of the patient's history were reviewed and updated as appropriate: allergies, current medications, past family history, past medical history, past social history, past surgical history and problem list.    Review of Systems   Constitutional: Negative for appetite change, chills, fatigue and fever.   Respiratory: Negative for cough and shortness of breath.    Cardiovascular: Negative for chest pain and palpitations.   Gastrointestinal: Negative for abdominal pain, blood in stool, constipation, diarrhea, nausea, vomiting and GERD.   Genitourinary: Negative for dysuria, frequency, hematuria and urgency.   Musculoskeletal: Positive for arthralgias, back pain and myalgias. Negative for joint swelling.   Neurological: Positive for numbness. Negative for dizziness, weakness, light-headedness and headache.    Psychiatric/Behavioral: Negative for self-injury, suicidal ideas and depressed mood. The patient is not nervous/anxious.        Objective   Physical Exam  Vitals reviewed.   Constitutional:       General: He is not in acute distress.     Appearance: Normal appearance. He is obese.   Cardiovascular:      Rate and Rhythm: Normal rate and regular rhythm.      Pulses: Normal pulses.      Heart sounds: Normal heart sounds. No murmur heard.  Pulmonary:      Effort: Pulmonary effort is normal. No respiratory distress.      Breath sounds: Normal breath sounds. No wheezing or rhonchi.   Chest:      Chest wall: No tenderness.   Abdominal:      Tenderness: There is no right CVA tenderness or left CVA tenderness.   Musculoskeletal:      Right elbow: No swelling or deformity. Normal range of motion. Tenderness present in lateral epicondyle.   Skin:     General: Skin is warm and dry.      Findings: No erythema.   Neurological:      General: No focal deficit present.      Mental Status: He is alert and oriented to person, place, and time.   Psychiatric:         Mood and Affect: Mood normal.           Assessment & Plan   Diagnoses and all orders for this visit:    1. Lumbar radiculopathy (Primary)  Comments:  Stable.   UDS in Jan 2023.   Cont. current medication.   Labs with next visit.   RTO in 4 mo.     2. Essential hypertension  Comments:  Stable.   Cont. current medication.   Work on healthy diet, exercise, weight loss.   Labs with next visit.   RTOin 4 mo.     3. Mixed hyperlipidemia  Comments:  Stable.   Cont. current medication.   Work on healthy diet, exercise, weight loss.   Labs with next visit.   RTOin 4 mo.     4. Obstructive chronic bronchitis without exacerbation  Comments:  Stable.   Cont. current medication.   Work on healthy diet, exercise, weight loss.   Labs with next visit.   RTOin 4 mo.     5. Primary narcolepsy without cataplexy  Comments:  Stable.   Cont. current medication.   Work on healthy diet,  exercise, weight loss.   Labs with next visit.   RTOin 4 mo.     6. Anxiety and depression  Comments:  Stable.   Cont. current medication.   RTOin 4 mo.     7. Right arm pain  Comments:  Referral to Ortho.   Orders:  -     Ambulatory Referral to Orthopedic Surgery              High BMI Plan  General weight loss/lifestyle modification strategies discussed (elicit support from others; identify saboteurs; non-food rewards, etc).  Informal exercise measures discussed, e.g. taking stairs instead of elevator.

## 2023-05-23 DIAGNOSIS — G47.419 PRIMARY NARCOLEPSY WITHOUT CATAPLEXY: ICD-10-CM

## 2023-05-24 RX ORDER — MODAFINIL 200 MG/1
TABLET ORAL
Qty: 30 TABLET | Refills: 0 | Status: SHIPPED | OUTPATIENT
Start: 2023-05-24

## 2023-05-30 DIAGNOSIS — G89.29 CHRONIC BILATERAL LOW BACK PAIN WITHOUT SCIATICA: ICD-10-CM

## 2023-05-30 DIAGNOSIS — M54.50 CHRONIC BILATERAL LOW BACK PAIN WITHOUT SCIATICA: ICD-10-CM

## 2023-05-30 RX ORDER — HYDROCODONE BITARTRATE AND ACETAMINOPHEN 10; 325 MG/1; MG/1
1 TABLET ORAL 4 TIMES DAILY
Qty: 120 TABLET | Refills: 0 | Status: SHIPPED | OUTPATIENT
Start: 2023-05-30

## 2023-07-25 DIAGNOSIS — G89.29 CHRONIC BILATERAL LOW BACK PAIN WITHOUT SCIATICA: ICD-10-CM

## 2023-07-25 DIAGNOSIS — M54.50 CHRONIC BILATERAL LOW BACK PAIN WITHOUT SCIATICA: ICD-10-CM

## 2023-07-25 RX ORDER — HYDROCODONE BITARTRATE AND ACETAMINOPHEN 10; 325 MG/1; MG/1
1 TABLET ORAL 4 TIMES DAILY
Qty: 120 TABLET | Refills: 0 | Status: SHIPPED | OUTPATIENT
Start: 2023-07-25

## 2023-08-09 DIAGNOSIS — G89.29 CHRONIC BILATERAL LOW BACK PAIN WITHOUT SCIATICA: ICD-10-CM

## 2023-08-09 DIAGNOSIS — M54.50 CHRONIC BILATERAL LOW BACK PAIN WITHOUT SCIATICA: ICD-10-CM

## 2023-08-10 RX ORDER — PREGABALIN 100 MG/1
CAPSULE ORAL
Qty: 60 CAPSULE | Refills: 0 | Status: SHIPPED | OUTPATIENT
Start: 2023-08-10

## 2023-08-11 NOTE — PROGRESS NOTES
Hematology/Oncology Outpatient Follow Up    Neptali Osuna  1955    Primary Care Physician: Honey Steele APRN   Referring Physician: No ref. 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 Osuna reports that an episode of fall loss of consciousness happened, and that he drove for about 45 minutes without knowing and ended up in his neighbor's backyard which happened in October 2019.  Ever since then he had had a few more spells of passing out.    The passing out spells happen for a few seconds.  Patient has obstructive sleep apnea and uses CPAP machine.  No seizure episodes.  He underwent video examination for 3 days had one episode during the evaluation.  Patient was sent to [facility] for further evaluation given his abnormal CBC that happened with leukocytosis and monocytosis.  12/30/2019 peripheral blood flow cytometry -A small monoclonal B-cell population with a CLL phenotype is present. If the patient has a prior diagnosis of CLL, then the findings may represent persistent involvement. If the patient is not known to have CLL or SLL, further investigation is recommended if clinically indicated. In the absence of CLL or SLL, small (<5000/uL) clonal B-cell populations in the blood are classified as monoclonal B-cell lymphocytosis. Clinical correlation is required.  1/3/2020 CT scan of the soft tissue of the neck chest abdomen and pelvis were obtained-CT soft tissue neck shows some soft tissue prominence in the nasopharynx which could relate to slightly prominent adenoid tissue.  Vascular calcification is noted in the carotid bifurcations.  DJD C-spine.  Chest abdomen and pelvis show some areas of atelectasis or scarring involving the lungs.  Bilateral renal cysts.  Prostate appears enlarged.  Colonic diverticulosis.  DJD spine.  Cholelithiasis.  No obvious pathologically enlarged  lymphadenopathy.  8/19/2020 - WBC 11.32. ALC 7.83  8/15/22: WBC 10.5, Hgb 14.8, platelets 357, ANC 6.64.  No B symptoms or recurrent infections  8/14/23 - WBC 8.94, Hb 13.5, plts 272 has started to have some B symptoms with fatigue but likely unrelated        SUBJECTIVE:   Has some fatigue, night sweats, no weight loss        Past Medical History:   Diagnosis Date    Acute diverticulitis     with perforation    Allergic     Anemia     Anxiety     Back pain     CAD S/P percutaneous coronary angioplasty     Cancer     cll     Congenital heart disease 2018    COPD (chronic obstructive pulmonary disease)     Depression     Diverticulitis     GERD (gastroesophageal reflux disease)     Hyperlipidemia     Hypertension     Low back pain     Myocardial infarction     TEIXEIRA (nonalcoholic steatohepatitis)     Obesity     BRITTNEY on CPAP     Sleep apnea     Sleep disorder, shift work     Spinal stenosis of lumbar region at multiple levels     Status post placement of implantable loop recorder 2020    JULIETTEI       Past Surgical History:   Procedure Laterality Date    CARDIAC CATHETERIZATION  11/05/2018    Dr. Sher    CHOLECYSTECTOMY N/A 02/22/2021    Procedure: CHOLECYSTECTOMY LAPAROSCOPIC;  Surgeon: Faisal Wilkins DO;  Location: [facility] MAIN OR;  Service: General;  Laterality: N/A;    COLONOSCOPY  2016    CORONARY STENT PLACEMENT      ERCP N/A 02/21/2021    Procedure: ENDOSCOPIC RETROGRADE CHOLANGIOPANCREATOGRAPHY, sphincterotomy, balloon clearance of  bile duct, stent placement;  Surgeon: Ramiro Pulido MD;  Location: [facility] ENDOSCOPY;  Service: Gastroenterology;  Laterality: N/A;  post: biliary dilation, choledocolithiasis    ERCP N/A 06/04/2021    Procedure: ENDOSCOPIC RETROGRADE CHOLANGIOPANCREATOGRAPHY WITH STENT REMOVAL and balloon sweeping of common bile duct (12-15mm balloon up to 15mm);  Surgeon: Pranay White MD;  Location: [facility] ENDOSCOPY;  Service: Gastroenterology;  Laterality: N/A;  post op: biliary stent  removal, common bile duct sweeping with 12-15mm balloon (up to 15mm), dialated bile duct    LIVER BIOPSY      TONSILLECTOMY AND ADENOIDECTOMY           Current Outpatient Medications:     albuterol sulfate  (90 Base) MCG/ACT inhaler, Inhale 2 puffs Every 4 (Four) Hours As Needed for Wheezing., Disp: 6.7 g, Rfl: 5    amLODIPine (NORVASC) 10 MG tablet, Take 1 tablet by mouth Daily., Disp: 90 tablet, Rfl: 1    aspirin 81 MG EC tablet, Take 1 tablet by mouth Daily. LD 5/25, Disp: , Rfl:     atorvastatin (LIPITOR) 40 MG tablet, TAKE 1 TABLET BY MOUTH EVERY NIGHT, Disp: 90 tablet, Rfl: 1    DULoxetine (CYMBALTA) 60 MG capsule, Take 1 capsule by mouth Daily., Disp: 90 capsule, Rfl: 3    Fluticasone-Umeclidin-Vilant (Trelegy Ellipta) 100-62.5-25 MCG/ACT inhaler, Inhale 1 puff Daily., Disp: 60 each, Rfl: 11    HYDROcodone-acetaminophen (NORCO)  MG per tablet, Take 1 tablet by mouth 4 (Four) Times a Day., Disp: 120 tablet, Rfl: 0    ipratropium-albuterol (DUO-NEB) 0.5-2.5 mg/3 ml nebulizer, Take 3 mL by nebulization Every 4 (Four) Hours As Needed for Wheezing., Disp: 360 mL, Rfl: 5    lisinopril (PRINIVIL,ZESTRIL) 30 MG tablet, TAKE 1 TABLET BY MOUTH EVERY DAY, Disp: 90 tablet, Rfl: 1    modafinil (PROVIGIL) 200 MG tablet, Take 1 tablet by mouth Daily., Disp: 30 tablet, Rfl: 0    pregabalin (LYRICA) 100 MG capsule, TAKE 1 CAPSULE BY MOUTH TWICE A DAY, Disp: 60 capsule, Rfl: 0    tiZANidine (ZANAFLEX) 4 MG tablet, TAKE 1 TABLET BY MOUTH EVERY 8 HOURS AS NEEDED FOR MUSCLE SPASMS, Disp: 30 tablet, Rfl: 1    No Known Allergies    Family History   Problem Relation Age of Onset    Colon cancer Mother     Heart disease Mother     Depression Mother     Colon cancer Father     Heart disease Father     Heart attack Father     Cancer Father     Diabetes Brother     Diabetes Maternal Grandmother     Diabetes Maternal Grandfather        Cancer-related family history includes Cancer in his father; Colon cancer in his father  "and mother.    Social History     Tobacco Use    Smoking status: Former     Packs/day: 1.50     Years: 40.00     Pack years: 60.00     Types: Cigarettes     Start date: 1969     Quit date: 2009     Years since quittin.9    Smokeless tobacco: Never   Vaping Use    Vaping Use: Never used   Substance Use Topics    Alcohol use: Not Currently     Comment: No alcohol the past 10 years.    Drug use: No       Objective:     Vitals:    23 0920   BP: 134/78   Pulse: 79   Resp: 16   Temp: 97.6 øF (36.4 øC)   SpO2: 98%   Weight: 104 kg (228 lb 6.4 oz)   Height: 172.7 cm (68\")   PainSc: 0-No pain     /78   Pulse 79   Temp 97.6 øF (36.4 øC)   Resp 16   Ht 172.7 cm (68\")   Wt 104 kg (228 lb 6.4 oz)   SpO2 98%   BMI 34.73 kg/mý       PHYSICAL EXAM:      Physical Exam   Constitutional: He is oriented to person, place, and time. No distress.   HENT:   Head: Normocephalic and atraumatic.   Eyes: Conjunctivae are normal. Right eye exhibits no discharge. Left eye exhibits no discharge. No scleral icterus.   Neck: No thyromegaly present.   Cardiovascular: Normal rate, regular rhythm and normal heart sounds. Exam reveals no gallop and no friction rub.   Pulmonary/Chest: Effort normal. No stridor. No respiratory distress. He has no wheezes.   Distant breath sounds   Abdominal: Soft. Bowel sounds are normal. He exhibits no mass. There is no abdominal tenderness. There is no rebound and no guarding.   Musculoskeletal: Normal range of motion. No tenderness.   Lymphadenopathy:     He has no cervical adenopathy.   Neurological: He is alert and oriented to person, place, and time. He exhibits normal muscle tone.   Skin: Skin is warm. No rash noted. He is not diaphoretic. No erythema.   Psychiatric: His behavior is normal.      RECENT LABS:     Lab Results   Component Value Date    WBC 8.94 2023    WBC 10.54 2022    HGB 13.5 2023    HGB 14.8 2022    HCT 41.9 2023    HCT 44.7 2022 "    NEUTROABS 4.85 08/14/2023         Lab Results   Component Value Date    GLUCOSE 120 (H) 01/16/2023    BUN 11 01/16/2023    CREATININE 0.88 01/16/2023    EGFRIFNONA 85 06/02/2021    BCR 12.5 01/16/2023    K 4.6 01/16/2023    CO2 28.0 01/16/2023    CALCIUM 9.8 01/16/2023    ALBUMIN 4.6 01/16/2023    LABIL2 1.5 05/20/2019    AST 21 01/16/2023    ALT 16 01/16/2023         Assessment & Plan      ASSESSMENT:     Monoclonal B-cell lymphocytosis: cbc stable, no significant s/s of development of CLL  COPD: stable  Obesity  Posttraumatic stress disorder   obstructive sleep apnea      PLAN:      Flow cytometry was consistent with monoclonal B-cell lymphocytosis. No s/s of progression monitor for now.  LDH today  CMP done in July 2022 reviewed  CBC reviewed with patient  Patient is instructed to report to us if he develops any constitutional symptoms.  Patient to continue to use CPAP machine.  Blood pressure is controlled well now   He received disability now for his PTSD.  Controlled.  RTC Dr Huddleston in 1 year and check CBC, CMP and LDH

## 2023-08-11 NOTE — PROGRESS NOTES
Encounter Date:08/22/2023  Last seen 2/7/2023      Patient ID: Neptali Osuna is a 68 y.o. male.    Chief Complaint:    Status post stent  History of syncope  Status post loop recorder  Hypertension  Dyslipidemia        History of Present Illness  Since I have last seen, the patient has been without any chest discomfort ,shortness of breath, palpitations, dizziness or syncope.  Denies having any headache ,abdominal pain ,nausea, vomiting , diarrhea constipation, loss of weight or loss of appetite.  Denies having any excessive bruising ,hematuria or blood in the stool.    Review of all systems negative except as indicated.    Reviewed ROS.    Assessment and Plan         ]]]]]]]]]]]]]]]]]]]  History  =============   -Status post stent to proximal LAD 11/05/2018     Cardiac catheterization 11/05/2018 revealed  Left ventricular size and contractility is normal with ejection fraction of 65%.  Left main coronary artery is normal.  Left anterior descending artery has proximal calcific plaquing with 95-99% proximal LAD disease.  Circumflex coronary artery is normal.  Right coronary artery is a large and dominant vessel that has mid segment 50% disease.     -History of recurrent syncopal episodes concerning for bradyarrhythmias.  Loop recorder monitor showed second-degree AV block type I and type II.  Patient had one episode of bradycardia with heart rates of 30/min while he was sleeping without CPAP.     -Status post [employer] loop recorder 11/15/2019 (Lin[employer])  Negative tilt table test 12/20/2019       -COPD dyslipidemia hypertension     -Status post cholecystectomyFormer smoker      -Strong family history of coronary artery disease      -No known allergies  =========  Plan  ===========  Status post stent  Patient is not having any angina pectoris or congestive heart failure.  EKG showed sinus rhythm without ischemic changes.-8/22/2023  Patient is off Plavix.  Continue baby aspirin.    History of syncope-no further  episodes.  Loop recorder in place.     Hypertension-well-controlled-111/69     Dyslipidemia-continue atorvastatin.     History of second-degree AV block Mobitz type I and type II-improved.  Patient does not have any symptoms of dizziness or near syncope or syncope.    No need for pacemaker at this time     Follow-up in the office in 6 months.     Further plan will depend on patient's progress.    Reviewed and updated-8/22/2023  ]]]]]]]]]]]]]]]              Diagnosis Plan   1. Status post placement of implantable loop recorder        2. Mixed hyperlipidemia        3. Status post angioplasty with stent        4. Status post coronary artery stent placement        5. Syncope and collapse        6. Dyslipidemia        7. Essential hypertension        8. Pain and swelling of right lower leg        LAB RESULTS (LAST 7 DAYS)    CBC        BMP        CMP         BNP        TROPONIN        CoAg        Creatinine Clearance  Estimated Creatinine Clearance: 98 mL/min (by C-G formula based on SCr of 0.85 mg/dL).    ABG        Radiology  No radiology results for the last day                The following portions of the patient's history were reviewed and updated as appropriate: allergies, current medications, past family history, past medical history, past social history, past surgical history, and problem list.    Review of Systems   Constitutional: Negative for malaise/fatigue.   Cardiovascular:  Negative for chest pain, leg swelling, palpitations and syncope.   Respiratory:  Negative for shortness of breath.    Skin:  Negative for rash.   Gastrointestinal:  Negative for nausea and vomiting.   Neurological:  Negative for dizziness, light-headedness and numbness.   All other systems reviewed and are negative.      Current Outpatient Medications:     albuterol sulfate  (90 Base) MCG/ACT inhaler, Inhale 2 puffs Every 4 (Four) Hours As Needed for Wheezing., Disp: 6.7 g, Rfl: 5    amLODIPine (NORVASC) 10 MG tablet, Take 1  tablet by mouth Daily., Disp: 90 tablet, Rfl: 1    aspirin 81 MG EC tablet, Take 1 tablet by mouth Daily. LD 5/25, Disp: , Rfl:     atorvastatin (LIPITOR) 40 MG tablet, TAKE 1 TABLET BY MOUTH EVERY NIGHT, Disp: 90 tablet, Rfl: 1    DULoxetine (CYMBALTA) 60 MG capsule, Take 1 capsule by mouth Daily., Disp: 90 capsule, Rfl: 3    Fluticasone-Umeclidin-Vilant (Trelegy Ellipta) 100-62.5-25 MCG/ACT inhaler, Inhale 1 puff Daily., Disp: 60 each, Rfl: 11    HYDROcodone-acetaminophen (NORCO)  MG per tablet, Take 1 tablet by mouth 4 (Four) Times a Day., Disp: 120 tablet, Rfl: 0    ipratropium-albuterol (DUO-NEB) 0.5-2.5 mg/3 ml nebulizer, Take 3 mL by nebulization Every 4 (Four) Hours As Needed for Wheezing., Disp: 360 mL, Rfl: 5    lisinopril (PRINIVIL,ZESTRIL) 30 MG tablet, TAKE 1 TABLET BY MOUTH EVERY DAY, Disp: 90 tablet, Rfl: 1    modafinil (PROVIGIL) 200 MG tablet, Take 1 tablet by mouth Daily., Disp: 30 tablet, Rfl: 0    pregabalin (LYRICA) 100 MG capsule, TAKE 1 CAPSULE BY MOUTH TWICE A DAY, Disp: 60 capsule, Rfl: 0    tiZANidine (ZANAFLEX) 4 MG tablet, TAKE 1 TABLET BY MOUTH EVERY 8 HOURS AS NEEDED FOR MUSCLE SPASMS, Disp: 30 tablet, Rfl: 1    No Known Allergies    Family History   Problem Relation Age of Onset    Colon cancer Mother     Heart disease Mother     Depression Mother     Colon cancer Father     Heart disease Father     Heart attack Father     Cancer Father     Diabetes Brother     Diabetes Maternal Grandmother     Diabetes Maternal Grandfather        Past Surgical History:   Procedure Laterality Date    CARDIAC CATHETERIZATION  11/05/2018    Dr. Sher    CHOLECYSTECTOMY N/A 02/22/2021    Procedure: CHOLECYSTECTOMY LAPAROSCOPIC;  Surgeon: Faisal Wilkins DO;  Location: [facility] MAIN OR;  Service: General;  Laterality: N/A;    COLONOSCOPY  2016    CORONARY STENT PLACEMENT      ERCP N/A 02/21/2021    Procedure: ENDOSCOPIC RETROGRADE CHOLANGIOPANCREATOGRAPHY, sphincterotomy, balloon clearance of  bile  duct, stent placement;  Surgeon: Ramiro Pulido MD;  Location: [facility] ENDOSCOPY;  Service: Gastroenterology;  Laterality: N/A;  post: biliary dilation, choledocolithiasis    ERCP N/A 2021    Procedure: ENDOSCOPIC RETROGRADE CHOLANGIOPANCREATOGRAPHY WITH STENT REMOVAL and balloon sweeping of common bile duct (12-15mm balloon up to 15mm);  Surgeon: rPanay White MD;  Location: [facility] ENDOSCOPY;  Service: Gastroenterology;  Laterality: N/A;  post op: biliary stent removal, common bile duct sweeping with 12-15mm balloon (up to 15mm), dialated bile duct    LIVER BIOPSY      TONSILLECTOMY AND ADENOIDECTOMY         Past Medical History:   Diagnosis Date    Acute diverticulitis     with perforation    Allergic     Anemia     Anxiety     Back pain     CAD S/P percutaneous coronary angioplasty     Cancer     cll     Congenital heart disease     COPD (chronic obstructive pulmonary disease)     Depression     Diverticulitis     GERD (gastroesophageal reflux disease)     Hyperlipidemia     Hypertension     Low back pain     Myocardial infarction     TEIXEIRA (nonalcoholic steatohepatitis)     Obesity     BRITTNEY on CPAP     Sleep apnea     Sleep disorder, shift work     Spinal stenosis of lumbar region at multiple levels     Status post placement of implantable loop recorder     GONDI       Family History   Problem Relation Age of Onset    Colon cancer Mother     Heart disease Mother     Depression Mother     Colon cancer Father     Heart disease Father     Heart attack Father     Cancer Father     Diabetes Brother     Diabetes Maternal Grandmother     Diabetes Maternal Grandfather        Social History     Socioeconomic History    Marital status:    Tobacco Use    Smoking status: Former     Packs/day: 1.50     Years: 40.00     Pack years: 60.00     Types: Cigarettes     Start date: 1969     Quit date: 2009     Years since quittin.9     Passive exposure: Past    Smokeless tobacco: Never  "  Vaping Use    Vaping Use: Never used   Substance and Sexual Activity    Alcohol use: Not Currently     Comment: No alcohol the past 10 years.    Drug use: No    Sexual activity: Not Currently     Partners: Female     Birth control/protection: None           ECG 12 Lead    Date/Time: 8/22/2023 1:31 PM  Performed by: Mesha Sher MD  Authorized by: Mesha Sher MD   Comparison: compared with previous ECG   Similar to previous ECG  Comparison to previous ECG: Normal sinus rhythm normal ECG 66/min normal axis normal intervals no ectopy no significant change from 2/7/2023.        Objective:       Physical Exam    /69 (BP Location: Right arm, Patient Position: Sitting, Cuff Size: Adult)   Pulse 66   Ht 172.7 cm (68\")   Wt 103 kg (226 lb)   SpO2 98% Comment: RA  BMI 34.36 kg/mý   The patient is alert, oriented and in no distress.    Vital signs as noted above.    Head and neck revealed no carotid bruits or jugular venous distension.  No thyromegaly or lymphadenopathy is present.    Lungs clear.  No wheezing.  Breath sounds are normal bilaterally.    Heart normal first and second heart sounds.  No murmur..  No pericardial rub is present.  No gallop is present.    Abdomen soft and nontender.  No organomegaly is present.    Extremities revealed good peripheral pulses without any pedal edema.    Skin warm and dry.    Musculoskeletal system is grossly normal.    CNS grossly normal.    Reviewed and updated.        "

## 2023-08-11 NOTE — PROGRESS NOTES
Hematology/Oncology Outpatient Follow Up    Neptali Osuna  1955    Primary Care Physician: Honey Steele APRN   Referring Physician: No ref. 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 Osuna reports that an episode of fall loss of consciousness happened, and that he drove for about 45 minutes without knowing and ended up in his neighbor's backyard which happened in October 2019.  Ever since then he had had a few more spells of passing out.    The passing out spells happen for a few seconds.  Patient has obstructive sleep apnea and uses CPAP machine.  No seizure episodes.  He underwent video examination for 3 days had one episode during the evaluation.  Patient was sent to the [facility] for further evaluation given his abnormal CBC that happened with leukocytosis and monocytosis.  12/30/2019 peripheral blood flow cytometry -A small monoclonal B-cell population with a CLL phenotype is present. If the patient has a prior diagnosis of CLL, then the findings may represent persistent involvement. If the patient is not known to have CLL or SLL, further investigation is recommended if clinically indicated. In the absence of CLL or SLL, small (<5000/uL) clonal B-cell populations in the blood are classified as monoclonal B-cell lymphocytosis. Clinical correlation is required.  1/3/2020 CT scan of the soft tissue of the neck chest abdomen and pelvis were obtained-CT soft tissue neck shows some soft tissue prominence in the nasopharynx which could relate to slightly prominent adenoid tissue.  Vascular calcification is noted in the carotid bifurcations.  DJD C-spine.  Chest abdomen and pelvis show some areas of atelectasis or scarring involving the lungs.  Bilateral renal cysts.  Prostate appears enlarged.  Colonic diverticulosis.  DJD spine.  Cholelithiasis.  No obvious pathologically enlarged  lymphadenopathy.  8/19/2020 - WBC 11.32. ALC 7.83      Past Medical History:   Diagnosis Date    Acute diverticulitis     with perforation    Allergic     Anemia     Anxiety     Back pain     CAD S/P percutaneous coronary angioplasty     Cancer     cll     Congenital heart disease 2018    COPD (chronic obstructive pulmonary disease)     Depression     Diverticulitis     GERD (gastroesophageal reflux disease)     Hyperlipidemia     Hypertension     Low back pain     Myocardial infarction     TEIXEIRA (nonalcoholic steatohepatitis)     Obesity     BRITTNEY on CPAP     Sleep apnea     Sleep disorder, shift work     Spinal stenosis of lumbar region at multiple levels     Status post placement of implantable loop recorder 2020    JULIETTEI       Past Surgical History:   Procedure Laterality Date    CARDIAC CATHETERIZATION  11/05/2018    Dr. Sher    CHOLECYSTECTOMY N/A 02/22/2021    Procedure: CHOLECYSTECTOMY LAPAROSCOPIC;  Surgeon: Faisal Wilkins DO;  Location: [facility] MAIN OR;  Service: General;  Laterality: N/A;    COLONOSCOPY  2016    CORONARY STENT PLACEMENT      ERCP N/A 02/21/2021    Procedure: ENDOSCOPIC RETROGRADE CHOLANGIOPANCREATOGRAPHY, sphincterotomy, balloon clearance of  bile duct, stent placement;  Surgeon: Ramiro Pulido MD;  Location: [facility] ENDOSCOPY;  Service: Gastroenterology;  Laterality: N/A;  post: biliary dilation, choledocolithiasis    ERCP N/A 06/04/2021    Procedure: ENDOSCOPIC RETROGRADE CHOLANGIOPANCREATOGRAPHY WITH STENT REMOVAL and balloon sweeping of common bile duct (12-15mm balloon up to 15mm);  Surgeon: Pranay White MD;  Location: [facility] ENDOSCOPY;  Service: Gastroenterology;  Laterality: N/A;  post op: biliary stent removal, common bile duct sweeping with 12-15mm balloon (up to 15mm), dialated bile duct    LIVER BIOPSY      TONSILLECTOMY AND ADENOIDECTOMY           Current Outpatient Medications:     albuterol sulfate  (90 Base) MCG/ACT inhaler,  Inhale 2 puffs Every 4 (Four) Hours As Needed for Wheezing., Disp: 6.7 g, Rfl: 5    amLODIPine (NORVASC) 10 MG tablet, Take 1 tablet by mouth Daily., Disp: 90 tablet, Rfl: 1    aspirin 81 MG EC tablet, Take 81 mg by mouth Daily. LD 5/25, Disp: , Rfl:     atorvastatin (LIPITOR) 40 MG tablet, TAKE 1 TABLET BY MOUTH EVERY NIGHT, Disp: 90 tablet, Rfl: 1    DULoxetine (CYMBALTA) 60 MG capsule, Take 1 capsule by mouth Daily., Disp: 90 capsule, Rfl: 3    Fluticasone-Umeclidin-Vilant (Trelegy Ellipta) 100-62.5-25 MCG/ACT inhaler, Inhale 1 puff Daily., Disp: 60 each, Rfl: 11    HYDROcodone-acetaminophen (NORCO)  MG per tablet, Take 1 tablet by mouth 4 (Four) Times a Day., Disp: 120 tablet, Rfl: 0    ipratropium-albuterol (DUO-NEB) 0.5-2.5 mg/3 ml nebulizer, Take 3 mL by nebulization Every 4 (Four) Hours As Needed for Wheezing., Disp: 360 mL, Rfl: 5    lisinopril (PRINIVIL,ZESTRIL) 30 MG tablet, TAKE 1 TABLET BY MOUTH EVERY DAY, Disp: 90 tablet, Rfl: 1    modafinil (PROVIGIL) 200 MG tablet, Take 1 tablet by mouth Daily., Disp: 30 tablet, Rfl: 0    pregabalin (LYRICA) 100 MG capsule, TAKE 1 CAPSULE BY MOUTH TWICE A DAY, Disp: 60 capsule, Rfl: 0    tiZANidine (ZANAFLEX) 4 MG tablet, TAKE 1 TABLET BY MOUTH EVERY 8 HOURS AS NEEDED FOR MUSCLE SPASMS, Disp: 30 tablet, Rfl: 1    No Known Allergies    Family History   Problem Relation Age of Onset    Colon cancer Mother     Heart disease Mother     Depression Mother     Colon cancer Father     Heart disease Father     Heart attack Father     Cancer Father     Diabetes Brother     Diabetes Maternal Grandmother     Diabetes Maternal Grandfather        Cancer-related family history includes Cancer in his father; Colon cancer in his father and mother.    Social History     Tobacco Use    Smoking status: Former     Packs/day: 1.50     Years: 40.00     Pack years: 60.00     Types: Cigarettes     Start date: 9/1/1969     Quit date: 9/1/2009     Years since  quittin.9    Smokeless tobacco: Never   Vaping Use    Vaping Use: Never used   Substance Use Topics    Alcohol use: Not Currently     Comment: No alcohol the past 10 years.    Drug use: No       I have reviewed the history of present illness, past medical history, family history, social history, lab results, all notes and other records since the patient was last seen at the cancer care center.    SUBJECTIVE:      Patient is seen for follow up. He has had cholecystectomy in the interim. No constitutional symptoms of weight loss, fatigue, night sweats.    ROS:      Review of Systems   Constitutional:  Negative for fever.   HENT:  Negative for nosebleeds and trouble swallowing.    Eyes:  Negative for visual disturbance.   Respiratory:  Negative for cough, shortness of breath and wheezing.    Cardiovascular:  Negative for chest pain.   Gastrointestinal:  Negative for abdominal pain and blood in stool.   Endocrine: Negative for cold intolerance.   Genitourinary:  Negative for dysuria and hematuria.   Musculoskeletal:  Negative for joint swelling.   Skin:  Negative for rash.   Allergic/Immunologic: Negative for environmental allergies.   Neurological:  Negative for seizures.   Hematological:  Does not bruise/bleed easily.   Psychiatric/Behavioral:  The patient is not nervous/anxious.       MD performed ROS and are negative except as mentioned in Subjective.unchanged      Objective:       There were no vitals filed for this visit.    There were no vitals taken for this visit.      PHYSICAL EXAM:      Physical Exam   Constitutional: He is oriented to person, place, and time. No distress.   HENT:   Head: Normocephalic and atraumatic.   Eyes: Conjunctivae are normal. Right eye exhibits no discharge. Left eye exhibits no discharge. No scleral icterus.   Neck: No thyromegaly present.   Cardiovascular: Normal rate, regular rhythm and normal heart sounds. Exam reveals no gallop and no friction rub.   Pulmonary/Chest:  Effort normal. No stridor. No respiratory distress. He has no wheezes.   Distant breath sounds   Abdominal: Soft. Bowel sounds are normal. He exhibits no mass. There is no abdominal tenderness. There is no rebound and no guarding.   Musculoskeletal: Normal range of motion. No tenderness.   Lymphadenopathy:     He has no cervical adenopathy.   Neurological: He is alert and oriented to person, place, and time. He exhibits normal muscle tone.   Skin: Skin is warm. No rash noted. He is not diaphoretic. No erythema.   Psychiatric: His behavior is normal.        RECENT LABS:     Lab Results   Component Value Date    WBC 10.54 08/16/2022    WBC 8.55 03/15/2022    HGB 14.8 08/16/2022    HGB 13.7 03/15/2022    HCT 44.7 08/16/2022    HCT 41.4 03/15/2022    NEUTROABS 6.64 08/16/2022         Lab Results   Component Value Date    GLUCOSE 120 (H) 01/16/2023    BUN 11 01/16/2023    CREATININE 0.88 01/16/2023    EGFRIFNONA 85 06/02/2021    BCR 12.5 01/16/2023    K 4.6 01/16/2023    CO2 28.0 01/16/2023    CALCIUM 9.8 01/16/2023    ALBUMIN 4.6 01/16/2023    LABIL2 1.5 05/20/2019    AST 21 01/16/2023    ALT 16 01/16/2023         Assessment & Plan      ASSESSMENT:     Monoclonal B-cell lymphocytosis  Obesity  Posttraumatic stress disorder   obstructive sleep apnea      PLAN:      Flow cytometry was consistent with monoclonal B-cell lymphocytosis.    No evidence of lymphadenopathy.  Repeat labs in 1 year.  He has lymphocytosis/leukocytosis treatment is not indicated.  Patient is to report to us if he develops any constitutional symptoms.  Patient to continue to use CPAP machine.  Blood pressure is controlled well now   He received disability now for his PTSD.  Controlled.  I will see him back in my office in 1 year and check CBC, CMP and LDH    I have reviewed labs results, imaging, vitals, and medications with the patient today.   Patient verbalized understanding and is in agreement of the above plan  Patient new to me, time spent  reviewing record and summarizing  Time spent on encounter including record review, history taking, exam, discussion, counseling and documentation at: 30 minutes        This report was compiled using Dragon voice recognition software. I have made every effort to proof read this document; however, typographical errors may persist.

## 2023-08-14 ENCOUNTER — APPOINTMENT (OUTPATIENT)
Dept: LAB | Facility: HOSPITAL | Age: 68
End: 2023-08-14
Payer: MEDICARE

## 2023-08-14 ENCOUNTER — OFFICE VISIT (OUTPATIENT)
Dept: ONCOLOGY | Facility: CLINIC | Age: 68
End: 2023-08-14
Payer: MEDICARE

## 2023-08-14 VITALS
BODY MASS INDEX: 34.62 KG/M2 | HEART RATE: 79 BPM | WEIGHT: 228.4 LBS | DIASTOLIC BLOOD PRESSURE: 78 MMHG | SYSTOLIC BLOOD PRESSURE: 134 MMHG | HEIGHT: 68 IN | RESPIRATION RATE: 16 BRPM | TEMPERATURE: 97.6 F | OXYGEN SATURATION: 98 %

## 2023-08-14 DIAGNOSIS — C91.10 CLL (CHRONIC LYMPHOCYTIC LEUKEMIA): Primary | ICD-10-CM

## 2023-08-14 DIAGNOSIS — G47.419 PRIMARY NARCOLEPSY WITHOUT CATAPLEXY: ICD-10-CM

## 2023-08-14 LAB
ALBUMIN SERPL-MCNC: 4.3 G/DL (ref 3.5–5.2)
ALBUMIN/GLOB SERPL: 1.7 G/DL
ALP SERPL-CCNC: 87 U/L (ref 39–117)
ALT SERPL W P-5'-P-CCNC: 18 U/L (ref 1–41)
ANION GAP SERPL CALCULATED.3IONS-SCNC: 10 MMOL/L (ref 5–15)
AST SERPL-CCNC: 16 U/L (ref 1–40)
BASOPHILS # BLD AUTO: 0.04 10*3/MM3 (ref 0–0.2)
BASOPHILS NFR BLD AUTO: 0.4 % (ref 0–1.5)
BILIRUB SERPL-MCNC: 0.5 MG/DL (ref 0–1.2)
BUN SERPL-MCNC: 9 MG/DL (ref 8–23)
BUN/CREAT SERPL: 10.6 (ref 7–25)
CALCIUM SPEC-SCNC: 9.5 MG/DL (ref 8.6–10.5)
CHLORIDE SERPL-SCNC: 100 MMOL/L (ref 98–107)
CO2 SERPL-SCNC: 27 MMOL/L (ref 22–29)
CREAT SERPL-MCNC: 0.85 MG/DL (ref 0.76–1.27)
DEPRECATED RDW RBC AUTO: 42.5 FL (ref 37–54)
EGFRCR SERPLBLD CKD-EPI 2021: 95.2 ML/MIN/1.73
EOSINOPHIL # BLD AUTO: 0.55 10*3/MM3 (ref 0–0.4)
EOSINOPHIL NFR BLD AUTO: 6.2 % (ref 0.3–6.2)
ERYTHROCYTE [DISTWIDTH] IN BLOOD BY AUTOMATED COUNT: 12.9 % (ref 12.3–15.4)
GLOBULIN UR ELPH-MCNC: 2.5 GM/DL
GLUCOSE SERPL-MCNC: 110 MG/DL (ref 65–99)
HCT VFR BLD AUTO: 41.9 % (ref 37.5–51)
HGB BLD-MCNC: 13.5 G/DL (ref 13–17.7)
HOLD SPECIMEN: NORMAL
LDH SERPL-CCNC: 148 U/L (ref 135–225)
LYMPHOCYTES # BLD AUTO: 2.62 10*3/MM3 (ref 0.7–3.1)
LYMPHOCYTES NFR BLD AUTO: 29.3 % (ref 19.6–45.3)
MCH RBC QN AUTO: 29.8 PG (ref 26.6–33)
MCHC RBC AUTO-ENTMCNC: 32.2 G/DL (ref 31.5–35.7)
MCV RBC AUTO: 92.5 FL (ref 79–97)
MONOCYTES # BLD AUTO: 0.88 10*3/MM3 (ref 0.1–0.9)
MONOCYTES NFR BLD AUTO: 9.8 % (ref 5–12)
NEUTROPHILS NFR BLD AUTO: 4.85 10*3/MM3 (ref 1.7–7)
NEUTROPHILS NFR BLD AUTO: 54.3 % (ref 42.7–76)
PLATELET # BLD AUTO: 272 10*3/MM3 (ref 140–450)
PMV BLD AUTO: 9.3 FL (ref 6–12)
POTASSIUM SERPL-SCNC: 3.6 MMOL/L (ref 3.5–5.2)
PROT SERPL-MCNC: 6.8 G/DL (ref 6–8.5)
RBC # BLD AUTO: 4.53 10*6/MM3 (ref 4.14–5.8)
SODIUM SERPL-SCNC: 137 MMOL/L (ref 136–145)
WBC NRBC COR # BLD: 8.94 10*3/MM3 (ref 3.4–10.8)

## 2023-08-14 PROCEDURE — 1126F AMNT PAIN NOTED NONE PRSNT: CPT | Performed by: INTERNAL MEDICINE

## 2023-08-14 PROCEDURE — 36415 COLL VENOUS BLD VENIPUNCTURE: CPT

## 2023-08-14 PROCEDURE — 3075F SYST BP GE 130 - 139MM HG: CPT | Performed by: INTERNAL MEDICINE

## 2023-08-14 PROCEDURE — 99213 OFFICE O/P EST LOW 20 MIN: CPT | Performed by: INTERNAL MEDICINE

## 2023-08-14 PROCEDURE — 3078F DIAST BP <80 MM HG: CPT | Performed by: INTERNAL MEDICINE

## 2023-08-14 PROCEDURE — 85025 COMPLETE CBC W/AUTO DIFF WBC: CPT | Performed by: INTERNAL MEDICINE

## 2023-08-14 PROCEDURE — 80053 COMPREHEN METABOLIC PANEL: CPT | Performed by: INTERNAL MEDICINE

## 2023-08-14 PROCEDURE — 83615 LACTATE (LD) (LDH) ENZYME: CPT | Performed by: INTERNAL MEDICINE

## 2023-08-14 RX ORDER — MODAFINIL 200 MG/1
200 TABLET ORAL DAILY
Qty: 30 TABLET | Refills: 0 | Status: SHIPPED | OUTPATIENT
Start: 2023-08-14

## 2023-08-22 ENCOUNTER — OFFICE VISIT (OUTPATIENT)
Dept: CARDIOLOGY | Facility: CLINIC | Age: 68
End: 2023-08-22
Payer: MEDICARE

## 2023-08-22 VITALS
BODY MASS INDEX: 34.25 KG/M2 | SYSTOLIC BLOOD PRESSURE: 111 MMHG | DIASTOLIC BLOOD PRESSURE: 69 MMHG | HEIGHT: 68 IN | OXYGEN SATURATION: 98 % | HEART RATE: 66 BPM | WEIGHT: 226 LBS

## 2023-08-22 DIAGNOSIS — E78.2 MIXED HYPERLIPIDEMIA: ICD-10-CM

## 2023-08-22 DIAGNOSIS — M79.89 PAIN AND SWELLING OF RIGHT LOWER LEG: ICD-10-CM

## 2023-08-22 DIAGNOSIS — M54.50 CHRONIC BILATERAL LOW BACK PAIN WITHOUT SCIATICA: ICD-10-CM

## 2023-08-22 DIAGNOSIS — M79.661 PAIN AND SWELLING OF RIGHT LOWER LEG: ICD-10-CM

## 2023-08-22 DIAGNOSIS — Z95.818 STATUS POST PLACEMENT OF IMPLANTABLE LOOP RECORDER: Primary | ICD-10-CM

## 2023-08-22 DIAGNOSIS — I10 ESSENTIAL HYPERTENSION: ICD-10-CM

## 2023-08-22 DIAGNOSIS — G89.29 CHRONIC BILATERAL LOW BACK PAIN WITHOUT SCIATICA: ICD-10-CM

## 2023-08-22 DIAGNOSIS — E78.5 DYSLIPIDEMIA: ICD-10-CM

## 2023-08-22 DIAGNOSIS — Z95.5 STATUS POST CORONARY ARTERY STENT PLACEMENT: ICD-10-CM

## 2023-08-22 DIAGNOSIS — Z95.820 STATUS POST ANGIOPLASTY WITH STENT: ICD-10-CM

## 2023-08-22 DIAGNOSIS — R55 SYNCOPE AND COLLAPSE: ICD-10-CM

## 2023-08-22 PROCEDURE — 1160F RVW MEDS BY RX/DR IN RCRD: CPT | Performed by: INTERNAL MEDICINE

## 2023-08-22 PROCEDURE — 99214 OFFICE O/P EST MOD 30 MIN: CPT | Performed by: INTERNAL MEDICINE

## 2023-08-22 PROCEDURE — 3078F DIAST BP <80 MM HG: CPT | Performed by: INTERNAL MEDICINE

## 2023-08-22 PROCEDURE — 93000 ELECTROCARDIOGRAM COMPLETE: CPT | Performed by: INTERNAL MEDICINE

## 2023-08-22 PROCEDURE — 1159F MED LIST DOCD IN RCRD: CPT | Performed by: INTERNAL MEDICINE

## 2023-08-22 PROCEDURE — 3074F SYST BP LT 130 MM HG: CPT | Performed by: INTERNAL MEDICINE

## 2023-08-22 RX ORDER — HYDROCODONE BITARTRATE AND ACETAMINOPHEN 10; 325 MG/1; MG/1
1 TABLET ORAL 4 TIMES DAILY
Qty: 120 TABLET | Refills: 0 | Status: SHIPPED | OUTPATIENT
Start: 2023-08-22

## 2023-09-18 NOTE — PROGRESS NOTES
Teresa Osuna is a 68 y.o. male.       HPI   Pt. is here today for 4 month follow up on medication.   1) Chronic low back pain with radiculopathy - currently has regimen of Norco 10/325 mg qid; Lyrica 100 mg bid; tizanidine 4 mg tid PRN.  Feels pain symptoms are in good control/ stable.  UDS Jan 2023.  Refills to [facility].     2) HTN -   Currently on amlodipine 10 mg daily; lisinopril 30 mg daily.   Denies any CP; palpitations; SOA; dizziness; headache; trouble with vision.    BP has been 130's/80's at home.    3) Hyperlipdiemia - currently on atorvastatin 40 mg daily; ASA 81 mg daily.   4) COPD - currently on Trelegy Ellipta inhaler; albuterol inhaler PRN.   Denies any current SOA; cough or wheezes.   Continues with pulmonology -Dr. Gracia.   5) Narcolepsy - currently taking Provigil 200 mg daily.  Stable.  Refills to [facility].     6) Depression/Anxiety - currently taking Cymbalta 60 mg daily. Moods stable.  Denies any SI or HI.     The following portions of the patient's history were reviewed and updated as appropriate: allergies, current medications, past family history, past medical history, past social history, past surgical history, and problem list.    Review of Systems   Constitutional:  Negative for appetite change, chills, fatigue, fever and unexpected weight loss.   Eyes:  Negative for visual disturbance.   Respiratory:  Negative for cough, shortness of breath and wheezing.    Cardiovascular:  Negative for chest pain, palpitations and leg swelling.   Gastrointestinal:  Negative for abdominal pain, blood in stool, constipation, diarrhea, nausea, vomiting and indigestion.   Genitourinary:  Negative for dysuria, frequency, hematuria, urgency and urinary incontinence.   Musculoskeletal:  Positive for arthralgias and back pain.   Neurological:  Positive for numbness. Negative for dizziness, weakness and headache.   Psychiatric/Behavioral:  Negative for self-injury, sleep  disturbance, suicidal ideas, depressed mood and stress. The patient is not nervous/anxious.      Objective   Physical Exam  Vitals reviewed.   Constitutional:       General: He is not in acute distress.     Appearance: Normal appearance. He is obese.   Cardiovascular:      Rate and Rhythm: Normal rate and regular rhythm.      Pulses: Normal pulses.      Heart sounds: Normal heart sounds. No murmur heard.  Pulmonary:      Effort: Pulmonary effort is normal. No respiratory distress.      Breath sounds: Normal breath sounds. No wheezing or rhonchi.   Chest:      Chest wall: No tenderness.   Abdominal:      Tenderness: There is no right CVA tenderness or left CVA tenderness.   Musculoskeletal:      Right lower leg: No edema.      Left lower leg: No edema.   Skin:     General: Skin is warm and dry.      Findings: No erythema.   Neurological:      General: No focal deficit present.      Mental Status: He is alert and oriented to person, place, and time.   Psychiatric:         Mood and Affect: Mood normal.              Assessment & Plan   Diagnoses and all orders for this visit:    1. Primary hypertension (Primary)  Comments:  Stable.   Cont current medication.   Recent labs reviewed.   RTO in 4 mo.    2. Mixed hyperlipidemia  Comments:  Stable.   Cont current medication.   Recent labs reviewed.   RTO in 4 mo.    3. Obstructive chronic bronchitis without exacerbation  Comments:  Stable.   Cont current medication.   Recent labs reviewed.   RTO in 4 mo.    4. Primary narcolepsy without cataplexy  Comments:  Update pain agreement today.   UDS in Jan 2023.   Stable.   Cont. current medication.   RTO in 4 mo.  Orders:  -     modafinil (PROVIGIL) 200 MG tablet; Take 1 tablet by mouth Daily.  Dispense: 30 tablet; Refill: 0    5. Anxiety and depression  Comments:  Stable.   Cont. current medication.   RTO in 4 mo    6. Chronic radicular pain of lower back  Comments:  Update pain agreement today.   UDS in Jan 2023.   Stable.    Cont. current medication.   RTO in 4 mo.

## 2023-09-19 ENCOUNTER — OFFICE VISIT (OUTPATIENT)
Dept: FAMILY MEDICINE CLINIC | Facility: CLINIC | Age: 68
End: 2023-09-19
Payer: MEDICARE

## 2023-09-19 VITALS
HEIGHT: 68 IN | OXYGEN SATURATION: 98 % | HEART RATE: 79 BPM | BODY MASS INDEX: 33.95 KG/M2 | DIASTOLIC BLOOD PRESSURE: 79 MMHG | SYSTOLIC BLOOD PRESSURE: 128 MMHG | WEIGHT: 224 LBS

## 2023-09-19 DIAGNOSIS — I10 PRIMARY HYPERTENSION: Primary | ICD-10-CM

## 2023-09-19 DIAGNOSIS — J44.9 OBSTRUCTIVE CHRONIC BRONCHITIS WITHOUT EXACERBATION: ICD-10-CM

## 2023-09-19 DIAGNOSIS — F41.9 ANXIETY AND DEPRESSION: ICD-10-CM

## 2023-09-19 DIAGNOSIS — G89.29 CHRONIC RADICULAR PAIN OF LOWER BACK: ICD-10-CM

## 2023-09-19 DIAGNOSIS — G47.419 PRIMARY NARCOLEPSY WITHOUT CATAPLEXY: ICD-10-CM

## 2023-09-19 DIAGNOSIS — E78.2 MIXED HYPERLIPIDEMIA: ICD-10-CM

## 2023-09-19 DIAGNOSIS — M54.16 CHRONIC RADICULAR PAIN OF LOWER BACK: ICD-10-CM

## 2023-09-19 DIAGNOSIS — F32.A ANXIETY AND DEPRESSION: ICD-10-CM

## 2023-09-19 PROCEDURE — 99214 OFFICE O/P EST MOD 30 MIN: CPT | Performed by: NURSE PRACTITIONER

## 2023-09-19 PROCEDURE — 3074F SYST BP LT 130 MM HG: CPT | Performed by: NURSE PRACTITIONER

## 2023-09-19 PROCEDURE — 1160F RVW MEDS BY RX/DR IN RCRD: CPT | Performed by: NURSE PRACTITIONER

## 2023-09-19 PROCEDURE — 3078F DIAST BP <80 MM HG: CPT | Performed by: NURSE PRACTITIONER

## 2023-09-19 PROCEDURE — 1159F MED LIST DOCD IN RCRD: CPT | Performed by: NURSE PRACTITIONER

## 2023-09-19 RX ORDER — MODAFINIL 200 MG/1
200 TABLET ORAL DAILY
Qty: 30 TABLET | Refills: 0 | Status: SHIPPED | OUTPATIENT
Start: 2023-09-19

## 2023-09-20 DIAGNOSIS — M54.50 CHRONIC BILATERAL LOW BACK PAIN WITHOUT SCIATICA: ICD-10-CM

## 2023-09-20 DIAGNOSIS — G89.29 CHRONIC BILATERAL LOW BACK PAIN WITHOUT SCIATICA: ICD-10-CM

## 2023-09-20 RX ORDER — HYDROCODONE BITARTRATE AND ACETAMINOPHEN 10; 325 MG/1; MG/1
1 TABLET ORAL 4 TIMES DAILY
Qty: 120 TABLET | Refills: 0 | Status: CANCELLED | OUTPATIENT
Start: 2023-09-20

## 2023-09-20 RX ORDER — HYDROCODONE BITARTRATE AND ACETAMINOPHEN 10; 325 MG/1; MG/1
1 TABLET ORAL 4 TIMES DAILY
Qty: 120 TABLET | Refills: 0 | Status: SHIPPED | OUTPATIENT
Start: 2023-09-20

## 2023-09-22 RX ORDER — TRAMADOL HYDROCHLORIDE 50 MG/1
50 TABLET ORAL EVERY 6 HOURS PRN
Qty: 20 TABLET | Refills: 0 | Status: SHIPPED | OUTPATIENT
Start: 2023-09-22

## 2023-09-26 DIAGNOSIS — M54.16 CHRONIC RADICULAR PAIN OF LOWER BACK: Primary | ICD-10-CM

## 2023-09-26 DIAGNOSIS — G89.29 CHRONIC RADICULAR PAIN OF LOWER BACK: Primary | ICD-10-CM

## 2023-09-26 RX ORDER — TRAMADOL HYDROCHLORIDE 50 MG/1
50 TABLET ORAL EVERY 8 HOURS PRN
Qty: 90 TABLET | Refills: 0 | Status: SHIPPED | OUTPATIENT
Start: 2023-09-26 | End: 2023-09-27

## 2023-09-27 DIAGNOSIS — M54.50 CHRONIC BILATERAL LOW BACK PAIN WITHOUT SCIATICA: ICD-10-CM

## 2023-09-27 DIAGNOSIS — G89.29 CHRONIC BILATERAL LOW BACK PAIN WITHOUT SCIATICA: ICD-10-CM

## 2023-09-27 RX ORDER — HYDROCODONE BITARTRATE AND ACETAMINOPHEN 10; 325 MG/1; MG/1
1 TABLET ORAL 4 TIMES DAILY
Qty: 120 TABLET | Refills: 0 | Status: SHIPPED | OUTPATIENT
Start: 2023-09-27

## 2023-10-24 DIAGNOSIS — G89.29 CHRONIC BILATERAL LOW BACK PAIN WITHOUT SCIATICA: ICD-10-CM

## 2023-10-24 DIAGNOSIS — M54.50 CHRONIC BILATERAL LOW BACK PAIN WITHOUT SCIATICA: ICD-10-CM

## 2023-10-24 RX ORDER — HYDROCODONE BITARTRATE AND ACETAMINOPHEN 10; 325 MG/1; MG/1
1 TABLET ORAL 4 TIMES DAILY
Qty: 120 TABLET | Refills: 0 | Status: SHIPPED | OUTPATIENT
Start: 2023-10-24

## 2023-10-24 RX ORDER — AMLODIPINE BESYLATE 10 MG/1
10 TABLET ORAL DAILY
Qty: 90 TABLET | Refills: 1 | Status: SHIPPED | OUTPATIENT
Start: 2023-10-24

## 2023-11-02 DIAGNOSIS — G89.29 CHRONIC BILATERAL LOW BACK PAIN WITHOUT SCIATICA: ICD-10-CM

## 2023-11-02 DIAGNOSIS — M54.50 CHRONIC BILATERAL LOW BACK PAIN WITHOUT SCIATICA: ICD-10-CM

## 2023-11-02 RX ORDER — HYDROCODONE BITARTRATE AND ACETAMINOPHEN 10; 325 MG/1; MG/1
1 TABLET ORAL 4 TIMES DAILY
Qty: 120 TABLET | Refills: 0 | Status: SHIPPED | OUTPATIENT
Start: 2023-11-02

## 2023-11-28 DIAGNOSIS — G47.419 PRIMARY NARCOLEPSY WITHOUT CATAPLEXY: ICD-10-CM

## 2023-11-29 DIAGNOSIS — G89.29 CHRONIC BILATERAL LOW BACK PAIN WITHOUT SCIATICA: ICD-10-CM

## 2023-11-29 DIAGNOSIS — M54.50 CHRONIC BILATERAL LOW BACK PAIN WITHOUT SCIATICA: ICD-10-CM

## 2023-11-29 RX ORDER — HYDROCODONE BITARTRATE AND ACETAMINOPHEN 10; 325 MG/1; MG/1
1 TABLET ORAL 4 TIMES DAILY
Qty: 120 TABLET | Refills: 0 | Status: SHIPPED | OUTPATIENT
Start: 2023-11-29

## 2023-11-29 RX ORDER — MODAFINIL 200 MG/1
200 TABLET ORAL DAILY
Qty: 30 TABLET | Refills: 0 | Status: SHIPPED | OUTPATIENT
Start: 2023-11-29

## 2023-12-12 DIAGNOSIS — G89.29 CHRONIC BILATERAL LOW BACK PAIN WITHOUT SCIATICA: ICD-10-CM

## 2023-12-12 DIAGNOSIS — M54.50 CHRONIC BILATERAL LOW BACK PAIN WITHOUT SCIATICA: ICD-10-CM

## 2023-12-12 RX ORDER — PREGABALIN 100 MG/1
100 CAPSULE ORAL 2 TIMES DAILY
Qty: 60 CAPSULE | Refills: 0 | Status: SHIPPED | OUTPATIENT
Start: 2023-12-12

## 2023-12-27 DIAGNOSIS — G89.29 CHRONIC BILATERAL LOW BACK PAIN WITHOUT SCIATICA: ICD-10-CM

## 2023-12-27 DIAGNOSIS — M54.50 CHRONIC BILATERAL LOW BACK PAIN WITHOUT SCIATICA: ICD-10-CM

## 2023-12-27 RX ORDER — HYDROCODONE BITARTRATE AND ACETAMINOPHEN 10; 325 MG/1; MG/1
1 TABLET ORAL 4 TIMES DAILY
Qty: 120 TABLET | Refills: 0 | Status: SHIPPED | OUTPATIENT
Start: 2023-12-27

## 2024-01-06 RX ORDER — ATORVASTATIN CALCIUM 40 MG/1
40 TABLET, FILM COATED ORAL
Qty: 90 TABLET | Refills: 1 | Status: SHIPPED | OUTPATIENT
Start: 2024-01-06

## 2024-01-06 RX ORDER — LISINOPRIL 30 MG/1
TABLET ORAL
Qty: 90 TABLET | Refills: 1 | Status: SHIPPED | OUTPATIENT
Start: 2024-01-06

## 2024-01-19 ENCOUNTER — OFFICE VISIT (OUTPATIENT)
Dept: FAMILY MEDICINE CLINIC | Facility: CLINIC | Age: 69
End: 2024-01-19
Payer: MEDICARE

## 2024-01-19 ENCOUNTER — LAB (OUTPATIENT)
Dept: FAMILY MEDICINE CLINIC | Facility: CLINIC | Age: 69
End: 2024-01-19
Payer: MEDICARE

## 2024-01-19 VITALS
SYSTOLIC BLOOD PRESSURE: 137 MMHG | HEART RATE: 75 BPM | OXYGEN SATURATION: 98 % | BODY MASS INDEX: 34.4 KG/M2 | DIASTOLIC BLOOD PRESSURE: 84 MMHG | HEIGHT: 68 IN | WEIGHT: 227 LBS

## 2024-01-19 DIAGNOSIS — Z12.11 COLON CANCER SCREENING: ICD-10-CM

## 2024-01-19 DIAGNOSIS — E78.2 MIXED HYPERLIPIDEMIA: ICD-10-CM

## 2024-01-19 DIAGNOSIS — J44.89 OBSTRUCTIVE CHRONIC BRONCHITIS WITHOUT EXACERBATION: ICD-10-CM

## 2024-01-19 DIAGNOSIS — I10 ESSENTIAL HYPERTENSION: ICD-10-CM

## 2024-01-19 DIAGNOSIS — G47.419 PRIMARY NARCOLEPSY WITHOUT CATAPLEXY: ICD-10-CM

## 2024-01-19 DIAGNOSIS — Z00.00 MEDICARE ANNUAL WELLNESS VISIT, SUBSEQUENT: Primary | ICD-10-CM

## 2024-01-19 DIAGNOSIS — F32.A ANXIETY AND DEPRESSION: ICD-10-CM

## 2024-01-19 DIAGNOSIS — F41.9 ANXIETY AND DEPRESSION: ICD-10-CM

## 2024-01-19 DIAGNOSIS — G89.29 CHRONIC BILATERAL LOW BACK PAIN WITHOUT SCIATICA: ICD-10-CM

## 2024-01-19 DIAGNOSIS — M54.50 CHRONIC BILATERAL LOW BACK PAIN WITHOUT SCIATICA: ICD-10-CM

## 2024-01-19 LAB
ALBUMIN SERPL-MCNC: 4.8 G/DL (ref 3.5–5.2)
ALBUMIN/GLOB SERPL: 2 G/DL
ALP SERPL-CCNC: 82 U/L (ref 39–117)
ALT SERPL W P-5'-P-CCNC: 20 U/L (ref 1–41)
ANION GAP SERPL CALCULATED.3IONS-SCNC: 12.8 MMOL/L (ref 5–15)
AST SERPL-CCNC: 16 U/L (ref 1–40)
BILIRUB SERPL-MCNC: 0.4 MG/DL (ref 0–1.2)
BUN SERPL-MCNC: 12 MG/DL (ref 8–23)
BUN/CREAT SERPL: 12.8 (ref 7–25)
CALCIUM SPEC-SCNC: 9.8 MG/DL (ref 8.6–10.5)
CHLORIDE SERPL-SCNC: 101 MMOL/L (ref 98–107)
CHOLEST SERPL-MCNC: 174 MG/DL (ref 0–200)
CO2 SERPL-SCNC: 26.2 MMOL/L (ref 22–29)
CREAT SERPL-MCNC: 0.94 MG/DL (ref 0.76–1.27)
EGFRCR SERPLBLD CKD-EPI 2021: 88.9 ML/MIN/1.73
GLOBULIN UR ELPH-MCNC: 2.4 GM/DL
GLUCOSE SERPL-MCNC: 125 MG/DL (ref 65–99)
HDLC SERPL-MCNC: 45 MG/DL (ref 40–60)
LDLC SERPL CALC-MCNC: 107 MG/DL (ref 0–100)
LDLC/HDLC SERPL: 2.32 {RATIO}
POTASSIUM SERPL-SCNC: 4.2 MMOL/L (ref 3.5–5.2)
PROT SERPL-MCNC: 7.2 G/DL (ref 6–8.5)
SODIUM SERPL-SCNC: 140 MMOL/L (ref 136–145)
TRIGL SERPL-MCNC: 124 MG/DL (ref 0–150)
VLDLC SERPL-MCNC: 22 MG/DL (ref 5–40)

## 2024-01-19 PROCEDURE — 80053 COMPREHEN METABOLIC PANEL: CPT | Performed by: NURSE PRACTITIONER

## 2024-01-19 PROCEDURE — 36415 COLL VENOUS BLD VENIPUNCTURE: CPT

## 2024-01-19 PROCEDURE — 80061 LIPID PANEL: CPT | Performed by: NURSE PRACTITIONER

## 2024-01-19 RX ORDER — MODAFINIL 200 MG/1
200 TABLET ORAL DAILY
Qty: 30 TABLET | Refills: 0 | Status: SHIPPED | OUTPATIENT
Start: 2024-01-19

## 2024-01-19 NOTE — PROGRESS NOTES
The ABCs of the Annual Wellness Visit  Subsequent Medicare Wellness Visit    Chief Complaint   Patient presents with    Medicare Wellness-subsequent      Subjective    History of Present Illness:  Neptali Osuna is a 68 y.o. male who presents for a Subsequent Medicare Wellness Visit.  MMSE score today is 30/30.   Immunizations reviewed. Has talked to pharmacy about getting shingles vaccine.   Colon screen- due.    Former smoker - quit in 2009.  Wants to wait til next visit to order lung cancer screen.     Pt. is also here today for 4 month follow up on chronic medical conditions.   1) Chronic low back pain with radiculopathy - currently has regimen of Norco 10/325 mg qid; Lyrica 100 mg bid; tizanidine 4 mg tid PRN.  Feels pain symptoms are in good control/ stable.  UDS Jan 2023.  Refills to [facility].     2) HTN -   Currently on amlodipine 10 mg daily; lisinopril 30 mg daily.   Denies any CP; palpitations; SOA; dizziness; headache; trouble with vision.    BP has been 130's/80's at home.    3) Hyperlipdiemia - currently on atorvastatin 40 mg daily; ASA 81 mg daily.   4) COPD - currently on Trelegy Ellipta inhaler; albuterol inhaler PRN.   Denies any current SOA; cough or wheezes.   Continues with pulmonology -Dr. Gracia.   5) Narcolepsy - currently taking Provigil 200 mg daily.  Stable.  Refills to [facility].     6) Depression/Anxiety - currently taking Cymbalta 60 mg daily. Moods stable despite wife's recent diagnosis of lung cancer.  Has had some anxiety related to this but managing well.   Denies any SI or HI.      The following portions of the patient's history were reviewed and   updated as appropriate: allergies, current medications, past family history, past medical history, past social history, past surgical history, and problem list.    Compared to one year ago, the patient feels his physical   health is the same.    Compared to one year ago, the patient feels his mental   health is the  same.    Recent Hospitalizations:  He was not admitted to the hospital during the last year.       Current Medical Providers:  Patient Care Team:  Honey Steele APRN as PCP - General (Nurse Practitioner)  Mesha Sher MD as Consulting Physician (Cardiology)  Norbert Huddleston MD as Consulting Physician (Hematology and Oncology)    Outpatient Medications Prior to Visit   Medication Sig Dispense Refill    albuterol sulfate  (90 Base) MCG/ACT inhaler Inhale 2 puffs Every 4 (Four) Hours As Needed for Wheezing. 6.7 g 5    amLODIPine (NORVASC) 10 MG tablet TAKE 1 TABLET BY MOUTH EVERY DAY 90 tablet 1    aspirin 81 MG EC tablet Take 1 tablet by mouth Daily. LD 5/25      atorvastatin (LIPITOR) 40 MG tablet TAKE 1 TABLET BY MOUTH EVERY DAY AT NIGHT 90 tablet 1    DULoxetine (CYMBALTA) 60 MG capsule Take 1 capsule by mouth Daily. 90 capsule 3    Fluticasone-Umeclidin-Vilant (Trelegy Ellipta) 100-62.5-25 MCG/ACT inhaler Inhale 1 puff Daily. 60 each 11    HYDROcodone-acetaminophen (NORCO)  MG per tablet Take 1 tablet by mouth 4 (Four) Times a Day. 120 tablet 0    ipratropium-albuterol (DUO-NEB) 0.5-2.5 mg/3 ml nebulizer Take 3 mL by nebulization Every 4 (Four) Hours As Needed for Wheezing. 360 mL 5    lisinopril (PRINIVIL,ZESTRIL) 30 MG tablet TAKE 1 TABLET BY MOUTH EVERY DAY 90 tablet 1    pregabalin (LYRICA) 100 MG capsule Take 1 capsule by mouth 2 (Two) Times a Day. 60 capsule 0    tiZANidine (ZANAFLEX) 4 MG tablet TAKE 1 TABLET BY MOUTH EVERY 8 HOURS AS NEEDED FOR MUSCLE SPASMS 30 tablet 1    modafinil (PROVIGIL) 200 MG tablet Take 1 tablet by mouth Daily. 30 tablet 0     No facility-administered medications prior to visit.       Opioid medication/s are on active medication list.  and I have evaluated his active treatment plan and pain score trends (see table).  There were no vitals filed for this visit.  I have reviewed the chart for potential of high risk medication and harmful drug interactions in the  elderly.          Aspirin is on active medication list. Aspirin use is indicated based on review of current medical condition/s. Pros and cons of this therapy have been discussed today. Benefits of this medication outweigh potential harm.  Patient has been encouraged to continue taking this medication.  .      Patient Active Problem List   Diagnosis    Anemia    Essential hypertension    Chronic back pain    Chronic coronary artery disease    Acute exacerbation of chronic obstructive pulmonary disease    Chronic obstructive pulmonary disease    Gallstones    Gastroesophageal reflux disease    Hyperlipidemia    Liver cirrhosis    Lumbar radiculopathy    Mixed anxiety depressive disorder    Muscle spasm    Prostate cancer screening    Sleep apnea    Status post coronary artery stent placement    TEIXEIRA (nonalcoholic steatohepatitis)    Status post angioplasty with stent    Dyslipidemia    Syncope and collapse    Chronic lymphocytic leukemia    Monoclonal B-cell lymphocytosis    Somatic symptom disorder, persistent, moderate    Post traumatic stress disorder (PTSD)    Status post placement of implantable loop recorder    Narcolepsy    Choledocholithiasis with acute cholecystitis     Advance Care Planning  Advance Directive is not on file.  ACP discussion was held with the patient during this visit. Patient does not have an advance directive, declines further assistance.    Review of Systems   Constitutional:  Negative for chills, fatigue and fever.   Eyes:  Negative for visual disturbance.   Respiratory:  Negative for cough, shortness of breath and wheezing.    Cardiovascular:  Negative for chest pain and palpitations.   Gastrointestinal:  Negative for abdominal pain, blood in stool, constipation, diarrhea, nausea and vomiting.   Genitourinary:  Negative for dysuria, flank pain, frequency, hematuria and urgency.   Musculoskeletal:  Positive for arthralgias and back pain.   Neurological:  Negative for dizziness, weakness  "and light-headedness.   Psychiatric/Behavioral:  Negative for dysphoric mood. The patient is not nervous/anxious.         Objective    Vitals:    24 0850   BP: 137/84   BP Location: Left arm   Patient Position: Sitting   Cuff Size: Large Adult   Pulse: 75   SpO2: 98%   Weight: 103 kg (227 lb)   Height: 172.7 cm (68\")     Estimated body mass index is 34.52 kg/m² as calculated from the following:    Height as of this encounter: 172.7 cm (68\").    Weight as of this encounter: 103 kg (227 lb).    BMI is >= 30 and <35. (Class 1 Obesity). The following options were offered after discussion;: exercise counseling/recommendations and nutrition counseling/recommendations      Does the patient have evidence of cognitive impairment? No    Physical Exam  Vitals reviewed.   Constitutional:       General: He is not in acute distress.     Appearance: Normal appearance. He is obese.   Cardiovascular:      Rate and Rhythm: Normal rate and regular rhythm.      Pulses: Normal pulses.      Heart sounds: Normal heart sounds. No murmur heard.  Pulmonary:      Effort: Pulmonary effort is normal. No respiratory distress.      Breath sounds: Normal breath sounds. No wheezing or rhonchi.   Chest:      Chest wall: No tenderness.   Abdominal:      Tenderness: There is no right CVA tenderness or left CVA tenderness.   Skin:     General: Skin is warm and dry.      Findings: No erythema.   Neurological:      General: No focal deficit present.      Mental Status: He is alert and oriented to person, place, and time. Mental status is at baseline.   Psychiatric:         Mood and Affect: Mood normal.                 HEALTH RISK ASSESSMENT    Smoking Status:  Social History     Tobacco Use   Smoking Status Former    Packs/day: 1.50    Years: 40.00    Additional pack years: 0.00    Total pack years: 60.00    Types: Cigarettes    Start date: 1969    Quit date: 2009    Years since quittin.3    Passive exposure: Past   Smokeless Tobacco " Never     Alcohol Consumption:  Social History     Substance and Sexual Activity   Alcohol Use Not Currently    Comment: No alcohol the past 10 years.     Fall Risk Screen:    BRIAN Fall Risk Assessment was completed, and patient is at LOW risk for falls.Assessment completed on:2024    Depression Screenin/19/2024     8:52 AM   PHQ-2/PHQ-9 Depression Screening   Little Interest or Pleasure in Doing Things 2-->more than half the days   Feeling Down, Depressed or Hopeless 3-->nearly every day   Trouble Falling or Staying Asleep, or Sleeping Too Much 3-->nearly every day   Feeling Tired or Having Little Energy 3-->nearly every day   Poor Appetite or Overeating 3-->nearly every day   Feeling Bad about Yourself - or that You are a Failure or Have Let Yourself or Your Family Down 0-->not at all   Trouble Concentrating on Things, Such as Reading the Newspaper or Watching Television 3-->nearly every day   Moving or Speaking So Slowly that Other People Could Have Noticed? Or the Opposite - Being So Fidgety 0-->not at all   Thoughts that You Would be Better Off Dead or of Hurting Yourself in Some Way 0-->not at all   PHQ-9: Brief Depression Severity Measure Score 17   If You Checked Off Any Problems, How Difficult Have These Problems Made It For You to Do Your Work, Take Care of Things at Home, or Get Along with Other People? not difficult at all       Health Habits and Functional and Cognitive Screenin/17/2024     9:56 AM   Functional & Cognitive Status   Do you have difficulty preparing food and eating? No   Do you have difficulty bathing yourself, getting dressed or grooming yourself? No   Do you have difficulty using the toilet? No   Do you have difficulty moving around from place to place? No   Do you have trouble with steps or getting out of a bed or a chair? No   Current Diet Limited Junk Food   Dental Exam Unknown   Eye Exam Up to date   Exercise (times per week) 3 times per week   Do you need  help using the phone?  No   Are you deaf or do you have serious difficulty hearing?  No   Do you need help to go to places out of walking distance? No   Do you need help shopping? No   Do you need help preparing meals?  No   Do you need help with housework?  No   Do you need help with laundry? No   Do you need help taking your medications? No   Do you need help managing money? No   Do you ever drive or ride in a car without wearing a seat belt? No   Have you felt unusual stress, anger or loneliness in the last month? No   Who do you live with? Spouse   If you need help, do you have trouble finding someone available to you? No   Have you been bothered in the last four weeks by sexual problems? No   Do you have difficulty concentrating, remembering or making decisions? No       Age-appropriate Screening Schedule:  Refer to the list below for future screening recommendations based on patient's age, sex and/or medical conditions. Orders for these recommended tests are listed in the plan section. The patient has been provided with a written plan.    Health Maintenance   Topic Date Due    ZOSTER VACCINE (1 of 2) Never done    Hepatitis B (1 of 3 - Risk 3-dose series) Never done    ANNUAL PHYSICAL  Never done    COLORECTAL CANCER SCREENING  11/25/2019    COVID-19 Vaccine (3 - Moderna risk series) 12/27/2021    LIPID PANEL  01/16/2024    LUNG CANCER SCREENING  02/03/2024 (Originally 11/2/2019)    TDAP/TD VACCINES (1 - Tdap) 01/19/2025 (Originally 7/7/1974)    BMI FOLLOWUP  01/19/2025    HEPATITIS C SCREENING  Completed    INFLUENZA VACCINE  Completed    Pneumococcal Vaccine 65+  Completed    AAA SCREEN (ONE-TIME)  Completed              Assessment & Plan   CMS Preventative Services Quick Reference  Risk Factors Identified During Encounter  Fall Risk-High or Moderate: Discussed Fall Prevention in the home  Immunizations Discussed/Encouraged: Shingrix  Inactivity/Sedentary: Patient was advised to exercise at least 150 minutes  a week per CDC recommendations. and Patient was advised to do at least 150 minutes a week of moderate intensity activity such as brisk walking and do at least 2 days a week of activities that strengthen muscles such as resistance training and do activities to improve balance such as standing on one foot about 3 days a week.  The above risks/problems have been discussed with the patient.  Follow up actions/plans if indicated are seen below in the Assessment/Plan Section.  Pertinent information has been shared with the patient in the After Visit Summary.    Diagnoses and all orders for this visit:    1. Medicare annual wellness visit, subsequent (Primary)  Comments:  Medical/social/family hx reviewed.   Immunizations reviewed.   Labs ordered.   Colonsocopy ordered.   Declines lung cancer screen today.    2. Chronic bilateral low back pain without sciatica  Comments:  Stable.   Cont. current medication.   Pain contract UTD.   UDS ordered today.   RTO in 4 mo.    3. Essential hypertension  Comments:  Stable.   Cont. current medication.   Labs ordered.   RTO in 4 mo.  Orders:  -     Comprehensive metabolic panel; Future  -     Lipid panel; Future    4. Mixed hyperlipidemia  Comments:  Stable.   Cont. current medication.   Labs ordered.   RTO in 4 mo.  Orders:  -     Comprehensive metabolic panel; Future  -     Lipid panel; Future    5. Obstructive chronic bronchitis without exacerbation  Comments:  Stable.   Cont. current medication.   Labs ordered.   RTO in 4 mo.    6. Primary narcolepsy without cataplexy  Comments:  Stable.   Cont. current medication.   Labs ordered.   RTO in 4 mo.  Orders:  -     modafinil (PROVIGIL) 200 MG tablet; Take 1 tablet by mouth Daily.  Dispense: 30 tablet; Refill: 0    7. Anxiety and depression  Comments:  Stable.   Cont. current medication.   Labs ordered.   RTO in 4 mo.    8. Colon cancer screening  Comments:  Order given for screening colonoscopy.  Orders:  -     Ambulatory Referral For  Screening Colonoscopy        Follow Up:   Return in about 4 months (around 5/19/2024).     An After Visit Summary and PPPS were made available to the patient.      I spent 18 minutes caring for Neptali on this date of service. This time includes time spent by me in the following activities:preparing for the visit, reviewing tests, obtaining and/or reviewing a separately obtained history, performing a medically appropriate examination and/or evaluation , counseling and educating the patient/family/caregiver, ordering medications, tests, or procedures, referring and communicating with other health care professionals , documenting information in the medical record, independently interpreting results and communicating that information with the patient/family/caregiver, and care coordination

## 2024-01-21 DIAGNOSIS — R73.9 HYPERGLYCEMIA: Primary | ICD-10-CM

## 2024-01-24 DIAGNOSIS — M54.50 CHRONIC BILATERAL LOW BACK PAIN WITHOUT SCIATICA: ICD-10-CM

## 2024-01-24 DIAGNOSIS — G89.29 CHRONIC BILATERAL LOW BACK PAIN WITHOUT SCIATICA: ICD-10-CM

## 2024-01-24 RX ORDER — HYDROCODONE BITARTRATE AND ACETAMINOPHEN 10; 325 MG/1; MG/1
1 TABLET ORAL 4 TIMES DAILY
Qty: 120 TABLET | Refills: 0 | Status: SHIPPED | OUTPATIENT
Start: 2024-01-24

## 2024-01-29 RX ORDER — DULOXETIN HYDROCHLORIDE 60 MG/1
60 CAPSULE, DELAYED RELEASE ORAL DAILY
Qty: 90 CAPSULE | Refills: 2 | Status: SHIPPED | OUTPATIENT
Start: 2024-01-29

## 2024-02-21 DIAGNOSIS — G89.29 CHRONIC BILATERAL LOW BACK PAIN WITHOUT SCIATICA: ICD-10-CM

## 2024-02-21 DIAGNOSIS — M54.50 CHRONIC BILATERAL LOW BACK PAIN WITHOUT SCIATICA: ICD-10-CM

## 2024-02-21 RX ORDER — HYDROCODONE BITARTRATE AND ACETAMINOPHEN 10; 325 MG/1; MG/1
1 TABLET ORAL 4 TIMES DAILY
Qty: 120 TABLET | Refills: 0 | Status: SHIPPED | OUTPATIENT
Start: 2024-02-21

## 2024-03-04 DIAGNOSIS — G47.419 PRIMARY NARCOLEPSY WITHOUT CATAPLEXY: ICD-10-CM

## 2024-03-04 RX ORDER — MODAFINIL 200 MG/1
200 TABLET ORAL DAILY
Qty: 30 TABLET | Refills: 0 | Status: SHIPPED | OUTPATIENT
Start: 2024-03-04

## 2024-03-12 DIAGNOSIS — M54.50 CHRONIC BILATERAL LOW BACK PAIN WITHOUT SCIATICA: ICD-10-CM

## 2024-03-12 DIAGNOSIS — G89.29 CHRONIC BILATERAL LOW BACK PAIN WITHOUT SCIATICA: ICD-10-CM

## 2024-03-12 RX ORDER — PREGABALIN 100 MG/1
100 CAPSULE ORAL 2 TIMES DAILY
Qty: 60 CAPSULE | Refills: 0 | Status: SHIPPED | OUTPATIENT
Start: 2024-03-12

## 2024-03-15 RX ORDER — AMLODIPINE BESYLATE 10 MG/1
10 TABLET ORAL DAILY
Qty: 90 TABLET | Refills: 1 | Status: SHIPPED | OUTPATIENT
Start: 2024-03-15

## 2024-03-18 NOTE — PROGRESS NOTES
Encounter Date:04/01/2024  Last seen 8/22/2023      Patient ID: Neptali Osuna is a 68 y.o. male.      Chief Complaint:     Status post stent  History of syncope  Status post loop recorder  Hypertension  Dyslipidemia        History of Present Illness  Since I have last seen, the patient has been without any chest discomfort ,shortness of breath, palpitations, dizziness or syncope.  Denies having any headache ,abdominal pain ,nausea, vomiting , diarrhea constipation, loss of weight or loss of appetite.  Denies having any excessive bruising ,hematuria or blood in the stool.    Review of all systems negative except as indicated.    Reviewed ROS.    Assessment and Plan         ]]]]]]]]]]]]]]]]]]]  History  =============   -Status post stent to proximal LAD 11/05/2018     Cardiac catheterization 11/05/2018 revealed  Left ventricular size and contractility is normal with ejection fraction of 65%.  Left main coronary artery is normal.  Left anterior descending artery has proximal calcific plaquing with 95-99% proximal LAD disease.  Circumflex coronary artery is normal.  Right coronary artery is a large and dominant vessel that has mid segment 50% disease.    Echocardiogram 4/1/2024   Structurally and functionally normal cardiac valves.  Left ventricular size and contractility is normal with ejection fraction of 55 %.  No evidence for pulmonary hypertension is present.    -History of recurrent syncopal episodes concerning for bradyarrhythmias.  Loop recorder monitor showed second-degree AV block type I and type II.  Patient had one episode of bradycardia with heart rates of 30/min while he was sleeping without CPAP.     -Status post [employer] loop recorder 11/15/2019 (Lin[employer])  Negative tilt table test 12/20/2019       -COPD dyslipidemia hypertension     -Status post cholecystectomyFormer smoker      -Strong family history of coronary artery disease      -No known allergies  =========  Plan  ===========  Status post  stent  Patient is not having any angina pectoris or congestive heart failure.  EKG showed sinus rhythm without ischemic changes.-8/22/2023  EKG 4/1/2024-sinus rhythm without ischemic changes  Patient is off Plavix.  Continue baby aspirin.     History of syncope-no further episodes.  Loop recorder in place.     Hypertension-well-controlled-140/75    Dyslipidemia-continue atorvastatin.     History of second-degree AV block Mobitz type I and type II-improved.  Patient does not have any symptoms of dizziness or near syncope or syncope.     No need for pacemaker at this time    Echocardiogram 4/1/2024   Structurally and functionally normal cardiac valves.  Left ventricular size and contractility is normal with ejection fraction of 55 %.  No evidence for pulmonary hypertension is present.    Patient was educated regarding the results of the testing.    Follow-up in the office in 6 months.     Further plan will depend on patient's progress.     Reviewed and updated-4/1/2024.  ]]]]]]]]]]]]]]]                 Diagnosis Plan   1. Status post placement of implantable loop recorder        2. Mixed hyperlipidemia        3. Status post angioplasty with stent        4. Status post coronary artery stent placement        5. Syncope and collapse        6. Dyslipidemia        7. Essential hypertension        LAB RESULTS (LAST 7 DAYS)    CBC        BMP        CMP         BNP        TROPONIN        CoAg        Creatinine Clearance  CrCl cannot be calculated (Patient's most recent lab result is older than the maximum 30 days allowed.).    ABG        Radiology  No radiology results for the last day                The following portions of the patient's history were reviewed and updated as appropriate: allergies, current medications, past family history, past medical history, past social history, past surgical history, and problem list.    Review of Systems   Constitutional: Negative for malaise/fatigue.   Cardiovascular:  Negative for  chest pain, dyspnea on exertion, leg swelling and palpitations.   Respiratory:  Negative for cough and shortness of breath.    Gastrointestinal:  Negative for abdominal pain, nausea and vomiting.   Neurological:  Negative for dizziness, focal weakness, headaches, light-headedness and numbness.   All other systems reviewed and are negative.      Current Outpatient Medications:   •  albuterol sulfate  (90 Base) MCG/ACT inhaler, Inhale 2 puffs Every 4 (Four) Hours As Needed for Wheezing., Disp: 6.7 g, Rfl: 5  •  amLODIPine (NORVASC) 10 MG tablet, Take 1 tablet by mouth Daily., Disp: 90 tablet, Rfl: 1  •  aspirin 81 MG EC tablet, Take 1 tablet by mouth Daily. LD 5/25, Disp: , Rfl:   •  atorvastatin (LIPITOR) 40 MG tablet, TAKE 1 TABLET BY MOUTH EVERY DAY AT NIGHT, Disp: 90 tablet, Rfl: 1  •  DULoxetine (CYMBALTA) 60 MG capsule, TAKE 1 CAPSULE BY MOUTH EVERY DAY, Disp: 90 capsule, Rfl: 2  •  Fluticasone-Umeclidin-Vilant (Trelegy Ellipta) 100-62.5-25 MCG/ACT inhaler, Inhale 1 puff Daily., Disp: 60 each, Rfl: 11  •  HYDROcodone-acetaminophen (NORCO)  MG per tablet, Take 1 tablet by mouth 4 (Four) Times a Day., Disp: 120 tablet, Rfl: 0  •  ipratropium-albuterol (DUO-NEB) 0.5-2.5 mg/3 ml nebulizer, Take 3 mL by nebulization Every 4 (Four) Hours As Needed for Wheezing., Disp: 360 mL, Rfl: 5  •  lisinopril (PRINIVIL,ZESTRIL) 30 MG tablet, TAKE 1 TABLET BY MOUTH EVERY DAY, Disp: 90 tablet, Rfl: 1  •  modafinil (PROVIGIL) 200 MG tablet, Take 1 tablet by mouth Daily., Disp: 30 tablet, Rfl: 0  •  pregabalin (LYRICA) 100 MG capsule, TAKE 1 CAPSULE BY MOUTH TWICE A DAY, Disp: 60 capsule, Rfl: 0  •  tiZANidine (ZANAFLEX) 4 MG tablet, TAKE 1 TABLET BY MOUTH EVERY 8 HOURS AS NEEDED FOR MUSCLE SPASMS, Disp: 30 tablet, Rfl: 1    No Known Allergies    Family History   Problem Relation Age of Onset   • Colon cancer Mother    • Heart disease Mother    • Depression Mother    • Colon cancer Father    • Heart disease Father    •  Heart attack Father    • Cancer Father    • Diabetes Brother    • Diabetes Maternal Grandmother    • Diabetes Maternal Grandfather        Past Surgical History:   Procedure Laterality Date   • CARDIAC CATHETERIZATION  11/05/2018    Dr. Sher   • CHOLECYSTECTOMY N/A 02/22/2021    Procedure: CHOLECYSTECTOMY LAPAROSCOPIC;  Surgeon: Faisal Wilkins DO;  Location: [facility] MAIN OR;  Service: General;  Laterality: N/A;   • COLONOSCOPY  2016   • CORONARY STENT PLACEMENT     • ERCP N/A 02/21/2021    Procedure: ENDOSCOPIC RETROGRADE CHOLANGIOPANCREATOGRAPHY, sphincterotomy, balloon clearance of  bile duct, stent placement;  Surgeon: Ramiro Pulido MD;  Location: [facility] ENDOSCOPY;  Service: Gastroenterology;  Laterality: N/A;  post: biliary dilation, choledocolithiasis   • ERCP N/A 06/04/2021    Procedure: ENDOSCOPIC RETROGRADE CHOLANGIOPANCREATOGRAPHY WITH STENT REMOVAL and balloon sweeping of common bile duct (12-15mm balloon up to 15mm);  Surgeon: Pranay White MD;  Location: [facility] ENDOSCOPY;  Service: Gastroenterology;  Laterality: N/A;  post op: biliary stent removal, common bile duct sweeping with 12-15mm balloon (up to 15mm), dialated bile duct   • LIVER BIOPSY     • TONSILLECTOMY AND ADENOIDECTOMY         Past Medical History:   Diagnosis Date   • Acute diverticulitis     with perforation   • Allergic    • Anemia    • Anxiety    • Back pain    • CAD S/P percutaneous coronary angioplasty    • Cancer     cll    • Congenital heart disease 2018   • COPD (chronic obstructive pulmonary disease)    • Depression    • Diverticulitis    • GERD (gastroesophageal reflux disease)    • Hyperlipidemia    • Hypertension    • Low back pain    • Myocardial infarction    • TEIXEIRA (nonalcoholic steatohepatitis)    • Obesity    • BRITTNEY on CPAP    • Sleep apnea    • Sleep disorder, shift work    • Spinal stenosis of lumbar region at multiple levels    • Status post placement of implantable loop recorder 2020    [facility]       Family  History   Problem Relation Age of Onset   • Colon cancer Mother    • Heart disease Mother    • Depression Mother    • Colon cancer Father    • Heart disease Father    • Heart attack Father    • Cancer Father    • Diabetes Brother    • Diabetes Maternal Grandmother    • Diabetes Maternal Grandfather        Social History     Socioeconomic History   • Marital status:    Tobacco Use   • Smoking status: Former     Current packs/day: 0.00     Average packs/day: 1.5 packs/day for 40.0 years (60.0 ttl pk-yrs)     Types: Cigarettes     Start date: 1969     Quit date: 2009     Years since quittin.5     Passive exposure: Past   • Smokeless tobacco: Never   Vaping Use   • Vaping status: Never Used   Substance and Sexual Activity   • Alcohol use: Not Currently     Comment: No alcohol the past 10 years.   • Drug use: No   • Sexual activity: Not Currently     Partners: Female     Birth control/protection: None           ECG 12 Lead    Date/Time: 2024 3:15 PM  Performed by: Mesha Sher MD    Authorized by: Mesha Sher MD  Comparison: compared with previous ECG   Similar to previous ECG  Comparison to previous ECG: Normal sinus rhythm 76/min normal axis normal intervals no ectopy no significant change from previous EKG.        Objective:       Physical Exam    There were no vitals taken for this visit.  The patient is alert, oriented and in no distress.    Vital signs as noted above.    Head and neck revealed no carotid bruits or jugular venous distension.  No thyromegaly or lymphadenopathy is present.    Lungs clear.  No wheezing.  Breath sounds are normal bilaterally.    Heart normal first and second heart sounds.  No murmur..  No pericardial rub is present.  No gallop is present.    Abdomen soft and nontender.  No organomegaly is present.    Extremities revealed good peripheral pulses without any pedal edema.    Skin warm and dry.    Musculoskeletal system is grossly normal.    CNS grossly  normal.    Reviewed and updated.

## 2024-03-20 DIAGNOSIS — G89.29 CHRONIC BILATERAL LOW BACK PAIN WITHOUT SCIATICA: ICD-10-CM

## 2024-03-20 DIAGNOSIS — M54.50 CHRONIC BILATERAL LOW BACK PAIN WITHOUT SCIATICA: ICD-10-CM

## 2024-03-20 RX ORDER — HYDROCODONE BITARTRATE AND ACETAMINOPHEN 10; 325 MG/1; MG/1
1 TABLET ORAL 4 TIMES DAILY
Qty: 120 TABLET | Refills: 0 | Status: SHIPPED | OUTPATIENT
Start: 2024-03-20

## 2024-04-01 ENCOUNTER — OFFICE VISIT (OUTPATIENT)
Dept: CARDIOLOGY | Facility: CLINIC | Age: 69
End: 2024-04-01
Payer: MEDICARE

## 2024-04-01 ENCOUNTER — HOSPITAL ENCOUNTER (OUTPATIENT)
Dept: CARDIOLOGY | Facility: HOSPITAL | Age: 69
Discharge: HOME | End: 2024-04-01
Admitting: INTERNAL MEDICINE
Payer: MEDICARE

## 2024-04-01 VITALS
HEIGHT: 68 IN | DIASTOLIC BLOOD PRESSURE: 65 MMHG | SYSTOLIC BLOOD PRESSURE: 151 MMHG | WEIGHT: 227 LBS | BODY MASS INDEX: 34.4 KG/M2

## 2024-04-01 VITALS
SYSTOLIC BLOOD PRESSURE: 144 MMHG | HEIGHT: 68 IN | BODY MASS INDEX: 35.16 KG/M2 | DIASTOLIC BLOOD PRESSURE: 75 MMHG | HEART RATE: 86 BPM | OXYGEN SATURATION: 94 % | WEIGHT: 232 LBS

## 2024-04-01 DIAGNOSIS — I10 ESSENTIAL HYPERTENSION: ICD-10-CM

## 2024-04-01 DIAGNOSIS — E78.5 DYSLIPIDEMIA: ICD-10-CM

## 2024-04-01 DIAGNOSIS — M79.661 PAIN AND SWELLING OF RIGHT LOWER LEG: ICD-10-CM

## 2024-04-01 DIAGNOSIS — E78.2 MIXED HYPERLIPIDEMIA: ICD-10-CM

## 2024-04-01 DIAGNOSIS — Z95.820 STATUS POST ANGIOPLASTY WITH STENT: ICD-10-CM

## 2024-04-01 DIAGNOSIS — Z95.5 STATUS POST CORONARY ARTERY STENT PLACEMENT: ICD-10-CM

## 2024-04-01 DIAGNOSIS — Z95.818 STATUS POST PLACEMENT OF IMPLANTABLE LOOP RECORDER: Primary | ICD-10-CM

## 2024-04-01 DIAGNOSIS — M79.89 PAIN AND SWELLING OF RIGHT LOWER LEG: ICD-10-CM

## 2024-04-01 DIAGNOSIS — R55 SYNCOPE AND COLLAPSE: ICD-10-CM

## 2024-04-01 DIAGNOSIS — Z95.818 STATUS POST PLACEMENT OF IMPLANTABLE LOOP RECORDER: ICD-10-CM

## 2024-04-01 LAB
BH CV ECHO MEAS - ACS: 1.44 CM
BH CV ECHO MEAS - AO MAX PG: 11.4 MMHG
BH CV ECHO MEAS - AO MEAN PG: 6.2 MMHG
BH CV ECHO MEAS - AO ROOT DIAM: 3.3 CM
BH CV ECHO MEAS - AO V2 MAX: 168.7 CM/SEC
BH CV ECHO MEAS - AO V2 VTI: 28.5 CM
BH CV ECHO MEAS - AVA(I,D): 2.6 CM2
BH CV ECHO MEAS - EDV(CUBED): 151.8 ML
BH CV ECHO MEAS - EDV(MOD-SP4): 50.7 ML
BH CV ECHO MEAS - EF(MOD-BP): 52 %
BH CV ECHO MEAS - EF(MOD-SP4): 51.6 %
BH CV ECHO MEAS - ESV(CUBED): 55 ML
BH CV ECHO MEAS - ESV(MOD-SP4): 24.5 ML
BH CV ECHO MEAS - FS: 28.7 %
BH CV ECHO MEAS - IVS/LVPW: 1.01 CM
BH CV ECHO MEAS - IVSD: 0.82 CM
BH CV ECHO MEAS - LA DIMENSION: 3.3 CM
BH CV ECHO MEAS - LV MASS(C)D: 155.3 GRAMS
BH CV ECHO MEAS - LV MAX PG: 7.5 MMHG
BH CV ECHO MEAS - LV MEAN PG: 3.7 MMHG
BH CV ECHO MEAS - LV V1 MAX: 137.1 CM/SEC
BH CV ECHO MEAS - LV V1 VTI: 22.8 CM
BH CV ECHO MEAS - LVIDD: 5.3 CM
BH CV ECHO MEAS - LVIDS: 3.8 CM
BH CV ECHO MEAS - LVOT AREA: 3.3 CM2
BH CV ECHO MEAS - LVOT DIAM: 2.05 CM
BH CV ECHO MEAS - LVPWD: 0.81 CM
BH CV ECHO MEAS - MV A MAX VEL: 110.6 CM/SEC
BH CV ECHO MEAS - MV DEC SLOPE: 576.2 CM/SEC2
BH CV ECHO MEAS - MV DEC TIME: 0.18 SEC
BH CV ECHO MEAS - MV E MAX VEL: 101.5 CM/SEC
BH CV ECHO MEAS - MV E/A: 0.92
BH CV ECHO MEAS - MV MAX PG: 7.6 MMHG
BH CV ECHO MEAS - MV MEAN PG: 4.6 MMHG
BH CV ECHO MEAS - MV V2 VTI: 25 CM
BH CV ECHO MEAS - MVA(VTI): 3 CM2
BH CV ECHO MEAS - PA ACC TIME: 0.05 SEC
BH CV ECHO MEAS - PA V2 MAX: 145.2 CM/SEC
BH CV ECHO MEAS - PULM A REVS DUR: 0.1 SEC
BH CV ECHO MEAS - PULM A REVS VEL: 36.7 CM/SEC
BH CV ECHO MEAS - PULM DIAS VEL: 40.5 CM/SEC
BH CV ECHO MEAS - PULM S/D: 1.73
BH CV ECHO MEAS - PULM SYS VEL: 69.9 CM/SEC
BH CV ECHO MEAS - RAP SYSTOLE: 3 MMHG
BH CV ECHO MEAS - RV MAX PG: 5.3 MMHG
BH CV ECHO MEAS - RV V1 MAX: 115.3 CM/SEC
BH CV ECHO MEAS - RV V1 VTI: 19 CM
BH CV ECHO MEAS - RVSP: 14.5 MMHG
BH CV ECHO MEAS - SV(LVOT): 75.4 ML
BH CV ECHO MEAS - SV(MOD-SP4): 26.1 ML
BH CV ECHO MEAS - TR MAX PG: 11.5 MMHG
BH CV ECHO MEAS - TR MAX VEL: 169.7 CM/SEC

## 2024-04-01 PROCEDURE — 93306 TTE W/DOPPLER COMPLETE: CPT

## 2024-04-17 DIAGNOSIS — G89.29 CHRONIC BILATERAL LOW BACK PAIN WITHOUT SCIATICA: ICD-10-CM

## 2024-04-17 DIAGNOSIS — M54.50 CHRONIC BILATERAL LOW BACK PAIN WITHOUT SCIATICA: ICD-10-CM

## 2024-04-17 RX ORDER — HYDROCODONE BITARTRATE AND ACETAMINOPHEN 10; 325 MG/1; MG/1
1 TABLET ORAL 4 TIMES DAILY
Qty: 120 TABLET | Refills: 0 | Status: SHIPPED | OUTPATIENT
Start: 2024-04-17

## 2024-05-07 ENCOUNTER — OFFICE VISIT (OUTPATIENT)
Dept: PULMONOLOGY | Facility: HOSPITAL | Age: 69
End: 2024-05-07
Payer: MEDICARE

## 2024-05-07 VITALS
BODY MASS INDEX: 34.71 KG/M2 | WEIGHT: 229 LBS | SYSTOLIC BLOOD PRESSURE: 137 MMHG | DIASTOLIC BLOOD PRESSURE: 78 MMHG | HEART RATE: 84 BPM | RESPIRATION RATE: 14 BRPM | HEIGHT: 68 IN | OXYGEN SATURATION: 96 %

## 2024-05-07 DIAGNOSIS — K21.9 GASTROESOPHAGEAL REFLUX DISEASE WITHOUT ESOPHAGITIS: ICD-10-CM

## 2024-05-07 DIAGNOSIS — G47.33 OBSTRUCTIVE SLEEP APNEA SYNDROME: Primary | ICD-10-CM

## 2024-05-07 DIAGNOSIS — J43.1 PANLOBULAR EMPHYSEMA: ICD-10-CM

## 2024-05-07 DIAGNOSIS — I10 ESSENTIAL HYPERTENSION: ICD-10-CM

## 2024-05-07 PROCEDURE — G0463 HOSPITAL OUTPT CLINIC VISIT: HCPCS

## 2024-05-14 DIAGNOSIS — M54.50 CHRONIC BILATERAL LOW BACK PAIN WITHOUT SCIATICA: ICD-10-CM

## 2024-05-14 DIAGNOSIS — G89.29 CHRONIC BILATERAL LOW BACK PAIN WITHOUT SCIATICA: ICD-10-CM

## 2024-05-14 RX ORDER — PREGABALIN 100 MG/1
100 CAPSULE ORAL 2 TIMES DAILY
Qty: 60 CAPSULE | Refills: 0 | Status: SHIPPED | OUTPATIENT
Start: 2024-05-14

## 2024-05-15 DIAGNOSIS — M54.50 CHRONIC BILATERAL LOW BACK PAIN WITHOUT SCIATICA: ICD-10-CM

## 2024-05-15 DIAGNOSIS — G89.29 CHRONIC BILATERAL LOW BACK PAIN WITHOUT SCIATICA: ICD-10-CM

## 2024-05-15 RX ORDER — HYDROCODONE BITARTRATE AND ACETAMINOPHEN 10; 325 MG/1; MG/1
1 TABLET ORAL 4 TIMES DAILY
Qty: 120 TABLET | Refills: 0 | Status: SHIPPED | OUTPATIENT
Start: 2024-05-15

## 2024-05-17 NOTE — PROGRESS NOTES
Teresa Osuna is a 68 y.o. male.       HPI   Pt. is here today for 4 month follow up on chronic medical conditions.   1) Chronic low back pain with radiculopathy - currently has regimen of Norco 10/325 mg qid; Lyrica 100 mg bid; tizanidine 4 mg tid PRN.  Feels pain symptoms are in good control/ stable.   2) HTN -   Currently on amlodipine 10 mg daily; lisinopril 30 mg daily.   Denies any CP; palpitations; SOA; dizziness; headache; trouble with vision.       3) Hyperlipdiemia - currently on atorvastatin 40 mg daily; ASA 81 mg daily.   4) COPD - currently on Trelegy Ellipta inhaler; albuterol inhaler PRN.   Denies any current SOA; cough or wheezes.   Continues with pulmonology -Dr. Gracia. Has not yet had low dose CT chest; reports he will do with pulmonology.   5) Narcolepsy - currently taking Provigil 200 mg daily.  Stable.  Refills to [facility].     6) Depression/Anxiety - currently taking Cymbalta 60 mg daily. Moods stable.  Denies any SI or HI.     The following portions of the patient's history were reviewed and updated as appropriate: allergies, current medications, past family history, past medical history, past social history, past surgical history, and problem list.    Review of Systems   Constitutional:  Negative for chills, fatigue and fever.   Eyes:  Negative for visual disturbance.   Respiratory:  Negative for cough, shortness of breath and wheezing.    Cardiovascular:  Negative for chest pain and palpitations.   Gastrointestinal:  Negative for abdominal pain, blood in stool, constipation, diarrhea, nausea, vomiting and GERD.   Genitourinary:  Negative for dysuria, frequency, hematuria and urgency.   Musculoskeletal:  Positive for back pain.   Neurological:  Negative for dizziness, weakness, light-headedness and headache.   Psychiatric/Behavioral:  Negative for self-injury, sleep disturbance, suicidal ideas and depressed mood. The patient is not nervous/anxious.         Objective   Physical Exam  Vitals reviewed.   Constitutional:       General: He is not in acute distress.     Appearance: Normal appearance. He is obese.   Cardiovascular:      Rate and Rhythm: Normal rate and regular rhythm.      Pulses: Normal pulses.      Heart sounds: Normal heart sounds. No murmur heard.  Pulmonary:      Effort: Pulmonary effort is normal. No respiratory distress.      Breath sounds: Normal breath sounds. No wheezing or rhonchi.   Chest:      Chest wall: No tenderness.   Abdominal:      Tenderness: There is no right CVA tenderness or left CVA tenderness.   Skin:     General: Skin is warm and dry.      Findings: No erythema.   Neurological:      General: No focal deficit present.      Mental Status: He is alert and oriented to person, place, and time.   Psychiatric:         Mood and Affect: Mood normal.                  Procedures   Assessment & Plan   Diagnoses and all orders for this visit:    1. Essential hypertension (Primary)  Comments:  Stable.   Cont. current medication.   Labs ordered.   RTO in 4 mo.  Orders:  -     Comprehensive metabolic panel; Future  -     Lipid panel; Future    2. Mixed hyperlipidemia  Comments:  Stable.   Cont. current medication.   Labs ordered.   RTO in 4 mo.  Orders:  -     Comprehensive metabolic panel; Future  -     Lipid panel; Future    3. Chronic radicular pain of lower back  Comments:  Stable.  Cont. with current regimen.   UDS ordered today.  Orders:  -     Urine Drug Screen - Urine, Clean Catch; Future    4. Primary narcolepsy without cataplexy  Comments:  Stable.   Cont. current medication.   RTO in 4 mo.  Orders:  -     Urine Drug Screen - Urine, Clean Catch; Future  -     modafinil (PROVIGIL) 200 MG tablet; Take 1 tablet by mouth Daily.  Dispense: 30 tablet; Refill: 0    5. MDD (major depressive disorder), recurrent episode, mild  Comments:  Stable.   Cont. current medication.   RTO in 4 mo.    6. COPD (chronic obstructive pulmonary disease)  with chronic bronchitis  Comments:  Stable.   Cont. current medication.  Discussed low dose CT chest; pt reports will have with pulmonologist.    7. Class 2 severe obesity due to excess calories with serious comorbidity and body mass index (BMI) of 35.0 to 35.9 in adult  Comments:  Work on a healthy diet; aim for 150 min exercise weekly.

## 2024-05-20 ENCOUNTER — OFFICE VISIT (OUTPATIENT)
Dept: FAMILY MEDICINE CLINIC | Facility: CLINIC | Age: 69
End: 2024-05-20
Payer: MEDICARE

## 2024-05-20 ENCOUNTER — LAB (OUTPATIENT)
Dept: FAMILY MEDICINE CLINIC | Facility: CLINIC | Age: 69
End: 2024-05-20
Payer: MEDICARE

## 2024-05-20 VITALS
DIASTOLIC BLOOD PRESSURE: 67 MMHG | BODY MASS INDEX: 35.31 KG/M2 | HEIGHT: 68 IN | OXYGEN SATURATION: 93 % | WEIGHT: 233 LBS | HEART RATE: 84 BPM | SYSTOLIC BLOOD PRESSURE: 130 MMHG

## 2024-05-20 DIAGNOSIS — E78.2 MIXED HYPERLIPIDEMIA: ICD-10-CM

## 2024-05-20 DIAGNOSIS — M54.16 CHRONIC RADICULAR PAIN OF LOWER BACK: ICD-10-CM

## 2024-05-20 DIAGNOSIS — I10 ESSENTIAL HYPERTENSION: ICD-10-CM

## 2024-05-20 DIAGNOSIS — I10 ESSENTIAL HYPERTENSION: Primary | ICD-10-CM

## 2024-05-20 DIAGNOSIS — E66.01 CLASS 2 SEVERE OBESITY DUE TO EXCESS CALORIES WITH SERIOUS COMORBIDITY AND BODY MASS INDEX (BMI) OF 35.0 TO 35.9 IN ADULT: ICD-10-CM

## 2024-05-20 DIAGNOSIS — G47.419 PRIMARY NARCOLEPSY WITHOUT CATAPLEXY: ICD-10-CM

## 2024-05-20 DIAGNOSIS — F33.0 MDD (MAJOR DEPRESSIVE DISORDER), RECURRENT EPISODE, MILD: ICD-10-CM

## 2024-05-20 DIAGNOSIS — G89.29 CHRONIC RADICULAR PAIN OF LOWER BACK: ICD-10-CM

## 2024-05-20 DIAGNOSIS — R73.9 HYPERGLYCEMIA: ICD-10-CM

## 2024-05-20 DIAGNOSIS — J44.89 COPD (CHRONIC OBSTRUCTIVE PULMONARY DISEASE) WITH CHRONIC BRONCHITIS: ICD-10-CM

## 2024-05-20 LAB
ALBUMIN SERPL-MCNC: 4.4 G/DL (ref 3.5–5.2)
ALBUMIN/GLOB SERPL: 2 G/DL
ALP SERPL-CCNC: 80 U/L (ref 39–117)
ALT SERPL W P-5'-P-CCNC: 30 U/L (ref 1–41)
AMPHET+METHAMPHET UR QL: NEGATIVE
ANION GAP SERPL CALCULATED.3IONS-SCNC: 12 MMOL/L (ref 5–15)
AST SERPL-CCNC: 33 U/L (ref 1–40)
BARBITURATES UR QL SCN: NEGATIVE
BENZODIAZ UR QL SCN: NEGATIVE
BILIRUB SERPL-MCNC: 0.2 MG/DL (ref 0–1.2)
BUN SERPL-MCNC: 20 MG/DL (ref 8–23)
BUN/CREAT SERPL: 25 (ref 7–25)
CALCIUM SPEC-SCNC: 9.2 MG/DL (ref 8.6–10.5)
CANNABINOIDS SERPL QL: POSITIVE
CHLORIDE SERPL-SCNC: 102 MMOL/L (ref 98–107)
CHOLEST SERPL-MCNC: 119 MG/DL (ref 0–200)
CO2 SERPL-SCNC: 26 MMOL/L (ref 22–29)
COCAINE UR QL: NEGATIVE
CREAT SERPL-MCNC: 0.8 MG/DL (ref 0.76–1.27)
EGFRCR SERPLBLD CKD-EPI 2021: 97 ML/MIN/1.73
FENTANYL UR-MCNC: NEGATIVE NG/ML
GLOBULIN UR ELPH-MCNC: 2.2 GM/DL
GLUCOSE SERPL-MCNC: 138 MG/DL (ref 65–99)
HBA1C MFR BLD: 6.8 % (ref 4.8–5.6)
HDLC SERPL-MCNC: 39 MG/DL (ref 40–60)
LDLC SERPL CALC-MCNC: 58 MG/DL (ref 0–100)
LDLC/HDLC SERPL: 1.43 {RATIO}
METHADONE UR QL SCN: NEGATIVE
OPIATES UR QL: POSITIVE
OXYCODONE UR QL SCN: NEGATIVE
POTASSIUM SERPL-SCNC: 3.8 MMOL/L (ref 3.5–5.2)
PROT SERPL-MCNC: 6.6 G/DL (ref 6–8.5)
SODIUM SERPL-SCNC: 140 MMOL/L (ref 136–145)
TRIGL SERPL-MCNC: 121 MG/DL (ref 0–150)
VLDLC SERPL-MCNC: 22 MG/DL (ref 5–40)

## 2024-05-20 PROCEDURE — 80307 DRUG TEST PRSMV CHEM ANLYZR: CPT | Performed by: NURSE PRACTITIONER

## 2024-05-20 PROCEDURE — 3075F SYST BP GE 130 - 139MM HG: CPT | Performed by: NURSE PRACTITIONER

## 2024-05-20 PROCEDURE — 36415 COLL VENOUS BLD VENIPUNCTURE: CPT

## 2024-05-20 PROCEDURE — 80061 LIPID PANEL: CPT | Performed by: NURSE PRACTITIONER

## 2024-05-20 PROCEDURE — 99214 OFFICE O/P EST MOD 30 MIN: CPT | Performed by: NURSE PRACTITIONER

## 2024-05-20 PROCEDURE — 1126F AMNT PAIN NOTED NONE PRSNT: CPT | Performed by: NURSE PRACTITIONER

## 2024-05-20 PROCEDURE — 1159F MED LIST DOCD IN RCRD: CPT | Performed by: NURSE PRACTITIONER

## 2024-05-20 PROCEDURE — 83036 HEMOGLOBIN GLYCOSYLATED A1C: CPT | Performed by: NURSE PRACTITIONER

## 2024-05-20 PROCEDURE — 3078F DIAST BP <80 MM HG: CPT | Performed by: NURSE PRACTITIONER

## 2024-05-20 PROCEDURE — 80053 COMPREHEN METABOLIC PANEL: CPT | Performed by: NURSE PRACTITIONER

## 2024-05-20 PROCEDURE — 1160F RVW MEDS BY RX/DR IN RCRD: CPT | Performed by: NURSE PRACTITIONER

## 2024-05-20 RX ORDER — MODAFINIL 200 MG/1
200 TABLET ORAL DAILY
Qty: 30 TABLET | Refills: 0 | Status: SHIPPED | OUTPATIENT
Start: 2024-05-20

## 2024-05-21 DIAGNOSIS — E11.65 TYPE 2 DIABETES MELLITUS WITH HYPERGLYCEMIA, WITHOUT LONG-TERM CURRENT USE OF INSULIN: Primary | ICD-10-CM

## 2024-05-21 RX ORDER — METFORMIN HYDROCHLORIDE 500 MG/1
500 TABLET, EXTENDED RELEASE ORAL
Qty: 90 TABLET | Refills: 1 | Status: SHIPPED | OUTPATIENT
Start: 2024-05-21

## 2024-06-10 DIAGNOSIS — E11.65 TYPE 2 DIABETES MELLITUS WITH HYPERGLYCEMIA, WITHOUT LONG-TERM CURRENT USE OF INSULIN: Primary | ICD-10-CM

## 2024-06-12 DIAGNOSIS — G89.29 CHRONIC BILATERAL LOW BACK PAIN WITHOUT SCIATICA: ICD-10-CM

## 2024-06-12 DIAGNOSIS — M54.50 CHRONIC BILATERAL LOW BACK PAIN WITHOUT SCIATICA: ICD-10-CM

## 2024-06-12 RX ORDER — HYDROCODONE BITARTRATE AND ACETAMINOPHEN 10; 325 MG/1; MG/1
1 TABLET ORAL 4 TIMES DAILY
Qty: 120 TABLET | Refills: 0 | Status: SHIPPED | OUTPATIENT
Start: 2024-06-12

## 2024-06-26 DIAGNOSIS — J44.89 OBSTRUCTIVE CHRONIC BRONCHITIS WITHOUT EXACERBATION: ICD-10-CM

## 2024-06-26 RX ORDER — FLUTICASONE FUROATE, UMECLIDINIUM BROMIDE AND VILANTEROL TRIFENATATE 100; 62.5; 25 UG/1; UG/1; UG/1
1 POWDER RESPIRATORY (INHALATION) DAILY
Qty: 60 EACH | Refills: 4 | Status: SHIPPED | OUTPATIENT
Start: 2024-06-26

## 2024-07-05 RX ORDER — ATORVASTATIN CALCIUM 40 MG/1
40 TABLET, FILM COATED ORAL
Qty: 90 TABLET | Refills: 1 | Status: SHIPPED | OUTPATIENT
Start: 2024-07-05

## 2024-07-05 RX ORDER — LISINOPRIL 30 MG/1
TABLET ORAL
Qty: 90 TABLET | Refills: 1 | Status: SHIPPED | OUTPATIENT
Start: 2024-07-05

## 2024-07-10 DIAGNOSIS — G89.29 CHRONIC BILATERAL LOW BACK PAIN WITHOUT SCIATICA: ICD-10-CM

## 2024-07-10 DIAGNOSIS — M54.50 CHRONIC BILATERAL LOW BACK PAIN WITHOUT SCIATICA: ICD-10-CM

## 2024-07-10 RX ORDER — HYDROCODONE BITARTRATE AND ACETAMINOPHEN 10; 325 MG/1; MG/1
1 TABLET ORAL 4 TIMES DAILY
Qty: 120 TABLET | Refills: 0 | Status: SHIPPED | OUTPATIENT
Start: 2024-07-10

## 2024-08-03 DIAGNOSIS — G89.29 CHRONIC BILATERAL LOW BACK PAIN WITHOUT SCIATICA: ICD-10-CM

## 2024-08-03 DIAGNOSIS — M54.50 CHRONIC BILATERAL LOW BACK PAIN WITHOUT SCIATICA: ICD-10-CM

## 2024-08-05 ENCOUNTER — LAB (OUTPATIENT)
Dept: LAB | Facility: HOSPITAL | Age: 69
End: 2024-08-05
Payer: MEDICARE

## 2024-08-05 DIAGNOSIS — C91.10 CLL (CHRONIC LYMPHOCYTIC LEUKEMIA): ICD-10-CM

## 2024-08-05 LAB
ALBUMIN SERPL-MCNC: 4.7 G/DL (ref 3.5–5.2)
ALBUMIN/GLOB SERPL: 1.8 G/DL
ALP SERPL-CCNC: 88 U/L (ref 39–117)
ALT SERPL W P-5'-P-CCNC: 15 U/L (ref 1–41)
ANION GAP SERPL CALCULATED.3IONS-SCNC: 11.9 MMOL/L (ref 5–15)
AST SERPL-CCNC: 17 U/L (ref 1–40)
BASOPHILS # BLD AUTO: 0.04 10*3/MM3 (ref 0–0.2)
BASOPHILS NFR BLD AUTO: 0.3 % (ref 0–1.5)
BILIRUB SERPL-MCNC: 0.6 MG/DL (ref 0–1.2)
BUN SERPL-MCNC: 14 MG/DL (ref 8–23)
BUN/CREAT SERPL: 16.3 (ref 7–25)
CALCIUM SPEC-SCNC: 9.8 MG/DL (ref 8.6–10.5)
CHLORIDE SERPL-SCNC: 102 MMOL/L (ref 98–107)
CO2 SERPL-SCNC: 25.1 MMOL/L (ref 22–29)
CREAT SERPL-MCNC: 0.86 MG/DL (ref 0.76–1.27)
DEPRECATED RDW RBC AUTO: 44 FL (ref 37–54)
EGFRCR SERPLBLD CKD-EPI 2021: 94.3 ML/MIN/1.73
EOSINOPHIL # BLD AUTO: 0.59 10*3/MM3 (ref 0–0.4)
EOSINOPHIL NFR BLD AUTO: 4.4 % (ref 0.3–6.2)
ERYTHROCYTE [DISTWIDTH] IN BLOOD BY AUTOMATED COUNT: 13.2 % (ref 12.3–15.4)
GLOBULIN UR ELPH-MCNC: 2.6 GM/DL
GLUCOSE SERPL-MCNC: 113 MG/DL (ref 65–99)
HCT VFR BLD AUTO: 46.1 % (ref 37.5–51)
HGB BLD-MCNC: 14.9 G/DL (ref 13–17.7)
LDH SERPL-CCNC: 146 U/L (ref 135–225)
LYMPHOCYTES # BLD AUTO: 2.64 10*3/MM3 (ref 0.7–3.1)
LYMPHOCYTES NFR BLD AUTO: 19.8 % (ref 19.6–45.3)
MCH RBC QN AUTO: 30.2 PG (ref 26.6–33)
MCHC RBC AUTO-ENTMCNC: 32.3 G/DL (ref 31.5–35.7)
MCV RBC AUTO: 93.3 FL (ref 79–97)
MONOCYTES # BLD AUTO: 1.15 10*3/MM3 (ref 0.1–0.9)
MONOCYTES NFR BLD AUTO: 8.6 % (ref 5–12)
NEUTROPHILS NFR BLD AUTO: 66.9 % (ref 42.7–76)
NEUTROPHILS NFR BLD AUTO: 8.94 10*3/MM3 (ref 1.7–7)
PLATELET # BLD AUTO: 329 10*3/MM3 (ref 140–450)
PMV BLD AUTO: 10 FL (ref 6–12)
POTASSIUM SERPL-SCNC: 4.6 MMOL/L (ref 3.5–5.2)
PROT SERPL-MCNC: 7.3 G/DL (ref 6–8.5)
RBC # BLD AUTO: 4.94 10*6/MM3 (ref 4.14–5.8)
SODIUM SERPL-SCNC: 139 MMOL/L (ref 136–145)
WBC NRBC COR # BLD AUTO: 13.36 10*3/MM3 (ref 3.4–10.8)

## 2024-08-05 PROCEDURE — 83615 LACTATE (LD) (LDH) ENZYME: CPT | Performed by: INTERNAL MEDICINE

## 2024-08-05 PROCEDURE — 36415 COLL VENOUS BLD VENIPUNCTURE: CPT

## 2024-08-05 PROCEDURE — 80053 COMPREHEN METABOLIC PANEL: CPT | Performed by: INTERNAL MEDICINE

## 2024-08-05 PROCEDURE — 85025 COMPLETE CBC W/AUTO DIFF WBC: CPT

## 2024-08-05 RX ORDER — PREGABALIN 100 MG/1
100 CAPSULE ORAL 2 TIMES DAILY
Qty: 60 CAPSULE | Refills: 0 | Status: SHIPPED | OUTPATIENT
Start: 2024-08-05

## 2024-08-07 DIAGNOSIS — G89.29 CHRONIC BILATERAL LOW BACK PAIN WITHOUT SCIATICA: ICD-10-CM

## 2024-08-07 DIAGNOSIS — M54.50 CHRONIC BILATERAL LOW BACK PAIN WITHOUT SCIATICA: ICD-10-CM

## 2024-08-07 RX ORDER — HYDROCODONE BITARTRATE AND ACETAMINOPHEN 10; 325 MG/1; MG/1
1 TABLET ORAL 4 TIMES DAILY
Qty: 120 TABLET | Refills: 0 | Status: SHIPPED | OUTPATIENT
Start: 2024-08-07

## 2024-08-08 NOTE — PROGRESS NOTES
Hematology/Oncology Outpatient Follow Up    Neptali Osuna  1955    Primary Care Physician: Honey Steele APRN   Referring Physician: No ref. 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 Osuna reports that an episode of fall loss of consciousness happened, and that he drove for about 45 minutes without knowing and ended up in his neighbor's backyard which happened in October 2019.  Ever since then he had had a few more spells of passing out.    The passing out spells happen for a few seconds.  Patient has obstructive sleep apnea and uses CPAP machine.  No seizure episodes.  He underwent video examination for 3 days had one episode during the evaluation.  Patient was sent to the [facility] for further evaluation given his abnormal CBC that happened with leukocytosis and monocytosis.  12/30/2019 peripheral blood flow cytometry -A small monoclonal B-cell population with a CLL phenotype is present. If the patient has a prior diagnosis of CLL, then the findings may represent persistent involvement. If the patient is not known to have CLL or SLL, further investigation is recommended if clinically indicated. In the absence of CLL or SLL, small (<5000/uL) clonal B-cell populations in the blood are classified as monoclonal B-cell lymphocytosis. Clinical correlation is required.  1/3/2020 CT scan of the soft tissue of the neck chest abdomen and pelvis were obtained-CT soft tissue neck shows some soft tissue prominence in the nasopharynx which could relate to slightly prominent adenoid tissue.  Vascular calcification is noted in the carotid bifurcations.  DJD C-spine.  Chest abdomen and pelvis show some areas of atelectasis or scarring involving the lungs.  Bilateral renal cysts.  Prostate appears enlarged.  Colonic diverticulosis.  DJD spine.  Cholelithiasis.  No obvious pathologically enlarged  lymphadenopathy.  8/19/2020 - WBC 11.32. ALC 7.83  8/15/22: WBC 10.5, Hgb 14.8, platelets 357, ANC 6.64.  No B symptoms or recurrent infections  8/14/23 - WBC 8.94, Hb 13.5, plts 272 has started to have some B symptoms with fatigue but likely unrelated    SUBJECTIVE:     Patient seen today for annual follow-up.  He continues to do well overall and denied any new health issues since last visit.  No B symptoms reported.  No recent infection/ER visits/hospitalizations.    Past Medical History:   Diagnosis Date    Acute diverticulitis     with perforation    Allergic     Anemia     Anxiety     Back pain     CAD S/P percutaneous coronary angioplasty     Cancer     cll     Congenital heart disease 2018    COPD (chronic obstructive pulmonary disease)     Depression     Diverticulitis     Emphysema of lung 11/2008    GERD (gastroesophageal reflux disease)     Hyperlipidemia     Hypertension     Low back pain     Myocardial infarction     TEIXEIRA (nonalcoholic steatohepatitis)     Obesity     BRITTNEY on CPAP     Sleep apnea     Sleep disorder, shift work     Spinal stenosis of lumbar region at multiple levels     Status post placement of implantable loop recorder 2020 [facility]       Past Surgical History:   Procedure Laterality Date    CARDIAC CATHETERIZATION  11/05/2018    Dr. Sher    CHOLECYSTECTOMY N/A 02/22/2021    Procedure: CHOLECYSTECTOMY LAPAROSCOPIC;  Surgeon: Faisal Wilkins DO;  Location: [facility] MAIN OR;  Service: General;  Laterality: N/A;    COLONOSCOPY  2016    CORONARY STENT PLACEMENT      ERCP N/A 02/21/2021    Procedure: ENDOSCOPIC RETROGRADE CHOLANGIOPANCREATOGRAPHY, sphincterotomy, balloon clearance of  bile duct, stent placement;  Surgeon: Ramiro Pulido MD;  Location: [facility] ENDOSCOPY;  Service: Gastroenterology;  Laterality: N/A;  post: biliary dilation, choledocolithiasis    ERCP N/A 06/04/2021    Procedure: ENDOSCOPIC RETROGRADE CHOLANGIOPANCREATOGRAPHY WITH STENT REMOVAL and balloon sweeping of common  bile duct (12-15mm balloon up to 15mm);  Surgeon: Pranay White MD;  Location: [facility] ENDOSCOPY;  Service: Gastroenterology;  Laterality: N/A;  post op: biliary stent removal, common bile duct sweeping with 12-15mm balloon (up to 15mm), dialated bile duct    LIVER BIOPSY      TONSILLECTOMY AND ADENOIDECTOMY           Current Outpatient Medications:     albuterol sulfate  (90 Base) MCG/ACT inhaler, Inhale 2 puffs Every 4 (Four) Hours As Needed for Wheezing., Disp: 6.7 g, Rfl: 5    amLODIPine (NORVASC) 10 MG tablet, Take 1 tablet by mouth Daily., Disp: 90 tablet, Rfl: 1    aspirin 81 MG EC tablet, Take 1 tablet by mouth Daily. LD 5/25, Disp: , Rfl:     atorvastatin (LIPITOR) 40 MG tablet, TAKE 1 TABLET BY MOUTH EVERY DAY AT NIGHT, Disp: 90 tablet, Rfl: 1    DULoxetine (CYMBALTA) 60 MG capsule, TAKE 1 CAPSULE BY MOUTH EVERY DAY, Disp: 90 capsule, Rfl: 2    HYDROcodone-acetaminophen (NORCO)  MG per tablet, Take 1 tablet by mouth 4 (Four) Times a Day., Disp: 120 tablet, Rfl: 0    ipratropium-albuterol (DUO-NEB) 0.5-2.5 mg/3 ml nebulizer, Take 3 mL by nebulization Every 4 (Four) Hours As Needed for Wheezing., Disp: 360 mL, Rfl: 5    lisinopril (PRINIVIL,ZESTRIL) 30 MG tablet, TAKE 1 TABLET BY MOUTH EVERY DAY, Disp: 90 tablet, Rfl: 1    modafinil (PROVIGIL) 200 MG tablet, Take 1 tablet by mouth Daily., Disp: 30 tablet, Rfl: 0    pregabalin (LYRICA) 100 MG capsule, TAKE 1 CAPSULE BY MOUTH TWICE A DAY, Disp: 60 capsule, Rfl: 0    SITagliptin (Januvia) 50 MG tablet, Take 1 tablet by mouth Daily., Disp: 30 tablet, Rfl: 3    tiZANidine (ZANAFLEX) 4 MG tablet, TAKE 1 TABLET BY MOUTH EVERY 8 HOURS AS NEEDED FOR MUSCLE SPASMS, Disp: 30 tablet, Rfl: 1    Trelegy Ellipta 100-62.5-25 MCG/ACT inhaler, INHALE 1 PUFF BY MOUTH DAILY, Disp: 60 each, Rfl: 4    No Known Allergies    Family History   Problem Relation Age of Onset    Colon cancer Mother     Heart disease Mother     Depression Mother     Colon cancer  "Father     Heart disease Father     Heart attack Father     Cancer Father     Diabetes Brother     Diabetes Maternal Grandmother     Diabetes Maternal Grandfather        Cancer-related family history includes Cancer in his father; Colon cancer in his father and mother.    Social History     Tobacco Use    Smoking status: Former     Current packs/day: 0.00     Average packs/day: 1.5 packs/day for 40.0 years (60.0 ttl pk-yrs)     Types: Cigarettes     Start date: 1969     Quit date: 2009     Years since quittin.9     Passive exposure: Past    Smokeless tobacco: Never   Vaping Use    Vaping status: Never Used   Substance Use Topics    Alcohol use: Not Currently     Alcohol/week: 1.0 standard drink of alcohol     Types: 1 Glasses of wine per week     Comment: No alcohol the past 10 years.    Drug use: No       Objective:     Vitals:    24 0919   BP: 110/66   Pulse: 71   SpO2: 95%   Weight: 101 kg (222 lb 12.8 oz)   Height: 172.7 cm (68\")   PainSc: 0-No pain       /66   Pulse 71   Ht 172.7 cm (68\")   Wt 101 kg (222 lb 12.8 oz)   SpO2 95%   BMI 33.88 kg/m²       PHYSICAL EXAM:      Physical Exam   Constitutional: He is oriented to person, place, and time. No distress.   HENT:   Head: Normocephalic and atraumatic.   Eyes: Conjunctivae are normal. Right eye exhibits no discharge. Left eye exhibits no discharge. No scleral icterus.   Neck: No thyromegaly present.   Cardiovascular: Normal rate, regular rhythm and normal heart sounds. Exam reveals no gallop and no friction rub.   Pulmonary/Chest: Effort normal. No stridor. No respiratory distress. He has no wheezes.   Distant breath sounds   Abdominal: Soft. Bowel sounds are normal. He exhibits no mass. There is no abdominal tenderness. There is no rebound and no guarding.   Musculoskeletal: Normal range of motion. No tenderness.   Lymphadenopathy:     He has no cervical adenopathy.   Neurological: He is alert and oriented to person, place, and " time. He exhibits normal muscle tone.   Skin: Skin is warm. No rash noted. He is not diaphoretic. No erythema.   Psychiatric: His behavior is normal.        RECENT LABS:     Lab Results   Component Value Date    WBC 13.36 (H) 08/05/2024    WBC 8.94 08/14/2023    HGB 14.9 08/05/2024    HGB 13.5 08/14/2023    HCT 46.1 08/05/2024    HCT 41.9 08/14/2023    NEUTROABS 8.94 (H) 08/05/2024         Lab Results   Component Value Date    GLUCOSE 113 (H) 08/05/2024    BUN 14 08/05/2024    CREATININE 0.86 08/05/2024    EGFRIFNONA 85 06/02/2021    BCR 16.3 08/05/2024    K 4.6 08/05/2024    CO2 25.1 08/05/2024    CALCIUM 9.8 08/05/2024    ALBUMIN 4.7 08/05/2024    LABIL2 1.5 05/20/2019    AST 17 08/05/2024    ALT 15 08/05/2024         Assessment & Plan      ASSESSMENT:     Monoclonal B-cell lymphocytosis: cbc stable, no significant s/s of development of CLL  COPD: stable  Obesity  Posttraumatic stress disorder   obstructive sleep apnea      PLAN:      Flow cytometry was consistent with monoclonal B-cell lymphocytosis. No s/s of progression monitor for now.  Repeat CBC today showed normal lymphocyte count.  Continue to monitor.  Patient is instructed to report to us if he develops any constitutional symptoms.  Patient to continue to use CPAP machine.  Blood pressure is controlled well now   He received disability now for his PTSD.  Controlled.      Follow-up in 1 year

## 2024-08-12 ENCOUNTER — OFFICE VISIT (OUTPATIENT)
Dept: ONCOLOGY | Facility: CLINIC | Age: 69
End: 2024-08-12
Payer: MEDICARE

## 2024-08-12 VITALS
HEIGHT: 68 IN | BODY MASS INDEX: 33.77 KG/M2 | DIASTOLIC BLOOD PRESSURE: 66 MMHG | HEART RATE: 71 BPM | SYSTOLIC BLOOD PRESSURE: 110 MMHG | WEIGHT: 222.8 LBS | OXYGEN SATURATION: 95 %

## 2024-08-12 DIAGNOSIS — D72.828 OTHER ELEVATED WHITE BLOOD CELL (WBC) COUNT: ICD-10-CM

## 2024-08-12 DIAGNOSIS — C91.10 CLL (CHRONIC LYMPHOCYTIC LEUKEMIA): Primary | ICD-10-CM

## 2024-08-12 PROCEDURE — 1126F AMNT PAIN NOTED NONE PRSNT: CPT | Performed by: STUDENT IN AN ORGANIZED HEALTH CARE EDUCATION/TRAINING PROGRAM

## 2024-08-12 PROCEDURE — 99214 OFFICE O/P EST MOD 30 MIN: CPT | Performed by: STUDENT IN AN ORGANIZED HEALTH CARE EDUCATION/TRAINING PROGRAM

## 2024-08-12 PROCEDURE — 3078F DIAST BP <80 MM HG: CPT | Performed by: STUDENT IN AN ORGANIZED HEALTH CARE EDUCATION/TRAINING PROGRAM

## 2024-08-12 PROCEDURE — 3074F SYST BP LT 130 MM HG: CPT | Performed by: STUDENT IN AN ORGANIZED HEALTH CARE EDUCATION/TRAINING PROGRAM

## 2024-08-30 DIAGNOSIS — G47.419 PRIMARY NARCOLEPSY WITHOUT CATAPLEXY: ICD-10-CM

## 2024-08-30 RX ORDER — MODAFINIL 200 MG/1
200 TABLET ORAL DAILY
Qty: 30 TABLET | Refills: 0 | Status: SHIPPED | OUTPATIENT
Start: 2024-08-30

## 2024-09-04 DIAGNOSIS — G89.29 CHRONIC BILATERAL LOW BACK PAIN WITHOUT SCIATICA: ICD-10-CM

## 2024-09-04 DIAGNOSIS — M54.50 CHRONIC BILATERAL LOW BACK PAIN WITHOUT SCIATICA: ICD-10-CM

## 2024-09-04 RX ORDER — HYDROCODONE BITARTRATE AND ACETAMINOPHEN 10; 325 MG/1; MG/1
1 TABLET ORAL 4 TIMES DAILY
Qty: 120 TABLET | Refills: 0 | Status: SHIPPED | OUTPATIENT
Start: 2024-09-04

## 2024-09-12 ENCOUNTER — ANESTHESIA EVENT (OUTPATIENT)
Dept: GASTROENTEROLOGY | Facility: HOSPITAL | Age: 69
End: 2024-09-12
Payer: MEDICARE

## 2024-09-12 ENCOUNTER — ON CAMPUS - OUTPATIENT (OUTPATIENT)
Age: 69
End: 2024-09-12
Payer: MEDICARE

## 2024-09-12 ENCOUNTER — HOSPITAL ENCOUNTER (OUTPATIENT)
Facility: HOSPITAL | Age: 69
Setting detail: HOSPITAL OUTPATIENT SURGERY
Discharge: HOME | End: 2024-09-12
Attending: INTERNAL MEDICINE | Admitting: INTERNAL MEDICINE
Payer: MEDICARE

## 2024-09-12 ENCOUNTER — ON CAMPUS - OUTPATIENT (OUTPATIENT)
Dept: URBAN - METROPOLITAN AREA HOSPITAL 85 | Facility: HOSPITAL | Age: 69
End: 2024-09-12
Payer: MEDICARE

## 2024-09-12 ENCOUNTER — ANESTHESIA (OUTPATIENT)
Dept: GASTROENTEROLOGY | Facility: HOSPITAL | Age: 69
End: 2024-09-12
Payer: MEDICARE

## 2024-09-12 VITALS
RESPIRATION RATE: 20 BRPM | SYSTOLIC BLOOD PRESSURE: 112 MMHG | OXYGEN SATURATION: 99 % | WEIGHT: 206.4 LBS | HEART RATE: 62 BPM | TEMPERATURE: 98.5 F | HEIGHT: 68 IN | DIASTOLIC BLOOD PRESSURE: 70 MMHG | BODY MASS INDEX: 31.28 KG/M2

## 2024-09-12 DIAGNOSIS — D12.4 BENIGN NEOPLASM OF DESCENDING COLON: ICD-10-CM

## 2024-09-12 DIAGNOSIS — Z09 ENCOUNTER FOR FOLLOW-UP EXAMINATION AFTER COMPLETED TREATMEN: ICD-10-CM

## 2024-09-12 DIAGNOSIS — Z80.0 FAMILY HISTORY OF COLON CANCER: ICD-10-CM

## 2024-09-12 DIAGNOSIS — Z86.010 PERSONAL HISTORY OF COLON POLYPS: ICD-10-CM

## 2024-09-12 DIAGNOSIS — D12.2 BENIGN NEOPLASM OF ASCENDING COLON: ICD-10-CM

## 2024-09-12 DIAGNOSIS — K57.30 DIVERTICULOSIS OF LARGE INTESTINE WITHOUT PERFORATION OR ABS: ICD-10-CM

## 2024-09-12 DIAGNOSIS — Z86.010 PERSONAL HISTORY OF COLONIC POLYPS: ICD-10-CM

## 2024-09-12 DIAGNOSIS — Z83.719 FAMILY HISTORY OF COLONIC POLYPS: ICD-10-CM

## 2024-09-12 LAB — GLUCOSE BLDC GLUCOMTR-MCNC: 132 MG/DL (ref 70–105)

## 2024-09-12 PROCEDURE — 45385 COLONOSCOPY W/LESION REMOVAL: CPT | Mod: 33 | Performed by: INTERNAL MEDICINE

## 2024-09-12 PROCEDURE — 88305 TISSUE EXAM BY PATHOLOGIST: CPT | Performed by: INTERNAL MEDICINE

## 2024-09-12 PROCEDURE — 25010000002 PROPOFOL 200 MG/20ML EMULSION: Performed by: NURSE ANESTHETIST, CERTIFIED REGISTERED

## 2024-09-12 PROCEDURE — 25810000003 SODIUM CHLORIDE 0.9 % SOLUTION: Performed by: INTERNAL MEDICINE

## 2024-09-12 PROCEDURE — 82948 REAGENT STRIP/BLOOD GLUCOSE: CPT

## 2024-09-12 RX ORDER — ONDANSETRON 2 MG/ML
4 INJECTION INTRAMUSCULAR; INTRAVENOUS ONCE AS NEEDED
Status: DISCONTINUED | OUTPATIENT
Start: 2024-09-12 | End: 2024-09-12 | Stop reason: HOSPADM

## 2024-09-12 RX ORDER — PROPOFOL 10 MG/ML
INJECTION, EMULSION INTRAVENOUS AS NEEDED
Status: DISCONTINUED | OUTPATIENT
Start: 2024-09-12 | End: 2024-09-12 | Stop reason: SURG

## 2024-09-12 RX ORDER — LIDOCAINE HYDROCHLORIDE 20 MG/ML
INJECTION, SOLUTION EPIDURAL; INFILTRATION; INTRACAUDAL; PERINEURAL AS NEEDED
Status: DISCONTINUED | OUTPATIENT
Start: 2024-09-12 | End: 2024-09-12 | Stop reason: SURG

## 2024-09-12 RX ORDER — SODIUM CHLORIDE 9 MG/ML
50 INJECTION, SOLUTION INTRAVENOUS CONTINUOUS
Status: DISCONTINUED | OUTPATIENT
Start: 2024-09-12 | End: 2024-09-12 | Stop reason: HOSPADM

## 2024-09-12 RX ORDER — ONDANSETRON 2 MG/ML
4 INJECTION INTRAMUSCULAR; INTRAVENOUS ONCE AS NEEDED
Status: DISCONTINUED | OUTPATIENT
Start: 2024-09-12 | End: 2024-09-12 | Stop reason: SDUPTHER

## 2024-09-12 RX ADMIN — PROPOFOL 25 MG: 10 INJECTION, EMULSION INTRAVENOUS at 08:46

## 2024-09-12 RX ADMIN — PROPOFOL 25 MG: 10 INJECTION, EMULSION INTRAVENOUS at 08:36

## 2024-09-12 RX ADMIN — PROPOFOL 25 MG: 10 INJECTION, EMULSION INTRAVENOUS at 08:43

## 2024-09-12 RX ADMIN — PROPOFOL 25 MG: 10 INJECTION, EMULSION INTRAVENOUS at 08:39

## 2024-09-12 RX ADMIN — PROPOFOL 25 MG: 10 INJECTION, EMULSION INTRAVENOUS at 08:53

## 2024-09-12 RX ADMIN — SODIUM CHLORIDE 50 ML/HR: 9 INJECTION, SOLUTION INTRAVENOUS at 07:59

## 2024-09-12 RX ADMIN — PROPOFOL 25 MG: 10 INJECTION, EMULSION INTRAVENOUS at 08:34

## 2024-09-12 RX ADMIN — PROPOFOL 50 MG: 10 INJECTION, EMULSION INTRAVENOUS at 08:31

## 2024-09-12 RX ADMIN — LIDOCAINE HYDROCHLORIDE 50 MG: 20 INJECTION, SOLUTION EPIDURAL; INFILTRATION; INTRACAUDAL; PERINEURAL at 08:31

## 2024-09-12 NOTE — ANESTHESIA PREPROCEDURE EVALUATION
Anesthesia Evaluation     NPO Solid Status: > 8 hours  NPO Liquid Status: > 8 hours           Airway   Mallampati: II  TM distance: >3 FB  Neck ROM: full  No difficulty expected  Dental - normal exam     Pulmonary - normal exam   (+) COPD mild,sleep apnea on CPAP  Cardiovascular - normal exam    (+) hypertension well controlled, past MI , CAD, hyperlipidemia      Neuro/Psych  (+) syncope, numbness, psychiatric history Depression  GI/Hepatic/Renal/Endo    (+) obesity, GERD well controlled, liver disease    Musculoskeletal     (+) back pain  Abdominal  - normal exam    Bowel sounds: normal.   Substance History      OB/GYN          Other   blood dyscrasia,   history of cancer                Anesthesia Plan    ASA 3     MAC   total IV anesthesia  intravenous induction     Anesthetic plan, risks, benefits, and alternatives have been provided, discussed and informed consent has been obtained with: patient.  Pre-procedure education provided  Plan discussed with CRNA.    CODE STATUS:

## 2024-09-12 NOTE — ANESTHESIA POSTPROCEDURE EVALUATION
Patient: Neptali Osuna    Procedure Summary       Date: 09/12/24 Room / Location: [facility] ENDOSCOPY 4 / [facility] ENDOSCOPY    Anesthesia Start: 0820 Anesthesia Stop: 0858    Procedure: COLONOSCOPY with polypectomy x 2 Diagnosis:       Personal history of colonic polyps      Family history of colon cancer      Family history of colonic polyps      (Personal history of colonic polyps [Z86.010])      (Family history of colon cancer [Z80.0])      (Family history of colonic polyps [Z83.719])    Surgeons: Pranay White MD Provider: Edvin Loera MD    Anesthesia Type: MAC ASA Status: 3            Anesthesia Type: MAC    Vitals  Vitals Value Taken Time   /70 09/12/24 0912   Temp     Pulse 78 09/12/24 0913   Resp 20 09/12/24 0912   SpO2 99 % 09/12/24 0913   Vitals shown include unfiled device data.        Post Anesthesia Care and Evaluation    Patient location during evaluation: PACU  Patient participation: complete - patient participated  Level of consciousness: awake  Pain scale: See nurse's notes for pain score.  Pain management: adequate    Airway patency: patent  Anesthetic complications: No anesthetic complications  PONV Status: none  Cardiovascular status: acceptable  Respiratory status: acceptable and spontaneous ventilation  Hydration status: acceptable    Comments: Patient seen and examined postoperatively; vital signs stable; SpO2 greater than or equal to 90%; cardiopulmonary status stable; nausea/vomiting adequately controlled; pain adequately controlled; no apparent anesthesia complications; patient discharged from anesthesia care when discharge criteria were met

## 2024-09-12 NOTE — DISCHARGE INSTRUCTIONS
A responsible adult should stay with you and you should rest quietly for the rest of the day.    Do not drink alcohol, drive, operate any heavy machinery or power tools or make any legal/important decisions for the next 24 hours.     Progress your diet as tolerated.  If you begin to experience severe pain, increased shortness of breath, racing heartbeat or a fever above 101 F, seek immediate medical attention.     Follow up with MD as instructed. Call office for results in 3 to 5 days if needed. [phone]    Findings:   2 sessile 3 mm polyps in the ascending colon removed with cold snare polypectomy.  1 semipedunculated 8 mm polyp in the descending colon removed with cold snare polypectomy.  Mild sigmoid diverticulosis noted.     Impression:  1.  Colon polyps  2.  Diverticulosis     Recommendations:  1.  High-fiber diet  2.  Repeat colonoscopy in 5 years  3.  Follow-up in my office as needed

## 2024-09-12 NOTE — OP NOTE
COLONOSCOPY Procedure Report    Patient Name:  Neptali Osuna  YOB: 1955    Date of Surgery:  9/12/2024     Pre-Op Diagnosis:  Personal history of colonic polyps [Z86.010]  Family history of colon cancer [Z80.0]  Family history of colonic polyps [Z83.719]    Post-Op Diagnosis:  1.  Colon polyps  2.  Diverticulosis       Procedure/CPT® Codes:      Procedure(s):  COLONOSCOPY with polypectomy x 2    Staff:  Surgeon(s):  Pranay White MD      Anesthesia: Monitored Anesthesia Care    Description of Procedure:  A description of the procedure as well as risks, benefits and alternative methods were explained to the patient who voiced understanding and signed the corresponding consent form. A physical exam was performed and vital signs were monitored throughout the procedure.    After informed consent was obtained the patient was placed in the left lateral decubitus position and sedated as above.  Digital rectal examination was performed and was normal.  The Olympus video colonoscope was inserted into the rectum and advanced to the cecum without difficulty.  A careful examination of the colon was performed as the colonoscope was slowly withdrawn.  The bowel prep was excellent.  The cecum and appendiceal orifice were identified. The scope was then retroflexed and the distal rectum was visualized. The procedure was not difficult and there were no immediate complications.  There was no blood loss.    Findings:   2 sessile 3 mm polyps in the ascending colon removed with cold snare polypectomy.  1 semipedunculated 8 mm polyp in the descending colon removed with cold snare polypectomy.  Mild sigmoid diverticulosis noted.    Impression:  1.  Colon polyps  2.  Diverticulosis    Recommendations:  1.  High-fiber diet  2.  Repeat colonoscopy in 5 years  3.  Follow-up in my office as needed      Pranay White MD     Date: 9/12/2024    Time: 08:57 EDT

## 2024-09-12 NOTE — H&P
GI PREOPERATIVE HISTORY AND PHYSICAL:    Referring Provider:    Honey Steele APRN    Chief complaint: Personal history of colon polyps    Subjective .     History of present illness:      Neptali Osuna is a 69 y.o. male who presents today for Procedure(s):  COLONOSCOPY for the indications listed below.     Personal history of colon polyps    The updated Patient Profile was reviewed prior to the procedure, in conjunction with the Physical Exam, including medical conditions, surgical procedures, medications, allergies, family history and social history.     Pre-operatively, I reviewed the indication(s) for the procedure, the risks of the procedure [including but not limited to: unexpected bleeding possibly requiring hospitalization and/or unplanned repeat procedures, perforation possibly requiring surgical treatment, missed lesions and complications of sedation/MAC (also explained by anesthesia staff)].     I have evaluated the patient for risks associated with the planned anesthesia and the procedure to be performed and find the patient an acceptable candidate for IV sedation.    Multiple opportunities were provided for any questions or concerns, and all questions were answered satisfactorily before any anesthesia was administered. We will proceed with the planned procedure.    Past Medical History:  Past Medical History:   Diagnosis Date    Acute diverticulitis     with perforation    Allergic     Anemia     Anxiety     Back pain     CAD S/P percutaneous coronary angioplasty     Cancer     cll     Congenital heart disease 2018    COPD (chronic obstructive pulmonary disease)     Depression     Diverticulitis     Emphysema of lung 11/2008    GERD (gastroesophageal reflux disease)     Hyperlipidemia     Hypertension     Low back pain     Myocardial infarction     TEIXEIRA (nonalcoholic steatohepatitis)     Obesity     BRITTNEY on CPAP     Sleep apnea     Sleep disorder, shift work     Spinal stenosis of lumbar region at  multiple levels     Status post placement of implantable loop recorder 2020    [facility]       Past Surgical History:  Past Surgical History:   Procedure Laterality Date    CARDIAC CATHETERIZATION  11/05/2018    Dr. Sher    CHOLECYSTECTOMY N/A 02/22/2021    Procedure: CHOLECYSTECTOMY LAPAROSCOPIC;  Surgeon: Faisal Wilkins DO;  Location: [facility] MAIN OR;  Service: General;  Laterality: N/A;    COLONOSCOPY  2016    CORONARY STENT PLACEMENT      ERCP N/A 02/21/2021    Procedure: ENDOSCOPIC RETROGRADE CHOLANGIOPANCREATOGRAPHY, sphincterotomy, balloon clearance of  bile duct, stent placement;  Surgeon: Ramiro Pulido MD;  Location: [facility] ENDOSCOPY;  Service: Gastroenterology;  Laterality: N/A;  post: biliary dilation, choledocolithiasis    ERCP N/A 06/04/2021    Procedure: ENDOSCOPIC RETROGRADE CHOLANGIOPANCREATOGRAPHY WITH STENT REMOVAL and balloon sweeping of common bile duct (12-15mm balloon up to 15mm);  Surgeon: Pranay White MD;  Location: [facility] ENDOSCOPY;  Service: Gastroenterology;  Laterality: N/A;  post op: biliary stent removal, common bile duct sweeping with 12-15mm balloon (up to 15mm), dialated bile duct    LIVER BIOPSY      TONSILLECTOMY AND ADENOIDECTOMY         Social History:  Social History     Tobacco Use    Smoking status: Former     Current packs/day: 0.00     Average packs/day: 1.5 packs/day for 40.0 years (60.0 ttl pk-yrs)     Types: Cigarettes     Start date: 9/1/1969     Quit date: 9/1/2009     Years since quitting: 15.0     Passive exposure: Past    Smokeless tobacco: Never   Vaping Use    Vaping status: Never Used   Substance Use Topics    Alcohol use: Not Currently     Alcohol/week: 1.0 standard drink of alcohol     Types: 1 Glasses of wine per week     Comment: No alcohol the past 10 years.    Drug use: No       Family History:  Family History   Problem Relation Age of Onset    Colon cancer Mother     Heart disease Mother     Depression Mother     Colon cancer Father     Heart  disease Father     Heart attack Father     Cancer Father     Diabetes Brother     Diabetes Maternal Grandmother     Diabetes Maternal Grandfather        Medications:  Medications Prior to Admission   Medication Sig Dispense Refill Last Dose    albuterol sulfate  (90 Base) MCG/ACT inhaler Inhale 2 puffs Every 4 (Four) Hours As Needed for Wheezing. 6.7 g 5     amLODIPine (NORVASC) 10 MG tablet Take 1 tablet by mouth Daily. 90 tablet 1     aspirin 81 MG EC tablet Take 1 tablet by mouth Daily. LD 5/25       atorvastatin (LIPITOR) 40 MG tablet TAKE 1 TABLET BY MOUTH EVERY DAY AT NIGHT 90 tablet 1     DULoxetine (CYMBALTA) 60 MG capsule TAKE 1 CAPSULE BY MOUTH EVERY DAY 90 capsule 2     ipratropium-albuterol (DUO-NEB) 0.5-2.5 mg/3 ml nebulizer Take 3 mL by nebulization Every 4 (Four) Hours As Needed for Wheezing. 360 mL 5     lisinopril (PRINIVIL,ZESTRIL) 30 MG tablet TAKE 1 TABLET BY MOUTH EVERY DAY 90 tablet 1     pregabalin (LYRICA) 100 MG capsule TAKE 1 CAPSULE BY MOUTH TWICE A DAY 60 capsule 0     SITagliptin (Januvia) 50 MG tablet Take 1 tablet by mouth Daily. 30 tablet 3     tiZANidine (ZANAFLEX) 4 MG tablet TAKE 1 TABLET BY MOUTH EVERY 8 HOURS AS NEEDED FOR MUSCLE SPASMS 30 tablet 1     Trelegy Ellipta 100-62.5-25 MCG/ACT inhaler INHALE 1 PUFF BY MOUTH DAILY 60 each 4     HYDROcodone-acetaminophen (NORCO)  MG per tablet Take 1 tablet by mouth 4 (Four) Times a Day. 120 tablet 0     modafinil (PROVIGIL) 200 MG tablet Take 1 tablet by mouth Daily. 30 tablet 0        Scheduled Meds:  Continuous Infusions:No current facility-administered medications for this encounter.    PRN Meds:.    ALLERGIES:  Patient has no known allergies.    ROS:  The following systems were reviewed and negative;   Constitution:  No fevers, chills, no unintentional weight loss  Skin: no rash, no jaundice  Eyes:  No blurry vision, no eye pain  HENT:  No change in hearing or smell  Resp:  No dyspnea or cough  CV:  No chest pain or  "palpitations  :  No dysuria, hematuria  Musculoskeletal:  No leg cramps or arthralgias  Neuro:  No tremor, no numbness  Psych:  No depression or confsuion    Objective     Vital Signs:   Vitals:    08/30/24 1338   Weight: 93 kg (205 lb)   Height: 172.7 cm (68\")       Physical Exam:       General Appearance:    Awake and alert, in no acute distress   Head:    Normocephalic, without obvious abnormality, atraumatic   Throat:   No oral lesions, no thrush, oral mucosa moist   Lungs  Cardiac:  Abdomen:  Extremities:     Respirations regular, even and unlabored    Regular rate and rhythm, no murmur, gallop, rub    Non-distended, good bowel sounds, non tender, no masses     No edema, pulses 2+   Skin:   No rash, no jaundice       Results Review:  Lab Results (last 24 hours)       ** No results found for the last 24 hours. **            Imaging Results (Last 24 Hours)       ** No results found for the last 24 hours. **             I reviewed the patient's labs and imaging.    ASSESSMENT AND PLAN:  Personal history of colon polyps    Active Problems:    * No active hospital problems. *       Procedure(s):  COLONOSCOPY      I discussed the patients findings and my recommendations with the patient.    Pranay White MD  09/12/24  07:30 EDT    "

## 2024-09-13 LAB
LAB AP CASE REPORT: NORMAL
PATH REPORT.FINAL DX SPEC: NORMAL
PATH REPORT.GROSS SPEC: NORMAL

## 2024-09-16 NOTE — PROGRESS NOTES
Encounter Date:10/10/2024  Last seen 4/1/2024      Patient ID: Neptali Osuna is a 69 y.o. male.    Chief Complaint:     Status post stent  History of syncope  Status post loop recorder  Hypertension  Dyslipidemia        History of Present Illness  Patient recently had colonoscopy without any cardiovascular problems.    Since I have last seen, the patient has been without any chest discomfort ,shortness of breath, palpitations, dizziness or syncope.  Denies having any headache ,abdominal pain ,nausea, vomiting , diarrhea constipation, loss of weight or loss of appetite.  Denies having any excessive bruising ,hematuria or blood in the stool.    Review of all systems negative except as indicated.    Reviewed ROS.  Assessment and Plan         ]]]]]]]]]]]]]]]]]]]  History  =============   -Status post stent to proximal LAD 11/05/2018     Cardiac catheterization 11/05/2018 revealed  Left ventricular size and contractility is normal with ejection fraction of 65%.  Left main coronary artery is normal.  Left anterior descending artery has proximal calcific plaquing with 95-99% proximal LAD disease.  Circumflex coronary artery is normal.  Right coronary artery is a large and dominant vessel that has mid segment 50% disease.     Echocardiogram 4/1/2024   Structurally and functionally normal cardiac valves.  Left ventricular size and contractility is normal with ejection fraction of 55 %.  No evidence for pulmonary hypertension is present.     -History of recurrent syncopal episodes concerning for bradyarrhythmias.  Loop recorder monitor showed second-degree AV block type I and type II.  Patient had one episode of bradycardia with heart rates of 30/min while he was sleeping without CPAP.     -Status post [employer] loop recorder 11/15/2019 (Lin[employer])  Negative tilt table test 12/20/2019       -COPD dyslipidemia hypertension     -Status post cholecystectomyFormer smoker      -Strong family history of coronary artery disease      -No  known allergies  =========  Plan  ===========  Status post stent  Patient is not having any angina pectoris or congestive heart failure.  EKG showed sinus rhythm without ischemic changes.-8/22/2023  EKG 4/1/2024-sinus rhythm without ischemic changes  EKG was not performed today 10/10/2024.  Patient is off Plavix.  Continue baby aspirin.     History of syncope-no further episodes.  Loop recorder in place.     Hypertension-well-controlled-98/60    Dyslipidemia-continue atorvastatin.     History of second-degree AV block Mobitz type I and type II-improved.  Patient does not have any symptoms of dizziness or near syncope or syncope.     No need for pacemaker at this time      Follow-up in the office in 6 months.     Further plan will depend on patient's progress.     Reviewed and updated 10/10/2024.  ]]]]]]]]]]]]]]]        Diagnosis Plan   1. Status post placement of implantable loop recorder        2. Status post coronary artery stent placement        3. Essential hypertension        4. Mixed hyperlipidemia        5. Syncope and collapse        6. Status post angioplasty with stent        7. Dyslipidemia        LAB RESULTS (LAST 7 DAYS)    CBC        BMP        CMP         BNP        TROPONIN        CoAg        Creatinine Clearance  CrCl cannot be calculated (Patient's most recent lab result is older than the maximum 30 days allowed.).    ABG        Radiology  No radiology results for the last day                The following portions of the patient's history were reviewed and updated as appropriate: allergies, current medications, past family history, past medical history, past social history, past surgical history, and problem list.    Review of Systems   Constitutional: Negative for malaise/fatigue.   Cardiovascular:  Negative for chest pain, dyspnea on exertion, leg swelling and palpitations.   Respiratory:  Negative for cough and shortness of breath.    Gastrointestinal:  Negative for abdominal pain, nausea and  vomiting.   Neurological:  Negative for dizziness, focal weakness, headaches, light-headedness and numbness.   All other systems reviewed and are negative.      Current Outpatient Medications:     albuterol sulfate  (90 Base) MCG/ACT inhaler, Inhale 2 puffs Every 4 (Four) Hours As Needed for Wheezing., Disp: 6.7 g, Rfl: 5    amLODIPine (NORVASC) 10 MG tablet, Take 1 tablet by mouth Daily., Disp: 90 tablet, Rfl: 1    aspirin 81 MG EC tablet, Take 1 tablet by mouth Daily. LD 5/25, Disp: , Rfl:     atorvastatin (LIPITOR) 40 MG tablet, TAKE 1 TABLET BY MOUTH EVERY DAY AT NIGHT, Disp: 90 tablet, Rfl: 1    DULoxetine (CYMBALTA) 60 MG capsule, TAKE 1 CAPSULE BY MOUTH EVERY DAY, Disp: 90 capsule, Rfl: 2    HYDROcodone-acetaminophen (NORCO)  MG per tablet, Take 1 tablet by mouth 4 (Four) Times a Day., Disp: 120 tablet, Rfl: 0    ipratropium-albuterol (DUO-NEB) 0.5-2.5 mg/3 ml nebulizer, Take 3 mL by nebulization Every 4 (Four) Hours As Needed for Wheezing., Disp: 360 mL, Rfl: 5    lisinopril (PRINIVIL,ZESTRIL) 30 MG tablet, TAKE 1 TABLET BY MOUTH EVERY DAY, Disp: 90 tablet, Rfl: 1    modafinil (PROVIGIL) 200 MG tablet, Take 1 tablet by mouth Daily., Disp: 30 tablet, Rfl: 0    pregabalin (LYRICA) 100 MG capsule, TAKE 1 CAPSULE BY MOUTH TWICE A DAY, Disp: 60 capsule, Rfl: 0    SITagliptin (Januvia) 50 MG tablet, Take 1 tablet by mouth Daily., Disp: 30 tablet, Rfl: 3    tiZANidine (ZANAFLEX) 4 MG tablet, TAKE 1 TABLET BY MOUTH EVERY 8 HOURS AS NEEDED FOR MUSCLE SPASMS, Disp: 30 tablet, Rfl: 1    Trelegy Ellipta 100-62.5-25 MCG/ACT inhaler, INHALE 1 PUFF BY MOUTH DAILY, Disp: 60 each, Rfl: 4    No Known Allergies    Family History   Problem Relation Age of Onset    Colon cancer Mother     Heart disease Mother     Depression Mother     Colon cancer Father     Heart disease Father     Heart attack Father     Cancer Father     Diabetes Brother     Diabetes Maternal Grandmother     Diabetes Maternal Grandfather         Past Surgical History:   Procedure Laterality Date    CARDIAC CATHETERIZATION  11/05/2018    Dr. Sher    CHOLECYSTECTOMY N/A 02/22/2021    Procedure: CHOLECYSTECTOMY LAPAROSCOPIC;  Surgeon: Faisal Wilkins DO;  Location: [facility] MAIN OR;  Service: General;  Laterality: N/A;    COLONOSCOPY  2016    COLONOSCOPY N/A 9/12/2024    Procedure: COLONOSCOPY with polypectomy x 2;  Surgeon: Pranay White MD;  Location: [facility] ENDOSCOPY;  Service: Gastroenterology;  Laterality: N/A;  polyp diverticulosis    CORONARY STENT PLACEMENT      ERCP N/A 02/21/2021    Procedure: ENDOSCOPIC RETROGRADE CHOLANGIOPANCREATOGRAPHY, sphincterotomy, balloon clearance of  bile duct, stent placement;  Surgeon: Ramiro Pulido MD;  Location: [facility] ENDOSCOPY;  Service: Gastroenterology;  Laterality: N/A;  post: biliary dilation, choledocolithiasis    ERCP N/A 06/04/2021    Procedure: ENDOSCOPIC RETROGRADE CHOLANGIOPANCREATOGRAPHY WITH STENT REMOVAL and balloon sweeping of common bile duct (12-15mm balloon up to 15mm);  Surgeon: Pranay White MD;  Location: [facility] ENDOSCOPY;  Service: Gastroenterology;  Laterality: N/A;  post op: biliary stent removal, common bile duct sweeping with 12-15mm balloon (up to 15mm), dialated bile duct    LIVER BIOPSY      TONSILLECTOMY AND ADENOIDECTOMY         Past Medical History:   Diagnosis Date    Acute diverticulitis     with perforation    Allergic     Anemia     Anxiety     Back pain     CAD S/P percutaneous coronary angioplasty     Cancer     cll     Congenital heart disease 2018    COPD (chronic obstructive pulmonary disease)     Depression     Diverticulitis     Emphysema of lung 11/2008    GERD (gastroesophageal reflux disease)     Hyperlipidemia     Hypertension     Low back pain     Myocardial infarction     TEIXEIRA (nonalcoholic steatohepatitis)     Obesity     BRITTNEY on CPAP     Sleep apnea     Sleep disorder, shift work     Spinal stenosis of lumbar region at multiple levels      Status post placement of implantable loop recorder 2020    [facility]       Family History   Problem Relation Age of Onset    Colon cancer Mother     Heart disease Mother     Depression Mother     Colon cancer Father     Heart disease Father     Heart attack Father     Cancer Father     Diabetes Brother     Diabetes Maternal Grandmother     Diabetes Maternal Grandfather        Social History     Socioeconomic History    Marital status:    Tobacco Use    Smoking status: Former     Current packs/day: 0.00     Average packs/day: 1.5 packs/day for 40.0 years (60.0 ttl pk-yrs)     Types: Cigarettes     Start date: 9/1/1969     Quit date: 9/1/2009     Years since quitting: 15.0     Passive exposure: Past    Smokeless tobacco: Never   Vaping Use    Vaping status: Never Used   Substance and Sexual Activity    Alcohol use: Not Currently     Alcohol/week: 1.0 standard drink of alcohol     Types: 1 Glasses of wine per week     Comment: No alcohol the past 10 years.    Drug use: No    Sexual activity: Not Currently     Partners: Female     Birth control/protection: None         Procedures      Objective:       Physical Exam    There were no vitals taken for this visit.  The patient is alert, oriented and in no distress.    Vital signs as noted above.    Head and neck revealed no carotid bruits or jugular venous distension.  No thyromegaly or lymphadenopathy is present.    Lungs clear.  No wheezing.  Breath sounds are normal bilaterally.    Heart normal first and second heart sounds.  No murmur..  No pericardial rub is present.  No gallop is present.    Abdomen soft and nontender.  No organomegaly is present.    Extremities revealed good peripheral pulses without any pedal edema.    Skin warm and dry.    Musculoskeletal system is grossly normal.    CNS grossly normal.    Reviewed and updated.

## 2024-09-20 ENCOUNTER — OFFICE VISIT (OUTPATIENT)
Dept: FAMILY MEDICINE CLINIC | Facility: CLINIC | Age: 69
End: 2024-09-20
Payer: MEDICARE

## 2024-09-20 VITALS
WEIGHT: 207 LBS | OXYGEN SATURATION: 96 % | HEART RATE: 88 BPM | BODY MASS INDEX: 31.37 KG/M2 | SYSTOLIC BLOOD PRESSURE: 115 MMHG | DIASTOLIC BLOOD PRESSURE: 77 MMHG | HEIGHT: 68 IN

## 2024-09-20 DIAGNOSIS — Z87.891 PERSONAL HISTORY OF NICOTINE DEPENDENCE: ICD-10-CM

## 2024-09-20 DIAGNOSIS — I10 ESSENTIAL HYPERTENSION: ICD-10-CM

## 2024-09-20 DIAGNOSIS — J44.89 OBSTRUCTIVE CHRONIC BRONCHITIS WITHOUT EXACERBATION: ICD-10-CM

## 2024-09-20 DIAGNOSIS — G47.419 PRIMARY NARCOLEPSY WITHOUT CATAPLEXY: ICD-10-CM

## 2024-09-20 DIAGNOSIS — F33.0 MDD (MAJOR DEPRESSIVE DISORDER), RECURRENT EPISODE, MILD: ICD-10-CM

## 2024-09-20 DIAGNOSIS — E66.09 CLASS 1 OBESITY DUE TO EXCESS CALORIES WITH SERIOUS COMORBIDITY AND BODY MASS INDEX (BMI) OF 31.0 TO 31.9 IN ADULT: ICD-10-CM

## 2024-09-20 DIAGNOSIS — M54.16 CHRONIC RADICULAR PAIN OF LOWER BACK: Primary | ICD-10-CM

## 2024-09-20 DIAGNOSIS — E78.2 MIXED HYPERLIPIDEMIA: ICD-10-CM

## 2024-09-20 DIAGNOSIS — E11.65 TYPE 2 DIABETES MELLITUS WITH HYPERGLYCEMIA, WITHOUT LONG-TERM CURRENT USE OF INSULIN: ICD-10-CM

## 2024-09-20 DIAGNOSIS — G89.29 CHRONIC RADICULAR PAIN OF LOWER BACK: Primary | ICD-10-CM

## 2024-09-20 PROCEDURE — G2211 COMPLEX E/M VISIT ADD ON: HCPCS | Performed by: NURSE PRACTITIONER

## 2024-09-20 PROCEDURE — 3044F HG A1C LEVEL LT 7.0%: CPT | Performed by: NURSE PRACTITIONER

## 2024-09-20 PROCEDURE — 3078F DIAST BP <80 MM HG: CPT | Performed by: NURSE PRACTITIONER

## 2024-09-20 PROCEDURE — 1159F MED LIST DOCD IN RCRD: CPT | Performed by: NURSE PRACTITIONER

## 2024-09-20 PROCEDURE — 99214 OFFICE O/P EST MOD 30 MIN: CPT | Performed by: NURSE PRACTITIONER

## 2024-09-20 PROCEDURE — 3074F SYST BP LT 130 MM HG: CPT | Performed by: NURSE PRACTITIONER

## 2024-09-20 PROCEDURE — 1160F RVW MEDS BY RX/DR IN RCRD: CPT | Performed by: NURSE PRACTITIONER

## 2024-09-20 PROCEDURE — 1126F AMNT PAIN NOTED NONE PRSNT: CPT | Performed by: NURSE PRACTITIONER

## 2024-09-25 ENCOUNTER — LAB (OUTPATIENT)
Dept: FAMILY MEDICINE CLINIC | Facility: CLINIC | Age: 69
End: 2024-09-25
Payer: MEDICARE

## 2024-09-25 DIAGNOSIS — E78.2 MIXED HYPERLIPIDEMIA: ICD-10-CM

## 2024-09-25 DIAGNOSIS — I10 ESSENTIAL HYPERTENSION: ICD-10-CM

## 2024-09-25 DIAGNOSIS — E11.65 TYPE 2 DIABETES MELLITUS WITH HYPERGLYCEMIA, WITHOUT LONG-TERM CURRENT USE OF INSULIN: ICD-10-CM

## 2024-09-25 LAB
ALBUMIN SERPL-MCNC: 4.2 G/DL (ref 3.5–5.2)
ALBUMIN UR-MCNC: <1.2 MG/DL
ALBUMIN/GLOB SERPL: 1.8 G/DL
ALP SERPL-CCNC: 66 U/L (ref 39–117)
ALT SERPL W P-5'-P-CCNC: 15 U/L (ref 1–41)
ANION GAP SERPL CALCULATED.3IONS-SCNC: 9.8 MMOL/L (ref 5–15)
AST SERPL-CCNC: 16 U/L (ref 1–40)
BILIRUB SERPL-MCNC: 0.2 MG/DL (ref 0–1.2)
BUN SERPL-MCNC: 14 MG/DL (ref 8–23)
BUN/CREAT SERPL: 16.3 (ref 7–25)
CALCIUM SPEC-SCNC: 9.5 MG/DL (ref 8.6–10.5)
CHLORIDE SERPL-SCNC: 102 MMOL/L (ref 98–107)
CHOLEST SERPL-MCNC: 133 MG/DL (ref 0–200)
CO2 SERPL-SCNC: 25.2 MMOL/L (ref 22–29)
CREAT SERPL-MCNC: 0.86 MG/DL (ref 0.76–1.27)
CREAT UR-MCNC: 101.6 MG/DL
EGFRCR SERPLBLD CKD-EPI 2021: 94.3 ML/MIN/1.73
GLOBULIN UR ELPH-MCNC: 2.3 GM/DL
GLUCOSE SERPL-MCNC: 97 MG/DL (ref 65–99)
HBA1C MFR BLD: 6.1 % (ref 4.8–5.6)
HDLC SERPL-MCNC: 39 MG/DL (ref 40–60)
LDLC SERPL CALC-MCNC: 77 MG/DL (ref 0–100)
LDLC/HDLC SERPL: 1.96 {RATIO}
MICROALBUMIN/CREAT UR: NORMAL MG/G{CREAT}
POTASSIUM SERPL-SCNC: 4.8 MMOL/L (ref 3.5–5.2)
PROT SERPL-MCNC: 6.5 G/DL (ref 6–8.5)
SODIUM SERPL-SCNC: 137 MMOL/L (ref 136–145)
TRIGL SERPL-MCNC: 88 MG/DL (ref 0–150)
VLDLC SERPL-MCNC: 17 MG/DL (ref 5–40)

## 2024-09-25 PROCEDURE — 80061 LIPID PANEL: CPT | Performed by: NURSE PRACTITIONER

## 2024-09-25 PROCEDURE — 82043 UR ALBUMIN QUANTITATIVE: CPT | Performed by: NURSE PRACTITIONER

## 2024-09-25 PROCEDURE — 82570 ASSAY OF URINE CREATININE: CPT | Performed by: NURSE PRACTITIONER

## 2024-09-25 PROCEDURE — 36415 COLL VENOUS BLD VENIPUNCTURE: CPT

## 2024-09-25 PROCEDURE — 83036 HEMOGLOBIN GLYCOSYLATED A1C: CPT | Performed by: NURSE PRACTITIONER

## 2024-09-25 PROCEDURE — 80053 COMPREHEN METABOLIC PANEL: CPT | Performed by: NURSE PRACTITIONER

## 2024-10-02 DIAGNOSIS — G89.29 CHRONIC BILATERAL LOW BACK PAIN WITHOUT SCIATICA: ICD-10-CM

## 2024-10-02 DIAGNOSIS — G47.419 PRIMARY NARCOLEPSY WITHOUT CATAPLEXY: ICD-10-CM

## 2024-10-02 DIAGNOSIS — M54.50 CHRONIC BILATERAL LOW BACK PAIN WITHOUT SCIATICA: ICD-10-CM

## 2024-10-02 RX ORDER — AMLODIPINE BESYLATE 10 MG/1
10 TABLET ORAL DAILY
Qty: 90 TABLET | Refills: 1 | Status: SHIPPED | OUTPATIENT
Start: 2024-10-02

## 2024-10-02 RX ORDER — HYDROCODONE BITARTRATE AND ACETAMINOPHEN 10; 325 MG/1; MG/1
1 TABLET ORAL 4 TIMES DAILY
Qty: 120 TABLET | Refills: 0 | Status: SHIPPED | OUTPATIENT
Start: 2024-10-02

## 2024-10-02 RX ORDER — MODAFINIL 200 MG/1
200 TABLET ORAL DAILY
Qty: 30 TABLET | Refills: 0 | Status: SHIPPED | OUTPATIENT
Start: 2024-10-02

## 2024-10-02 NOTE — TELEPHONE ENCOUNTER
Last Filled: 09/05/2024  Last Written:09/04/2024  Last Sold: 09/05/2024    LV: 09/20/2024  Upv: 01/20/2024

## 2024-10-10 ENCOUNTER — OFFICE VISIT (OUTPATIENT)
Dept: CARDIOLOGY | Facility: CLINIC | Age: 69
End: 2024-10-10
Payer: MEDICARE

## 2024-10-10 VITALS
HEIGHT: 68 IN | BODY MASS INDEX: 31.83 KG/M2 | SYSTOLIC BLOOD PRESSURE: 98 MMHG | HEART RATE: 73 BPM | WEIGHT: 210 LBS | DIASTOLIC BLOOD PRESSURE: 60 MMHG | OXYGEN SATURATION: 92 %

## 2024-10-10 DIAGNOSIS — Z95.818 STATUS POST PLACEMENT OF IMPLANTABLE LOOP RECORDER: Primary | ICD-10-CM

## 2024-10-10 DIAGNOSIS — E78.2 MIXED HYPERLIPIDEMIA: ICD-10-CM

## 2024-10-10 DIAGNOSIS — R55 SYNCOPE AND COLLAPSE: ICD-10-CM

## 2024-10-10 DIAGNOSIS — Z95.820 STATUS POST ANGIOPLASTY WITH STENT: ICD-10-CM

## 2024-10-10 DIAGNOSIS — I10 ESSENTIAL HYPERTENSION: ICD-10-CM

## 2024-10-10 DIAGNOSIS — E78.5 DYSLIPIDEMIA: ICD-10-CM

## 2024-10-10 DIAGNOSIS — Z95.5 STATUS POST CORONARY ARTERY STENT PLACEMENT: ICD-10-CM

## 2024-10-14 DIAGNOSIS — G89.29 CHRONIC BILATERAL LOW BACK PAIN WITHOUT SCIATICA: ICD-10-CM

## 2024-10-14 DIAGNOSIS — E11.65 TYPE 2 DIABETES MELLITUS WITH HYPERGLYCEMIA, WITHOUT LONG-TERM CURRENT USE OF INSULIN: ICD-10-CM

## 2024-10-14 DIAGNOSIS — M54.50 CHRONIC BILATERAL LOW BACK PAIN WITHOUT SCIATICA: ICD-10-CM

## 2024-10-14 RX ORDER — PREGABALIN 100 MG/1
100 CAPSULE ORAL 2 TIMES DAILY
Qty: 60 CAPSULE | Refills: 0 | Status: SHIPPED | OUTPATIENT
Start: 2024-10-14

## 2024-10-14 RX ORDER — SITAGLIPTIN 50 MG/1
50 TABLET, FILM COATED ORAL DAILY
Qty: 30 TABLET | Refills: 3 | Status: SHIPPED | OUTPATIENT
Start: 2024-10-14

## 2024-10-30 DIAGNOSIS — M54.50 CHRONIC BILATERAL LOW BACK PAIN WITHOUT SCIATICA: ICD-10-CM

## 2024-10-30 DIAGNOSIS — G89.29 CHRONIC BILATERAL LOW BACK PAIN WITHOUT SCIATICA: ICD-10-CM

## 2024-10-30 RX ORDER — HYDROCODONE BITARTRATE AND ACETAMINOPHEN 10; 325 MG/1; MG/1
1 TABLET ORAL 4 TIMES DAILY
Qty: 120 TABLET | Refills: 0 | Status: SHIPPED | OUTPATIENT
Start: 2024-10-30

## 2024-11-01 DIAGNOSIS — G47.419 PRIMARY NARCOLEPSY WITHOUT CATAPLEXY: ICD-10-CM

## 2024-11-01 RX ORDER — MODAFINIL 200 MG/1
200 TABLET ORAL DAILY
Qty: 30 TABLET | Refills: 0 | Status: SHIPPED | OUTPATIENT
Start: 2024-11-01

## 2024-11-18 NOTE — PROGRESS NOTES
Teresa Osuna is a 69 y.o. male.       HPI   Pt is here today with concern of fluid on his right elbow. This started last week after he was walking the dog and got pulled.  He has not seen redness or felt warmth over the area.  Has been trying to keep a compression wrap over the area but it becomes uncomfortable at times.      The following portions of the patient's history were reviewed and updated as appropriate: allergies, current medications, past family history, past medical history, past social history, past surgical history, and problem list.    Review of Systems   Constitutional:  Negative for chills, fatigue and fever.   Respiratory:  Negative for cough and shortness of breath.    Cardiovascular:  Negative for chest pain and palpitations.   Musculoskeletal:  Positive for arthralgias and joint swelling.   Psychiatric/Behavioral:  Negative for depressed mood. The patient is not nervous/anxious.        Objective   Physical Exam  Vitals reviewed.   Constitutional:       General: He is not in acute distress.     Appearance: Normal appearance.   Cardiovascular:      Rate and Rhythm: Normal rate and regular rhythm.      Pulses: Normal pulses.   Pulmonary:      Effort: Pulmonary effort is normal. No respiratory distress.   Musculoskeletal:      Right elbow: Swelling present. Normal range of motion. Tenderness present in olecranon process.   Skin:     General: Skin is warm and dry.      Findings: No erythema.   Neurological:      General: No focal deficit present.      Mental Status: He is alert and oriented to person, place, and time.   Psychiatric:         Mood and Affect: Mood normal.                  Procedures   Assessment & Plan   Diagnoses and all orders for this visit:    1. Olecranon bursitis of right elbow (Primary)  Comments:  Cont. compression.   Referral to Ortho.   Handout in AVS  Orders:  -     Ambulatory Referral to Orthopedic Surgery

## 2024-11-19 ENCOUNTER — OFFICE VISIT (OUTPATIENT)
Dept: FAMILY MEDICINE CLINIC | Facility: CLINIC | Age: 69
End: 2024-11-19
Payer: MEDICARE

## 2024-11-19 VITALS
HEART RATE: 99 BPM | BODY MASS INDEX: 30.31 KG/M2 | SYSTOLIC BLOOD PRESSURE: 136 MMHG | WEIGHT: 200 LBS | HEIGHT: 68 IN | DIASTOLIC BLOOD PRESSURE: 87 MMHG | OXYGEN SATURATION: 94 %

## 2024-11-19 DIAGNOSIS — M70.21 OLECRANON BURSITIS OF RIGHT ELBOW: Primary | ICD-10-CM

## 2024-11-19 PROCEDURE — 3044F HG A1C LEVEL LT 7.0%: CPT | Performed by: NURSE PRACTITIONER

## 2024-11-19 PROCEDURE — 1126F AMNT PAIN NOTED NONE PRSNT: CPT | Performed by: NURSE PRACTITIONER

## 2024-11-19 PROCEDURE — 3075F SYST BP GE 130 - 139MM HG: CPT | Performed by: NURSE PRACTITIONER

## 2024-11-19 PROCEDURE — 1159F MED LIST DOCD IN RCRD: CPT | Performed by: NURSE PRACTITIONER

## 2024-11-19 PROCEDURE — 1160F RVW MEDS BY RX/DR IN RCRD: CPT | Performed by: NURSE PRACTITIONER

## 2024-11-19 PROCEDURE — 3079F DIAST BP 80-89 MM HG: CPT | Performed by: NURSE PRACTITIONER

## 2024-11-19 PROCEDURE — 99213 OFFICE O/P EST LOW 20 MIN: CPT | Performed by: NURSE PRACTITIONER

## 2024-11-25 ENCOUNTER — OFFICE VISIT (OUTPATIENT)
Dept: ORTHOPEDIC SURGERY | Facility: CLINIC | Age: 69
End: 2024-11-25
Payer: MEDICARE

## 2024-11-25 VITALS — HEIGHT: 68 IN | BODY MASS INDEX: 30.31 KG/M2 | OXYGEN SATURATION: 96 % | WEIGHT: 200 LBS

## 2024-11-25 DIAGNOSIS — M25.521 RIGHT ELBOW PAIN: Primary | ICD-10-CM

## 2024-11-25 DIAGNOSIS — M70.21 OLECRANON BURSITIS OF RIGHT ELBOW: ICD-10-CM

## 2024-11-25 NOTE — PROGRESS NOTES
"Neptali is a 69 y.o. year old male presents to [facility] ORTH[facility]    Chief Complaint   Patient presents with    Right Elbow - Initial Evaluation     Bursitis       History of Present Illness  Neptali Osuna is a 69 y.o. male presents to clinic for evaluation of right elbow pain for the past 2 weeks. Initially evaluated by PCP who diagnosed with olecranon bursitis. States he started doing incline pushups a few months ago for strengthening and weight loss. Qualifies: ache. Pain from mid forearm to elbow recently developed. Provocative: any pressure applied, ie leaning on right elbow. Treatment: bracing, compression, padded sleeve. Ice caused achy pain.  Denies any numbness or tingling in this extremity or digits.    I have reviewed the patient's medical, family, and social history in detail and updated the computerized patient record.    Objective:  Ht 172.7 cm (68\")   Wt 90.7 kg (200 lb)   SpO2 96%   BMI 30.41 kg/m²      Physical Exam    Vital signs reviewed.   General: No acute distress.  Eyes: conjunctiva clear  ENT: external ears atraumatic  CV: no peripheral edema  Resp: normal respiratory effort  Skin: no rashes or wounds; normal turgor  Psych: mood and affect appropriate; recent and remote memory intact  Neuro: sensation to light touch intact    MSK Exam    Right elbow:  Moderate effusion posteriorly, no warmth.  NO signs of infection  No tenderness palpation over the medial and lateral epicondyles  No pain or weakness with supination or pronation of the hand  Extension and flexion at the elbow grossly intact.  ROM at the wrist and shoulder intact  Radial pulse 1+, capillary refill less than 2 seconds to all digits  Sensation light touch intact in all distributions   strength 5/5 and equal bilaterally    Imaging:  XR Elbow 2 View Right (11/25/2024 13:03)       Assessment:  Diagnoses and all orders for this visit:    Right elbow pain  -     XR Elbow 2 View Right    Olecranon bursitis of " right elbow    Plan: Olcrenanon bursitis rehab provided at checkout recommend initiating this HEP in 4-5 weeks. Continue compression consistently for the next 3-4 weeks, then may begin removing the base for bathing.  But, continue wearing the Ace for another 1 to 2 weeks.  Follow up as needed.          Follow Up   Return if symptoms worsen or fail to improve.  Patient was given instructions and counseling regarding his condition or for health maintenance advice. Please see specific information pulled into the AVS if appropriate.     EMR Dragon/Transcription disclaimer:    Much of this encounter note is an electronic transcription/translation of spoken language to printed text.  The electronic translation of spoken language may permit erroneous, or at times, nonsensical words or phrases to be inadvertently transcribed.  Although I have reviewed the note for such errors some may still exist.

## 2024-11-27 ENCOUNTER — PATIENT ROUNDING (BHMG ONLY) (OUTPATIENT)
Dept: ORTHOPEDIC SURGERY | Facility: CLINIC | Age: 69
End: 2024-11-27
Payer: MEDICARE

## 2024-11-27 DIAGNOSIS — G89.29 CHRONIC BILATERAL LOW BACK PAIN WITHOUT SCIATICA: ICD-10-CM

## 2024-11-27 DIAGNOSIS — M54.50 CHRONIC BILATERAL LOW BACK PAIN WITHOUT SCIATICA: ICD-10-CM

## 2024-11-27 RX ORDER — HYDROCODONE BITARTRATE AND ACETAMINOPHEN 10; 325 MG/1; MG/1
1 TABLET ORAL 4 TIMES DAILY
Qty: 120 TABLET | Refills: 0 | Status: SHIPPED | OUTPATIENT
Start: 2024-11-27

## 2024-12-02 DIAGNOSIS — G47.419 PRIMARY NARCOLEPSY WITHOUT CATAPLEXY: ICD-10-CM

## 2024-12-03 RX ORDER — MODAFINIL 200 MG/1
200 TABLET ORAL DAILY
Qty: 30 TABLET | Refills: 0 | Status: SHIPPED | OUTPATIENT
Start: 2024-12-03

## 2024-12-18 DIAGNOSIS — G89.29 CHRONIC BILATERAL LOW BACK PAIN WITHOUT SCIATICA: ICD-10-CM

## 2024-12-18 DIAGNOSIS — M54.50 CHRONIC BILATERAL LOW BACK PAIN WITHOUT SCIATICA: ICD-10-CM

## 2024-12-19 RX ORDER — PREGABALIN 100 MG/1
100 CAPSULE ORAL 2 TIMES DAILY
Qty: 60 CAPSULE | Refills: 0 | Status: SHIPPED | OUTPATIENT
Start: 2024-12-19

## 2024-12-23 DIAGNOSIS — G89.29 CHRONIC BILATERAL LOW BACK PAIN WITHOUT SCIATICA: ICD-10-CM

## 2024-12-23 DIAGNOSIS — M54.50 CHRONIC BILATERAL LOW BACK PAIN WITHOUT SCIATICA: ICD-10-CM

## 2024-12-23 RX ORDER — HYDROCODONE BITARTRATE AND ACETAMINOPHEN 10; 325 MG/1; MG/1
1 TABLET ORAL 4 TIMES DAILY
Qty: 120 TABLET | Refills: 0 | Status: SHIPPED | OUTPATIENT
Start: 2024-12-23

## 2024-12-30 RX ORDER — DULOXETIN HYDROCHLORIDE 60 MG/1
60 CAPSULE, DELAYED RELEASE ORAL DAILY
Qty: 90 CAPSULE | Refills: 2 | Status: SHIPPED | OUTPATIENT
Start: 2024-12-30

## 2025-01-05 RX ORDER — ATORVASTATIN CALCIUM 40 MG/1
40 TABLET, FILM COATED ORAL
Qty: 90 TABLET | Refills: 1 | Status: SHIPPED | OUTPATIENT
Start: 2025-01-05

## 2025-01-05 RX ORDER — LISINOPRIL 30 MG/1
TABLET ORAL
Qty: 90 TABLET | Refills: 1 | Status: SHIPPED | OUTPATIENT
Start: 2025-01-05

## 2025-01-06 DIAGNOSIS — G47.419 PRIMARY NARCOLEPSY WITHOUT CATAPLEXY: ICD-10-CM

## 2025-01-07 RX ORDER — MODAFINIL 200 MG/1
200 TABLET ORAL DAILY
Qty: 30 TABLET | Refills: 0 | Status: SHIPPED | OUTPATIENT
Start: 2025-01-07

## 2025-01-08 ENCOUNTER — TELEPHONE (OUTPATIENT)
Dept: FAMILY MEDICINE CLINIC | Facility: CLINIC | Age: 70
End: 2025-01-08
Payer: MEDICARE

## 2025-01-08 ENCOUNTER — PATIENT MESSAGE (OUTPATIENT)
Dept: PULMONOLOGY | Facility: HOSPITAL | Age: 70
End: 2025-01-08
Payer: MEDICARE

## 2025-01-08 NOTE — TELEPHONE ENCOUNTER
Please let patient know his Modafinil was denied by insurance.  I would recommend he see his sleep specialist; they may have the info needed to get it covered or may have to do further testing.

## 2025-01-08 NOTE — TELEPHONE ENCOUNTER
Modafinil 200MG tablets PA request was submitted to [employer]. They have faxed additional questions that need completed and also needs your signature. I have placed on your desk. Please let me know when completed and I will come back there to get.     [employer] Electronic PA Form  (Key: [id number])  PA Case ID #: [id number]  Rx #: [id number]

## 2025-01-08 NOTE — TELEPHONE ENCOUNTER
We have reviewed your request for MODAFINIL 200 MG TABLET submitted for the member   identified above, and we denied your request for the following reason:  because we did not see certain information that is required in order to approve the requested   drug. We may consider approval of this drug when it will be used in a certain situation (when   your diagnosis is defined by Multiple Sleep Latency Test [MSLT] showing one of the following:   mean sleep latency of less than 8 minutes with evidence of two sleep-onset rapid eye   movement periods [SOREMPs] or mean sleep latency of less than 8 minutes with evidence at   least one SOREMP on MSLT and a SOREMP [less than 15 minutes] on the preceding   overnight polysomnography). We based this decision on your health plan's prior authorization   criteria named Provigil (modafinil).

## 2025-01-17 NOTE — PROGRESS NOTES
Teresa Osuna is a 69 y.o. male.       HPI   Pt. is here today for 4 month follow up on chronic medical conditions.   1) Chronic low back pain with radiculopathy - currently has regimen of Norco 10/325 mg qid; Lyrica 100 mg bid; tizanidine 4 mg tid PRN.  Feels pain symptoms are in good control/ stable.   2) HTN -   Currently on amlodipine 10 mg daily; lisinopril 30 mg daily.   Denies any CP; palpitations; SOA; dizziness; headache; trouble with vision.     Former smoker. Has been working on better diet and increased exercise to daily; weight is down 4 more pounds since last visit.  Using treadmill and strength training 6 days a week; has cut out sugar and eating more whole foods.    3) Hyperlipdiemia - currently on atorvastatin 40 mg daily; ASA 81 mg daily.   4) COPD - currently on Trelegy Ellipta inhaler; albuterol inhaler PRN.   Denies any current SOA; cough or wheezes.   Continues with pulmonology -Dr. Gracia.  .    5) Narcolepsy - followed by Sleep Med (Dr. Gracia).  Currently taking Provigil 200 mg daily.  Stable.       6) Depression/Anxiety - currently taking Cymbalta 60 mg daily. Moods stable.  Denies any SI or HI.  7) T2DM - currently on sitagliptin 50 mg daily.  Last A1C was 6.1% in Sept 2024.  Denies any polyuria or polydipsia.  Denies any skin lesions or sores that are not healing.  Due eye exam.   8) Right middle toe pain- stubbed toe a few weeks ago on furniture at home; thinks he probably broke the middle toe; it has been swollen and tender.  He is taping his toes together but not today.  He is able to walk.  No pain in foot.      The following portions of the patient's history were reviewed and updated as appropriate: allergies, current medications, past family history, past medical history, past social history, past surgical history, and problem list.    Review of Systems   Constitutional:  Negative for chills, fatigue and fever.   Eyes:  Negative for visual disturbance.   Respiratory:   Negative for cough, shortness of breath and wheezing.    Cardiovascular:  Negative for chest pain and palpitations.   Gastrointestinal:  Negative for abdominal pain, blood in stool, constipation, diarrhea, nausea, vomiting and GERD.   Endocrine: Negative for polydipsia, polyphagia and polyuria.   Genitourinary:  Negative for dysuria, flank pain, frequency, hematuria and urgency.   Neurological:  Negative for dizziness, weakness, light-headedness and headache.   Psychiatric/Behavioral:  Negative for depressed mood. The patient is not nervous/anxious.        Objective   Physical Exam  Vitals reviewed.   Constitutional:       General: He is not in acute distress.     Appearance: Normal appearance. He is obese.   Cardiovascular:      Rate and Rhythm: Normal rate and regular rhythm.      Pulses: Normal pulses.      Heart sounds: Normal heart sounds. No murmur heard.  Pulmonary:      Effort: Pulmonary effort is normal. No respiratory distress.      Breath sounds: Normal breath sounds. No wheezing, rhonchi or rales.   Chest:      Chest wall: No tenderness.   Abdominal:      Tenderness: There is no right CVA tenderness or left CVA tenderness.   Skin:     General: Skin is warm and dry.   Neurological:      General: No focal deficit present.      Mental Status: He is alert and oriented to person, place, and time.   Psychiatric:         Mood and Affect: Mood normal.                  Procedures   Assessment & Plan   Diagnoses and all orders for this visit:    1. Essential hypertension (Primary)  Comments:  Stable.   Cont. current medication.   Labs ordered.   RTO in 4 mo.  Orders:  -     Comprehensive metabolic panel; Future  -     Hemoglobin A1c; Future  -     Lipid panel; Future    2. Mixed hyperlipidemia  Comments:  Stable.   Cont. current medication.   Labs ordered.   RTO in 4 mo.  Orders:  -     Comprehensive metabolic panel; Future  -     Hemoglobin A1c; Future  -     Lipid panel; Future    3. Type 2 diabetes mellitus  with hyperglycemia, without long-term current use of insulin  Comments:  Stable.   Cont. current medication.   Labs ordered.   RTO in 4 mo.  Updaye eye exam.  Orders:  -     Comprehensive metabolic panel; Future  -     Hemoglobin A1c; Future  -     Lipid panel; Future    4. Chronic bilateral low back pain without sciatica  Comments:  Stable.   Cont. current medication.   Labs ordered.   RTO in 4 mo.    5. Primary narcolepsy without cataplexy  Comments:  Keep f/u with Sleep Medicine.    6. Obstructive chronic bronchitis without exacerbation  Comments:  Stable.   Cont. current medication.   Labs ordered.   RTO in 4 mo.  Orders:  -     Comprehensive metabolic panel; Future  -     Hemoglobin A1c; Future  -     Lipid panel; Future    7. MDD (major depressive disorder), recurrent episode, mild  Comments:  Stable.   Cont. current medication.   Labs ordered.   RTO in 4 mo.    8. Pain and swelling of toe of right foot  Comments:  Pt declines xray.   Discussed jared taping toes.    He will call if anything worsens.

## 2025-01-20 ENCOUNTER — OFFICE VISIT (OUTPATIENT)
Dept: FAMILY MEDICINE CLINIC | Facility: CLINIC | Age: 70
End: 2025-01-20
Payer: MEDICARE

## 2025-01-20 ENCOUNTER — LAB (OUTPATIENT)
Dept: FAMILY MEDICINE CLINIC | Facility: CLINIC | Age: 70
End: 2025-01-20
Payer: MEDICARE

## 2025-01-20 VITALS
SYSTOLIC BLOOD PRESSURE: 121 MMHG | OXYGEN SATURATION: 95 % | DIASTOLIC BLOOD PRESSURE: 71 MMHG | BODY MASS INDEX: 29.7 KG/M2 | WEIGHT: 196 LBS | HEIGHT: 68 IN | HEART RATE: 76 BPM

## 2025-01-20 DIAGNOSIS — I10 ESSENTIAL HYPERTENSION: ICD-10-CM

## 2025-01-20 DIAGNOSIS — E78.2 MIXED HYPERLIPIDEMIA: ICD-10-CM

## 2025-01-20 DIAGNOSIS — M79.674 PAIN AND SWELLING OF TOE OF RIGHT FOOT: ICD-10-CM

## 2025-01-20 DIAGNOSIS — E11.65 TYPE 2 DIABETES MELLITUS WITH HYPERGLYCEMIA, WITHOUT LONG-TERM CURRENT USE OF INSULIN: ICD-10-CM

## 2025-01-20 DIAGNOSIS — J44.89 OBSTRUCTIVE CHRONIC BRONCHITIS WITHOUT EXACERBATION: ICD-10-CM

## 2025-01-20 DIAGNOSIS — M79.89 PAIN AND SWELLING OF TOE OF RIGHT FOOT: ICD-10-CM

## 2025-01-20 DIAGNOSIS — I10 ESSENTIAL HYPERTENSION: Primary | ICD-10-CM

## 2025-01-20 DIAGNOSIS — G89.29 CHRONIC BILATERAL LOW BACK PAIN WITHOUT SCIATICA: ICD-10-CM

## 2025-01-20 DIAGNOSIS — G47.419 PRIMARY NARCOLEPSY WITHOUT CATAPLEXY: ICD-10-CM

## 2025-01-20 DIAGNOSIS — M54.50 CHRONIC BILATERAL LOW BACK PAIN WITHOUT SCIATICA: ICD-10-CM

## 2025-01-20 DIAGNOSIS — F33.0 MDD (MAJOR DEPRESSIVE DISORDER), RECURRENT EPISODE, MILD: ICD-10-CM

## 2025-01-20 LAB
ALBUMIN SERPL-MCNC: 4.3 G/DL (ref 3.5–5.2)
ALBUMIN/GLOB SERPL: 1.3 G/DL
ALP SERPL-CCNC: 73 U/L (ref 39–117)
ALT SERPL W P-5'-P-CCNC: 14 U/L (ref 1–41)
ANION GAP SERPL CALCULATED.3IONS-SCNC: 13.4 MMOL/L (ref 5–15)
AST SERPL-CCNC: 20 U/L (ref 1–40)
BILIRUB SERPL-MCNC: 0.5 MG/DL (ref 0–1.2)
BUN SERPL-MCNC: 12 MG/DL (ref 8–23)
BUN/CREAT SERPL: 14.1 (ref 7–25)
CALCIUM SPEC-SCNC: 9.8 MG/DL (ref 8.6–10.5)
CHLORIDE SERPL-SCNC: 97 MMOL/L (ref 98–107)
CHOLEST SERPL-MCNC: 139 MG/DL (ref 0–200)
CO2 SERPL-SCNC: 26.6 MMOL/L (ref 22–29)
CREAT SERPL-MCNC: 0.85 MG/DL (ref 0.76–1.27)
EGFRCR SERPLBLD CKD-EPI 2021: 94.6 ML/MIN/1.73
GLOBULIN UR ELPH-MCNC: 3.3 GM/DL
GLUCOSE SERPL-MCNC: 90 MG/DL (ref 65–99)
HBA1C MFR BLD: 5.9 % (ref 4.8–5.6)
HDLC SERPL-MCNC: 46 MG/DL (ref 40–60)
LDLC SERPL CALC-MCNC: 76 MG/DL (ref 0–100)
LDLC/HDLC SERPL: 1.63 {RATIO}
POTASSIUM SERPL-SCNC: 4.3 MMOL/L (ref 3.5–5.2)
PROT SERPL-MCNC: 7.6 G/DL (ref 6–8.5)
SODIUM SERPL-SCNC: 137 MMOL/L (ref 136–145)
TRIGL SERPL-MCNC: 89 MG/DL (ref 0–150)
VLDLC SERPL-MCNC: 17 MG/DL (ref 5–40)

## 2025-01-20 PROCEDURE — 3078F DIAST BP <80 MM HG: CPT | Performed by: NURSE PRACTITIONER

## 2025-01-20 PROCEDURE — 80053 COMPREHEN METABOLIC PANEL: CPT | Performed by: NURSE PRACTITIONER

## 2025-01-20 PROCEDURE — 99214 OFFICE O/P EST MOD 30 MIN: CPT | Performed by: NURSE PRACTITIONER

## 2025-01-20 PROCEDURE — 3074F SYST BP LT 130 MM HG: CPT | Performed by: NURSE PRACTITIONER

## 2025-01-20 PROCEDURE — 1126F AMNT PAIN NOTED NONE PRSNT: CPT | Performed by: NURSE PRACTITIONER

## 2025-01-20 PROCEDURE — 36415 COLL VENOUS BLD VENIPUNCTURE: CPT

## 2025-01-20 PROCEDURE — G2211 COMPLEX E/M VISIT ADD ON: HCPCS | Performed by: NURSE PRACTITIONER

## 2025-01-20 PROCEDURE — 83036 HEMOGLOBIN GLYCOSYLATED A1C: CPT | Performed by: NURSE PRACTITIONER

## 2025-01-20 PROCEDURE — 80061 LIPID PANEL: CPT | Performed by: NURSE PRACTITIONER

## 2025-01-22 DIAGNOSIS — G89.29 CHRONIC BILATERAL LOW BACK PAIN WITHOUT SCIATICA: ICD-10-CM

## 2025-01-22 DIAGNOSIS — M54.50 CHRONIC BILATERAL LOW BACK PAIN WITHOUT SCIATICA: ICD-10-CM

## 2025-01-22 RX ORDER — HYDROCODONE BITARTRATE AND ACETAMINOPHEN 10; 325 MG/1; MG/1
1 TABLET ORAL 4 TIMES DAILY
Qty: 120 TABLET | Refills: 0 | Status: SHIPPED | OUTPATIENT
Start: 2025-01-22

## 2025-01-22 NOTE — TELEPHONE ENCOUNTER
LV: 01/20/2025  Upv; 05/20/2025    Last filled: 12/27/2024  Last written: 12/23/2024  Last Sold: 12/27/2024

## 2025-01-27 DIAGNOSIS — E11.65 TYPE 2 DIABETES MELLITUS WITH HYPERGLYCEMIA, WITHOUT LONG-TERM CURRENT USE OF INSULIN: ICD-10-CM

## 2025-01-27 RX ORDER — SITAGLIPTIN 50 MG/1
50 TABLET, FILM COATED ORAL DAILY
Qty: 30 TABLET | Refills: 3 | Status: SHIPPED | OUTPATIENT
Start: 2025-01-27

## 2025-02-07 DIAGNOSIS — G47.419 PRIMARY NARCOLEPSY WITHOUT CATAPLEXY: ICD-10-CM

## 2025-02-07 RX ORDER — MODAFINIL 200 MG/1
200 TABLET ORAL DAILY
Qty: 30 TABLET | Refills: 0 | Status: SHIPPED | OUTPATIENT
Start: 2025-02-07

## 2025-02-11 DIAGNOSIS — G47.419 PRIMARY NARCOLEPSY WITHOUT CATAPLEXY: ICD-10-CM

## 2025-02-11 RX ORDER — MODAFINIL 200 MG/1
200 TABLET ORAL DAILY
Qty: 30 TABLET | Refills: 0 | OUTPATIENT
Start: 2025-02-11

## 2025-02-20 DIAGNOSIS — M54.50 CHRONIC BILATERAL LOW BACK PAIN WITHOUT SCIATICA: ICD-10-CM

## 2025-02-20 DIAGNOSIS — G89.29 CHRONIC BILATERAL LOW BACK PAIN WITHOUT SCIATICA: ICD-10-CM

## 2025-02-20 RX ORDER — HYDROCODONE BITARTRATE AND ACETAMINOPHEN 10; 325 MG/1; MG/1
1 TABLET ORAL 4 TIMES DAILY
Qty: 120 TABLET | Refills: 0 | Status: SHIPPED | OUTPATIENT
Start: 2025-02-20

## 2025-02-26 DIAGNOSIS — M54.50 CHRONIC BILATERAL LOW BACK PAIN WITHOUT SCIATICA: ICD-10-CM

## 2025-02-26 DIAGNOSIS — G89.29 CHRONIC BILATERAL LOW BACK PAIN WITHOUT SCIATICA: ICD-10-CM

## 2025-02-26 RX ORDER — PREGABALIN 100 MG/1
100 CAPSULE ORAL 2 TIMES DAILY
Qty: 60 CAPSULE | Refills: 0 | Status: SHIPPED | OUTPATIENT
Start: 2025-02-26

## 2025-03-12 DIAGNOSIS — G47.419 PRIMARY NARCOLEPSY WITHOUT CATAPLEXY: ICD-10-CM

## 2025-03-12 RX ORDER — MODAFINIL 200 MG/1
200 TABLET ORAL DAILY
Qty: 30 TABLET | Refills: 0 | Status: SHIPPED | OUTPATIENT
Start: 2025-03-12

## 2025-03-13 DIAGNOSIS — M54.50 CHRONIC BILATERAL LOW BACK PAIN WITHOUT SCIATICA: ICD-10-CM

## 2025-03-13 DIAGNOSIS — G89.29 CHRONIC BILATERAL LOW BACK PAIN WITHOUT SCIATICA: ICD-10-CM

## 2025-03-13 RX ORDER — HYDROCODONE BITARTRATE AND ACETAMINOPHEN 10; 325 MG/1; MG/1
1 TABLET ORAL 4 TIMES DAILY
Qty: 120 TABLET | Refills: 0 | OUTPATIENT
Start: 2025-03-13

## 2025-03-20 DIAGNOSIS — M54.50 CHRONIC MIDLINE LOW BACK PAIN WITHOUT SCIATICA: ICD-10-CM

## 2025-03-20 DIAGNOSIS — G89.29 CHRONIC MIDLINE LOW BACK PAIN WITHOUT SCIATICA: ICD-10-CM

## 2025-03-20 DIAGNOSIS — G89.29 CHRONIC BILATERAL LOW BACK PAIN WITHOUT SCIATICA: ICD-10-CM

## 2025-03-20 DIAGNOSIS — M54.50 CHRONIC BILATERAL LOW BACK PAIN WITHOUT SCIATICA: ICD-10-CM

## 2025-03-20 RX ORDER — HYDROCODONE BITARTRATE AND ACETAMINOPHEN 10; 325 MG/1; MG/1
1 TABLET ORAL 4 TIMES DAILY
Qty: 120 TABLET | Refills: 0 | Status: SHIPPED | OUTPATIENT
Start: 2025-03-20

## 2025-03-31 RX ORDER — AMLODIPINE BESYLATE 10 MG/1
10 TABLET ORAL DAILY
Qty: 90 TABLET | Refills: 1 | Status: SHIPPED | OUTPATIENT
Start: 2025-03-31

## 2025-04-01 DIAGNOSIS — G89.29 CHRONIC BILATERAL LOW BACK PAIN WITHOUT SCIATICA: ICD-10-CM

## 2025-04-01 DIAGNOSIS — M54.50 CHRONIC BILATERAL LOW BACK PAIN WITHOUT SCIATICA: ICD-10-CM

## 2025-04-01 RX ORDER — PREGABALIN 100 MG/1
100 CAPSULE ORAL 2 TIMES DAILY
Qty: 60 CAPSULE | Refills: 0 | Status: SHIPPED | OUTPATIENT
Start: 2025-04-01

## 2025-04-04 NOTE — PROGRESS NOTES
Encounter Date:04/10/2025  Last seen 10/10/2024      Patient ID: Neptali Osuna is a 69 y.o. male.    Chief Complaint:     Status post stent  History of syncope  Status post loop recorder  Hypertension  Dyslipidemia        History of Present Illness  Since I have last seen, the patient has been without any chest discomfort ,shortness of breath, palpitations, dizziness or syncope.  Denies having any headache ,abdominal pain ,nausea, vomiting , diarrhea constipation, loss of weight or loss of appetite.  Denies having any excessive bruising ,hematuria or blood in the stool.    Review of all systems negative except as indicated.    Reviewed ROS.    Assessment and Plan         ]]]]]]]]]]]]]]]]]]]  History  =============   -Status post stent to proximal LAD 11/05/2018     Cardiac catheterization 11/05/2018 revealed  Left ventricular size and contractility is normal with ejection fraction of 65%.  Left main coronary artery is normal.  Left anterior descending artery has proximal calcific plaquing with 95-99% proximal LAD disease.  Circumflex coronary artery is normal.  Right coronary artery is a large and dominant vessel that has mid segment 50% disease.     Echocardiogram 4/1/2024   Structurally and functionally normal cardiac valves.  Left ventricular size and contractility is normal with ejection fraction of 55 %.  No evidence for pulmonary hypertension is present.     -History of recurrent syncopal episodes concerning for bradyarrhythmias.  Loop recorder monitor showed second-degree AV block type I and type II.  Patient had one episode of bradycardia with heart rates of 30/min while he was sleeping without CPAP.     -Status post [employer] loop recorder 11/15/2019 (Linq)  Negative tilt table test 12/20/2019       -COPD dyslipidemia hypertension     -Status post cholecystectomyFormer smoker      -Strong family history of coronary artery disease      -No known allergies  =========  Plan  ===========  Status post  stent  Patient is not having any angina pectoris or congestive heart failure.  EKG 4/10/2025-normal sinus rhythm normal ECG.  EKG showed sinus rhythm without ischemic changes.-8/22/2023  EKG 4/1/2024-sinus rhythm without ischemic changes  EKG was not performed today 10/10/2024.    Antiplatelet therapy was reviewed.  Patient is off Plavix.  Continue baby aspirin.     History of syncope-no further episodes.  Loop recorder in place.     Hypertension-well-controlled  136/66.    Dyslipidemia-continue atorvastatin.    History of second-degree AV block Mobitz type I and type II-improved.  Patient does not have any symptoms of dizziness or near syncope or syncope.     No need for pacemaker at this time      Medications were reviewed and updated.    Further plan will depend on patient's progress.    Reviewed and updated 4/10/2025  ]]]]]]]]]]]]]]]          Diagnosis Plan   1. Status post placement of implantable loop recorder        2. Status post coronary artery stent placement        3. Dyslipidemia        4. Status post angioplasty with stent        5. Essential hypertension        6. Syncope and collapse        7. Mixed hyperlipidemia        LAB RESULTS (LAST 7 DAYS)    CBC        BMP        CMP         BNP        TROPONIN        CoAg        Creatinine Clearance  CrCl cannot be calculated (Patient's most recent lab result is older than the maximum 30 days allowed.).    ABG        Radiology  No radiology results for the last day                The following portions of the patient's history were reviewed and updated as appropriate: allergies, current medications, past family history, past medical history, past social history, past surgical history, and problem list.    Review of Systems   Constitutional: Negative for malaise/fatigue.   Cardiovascular:  Negative for chest pain, leg swelling, palpitations and syncope.   Respiratory:  Negative for shortness of breath.    Skin:  Negative for rash.   Gastrointestinal:  Negative  for nausea and vomiting.   Neurological:  Negative for dizziness, light-headedness and numbness.   All other systems reviewed and are negative.        Current Outpatient Medications:     albuterol sulfate  (90 Base) MCG/ACT inhaler, Inhale 2 puffs Every 4 (Four) Hours As Needed for Wheezing., Disp: 6.7 g, Rfl: 5    amLODIPine (NORVASC) 10 MG tablet, TAKE 1 TABLET BY MOUTH EVERY DAY, Disp: 90 tablet, Rfl: 1    aspirin 81 MG EC tablet, Take 1 tablet by mouth Daily. LD 5/25, Disp: , Rfl:     atorvastatin (LIPITOR) 40 MG tablet, TAKE 1 TABLET BY MOUTH EVERY DAY AT NIGHT, Disp: 90 tablet, Rfl: 1    DULoxetine (CYMBALTA) 60 MG capsule, TAKE 1 CAPSULE BY MOUTH EVERY DAY, Disp: 90 capsule, Rfl: 2    HYDROcodone-acetaminophen (NORCO)  MG per tablet, Take 1 tablet by mouth 4 (Four) Times a Day., Disp: 120 tablet, Rfl: 0    ipratropium-albuterol (DUO-NEB) 0.5-2.5 mg/3 ml nebulizer, Take 3 mL by nebulization Every 4 (Four) Hours As Needed for Wheezing., Disp: 360 mL, Rfl: 5    Januvia 50 MG tablet, TAKE 1 TABLET BY MOUTH EVERY DAY, Disp: 30 tablet, Rfl: 3    lisinopril (PRINIVIL,ZESTRIL) 30 MG tablet, TAKE 1 TABLET BY MOUTH EVERY DAY, Disp: 90 tablet, Rfl: 1    modafinil (PROVIGIL) 200 MG tablet, Take 1 tablet by mouth Daily., Disp: 30 tablet, Rfl: 0    pregabalin (LYRICA) 100 MG capsule, TAKE 1 CAPSULE BY MOUTH TWICE A DAY, Disp: 60 capsule, Rfl: 0    tiZANidine (ZANAFLEX) 4 MG tablet, Take 1 tablet by mouth Every 8 (Eight) Hours As Needed for Muscle Spasms., Disp: 30 tablet, Rfl: 1    Trelegy Ellipta 100-62.5-25 MCG/ACT inhaler, INHALE 1 PUFF BY MOUTH DAILY, Disp: 60 each, Rfl: 4    No Known Allergies    Family History   Problem Relation Age of Onset    Colon cancer Mother     Heart disease Mother     Depression Mother     Anxiety disorder Mother     Colon cancer Father     Heart disease Father     Heart attack Father     Cancer Father     Diabetes Brother     Diabetes Maternal Grandmother     Diabetes Maternal  Grandfather     Diabetes Paternal Grandmother     Diabetes Maternal Uncle        Past Surgical History:   Procedure Laterality Date    CARDIAC CATHETERIZATION  11/05/2018    Dr. Sher    CHOLECYSTECTOMY N/A 02/22/2021    Procedure: CHOLECYSTECTOMY LAPAROSCOPIC;  Surgeon: Faisal Wilkins DO;  Location: [facility] MAIN OR;  Service: General;  Laterality: N/A;    COLONOSCOPY  2016    COLONOSCOPY N/A 9/12/2024    Procedure: COLONOSCOPY with polypectomy x 2;  Surgeon: Pranay White MD;  Location: [facility] ENDOSCOPY;  Service: Gastroenterology;  Laterality: N/A;  polyp diverticulosis    CORONARY STENT PLACEMENT      ERCP N/A 02/21/2021    Procedure: ENDOSCOPIC RETROGRADE CHOLANGIOPANCREATOGRAPHY, sphincterotomy, balloon clearance of  bile duct, stent placement;  Surgeon: Ramiro Pulido MD;  Location: [facility] ENDOSCOPY;  Service: Gastroenterology;  Laterality: N/A;  post: biliary dilation, choledocolithiasis    ERCP N/A 06/04/2021    Procedure: ENDOSCOPIC RETROGRADE CHOLANGIOPANCREATOGRAPHY WITH STENT REMOVAL and balloon sweeping of common bile duct (12-15mm balloon up to 15mm);  Surgeon: Pranay White MD;  Location: [facility] ENDOSCOPY;  Service: Gastroenterology;  Laterality: N/A;  post op: biliary stent removal, common bile duct sweeping with 12-15mm balloon (up to 15mm), dialated bile duct    LIVER BIOPSY      TONSILLECTOMY AND ADENOIDECTOMY         Past Medical History:   Diagnosis Date    Acute diverticulitis     with perforation    Allergic     Anemia     Anxiety     Back pain     CAD S/P percutaneous coronary angioplasty     Cancer     cll     Chronic bronchitis 2008    Congenital heart disease 2018    COPD (chronic obstructive pulmonary disease)     Depression     Diabetes mellitus 04/01/2024    Diverticulitis     Emphysema of lung 11/2008    Fracture, finger 1970    GERD (gastroesophageal reflux disease)     Hyperlipidemia     Hypertension     Low back pain     Lumbosacral disc disease 1987     "Myocardial infarction     TEIXEIRA (nonalcoholic steatohepatitis)     Obesity     BRITTNEY on CPAP     Primary central sleep apnea 2008    Sleep apnea     Sleep disorder, shift work     Spinal stenosis of lumbar region at multiple levels     Status post placement of implantable loop recorder 2020    CECELIA    Wrist sprain 2020       Family History   Problem Relation Age of Onset    Colon cancer Mother     Heart disease Mother     Depression Mother     Anxiety disorder Mother     Colon cancer Father     Heart disease Father     Heart attack Father     Cancer Father     Diabetes Brother     Diabetes Maternal Grandmother     Diabetes Maternal Grandfather     Diabetes Paternal Grandmother     Diabetes Maternal Uncle        Social History     Socioeconomic History    Marital status:    Tobacco Use    Smoking status: Former     Current packs/day: 0.00     Average packs/day: 1.5 packs/day for 40.0 years (60.0 ttl pk-yrs)     Types: Cigarettes     Start date: 9/1/1969     Quit date: 9/1/2009     Years since quitting: 15.6     Passive exposure: Past    Smokeless tobacco: Never   Vaping Use    Vaping status: Never Used   Substance and Sexual Activity    Alcohol use: Not Currently     Alcohol/week: 1.0 standard drink of alcohol     Comment: No alcohol the past 20 years.    Drug use: No    Sexual activity: Not Currently     Partners: Female     Birth control/protection: None           ECG 12 Lead    Date/Time: 4/10/2025 12:46 PM  Performed by: Mesha Sher MD    Authorized by: Mesha Sher MD  Comparison: compared with previous ECG   Similar to previous ECG  Comparison to previous ECG: Normal sinus rhythm normal ECG 79/min normal axis normal intervals no ectopy no significant change from previous EKG.        Objective:       Physical Exam    /66 (BP Location: Right arm, Patient Position: Sitting, Cuff Size: Adult)   Pulse 79   Ht 172.7 cm (68\")   Wt 86.6 kg (191 lb)   SpO2 98%   BMI 29.04 kg/m²   The patient " is alert, oriented and in no distress.    Vital signs as noted above.     Head and neck revealed no carotid bruits or jugular venous distension.  No thyromegaly or lymphadenopathy is present.     Lungs clear.  No wheezing.  Breath sounds are normal bilaterally.     Heart normal first and second heart sounds.  No murmur..  No pericardial rub is present.  No gallop is present.     Abdomen soft and nontender.  No organomegaly is present.     Extremities revealed good peripheral pulses without any pedal edema.     Skin warm and dry.     Musculoskeletal system is grossly normal.     CNS grossly normal.    Reviewed and updated.

## 2025-04-10 ENCOUNTER — OFFICE VISIT (OUTPATIENT)
Dept: CARDIOLOGY | Facility: CLINIC | Age: 70
End: 2025-04-10
Payer: MEDICARE

## 2025-04-10 VITALS
DIASTOLIC BLOOD PRESSURE: 66 MMHG | SYSTOLIC BLOOD PRESSURE: 136 MMHG | HEART RATE: 79 BPM | OXYGEN SATURATION: 98 % | HEIGHT: 68 IN | WEIGHT: 191 LBS | BODY MASS INDEX: 28.95 KG/M2

## 2025-04-10 DIAGNOSIS — R55 SYNCOPE AND COLLAPSE: ICD-10-CM

## 2025-04-10 DIAGNOSIS — E78.2 MIXED HYPERLIPIDEMIA: ICD-10-CM

## 2025-04-10 DIAGNOSIS — Z95.818 STATUS POST PLACEMENT OF IMPLANTABLE LOOP RECORDER: Primary | ICD-10-CM

## 2025-04-10 DIAGNOSIS — E78.5 DYSLIPIDEMIA: ICD-10-CM

## 2025-04-10 DIAGNOSIS — I10 ESSENTIAL HYPERTENSION: ICD-10-CM

## 2025-04-10 DIAGNOSIS — Z95.5 STATUS POST CORONARY ARTERY STENT PLACEMENT: ICD-10-CM

## 2025-04-10 DIAGNOSIS — Z95.820 STATUS POST ANGIOPLASTY WITH STENT: ICD-10-CM

## 2025-04-17 DIAGNOSIS — M54.50 CHRONIC BILATERAL LOW BACK PAIN WITHOUT SCIATICA: ICD-10-CM

## 2025-04-17 DIAGNOSIS — G47.419 PRIMARY NARCOLEPSY WITHOUT CATAPLEXY: ICD-10-CM

## 2025-04-17 DIAGNOSIS — G89.29 CHRONIC BILATERAL LOW BACK PAIN WITHOUT SCIATICA: ICD-10-CM

## 2025-04-17 RX ORDER — HYDROCODONE BITARTRATE AND ACETAMINOPHEN 10; 325 MG/1; MG/1
1 TABLET ORAL 4 TIMES DAILY
Qty: 120 TABLET | Refills: 0 | Status: SHIPPED | OUTPATIENT
Start: 2025-04-17

## 2025-04-17 RX ORDER — MODAFINIL 200 MG/1
200 TABLET ORAL DAILY
Qty: 30 TABLET | Refills: 0 | Status: SHIPPED | OUTPATIENT
Start: 2025-04-17

## 2025-04-17 NOTE — TELEPHONE ENCOUNTER
I ran an inspect. The info given to you in the initial refill was for the pregabalin. Hydrocodone was last filled and sold on on 3/21/25. Written on 3/20/25

## 2025-05-04 DIAGNOSIS — G89.29 CHRONIC BILATERAL LOW BACK PAIN WITHOUT SCIATICA: ICD-10-CM

## 2025-05-04 DIAGNOSIS — M54.50 CHRONIC BILATERAL LOW BACK PAIN WITHOUT SCIATICA: ICD-10-CM

## 2025-05-04 RX ORDER — PREGABALIN 100 MG/1
100 CAPSULE ORAL 2 TIMES DAILY
Qty: 60 CAPSULE | Refills: 0 | Status: SHIPPED | OUTPATIENT
Start: 2025-05-04

## 2025-05-15 DIAGNOSIS — G89.29 CHRONIC BILATERAL LOW BACK PAIN WITHOUT SCIATICA: ICD-10-CM

## 2025-05-15 DIAGNOSIS — M54.50 CHRONIC BILATERAL LOW BACK PAIN WITHOUT SCIATICA: ICD-10-CM

## 2025-05-15 DIAGNOSIS — G47.419 PRIMARY NARCOLEPSY WITHOUT CATAPLEXY: ICD-10-CM

## 2025-05-15 RX ORDER — HYDROCODONE BITARTRATE AND ACETAMINOPHEN 10; 325 MG/1; MG/1
1 TABLET ORAL 4 TIMES DAILY
Qty: 120 TABLET | Refills: 0 | Status: SHIPPED | OUTPATIENT
Start: 2025-05-15

## 2025-05-15 RX ORDER — MODAFINIL 200 MG/1
200 TABLET ORAL DAILY
Qty: 30 TABLET | Refills: 0 | Status: SHIPPED | OUTPATIENT
Start: 2025-05-15

## 2025-05-15 NOTE — TELEPHONE ENCOUNTER
Lv: 1/20/25  Appt: 5/20/25  Written 04/17/2025 Sold 04/18/2025   How Did Your Itching Occur?: gradual in onset  (over a period of years) How Severe Is Your Itching?: moderate

## 2025-05-19 NOTE — PROGRESS NOTES
Teresa Osuna is a 69 y.o. male.       HPI   Pt. is here today for 4 month follow up on chronic medical conditions.   1) Chronic low back pain with radiculopathy - currently has regimen of Norco 10/325 mg qid; Lyrica 100 mg bid; tizanidine 4 mg tid PRN.  Feels pain symptoms are in good control/ stable.   2) HTN -   Currently on amlodipine 10 mg daily; lisinopril 30 mg daily.   Denies any CP; palpitations; SOA; dizziness; headache; trouble with vision.     Former smoker. Current weight 196; BMI 29.  Working on a healthy diet; getting exercise (cardio and strength) 5-7 days a week.    3) Hyperlipdiemia - currently on atorvastatin 40 mg daily; ASA 81 mg daily.   4) COPD - currently on Trelegy Ellipta inhaler; albuterol inhaler PRN.   Denies any current SOA; cough or wheezes.   Continues with pulmonology -Dr. Gracia.  .    5) Narcolepsy - followed by [facility] (Dr. Gracia).  Currently taking Provigil 200 mg daily.  Stable.       6) Depression/Anxiety - currently taking Cymbalta 60 mg daily. Moods stable.  Denies any SI or HI.  7) T2DM - currently on sitagliptin 50 mg daily.  Last A1C was 5.91% in Jan 2025.  Denies any polyuria or polydipsia.  Denies any skin lesions or sores that are not healing.       The following portions of the patient's history were reviewed and updated as appropriate: allergies, current medications, past family history, past medical history, past social history, past surgical history, and problem list.    Review of Systems   Constitutional:  Negative for chills, fatigue and fever.   Eyes:  Negative for visual disturbance.   Respiratory:  Negative for cough, shortness of breath and wheezing.    Cardiovascular:  Negative for chest pain and palpitations.   Gastrointestinal:  Negative for abdominal pain, blood in stool, constipation, diarrhea, nausea, vomiting and GERD.   Endocrine: Negative for polydipsia, polyphagia and polyuria.   Genitourinary:  Negative for dysuria, frequency,  hematuria and urgency.   Neurological:  Negative for dizziness, weakness, light-headedness and headache.   Psychiatric/Behavioral:  Negative for depressed mood. The patient is not nervous/anxious.        Objective   Physical Exam  Vitals reviewed.   Constitutional:       General: He is not in acute distress.     Appearance: Normal appearance.   HENT:      Head: Normocephalic and atraumatic.   Cardiovascular:      Rate and Rhythm: Normal rate and regular rhythm.      Pulses: Normal pulses.      Heart sounds: Normal heart sounds. No murmur heard.  Pulmonary:      Effort: Pulmonary effort is normal. No respiratory distress.      Breath sounds: Normal breath sounds. No wheezing, rhonchi or rales.   Chest:      Chest wall: No tenderness.   Abdominal:      Tenderness: There is no right CVA tenderness or left CVA tenderness.   Musculoskeletal:      Cervical back: Normal range of motion and neck supple.   Skin:     General: Skin is warm and dry.   Neurological:      General: No focal deficit present.      Mental Status: He is alert and oriented to person, place, and time.   Psychiatric:         Mood and Affect: Mood normal.                Procedures   Assessment & Plan   Diagnoses and all orders for this visit:    1. Chronic bilateral low back pain without sciatica (Primary)  Comments:  Stable.   UDS today.   Cont. current medication.   Labs ordered.   RTO in 4 mo.    2. Chronic midline low back pain without sciatica  Comments:  Stable.   UDS today.   Cont. current medication.   Labs ordered.   RTO in 4 mo.    3. Essential hypertension  Comments:  Stable.   Cont. current medication.   Labs ordered.   RTO in 4 mo.   Work on healthy diet; aim for 150 min exercise weekly.  Orders:  -     Comprehensive metabolic panel; Future  -     Hemoglobin A1c; Future  -     Lipid panel; Future    4. Mixed hyperlipidemia  Comments:  Stable.   Cont. current medication.   Labs ordered.   RTO in 4 mo.   Work on healthy diet; aim for 150 min  exercise weekly.  Orders:  -     Comprehensive metabolic panel; Future  -     Hemoglobin A1c; Future  -     Lipid panel; Future    5. Type 2 diabetes mellitus with hyperglycemia, without long-term current use of insulin  Comments:  Stable.   Cont. current medication.   Labs ordered.   RTO in 4 mo.  Update eye exam  Work on healthy diet; aim for 150 min exercise weekly.  Orders:  -     Comprehensive metabolic panel; Future  -     Hemoglobin A1c; Future  -     Lipid panel; Future    6. Obstructive chronic bronchitis without exacerbation  Comments:  Stable.   Cont. current medication.   Labs ordered.   Keep f/u with pulmonology    7. MDD (major depressive disorder), recurrent episode, mild  Comments:  Stable.   Cont. current medication.   Labs ordered.   RTO in 4 mo.    8. Primary narcolepsy without cataplexy  Comments:  Stable.   Cont. current medication.   Labs ordered.   Keep f/u with pulmonology

## 2025-05-20 ENCOUNTER — OFFICE VISIT (OUTPATIENT)
Dept: FAMILY MEDICINE CLINIC | Facility: CLINIC | Age: 70
End: 2025-05-20
Payer: MEDICARE

## 2025-05-20 ENCOUNTER — LAB (OUTPATIENT)
Dept: FAMILY MEDICINE CLINIC | Facility: CLINIC | Age: 70
End: 2025-05-20
Payer: MEDICARE

## 2025-05-20 VITALS
OXYGEN SATURATION: 95 % | WEIGHT: 196 LBS | BODY MASS INDEX: 29.7 KG/M2 | HEIGHT: 68 IN | SYSTOLIC BLOOD PRESSURE: 124 MMHG | HEART RATE: 69 BPM | DIASTOLIC BLOOD PRESSURE: 68 MMHG

## 2025-05-20 DIAGNOSIS — G89.29 CHRONIC BILATERAL LOW BACK PAIN WITHOUT SCIATICA: Primary | ICD-10-CM

## 2025-05-20 DIAGNOSIS — F33.0 MDD (MAJOR DEPRESSIVE DISORDER), RECURRENT EPISODE, MILD: ICD-10-CM

## 2025-05-20 DIAGNOSIS — E78.2 MIXED HYPERLIPIDEMIA: ICD-10-CM

## 2025-05-20 DIAGNOSIS — M54.50 CHRONIC MIDLINE LOW BACK PAIN WITHOUT SCIATICA: ICD-10-CM

## 2025-05-20 DIAGNOSIS — G89.29 CHRONIC MIDLINE LOW BACK PAIN WITHOUT SCIATICA: ICD-10-CM

## 2025-05-20 DIAGNOSIS — E11.65 TYPE 2 DIABETES MELLITUS WITH HYPERGLYCEMIA, WITHOUT LONG-TERM CURRENT USE OF INSULIN: ICD-10-CM

## 2025-05-20 DIAGNOSIS — M54.50 CHRONIC BILATERAL LOW BACK PAIN WITHOUT SCIATICA: Primary | ICD-10-CM

## 2025-05-20 DIAGNOSIS — J44.89 OBSTRUCTIVE CHRONIC BRONCHITIS WITHOUT EXACERBATION: ICD-10-CM

## 2025-05-20 DIAGNOSIS — I10 ESSENTIAL HYPERTENSION: ICD-10-CM

## 2025-05-20 DIAGNOSIS — G47.419 PRIMARY NARCOLEPSY WITHOUT CATAPLEXY: ICD-10-CM

## 2025-05-20 LAB
ALBUMIN SERPL-MCNC: 4.3 G/DL (ref 3.5–5.2)
ALBUMIN/GLOB SERPL: 1.8 G/DL
ALP SERPL-CCNC: 127 U/L (ref 39–117)
ALT SERPL W P-5'-P-CCNC: 161 U/L (ref 1–41)
ANION GAP SERPL CALCULATED.3IONS-SCNC: 11.3 MMOL/L (ref 5–15)
AST SERPL-CCNC: 177 U/L (ref 1–40)
BILIRUB SERPL-MCNC: 0.3 MG/DL (ref 0–1.2)
BUN SERPL-MCNC: 21 MG/DL (ref 8–23)
BUN/CREAT SERPL: 21.6 (ref 7–25)
CALCIUM SPEC-SCNC: 9.2 MG/DL (ref 8.6–10.5)
CHLORIDE SERPL-SCNC: 100 MMOL/L (ref 98–107)
CHOLEST SERPL-MCNC: 131 MG/DL (ref 0–200)
CO2 SERPL-SCNC: 26.7 MMOL/L (ref 22–29)
CREAT SERPL-MCNC: 0.97 MG/DL (ref 0.76–1.27)
EGFRCR SERPLBLD CKD-EPI 2021: 85 ML/MIN/1.73
GLOBULIN UR ELPH-MCNC: 2.4 GM/DL
GLUCOSE SERPL-MCNC: 107 MG/DL (ref 65–99)
HBA1C MFR BLD: 5.8 % (ref 4.8–5.6)
HDLC SERPL-MCNC: 53 MG/DL (ref 40–60)
LDLC SERPL CALC-MCNC: 67 MG/DL (ref 0–100)
LDLC/HDLC SERPL: 1.3 {RATIO}
POTASSIUM SERPL-SCNC: 4.6 MMOL/L (ref 3.5–5.2)
PROT SERPL-MCNC: 6.7 G/DL (ref 6–8.5)
SODIUM SERPL-SCNC: 138 MMOL/L (ref 136–145)
TRIGL SERPL-MCNC: 45 MG/DL (ref 0–150)
VLDLC SERPL-MCNC: 11 MG/DL (ref 5–40)

## 2025-05-20 PROCEDURE — 80053 COMPREHEN METABOLIC PANEL: CPT | Performed by: NURSE PRACTITIONER

## 2025-05-20 PROCEDURE — 3078F DIAST BP <80 MM HG: CPT | Performed by: NURSE PRACTITIONER

## 2025-05-20 PROCEDURE — G2211 COMPLEX E/M VISIT ADD ON: HCPCS | Performed by: NURSE PRACTITIONER

## 2025-05-20 PROCEDURE — 3044F HG A1C LEVEL LT 7.0%: CPT | Performed by: NURSE PRACTITIONER

## 2025-05-20 PROCEDURE — 80061 LIPID PANEL: CPT | Performed by: NURSE PRACTITIONER

## 2025-05-20 PROCEDURE — 83036 HEMOGLOBIN GLYCOSYLATED A1C: CPT | Performed by: NURSE PRACTITIONER

## 2025-05-20 PROCEDURE — 3074F SYST BP LT 130 MM HG: CPT | Performed by: NURSE PRACTITIONER

## 2025-05-20 PROCEDURE — 36415 COLL VENOUS BLD VENIPUNCTURE: CPT

## 2025-05-20 PROCEDURE — 99214 OFFICE O/P EST MOD 30 MIN: CPT | Performed by: NURSE PRACTITIONER

## 2025-05-20 PROCEDURE — 1126F AMNT PAIN NOTED NONE PRSNT: CPT | Performed by: NURSE PRACTITIONER

## 2025-05-22 ENCOUNTER — OFFICE VISIT (OUTPATIENT)
Dept: PULMONOLOGY | Facility: HOSPITAL | Age: 70
End: 2025-05-22
Payer: MEDICARE

## 2025-05-22 VITALS
HEIGHT: 68 IN | OXYGEN SATURATION: 95 % | RESPIRATION RATE: 14 BRPM | SYSTOLIC BLOOD PRESSURE: 123 MMHG | BODY MASS INDEX: 29.7 KG/M2 | WEIGHT: 196 LBS | DIASTOLIC BLOOD PRESSURE: 66 MMHG | HEART RATE: 72 BPM

## 2025-05-22 DIAGNOSIS — I10 ESSENTIAL HYPERTENSION: ICD-10-CM

## 2025-05-22 DIAGNOSIS — G47.33 OBSTRUCTIVE SLEEP APNEA SYNDROME: Primary | ICD-10-CM

## 2025-05-22 DIAGNOSIS — J43.1 PANLOBULAR EMPHYSEMA: ICD-10-CM

## 2025-05-22 NOTE — PROGRESS NOTES
SLEEP/PULMONARY  CLINIC NOTE      PATIENT IDENTIFICATION:  Name: Neptali Osuna  Age: 69 y.o.  Sex: male  :  1955  MRN: [id number]    DATE OF CONSULTATION:  2025     Referring physician:    Honey Steele APRN            CHIEF COMPLAINT: BRITTNEY    History of Present Illness:   Neptali Osuna is a 69 y.o. male Pt on CPAP feeling better more energy especially the night he use it more than 4 hours, no sleepiness no fatigue no tiredness, no mask irritation no dryness, compliance report reviewed with pt d discussed with the patient the download data and encouarged the patient to continue to use the CPAP. The residual AHI is acceptable.     Patient will obstructive disease rarely has to use his inhaler recently did not have any flareup    Review of Systems:   Constitutional: As above   Eyes: negative   ENT/oropharynx: negative   Cardiovascular: negative   Respiratory: negative   Gastrointestinal: negative   Genitourinary: negative   Neurological: negative   Musculoskeletal: negative   Integument/breast: negative   Endocrine: negative   Allergic/Immunologic: negative     Past Medical History:  Past Medical History:   Diagnosis Date    Acute diverticulitis     with perforation    Allergic     Anemia     Anxiety     Back pain     CAD S/P percutaneous coronary angioplasty     Cancer     cll     Chronic bronchitis 2008    Congenital heart disease     COPD (chronic obstructive pulmonary disease)     Depression     Diabetes mellitus 2024    Diverticulitis     Emphysema of lung 2008    Fracture, finger 1970    GERD (gastroesophageal reflux disease)     Hyperlipidemia     Hypertension     Low back pain     Lumbosacral disc disease 1987    Myocardial infarction     TEIXEIRA (nonalcoholic steatohepatitis)     Obesity     BRITTNEY on CPAP     Primary central sleep apnea 2008    Sleep apnea     CPAP Therapy    Sleep disorder, shift work     Spinal stenosis of lumbar region at multiple levels     Status post placement of  implantable loop recorder 2020    CECELIA    Wrist sprain 2020       Past Surgical History:  Past Surgical History:   Procedure Laterality Date    CARDIAC CATHETERIZATION  11/05/2018    Dr. Sher    CHOLECYSTECTOMY N/A 02/22/2021    Procedure: CHOLECYSTECTOMY LAPAROSCOPIC;  Surgeon: Faisal Wilkins DO;  Location: [facility] MAIN OR;  Service: General;  Laterality: N/A;    COLONOSCOPY  2016    COLONOSCOPY N/A 9/12/2024    Procedure: COLONOSCOPY with polypectomy x 2;  Surgeon: Pranay White MD;  Location: [facility] ENDOSCOPY;  Service: Gastroenterology;  Laterality: N/A;  polyp diverticulosis    CORONARY STENT PLACEMENT      ERCP N/A 02/21/2021    Procedure: ENDOSCOPIC RETROGRADE CHOLANGIOPANCREATOGRAPHY, sphincterotomy, balloon clearance of  bile duct, stent placement;  Surgeon: Ramiro Pulido MD;  Location: [facility] ENDOSCOPY;  Service: Gastroenterology;  Laterality: N/A;  post: biliary dilation, choledocolithiasis    ERCP N/A 06/04/2021    Procedure: ENDOSCOPIC RETROGRADE CHOLANGIOPANCREATOGRAPHY WITH STENT REMOVAL and balloon sweeping of common bile duct (12-15mm balloon up to 15mm);  Surgeon: Pranay White MD;  Location: [facility] ENDOSCOPY;  Service: Gastroenterology;  Laterality: N/A;  post op: biliary stent removal, common bile duct sweeping with 12-15mm balloon (up to 15mm), dialated bile duct    LIVER BIOPSY      TONSILLECTOMY AND ADENOIDECTOMY          Family History:  Family History   Problem Relation Age of Onset    Colon cancer Mother     Heart disease Mother     Depression Mother     Anxiety disorder Mother     Colon cancer Father     Heart disease Father     Heart attack Father     Cancer Father     Diabetes Brother     Diabetes Maternal Grandmother     Diabetes Maternal Grandfather     Diabetes Paternal Grandmother     Diabetes Maternal Uncle         Social History:   Social History     Tobacco Use    Smoking status: Former     Current packs/day: 0.00     Average packs/day: 1.5 packs/day for  40.0 years (60.0 ttl pk-yrs)     Types: Cigarettes     Start date: 9/1/1969     Quit date: 9/1/2009     Years since quitting: 15.7     Passive exposure: Past    Smokeless tobacco: Never   Substance Use Topics    Alcohol use: Not Currently     Alcohol/week: 1.0 standard drink of alcohol     Types: 1 Glasses of wine per week     Comment: No alcohol the past 10 years.        Allergies:  No Known Allergies    Home Meds:  (Not in a hospital admission)      Objective:    Vitals Ranges:   Heart Rate:  [72] 72  Resp:  [14] 14  BP: (123)/(66) 123/66  Body mass index is 29.8 kg/m².     Exam:  General Appearance:  WDWN    HEENT:   without obvious abnormality,  Conjunctiva/corneas clear,  Normal external ear canals, no drainage    Clear orsalmucosa,  Mallampati score 3    Neck:  Supple, symmetrical, trachea midline. No JVD.  Lungs:   Bilateral basal rhonchi bilaterally, respirations unlabored symmetrical wall movement.    Chest wall:  No tenderness or deformity.    Heart:  Regular rate and rhythm, S1 and S2 normal.  Extremities: Trace edema no clubbing or Cyanosis        Data Review:  All labs (24hrs): No results found for this or any previous visit (from the past 24 hours).     Imaging:  XR Elbow 2 View Right  Narrative: XR ELBOW 2 VW RIGHT    Date of Exam: 11/25/2024 12:58 PM EST    Indication: Right elbow pain for 2 weeks, no known injury    Comparison: None available.    Findings:  There is no acute fracture or dislocation. The joint spaces appear normally aligned. There is no elbow joint effusion. There is soft tissue swelling over the olecranon.  Impression: Impression:  1.No acute osseous abnormality of the right elbow.  2.Soft tissue swelling over the olecranon which can be seen with olecranon bursitis.    Electronically Signed: Frank Contreras    11/26/2024 8:32 PM EST    Workstation ID: [id number]       ASSESSMENT:  Diagnoses and all orders for this visit:    Obstructive sleep apnea syndrome    Panlobular  emphysema    Essential hypertension    Other orders  -     Pulmonary Results Scan        PLAN:    Bronchodilator as needed  Education how to use inhalers    Encouraged to use incentive spirometer    Continue to exercise slowly as tolerated    Monitor for any change in the color of the sputum    Avoid any exposure to fumes, gas or any irritant        This patient with obstructive sleep apnea, compliance is improved. Encourage to use it more frequent, I re-emphasized on pt the long and short term benefit of treating BRITTNEY. The device is benefiting the patient.  The patient is also instructed to get the CPAP supplies from the [employer] and see me in 1 year for follow-up.    Treating BRITTNEY will improve BP control       Follow-up 1 year    Goyo Gracia MD. D, ABSM.  5/22/2025  12:41 EDT

## 2025-05-27 DIAGNOSIS — E11.65 TYPE 2 DIABETES MELLITUS WITH HYPERGLYCEMIA, WITHOUT LONG-TERM CURRENT USE OF INSULIN: ICD-10-CM

## 2025-06-02 ENCOUNTER — LAB (OUTPATIENT)
Dept: FAMILY MEDICINE CLINIC | Facility: CLINIC | Age: 70
End: 2025-06-02
Payer: MEDICARE

## 2025-06-02 DIAGNOSIS — R79.89 ELEVATED LFTS: ICD-10-CM

## 2025-06-02 DIAGNOSIS — R74.01 ELEVATION OF LEVELS OF LIVER TRANSAMINASE LEVELS: ICD-10-CM

## 2025-06-02 LAB
ALBUMIN SERPL-MCNC: 4.3 G/DL (ref 3.5–5.2)
ALP SERPL-CCNC: 71 U/L (ref 39–117)
ALT SERPL W P-5'-P-CCNC: 18 U/L (ref 1–41)
AST SERPL-CCNC: 21 U/L (ref 1–40)
BILIRUB CONJ SERPL-MCNC: 0.2 MG/DL (ref 0–0.3)
BILIRUB INDIRECT SERPL-MCNC: 0.2 MG/DL
BILIRUB SERPL-MCNC: 0.4 MG/DL (ref 0–1.2)
GGT SERPL-CCNC: 90 U/L (ref 8–61)
HAV IGM SERPL QL IA: NORMAL
HBV CORE IGM SERPL QL IA: NORMAL
HBV SURFACE AG SERPL QL IA: NORMAL
HCV AB SER QL: NORMAL
PROT SERPL-MCNC: 7 G/DL (ref 6–8.5)

## 2025-06-02 PROCEDURE — 36415 COLL VENOUS BLD VENIPUNCTURE: CPT

## 2025-06-02 PROCEDURE — 80074 ACUTE HEPATITIS PANEL: CPT | Performed by: NURSE PRACTITIONER

## 2025-06-02 PROCEDURE — 80076 HEPATIC FUNCTION PANEL: CPT | Performed by: NURSE PRACTITIONER

## 2025-06-02 PROCEDURE — 82977 ASSAY OF GGT: CPT | Performed by: NURSE PRACTITIONER

## 2025-06-12 DIAGNOSIS — G89.29 CHRONIC BILATERAL LOW BACK PAIN WITHOUT SCIATICA: ICD-10-CM

## 2025-06-12 DIAGNOSIS — M54.50 CHRONIC BILATERAL LOW BACK PAIN WITHOUT SCIATICA: ICD-10-CM

## 2025-06-12 RX ORDER — HYDROCODONE BITARTRATE AND ACETAMINOPHEN 10; 325 MG/1; MG/1
1 TABLET ORAL 4 TIMES DAILY
Qty: 120 TABLET | Refills: 0 | Status: SHIPPED | OUTPATIENT
Start: 2025-06-12

## 2025-06-23 DIAGNOSIS — G47.419 PRIMARY NARCOLEPSY WITHOUT CATAPLEXY: ICD-10-CM

## 2025-06-23 RX ORDER — MODAFINIL 200 MG/1
200 TABLET ORAL DAILY
Qty: 30 TABLET | Refills: 0 | Status: SHIPPED | OUTPATIENT
Start: 2025-06-23

## 2025-06-24 DIAGNOSIS — G47.419 PRIMARY NARCOLEPSY WITHOUT CATAPLEXY: ICD-10-CM

## 2025-06-24 RX ORDER — MODAFINIL 200 MG/1
200 TABLET ORAL DAILY
Qty: 30 TABLET | Refills: 0 | OUTPATIENT
Start: 2025-06-24

## 2025-06-25 DIAGNOSIS — E11.65 TYPE 2 DIABETES MELLITUS WITH HYPERGLYCEMIA, WITHOUT LONG-TERM CURRENT USE OF INSULIN: ICD-10-CM

## 2025-07-06 RX ORDER — LISINOPRIL 30 MG/1
30 TABLET ORAL DAILY
Qty: 90 TABLET | Refills: 1 | Status: SHIPPED | OUTPATIENT
Start: 2025-07-06

## 2025-07-06 RX ORDER — ATORVASTATIN CALCIUM 40 MG/1
40 TABLET, FILM COATED ORAL
Qty: 90 TABLET | Refills: 1 | Status: SHIPPED | OUTPATIENT
Start: 2025-07-06

## 2025-07-10 DIAGNOSIS — G89.29 CHRONIC BILATERAL LOW BACK PAIN WITHOUT SCIATICA: ICD-10-CM

## 2025-07-10 DIAGNOSIS — M54.50 CHRONIC BILATERAL LOW BACK PAIN WITHOUT SCIATICA: ICD-10-CM

## 2025-07-10 RX ORDER — HYDROCODONE BITARTRATE AND ACETAMINOPHEN 10; 325 MG/1; MG/1
1 TABLET ORAL 4 TIMES DAILY
Qty: 120 TABLET | Refills: 0 | Status: SHIPPED | OUTPATIENT
Start: 2025-07-10

## 2025-07-11 RX ORDER — PREGABALIN 100 MG/1
100 CAPSULE ORAL 2 TIMES DAILY
Qty: 60 CAPSULE | Refills: 0 | Status: SHIPPED | OUTPATIENT
Start: 2025-07-11

## 2025-07-23 DIAGNOSIS — G47.419 PRIMARY NARCOLEPSY WITHOUT CATAPLEXY: ICD-10-CM

## 2025-07-23 RX ORDER — MODAFINIL 200 MG/1
200 TABLET ORAL DAILY
Qty: 30 TABLET | Refills: 0 | Status: SHIPPED | OUTPATIENT
Start: 2025-07-23

## 2025-08-07 DIAGNOSIS — G89.29 CHRONIC BILATERAL LOW BACK PAIN WITHOUT SCIATICA: ICD-10-CM

## 2025-08-07 DIAGNOSIS — M54.50 CHRONIC BILATERAL LOW BACK PAIN WITHOUT SCIATICA: ICD-10-CM

## 2025-08-07 RX ORDER — HYDROCODONE BITARTRATE AND ACETAMINOPHEN 10; 325 MG/1; MG/1
1 TABLET ORAL 4 TIMES DAILY
Qty: 120 TABLET | Refills: 0 | Status: SHIPPED | OUTPATIENT
Start: 2025-08-07

## 2025-08-12 ENCOUNTER — LAB (OUTPATIENT)
Dept: LAB | Facility: HOSPITAL | Age: 70
End: 2025-08-12
Payer: MEDICARE

## 2025-08-12 ENCOUNTER — OFFICE VISIT (OUTPATIENT)
Dept: ONCOLOGY | Facility: CLINIC | Age: 70
End: 2025-08-12
Payer: MEDICARE

## 2025-08-12 VITALS
DIASTOLIC BLOOD PRESSURE: 73 MMHG | HEART RATE: 67 BPM | TEMPERATURE: 98 F | HEIGHT: 68 IN | SYSTOLIC BLOOD PRESSURE: 120 MMHG | OXYGEN SATURATION: 95 % | WEIGHT: 192 LBS | BODY MASS INDEX: 29.1 KG/M2

## 2025-08-12 DIAGNOSIS — C91.10 CLL (CHRONIC LYMPHOCYTIC LEUKEMIA): Primary | ICD-10-CM

## 2025-08-12 LAB
ALBUMIN SERPL-MCNC: 4.4 G/DL (ref 3.5–5.2)
ALBUMIN/GLOB SERPL: 1.7 G/DL
ALP SERPL-CCNC: 68 U/L (ref 39–117)
ALT SERPL W P-5'-P-CCNC: 11 U/L (ref 1–41)
ANION GAP SERPL CALCULATED.3IONS-SCNC: 11.3 MMOL/L (ref 5–15)
AST SERPL-CCNC: 26 U/L (ref 1–40)
BASOPHILS # BLD AUTO: 0.03 10*3/MM3 (ref 0–0.2)
BASOPHILS NFR BLD AUTO: 0.2 % (ref 0–1.5)
BILIRUB SERPL-MCNC: 0.4 MG/DL (ref 0–1.2)
BUN SERPL-MCNC: 21.2 MG/DL (ref 8–23)
BUN/CREAT SERPL: 26.2 (ref 7–25)
CALCIUM SPEC-SCNC: 9.6 MG/DL (ref 8.6–10.5)
CHLORIDE SERPL-SCNC: 100 MMOL/L (ref 98–107)
CO2 SERPL-SCNC: 25.7 MMOL/L (ref 22–29)
CREAT SERPL-MCNC: 0.81 MG/DL (ref 0.76–1.27)
DEPRECATED RDW RBC AUTO: 42.6 FL (ref 37–54)
EGFRCR SERPLBLD CKD-EPI 2021: 95.4 ML/MIN/1.73
EOSINOPHIL # BLD AUTO: 0.88 10*3/MM3 (ref 0–0.4)
EOSINOPHIL NFR BLD AUTO: 6.8 % (ref 0.3–6.2)
ERYTHROCYTE [DISTWIDTH] IN BLOOD BY AUTOMATED COUNT: 12.4 % (ref 12.3–15.4)
FERRITIN SERPL-MCNC: 147 NG/ML (ref 30–400)
GLOBULIN UR ELPH-MCNC: 2.6 GM/DL
GLUCOSE SERPL-MCNC: 113 MG/DL (ref 65–99)
HCT VFR BLD AUTO: 42.6 % (ref 37.5–51)
HGB BLD-MCNC: 14 G/DL (ref 13–17.7)
HOLD SPECIMEN: NORMAL
IMM GRANULOCYTES # BLD AUTO: 0.03 10*3/MM3 (ref 0–0.05)
IMM GRANULOCYTES NFR BLD AUTO: 0.2 % (ref 0–0.5)
IRON 24H UR-MRATE: 35 MCG/DL (ref 59–158)
IRON 24H UR-MRATE: 36 MCG/DL (ref 59–158)
IRON SATN MFR SERPL: 11 % (ref 20–50)
IRON SATN MFR SERPL: 12 % (ref 20–50)
LDH SERPL-CCNC: 208 U/L (ref 135–225)
LYMPHOCYTES # BLD AUTO: 1.67 10*3/MM3 (ref 0.7–3.1)
LYMPHOCYTES NFR BLD AUTO: 12.9 % (ref 19.6–45.3)
MCH RBC QN AUTO: 30.5 PG (ref 26.6–33)
MCHC RBC AUTO-ENTMCNC: 32.9 G/DL (ref 31.5–35.7)
MCV RBC AUTO: 92.8 FL (ref 79–97)
MONOCYTES # BLD AUTO: 1.28 10*3/MM3 (ref 0.1–0.9)
MONOCYTES NFR BLD AUTO: 9.9 % (ref 5–12)
NEUTROPHILS NFR BLD AUTO: 70 % (ref 42.7–76)
NEUTROPHILS NFR BLD AUTO: 9.01 10*3/MM3 (ref 1.7–7)
PLATELET # BLD AUTO: 314 10*3/MM3 (ref 140–450)
PMV BLD AUTO: 9.1 FL (ref 6–12)
POTASSIUM SERPL-SCNC: 4.8 MMOL/L (ref 3.5–5.2)
PROT SERPL-MCNC: 7 G/DL (ref 6–8.5)
RBC # BLD AUTO: 4.59 10*6/MM3 (ref 4.14–5.8)
RETICS # AUTO: 0.05 10*6/MM3 (ref 0.02–0.13)
RETICS/RBC NFR AUTO: 1.04 % (ref 0.7–1.9)
SODIUM SERPL-SCNC: 137 MMOL/L (ref 136–145)
TIBC SERPL-MCNC: 307 MCG/DL (ref 298–536)
TIBC SERPL-MCNC: 322 MCG/DL (ref 298–536)
TRANSFERRIN SERPL-MCNC: 206 MG/DL (ref 200–360)
TRANSFERRIN SERPL-MCNC: 216 MG/DL (ref 200–360)
WBC NRBC COR # BLD AUTO: 12.9 10*3/MM3 (ref 3.4–10.8)

## 2025-08-12 PROCEDURE — 80053 COMPREHEN METABOLIC PANEL: CPT | Performed by: STUDENT IN AN ORGANIZED HEALTH CARE EDUCATION/TRAINING PROGRAM

## 2025-08-12 PROCEDURE — 84466 ASSAY OF TRANSFERRIN: CPT | Performed by: NURSE PRACTITIONER

## 2025-08-12 PROCEDURE — 83540 ASSAY OF IRON: CPT | Performed by: NURSE PRACTITIONER

## 2025-08-12 PROCEDURE — 85045 AUTOMATED RETICULOCYTE COUNT: CPT | Performed by: STUDENT IN AN ORGANIZED HEALTH CARE EDUCATION/TRAINING PROGRAM

## 2025-08-12 PROCEDURE — 83615 LACTATE (LD) (LDH) ENZYME: CPT | Performed by: NURSE PRACTITIONER

## 2025-08-12 PROCEDURE — 82728 ASSAY OF FERRITIN: CPT | Performed by: STUDENT IN AN ORGANIZED HEALTH CARE EDUCATION/TRAINING PROGRAM

## 2025-08-12 PROCEDURE — 36415 COLL VENOUS BLD VENIPUNCTURE: CPT

## 2025-08-12 PROCEDURE — 85025 COMPLETE CBC W/AUTO DIFF WBC: CPT

## 2025-08-13 ENCOUNTER — HOSPITAL ENCOUNTER (EMERGENCY)
Facility: HOSPITAL | Age: 70
Discharge: HOME | End: 2025-08-13
Attending: EMERGENCY MEDICINE
Payer: MEDICARE

## 2025-08-13 ENCOUNTER — APPOINTMENT (OUTPATIENT)
Dept: GENERAL RADIOLOGY | Facility: HOSPITAL | Age: 70
End: 2025-08-13
Payer: MEDICARE

## 2025-08-13 VITALS
HEART RATE: 87 BPM | TEMPERATURE: 98.2 F | DIASTOLIC BLOOD PRESSURE: 57 MMHG | SYSTOLIC BLOOD PRESSURE: 112 MMHG | OXYGEN SATURATION: 95 % | WEIGHT: 192 LBS | BODY MASS INDEX: 29.1 KG/M2 | HEIGHT: 68 IN | RESPIRATION RATE: 18 BRPM

## 2025-08-13 DIAGNOSIS — S90.32XA CONTUSION OF LEFT FOOT, INITIAL ENCOUNTER: Primary | ICD-10-CM

## 2025-08-13 PROCEDURE — 73630 X-RAY EXAM OF FOOT: CPT

## 2025-08-13 PROCEDURE — 99283 EMERGENCY DEPT VISIT LOW MDM: CPT

## 2025-08-13 RX ORDER — HYDROCODONE BITARTRATE AND ACETAMINOPHEN 5; 325 MG/1; MG/1
1 TABLET ORAL ONCE AS NEEDED
Refills: 0 | Status: COMPLETED | OUTPATIENT
Start: 2025-08-13 | End: 2025-08-13

## 2025-08-13 RX ORDER — TRAMADOL HYDROCHLORIDE 50 MG/1
50 TABLET ORAL EVERY 6 HOURS PRN
Qty: 12 TABLET | Refills: 0 | Status: SHIPPED | OUTPATIENT
Start: 2025-08-13

## 2025-08-13 RX ADMIN — HYDROCODONE BITARTRATE AND ACETAMINOPHEN 1 TABLET: 5; 325 TABLET ORAL at 22:07

## 2025-08-18 ENCOUNTER — TELEPHONE (OUTPATIENT)
Dept: ONCOLOGY | Facility: CLINIC | Age: 70
End: 2025-08-18
Payer: MEDICARE

## 2025-08-20 LAB
ABNORMAL WBC NFR SPEC FC: NORMAL %
ANNOTATION COMMENT IMP: NORMAL
ASSESSMENT OF LEUKOCYTES: NORMAL
CLINICAL INFO: NORMAL
GATING STRATEGY: NORMAL
IMMUNOPHENOTYPING STUDY: NORMAL
LABORATORY COMMENT REPORT: NORMAL
PATH INTERP SPEC-IMP: NORMAL
PATHOLOGIST NAME: NORMAL
SPECIMEN SOURCE: NORMAL
VIABLE CELLS NFR SPEC: NORMAL %

## 2025-08-25 ENCOUNTER — OFFICE VISIT (OUTPATIENT)
Dept: ORTHOPEDIC SURGERY | Facility: CLINIC | Age: 70
End: 2025-08-25
Payer: MEDICARE

## 2025-08-25 VITALS — HEIGHT: 68 IN | BODY MASS INDEX: 29.1 KG/M2 | OXYGEN SATURATION: 96 % | HEART RATE: 75 BPM | WEIGHT: 192 LBS

## 2025-08-25 DIAGNOSIS — M79.644 FINGER PAIN, RIGHT: ICD-10-CM

## 2025-08-25 DIAGNOSIS — M67.441 DIGITAL MUCOUS CYST OF FINGER OF RIGHT HAND: Primary | ICD-10-CM

## 2025-08-28 ENCOUNTER — HOSPITAL ENCOUNTER (OUTPATIENT)
Dept: ULTRASOUND IMAGING | Facility: HOSPITAL | Age: 70
Discharge: HOME | End: 2025-08-28
Admitting: NURSE PRACTITIONER
Payer: MEDICARE

## 2025-08-28 DIAGNOSIS — R79.89 ELEVATED LFTS: ICD-10-CM

## 2025-08-28 DIAGNOSIS — J44.89 OBSTRUCTIVE CHRONIC BRONCHITIS WITHOUT EXACERBATION: ICD-10-CM

## 2025-08-28 DIAGNOSIS — R74.01 ELEVATION OF LEVELS OF LIVER TRANSAMINASE LEVELS: ICD-10-CM

## 2025-08-28 PROCEDURE — 76705 ECHO EXAM OF ABDOMEN: CPT

## 2025-08-28 RX ORDER — FLUTICASONE FUROATE, UMECLIDINIUM BROMIDE AND VILANTEROL TRIFENATATE 100; 62.5; 25 UG/1; UG/1; UG/1
1 POWDER RESPIRATORY (INHALATION) DAILY
Qty: 60 EACH | Refills: 4 | Status: SHIPPED | OUTPATIENT
Start: 2025-08-28

## (undated) DEVICE — DEV POSITION LK AND BIOP CAP SYS

## (undated) DEVICE — PENCL EVAC ULTRAVAC SMOKE W/BLD

## (undated) DEVICE — SNAR POLYP HOTSNARE/BRAIDED OVL/MINI 7F 2.8X10MM 230CM 1P/U

## (undated) DEVICE — FLTR ULPA W/FLD TRAP

## (undated) DEVICE — HIGH PERFORMANCE GUIDEWIRE: Brand: DREAMWIRE

## (undated) DEVICE — TRAP WIDEEYE POLYP

## (undated) DEVICE — RETRIEVAL BALLOON CATHETER: Brand: EXTRACTOR™ PRO RX

## (undated) DEVICE — ENDOPATH PNEUMONEEDLE INSUFFLATION NEEDLES WITH LUER LOCK CONNECTORS 120MM: Brand: ENDOPATH

## (undated) DEVICE — 2, DISPOSABLE SUCTION/IRRIGATOR WITH DISPOSABLE TIP: Brand: STRYKEFLOW

## (undated) DEVICE — ENDOPATH XCEL DILATING TIP TROCARS WITH STABILITY SLEEVES: Brand: ENDOPATH XCEL

## (undated) DEVICE — PAPR PRNT PK SONY W RIBN UPC55

## (undated) DEVICE — UNIVERSAL PLUS/FLOVAC, SUMP CANNULA WITH SIDE HOLES, CANNULA 5 MM X 32 CM: Brand: UNIVERSAL PLUS/FLOVAC

## (undated) DEVICE — SLV SCD CALF HEMOFORCE DVT THERP REPROC MD

## (undated) DEVICE — PK ENDO GI 50

## (undated) DEVICE — SOL LACTATED RINGER 1000ML

## (undated) DEVICE — DRSNG SURESITE WNDW 2.38X2.75

## (undated) DEVICE — PDS II VLT 0 107CM AG ST3: Brand: ENDOLOOP

## (undated) DEVICE — DRN WND EVAC BULB 100CC

## (undated) DEVICE — ENDOPOUCH RETRIEVER SPECIMEN RETRIEVAL BAGS: Brand: ENDOPOUCH RETRIEVER

## (undated) DEVICE — KT SURG TURNOVER 050

## (undated) DEVICE — BITEBLOCK ENDO W/STRAP 60F A/ LF DISP

## (undated) DEVICE — LAPAROSCOPIC GAS CONDITIONING DEVICE.: Brand: INSUFLOW

## (undated) DEVICE — SUT SILK 0 SH 30IN K834H

## (undated) DEVICE — RETRV BG SPECI GENISTRONG MD 240ML

## (undated) DEVICE — ADHS LIQ MASTISOL 2/3ML

## (undated) DEVICE — ENDOPATH XCEL WITH OPTIVIEW TECHNOLOGY UNIVERSAL TROCAR STABILITY SLEEVES: Brand: ENDOPATH XCEL OPTIVIEW

## (undated) DEVICE — SUT ETHLN 2/0 PS 18IN 585H

## (undated) DEVICE — ENDOPATH 5MM CURVED SCISSORS WITH MONOPOLAR CAUTERY: Brand: ENDOPATH

## (undated) DEVICE — GENERAL LAPAROSCOPY CDS: Brand: MEDLINE INDUSTRIES, INC.

## (undated) DEVICE — SUT VIC 0 SUTUPAK TIES 18IN J906G

## (undated) DEVICE — SOL IRRIG H2O 1000ML STRL

## (undated) DEVICE — SPHINCTEROTOME: Brand: DREAMTOME™ RX 44

## (undated) DEVICE — SYR LL TP 10ML STRL

## (undated) DEVICE — SUT VIC 3/0 SH 27IN J416H

## (undated) DEVICE — GLV SURG BIOGEL SENSR LTX PF SZ7.5

## (undated) DEVICE — BLAKE SILICONE DRAIN, 19 FR ROUND, HUBLESS WITH 1/4" BENDABLE TROCAR: Brand: BLAKE